# Patient Record
Sex: MALE | Race: WHITE | NOT HISPANIC OR LATINO | Employment: UNEMPLOYED | URBAN - METROPOLITAN AREA
[De-identification: names, ages, dates, MRNs, and addresses within clinical notes are randomized per-mention and may not be internally consistent; named-entity substitution may affect disease eponyms.]

---

## 2017-03-02 PROBLEM — H25.13 NUCLEAR SCLEROSIS OF BOTH EYES: Status: ACTIVE | Noted: 2017-03-02

## 2017-03-02 PROBLEM — E11.9 TYPE 2 DIABETES MELLITUS WITHOUT OPHTHALMIC MANIFESTATIONS: Status: ACTIVE | Noted: 2017-03-02

## 2017-03-02 PROBLEM — I10 BENIGN ESSENTIAL HTN: Status: ACTIVE | Noted: 2017-03-02

## 2017-06-05 PROBLEM — E11.65 UNCONTROLLED TYPE 2 DIABETES MELLITUS WITH HYPERGLYCEMIA, WITH LONG-TERM CURRENT USE OF INSULIN: Status: ACTIVE | Noted: 2017-06-05

## 2017-06-05 PROBLEM — T43.615A: Status: ACTIVE | Noted: 2017-06-05

## 2017-06-05 PROBLEM — E78.00 PURE HYPERCHOLESTEROLEMIA: Status: ACTIVE | Noted: 2017-06-05

## 2017-06-05 PROBLEM — R00.0 TACHYCARDIA: Status: ACTIVE | Noted: 2017-06-05

## 2017-06-05 PROBLEM — Z79.899 ON STATIN THERAPY: Status: ACTIVE | Noted: 2017-06-05

## 2017-06-05 PROBLEM — Z79.4 UNCONTROLLED TYPE 2 DIABETES MELLITUS WITH HYPERGLYCEMIA, WITH LONG-TERM CURRENT USE OF INSULIN: Status: ACTIVE | Noted: 2017-06-05

## 2017-09-21 PROBLEM — Z91.199 NONCOMPLIANCE WITH DIET AND MEDICATION REGIMEN: Status: ACTIVE | Noted: 2017-09-21

## 2017-09-21 PROBLEM — Z91.148 NONCOMPLIANCE WITH DIET AND MEDICATION REGIMEN: Status: ACTIVE | Noted: 2017-09-21

## 2017-09-21 PROBLEM — Z28.21 REFUSED INFLUENZA VACCINE: Status: ACTIVE | Noted: 2017-09-21

## 2018-02-22 ENCOUNTER — TELEPHONE (OUTPATIENT)
Dept: ADMINISTRATIVE | Facility: HOSPITAL | Age: 48
End: 2018-02-22

## 2018-05-29 PROBLEM — N40.1 BENIGN PROSTATIC HYPERPLASIA WITH URINARY FREQUENCY: Status: ACTIVE | Noted: 2018-05-29

## 2018-05-29 PROBLEM — R35.0 BENIGN PROSTATIC HYPERPLASIA WITH URINARY FREQUENCY: Status: ACTIVE | Noted: 2018-05-29

## 2018-10-11 ENCOUNTER — HOSPITAL ENCOUNTER (EMERGENCY)
Facility: HOSPITAL | Age: 48
Discharge: HOME | End: 2018-10-11
Attending: EMERGENCY MEDICINE
Payer: MEDICAID

## 2018-10-11 VITALS
WEIGHT: 195 LBS | TEMPERATURE: 97 F | RESPIRATION RATE: 16 BRPM | OXYGEN SATURATION: 98 % | DIASTOLIC BLOOD PRESSURE: 86 MMHG | SYSTOLIC BLOOD PRESSURE: 142 MMHG | BODY MASS INDEX: 29.65 KG/M2 | HEART RATE: 72 BPM

## 2018-10-11 DIAGNOSIS — B02.9 HERPES ZOSTER WITHOUT COMPLICATION: Primary | ICD-10-CM

## 2018-10-11 PROCEDURE — 99284 EMERGENCY DEPT VISIT MOD MDM: CPT | Mod: 25

## 2018-10-11 PROCEDURE — 63600175 PHARM REV CODE 636 W HCPCS: Performed by: NURSE PRACTITIONER

## 2018-10-11 PROCEDURE — 96372 THER/PROPH/DIAG INJ SC/IM: CPT

## 2018-10-11 RX ORDER — VALACYCLOVIR HYDROCHLORIDE 1 G/1
1000 TABLET, FILM COATED ORAL EVERY 8 HOURS
Qty: 21 TABLET | Refills: 0 | Status: SHIPPED | OUTPATIENT
Start: 2018-10-11 | End: 2020-09-22

## 2018-10-11 RX ORDER — GABAPENTIN 300 MG/1
300 CAPSULE ORAL EVERY 8 HOURS PRN
Qty: 63 CAPSULE | Refills: 0 | Status: SHIPPED | OUTPATIENT
Start: 2018-10-11 | End: 2024-03-21

## 2018-10-11 RX ORDER — KETOROLAC TROMETHAMINE 30 MG/ML
60 INJECTION, SOLUTION INTRAMUSCULAR; INTRAVENOUS
Status: COMPLETED | OUTPATIENT
Start: 2018-10-11 | End: 2018-10-11

## 2018-10-11 RX ADMIN — KETOROLAC TROMETHAMINE 60 MG: 30 INJECTION, SOLUTION INTRAMUSCULAR at 10:10

## 2018-10-11 NOTE — ED PROVIDER NOTES
"Encounter Date: 10/11/2018       History     Chief Complaint   Patient presents with    Rash     The history is provided by the patient.   Rash    This is a new problem. The current episode started several days ago (1 week). The problem has been gradually worsening. The problem is associated with nothing. The rash is present on the abdomen (right lower/flank). The pain is at a severity of 7/10. The pain has been constant since onset. Associated symptoms include blisters and pain. He has tried nothing for the symptoms.     Review of patient's allergies indicates:  No Known Allergies  Past Medical History:   Diagnosis Date    Diabetes mellitus     Hepatitis      Past Surgical History:   Procedure Laterality Date    NO PAST SURGERIES       Family History   Problem Relation Age of Onset    Diabetes Mother     Lung cancer Father      Social History     Tobacco Use    Smoking status: Current Every Day Smoker     Packs/day: 1.00     Types: Cigarettes    Smokeless tobacco: Former User   Substance Use Topics    Alcohol use: No    Drug use: Yes     Types: Methamphetamines     Comment: Patient stated "I'm use IV drug user with meth in the last 2 weeks."     Review of Systems   Constitutional: Negative for chills, fatigue and fever.   HENT: Negative for congestion, dental problem, ear pain, rhinorrhea, sore throat and trouble swallowing.    Eyes: Negative for pain, discharge, redness and visual disturbance.   Respiratory: Negative for cough, shortness of breath and wheezing.    Cardiovascular: Negative for chest pain, palpitations and leg swelling.   Gastrointestinal: Negative for abdominal pain, constipation, diarrhea, nausea and vomiting.   Genitourinary: Negative for difficulty urinating, dysuria, flank pain, frequency, hematuria and urgency.   Musculoskeletal: Negative for arthralgias, back pain, myalgias and neck pain.   Skin: Positive for rash. Negative for pallor and wound.   Neurological: Negative for " seizures, weakness and headaches.   Psychiatric/Behavioral: Negative.        Physical Exam     Initial Vitals [10/11/18 1012]   BP Pulse Resp Temp SpO2   (!) 140/91 76 16 97 °F (36.1 °C) 97 %      MAP       --         Physical Exam    Nursing note and vitals reviewed.  Constitutional: No distress.   HENT:   Head: Normocephalic and atraumatic.   Right Ear: External ear normal.   Left Ear: External ear normal.   Nose: Nose normal.   Mouth/Throat: Oropharynx is clear and moist.   Eyes: Conjunctivae, EOM and lids are normal. Pupils are equal, round, and reactive to light.   Neck: Neck supple.   Cardiovascular: Normal rate, regular rhythm, normal heart sounds and intact distal pulses.   Pulmonary/Chest: Breath sounds normal. No respiratory distress.   Abdominal: Soft. Bowel sounds are normal. There is no tenderness.   Musculoskeletal: Normal range of motion.   Neurological: He is alert and oriented to person, place, and time. He has normal strength.   Skin: Skin is warm and dry. Capillary refill takes less than 2 seconds. Rash noted.        Psychiatric: He has a normal mood and affect. His behavior is normal.         ED Course   Procedures                     Medications   ketorolac injection 60 mg (60 mg Intramuscular Given 10/11/18 1039)        Discussed plan of care with Dr. Jones, agrees with plan of care to treat with antiviral and gabapentin on discharge.               Clinical Impression:   The encounter diagnosis was Herpes zoster without complication.      Disposition:   Disposition: Discharged  Condition: Stable    The patient acknowledges that close follow up with medical provider is required. Instructed to follow up with PCP within 2 days. Patient was given specific return precautions. The patient agrees to comply with all instruction and directions given in the ER.                       Pamela Johns NP  10/11/18 2771

## 2018-10-11 NOTE — ED TRIAGE NOTES
Pt c/o small rash noted right side that appeared a couple days ago. Pt reported it began with inner pain about a week ago.

## 2018-10-11 NOTE — DISCHARGE INSTRUCTIONS
**Follow up with PCP in 24-48 hours. Return to ER with worsening of symptoms.     **Over the counter motrin as needed for pain and/or fever as directed on package insert. Drink plenty fluids. Get plenty rest. Wash hands frequently.     **Our goal in the emergency department is to always give you outstanding care and exceptional service. You may receive a survey by mail or e-mail in the next week regarding your experience in our ED. We would greatly appreciate your completing and returning the survey. Your feedback provides us with a way to recognize our staff who give very good care and it helps us learn how to improve when your experience was below our aspiration of excellence.

## 2018-10-31 PROBLEM — R80.9 TYPE 2 DIABETES MELLITUS WITH MICROALBUMINURIA, WITH LONG-TERM CURRENT USE OF INSULIN: Status: ACTIVE | Noted: 2017-03-02

## 2018-10-31 PROBLEM — E11.29 TYPE 2 DIABETES MELLITUS WITH MICROALBUMINURIA, WITH LONG-TERM CURRENT USE OF INSULIN: Status: ACTIVE | Noted: 2017-03-02

## 2018-10-31 PROBLEM — Z79.4 TYPE 2 DIABETES MELLITUS WITH MICROALBUMINURIA, WITH LONG-TERM CURRENT USE OF INSULIN: Status: ACTIVE | Noted: 2017-03-02

## 2019-01-02 ENCOUNTER — TELEPHONE (OUTPATIENT)
Dept: ADMINISTRATIVE | Facility: HOSPITAL | Age: 49
End: 2019-01-02

## 2019-05-06 ENCOUNTER — HOSPITAL ENCOUNTER (EMERGENCY)
Facility: HOSPITAL | Age: 49
Discharge: HOME | End: 2019-05-06
Attending: SURGERY
Payer: MEDICAID

## 2019-05-06 VITALS
HEART RATE: 76 BPM | SYSTOLIC BLOOD PRESSURE: 132 MMHG | BODY MASS INDEX: 30.41 KG/M2 | OXYGEN SATURATION: 98 % | WEIGHT: 200 LBS | TEMPERATURE: 98 F | RESPIRATION RATE: 16 BRPM | DIASTOLIC BLOOD PRESSURE: 75 MMHG

## 2019-05-06 DIAGNOSIS — L02.419 ABSCESS OF ANTECUBITAL FOSSA: Primary | ICD-10-CM

## 2019-05-06 PROCEDURE — 10060 I&D ABSCESS SIMPLE/SINGLE: CPT

## 2019-05-06 PROCEDURE — 87070 CULTURE OTHR SPECIMN AEROBIC: CPT

## 2019-05-06 PROCEDURE — 90471 IMMUNIZATION ADMIN: CPT | Performed by: SURGERY

## 2019-05-06 PROCEDURE — S0077 INJECTION, CLINDAMYCIN PHOSP: HCPCS | Performed by: SURGERY

## 2019-05-06 PROCEDURE — 63600175 PHARM REV CODE 636 W HCPCS: Performed by: SURGERY

## 2019-05-06 PROCEDURE — 99284 EMERGENCY DEPT VISIT MOD MDM: CPT | Mod: 25

## 2019-05-06 PROCEDURE — 90715 TDAP VACCINE 7 YRS/> IM: CPT | Performed by: SURGERY

## 2019-05-06 PROCEDURE — 96372 THER/PROPH/DIAG INJ SC/IM: CPT

## 2019-05-06 PROCEDURE — 25000003 PHARM REV CODE 250: Performed by: SURGERY

## 2019-05-06 RX ORDER — HYDROCODONE BITARTRATE AND ACETAMINOPHEN 5; 325 MG/1; MG/1
1 TABLET ORAL EVERY 4 HOURS PRN
Qty: 10 TABLET | Refills: 0 | Status: SHIPPED | OUTPATIENT
Start: 2019-05-06 | End: 2019-05-16

## 2019-05-06 RX ORDER — LIDOCAINE HYDROCHLORIDE 10 MG/ML
10 INJECTION, SOLUTION EPIDURAL; INFILTRATION; INTRACAUDAL; PERINEURAL
Status: COMPLETED | OUTPATIENT
Start: 2019-05-06 | End: 2019-05-06

## 2019-05-06 RX ORDER — MUPIROCIN 20 MG/G
OINTMENT TOPICAL 3 TIMES DAILY
Qty: 15 G | Refills: 0 | Status: SHIPPED | OUTPATIENT
Start: 2019-05-06 | End: 2019-05-16

## 2019-05-06 RX ORDER — MUPIROCIN 20 MG/G
1 OINTMENT TOPICAL
Status: COMPLETED | OUTPATIENT
Start: 2019-05-06 | End: 2019-05-06

## 2019-05-06 RX ORDER — CLINDAMYCIN HYDROCHLORIDE 300 MG/1
300 CAPSULE ORAL 4 TIMES DAILY
Qty: 28 CAPSULE | Refills: 0 | Status: SHIPPED | OUTPATIENT
Start: 2019-05-06 | End: 2019-05-13

## 2019-05-06 RX ORDER — CLINDAMYCIN PHOSPHATE 150 MG/ML
600 INJECTION, SOLUTION INTRAVENOUS
Status: COMPLETED | OUTPATIENT
Start: 2019-05-06 | End: 2019-05-06

## 2019-05-06 RX ADMIN — MUPIROCIN 22 G: 20 OINTMENT TOPICAL at 10:05

## 2019-05-06 RX ADMIN — LIDOCAINE HYDROCHLORIDE 100 MG: 10 INJECTION, SOLUTION EPIDURAL; INFILTRATION; INTRACAUDAL; PERINEURAL at 10:05

## 2019-05-06 RX ADMIN — CLOSTRIDIUM TETANI TOXOID ANTIGEN (FORMALDEHYDE INACTIVATED), CORYNEBACTERIUM DIPHTHERIAE TOXOID ANTIGEN (FORMALDEHYDE INACTIVATED), BORDETELLA PERTUSSIS TOXOID ANTIGEN (GLUTARALDEHYDE INACTIVATED), BORDETELLA PERTUSSIS FILAMENTOUS HEMAGGLUTININ ANTIGEN (FORMALDEHYDE INACTIVATED), BORDETELLA PERTUSSIS PERTACTIN ANTIGEN, AND BORDETELLA PERTUSSIS FIMBRIAE 2/3 ANTIGEN 0.5 ML: 5; 2; 2.5; 5; 3; 5 INJECTION, SUSPENSION INTRAMUSCULAR at 10:05

## 2019-05-06 RX ADMIN — CLINDAMYCIN PHOSPHATE 600 MG: 150 INJECTION, SOLUTION INTRAMUSCULAR; INTRAVENOUS at 10:05

## 2019-05-06 NOTE — ED PROVIDER NOTES
[facility]                                                 Chief Complaint  48 y.o. male with Cellulitis (rt elbow area)    History of Present Illness  Onofre Ceballos presents to the emergency room with abscess today  Patient presents with a right antecubital abscess from IV methamphetamine use  Patient states this abscess is been present since the weekend, worse this a.m.  Patient on exam has an obvious right antecubital abscess with fluctuance now  Patient has mild local reaction with no obvious cellulitis, no cellulitic spread  Patient has no MRSA history, has no history of abscess incision and drainage  After discussion with the patient, he agrees to incision and drainage this a.m.  I have also counseled the patient today to seek help for intravenous drug abuse    The history is provided by the patient   device was not used during this ER visit  Medical history: Diabetes, hepatitis, hypertension and cheek  No past surgical history  No known allergy    Review of Systems and Physical Exam      Review of Systems  -- Constitution - no fever, denies fatigue, no weakness, no chills  -- Eyes - no tearing or redness, no visual disturbance  -- Ear, Nose - no tinnitus or earache, no nasal congestion or discharge  -- Mouth,Throat - no sore throat, no toothache, normal voice, normal swallowing  -- Respiratory - denies cough and congestion, no shortness of breath, no KRUEGER  -- Cardiovascular - denies chest pain, no palpitations, denies claudication  -- Gastrointestinal - denies abdominal pain, nausea, vomiting, or diarrhea  -- Genitourinary - no dysuria, no hematuria, no flank pain, no bladder pain  -- Musculoskeletal - denies back pain, negative for trauma or injury  -- Neurological - no headache, denies weakness or seizure; no LOC  -- Skin - right-sided antecubital abscess    Vital Signs  His oral temperature is 98 °F (36.7 °C).   His blood pressure is 130/82 and his pulse is  88.   His respiration is 16 and oxygen saturation is 98%.     Physical Exam  -- Nursing note and vitals reviewed  -- Constitutional: Appears well-developed and well-nourished  -- Head: Atraumatic. Normocephalic. No obvious abnormality  -- Eyes: Pupils are equal and reactive to light. Normal conjunctiva and lids  -- Cardiac: Normal rate, regular rhythm and normal heart sounds  -- Pulmonary: Normal respiratory effort, breath sounds clear to auscultation  -- Abdominal: Soft, no tenderness. Normal bowel sounds. Normal liver edge  -- Musculoskeletal: Normal range of motion, no effusions. Joints stable   -- Neurological: No focal deficits. Showed good interaction with staff  -- Skin: 5 centimeter right antecubital abscess with local induration    Emergency Room Course          I & D - Incision and Drainage  -- Performed by: Jose Antonio Dowd M.D.  -- Date/Time: 10:36 AM 5/6/2019    -- Type: abscess  -- Location: Right antecubital  -- Anesthesia: local infiltration  -- Local anesthetic: lidocaine 1%   -- Anesthetic total: 10 ml  -- Scalpel size: 11  -- Incision type: single straight  -- Complexity: simple  -- Drainage: pus  -- Drainage amount: scant  -- Wound treatment: incision  -- Packing material: 1/4 in gauze  -- Wound culture taken    Medications Given  lidocaine (PF) 10 mg/ml (1%) injection 100 mg (100 mg Infiltration Given 5/6/19 1017)   clindamycin injection 600 mg (600 mg Intramuscular Given 5/6/19 1017)   mupirocin 2 % ointment 22 g (22 g Topical (Top) Given 5/6/19 1017)   Tdap vaccine injection 0.5 mL (0.5 mLs Intramuscular Given 5/6/19 1018)     Diagnosis  -- The encounter diagnosis was Abscess of antecubital fossa.    Disposition and Plan  -- Disposition: home  -- Condition: stable  -- Follow-up: Patient to follow up with MICHELLE Mayfield in 1-2 days.  -- I advised the patient that we have found no life threatening condition today  -- At this time, I believe the patient is clinically stable for discharge.   --  The patient acknowledges that close follow up with a MD is required   -- Patient agrees to comply with all instruction and direction given in the ER    This note is dictated on M*Modal word recognition program.  There are word recognition mistakes that are occasionally missed on review.          Jose Antonio Dowd MD  05/06/19 1038

## 2019-05-08 ENCOUNTER — HOSPITAL ENCOUNTER (EMERGENCY)
Facility: HOSPITAL | Age: 49
Discharge: HOME | End: 2019-05-08
Attending: SURGERY
Payer: MEDICAID

## 2019-05-08 VITALS
HEART RATE: 109 BPM | SYSTOLIC BLOOD PRESSURE: 155 MMHG | WEIGHT: 200 LBS | BODY MASS INDEX: 30.41 KG/M2 | RESPIRATION RATE: 16 BRPM | DIASTOLIC BLOOD PRESSURE: 87 MMHG | OXYGEN SATURATION: 95 % | TEMPERATURE: 98 F

## 2019-05-08 DIAGNOSIS — S41.101D ARM WOUND, RIGHT, SUBSEQUENT ENCOUNTER: Primary | ICD-10-CM

## 2019-05-08 LAB — BACTERIA SPEC AEROBE CULT: NORMAL

## 2019-05-08 PROCEDURE — 25000003 PHARM REV CODE 250: Performed by: SURGERY

## 2019-05-08 PROCEDURE — 99281 EMR DPT VST MAYX REQ PHY/QHP: CPT

## 2019-05-08 RX ADMIN — BACITRACIN, NEOMYCIN, POLYMYXIN B 1 EACH: 400; 3.5; 5 OINTMENT TOPICAL at 07:05

## 2019-05-08 NOTE — ED PROVIDER NOTES
"Encounter Date: 5/8/2019       History     Chief Complaint   Patient presents with    Wound Check     Patient is 48-year-old white male who had incision and drainage of right antecubital fossa abscess 2 days ago.  He returns for wound dressing change.        Review of patient's allergies indicates:  No Known Allergies  Past Medical History:   Diagnosis Date    Diabetes mellitus     Diabetes mellitus type I     Hepatitis     Hepatitis C     Hypertension     Shingles 10/2018     Past Surgical History:   Procedure Laterality Date    NO PAST SURGERIES       Family History   Problem Relation Age of Onset    Diabetes Mother     Lung cancer Father      Social History     Tobacco Use    Smoking status: Current Every Day Smoker     Packs/day: 1.00     Types: Cigarettes    Smokeless tobacco: Former User   Substance Use Topics    Alcohol use: No    Drug use: Yes     Types: Methamphetamines     Comment: Patient stated "I'm use IV drug user with meth in the last 2 weeks."     Review of Systems   Constitutional: Negative for fever.   HENT: Negative for sore throat.    Respiratory: Negative for shortness of breath.    Cardiovascular: Negative for chest pain.   Gastrointestinal: Negative for nausea.   Genitourinary: Negative for dysuria.   Musculoskeletal: Negative for back pain.   Skin: Positive for wound. Negative for rash.   Neurological: Negative for weakness.   Hematological: Does not bruise/bleed easily.       Physical Exam     Initial Vitals [05/08/19 0739]   BP Pulse Resp Temp SpO2   (!) 155/87 109 16 97.7 °F (36.5 °C) 95 %      MAP       --         Physical Exam    Nursing note and vitals reviewed.  Constitutional: He appears well-developed and well-nourished.   HENT:   Head: Normocephalic and atraumatic.   Eyes: EOM are normal. Pupils are equal, round, and reactive to light.   Neck: Normal range of motion. Neck supple.   Cardiovascular: Normal rate.   Pulmonary/Chest: Breath sounds normal.   Abdominal: Soft. "   Musculoskeletal: Normal range of motion.   Neurological: He is alert and oriented to person, place, and time.   Skin: Skin is warm and dry.   Open abscess cavity right antecubital fossa, clean, granulating, no purulent drainage   Psychiatric: He has a normal mood and affect. Thought content normal.         ED Course   Procedures  Labs Reviewed - No data to display       Imaging Results    None                               Clinical Impression:       ICD-10-CM ICD-9-CM   1. Arm wound, right, subsequent encounter S41.101D V58.89     884.0         Disposition:   Disposition: Discharged  Condition: Stable                        Juaquin William Jr., MD  05/08/19 0752

## 2019-10-10 ENCOUNTER — HOSPITAL ENCOUNTER (EMERGENCY)
Facility: HOSPITAL | Age: 49
Discharge: HOME | End: 2019-10-10
Attending: SURGERY
Payer: MEDICAID

## 2019-10-10 VITALS
BODY MASS INDEX: 30.17 KG/M2 | RESPIRATION RATE: 18 BRPM | TEMPERATURE: 97 F | DIASTOLIC BLOOD PRESSURE: 82 MMHG | HEART RATE: 73 BPM | SYSTOLIC BLOOD PRESSURE: 164 MMHG | OXYGEN SATURATION: 98 % | WEIGHT: 198.44 LBS

## 2019-10-10 DIAGNOSIS — R10.11 RIGHT UPPER QUADRANT ABDOMINAL PAIN: ICD-10-CM

## 2019-10-10 DIAGNOSIS — R10.13 EPIGASTRIC PAIN: ICD-10-CM

## 2019-10-10 DIAGNOSIS — R10.9 ABDOMINAL PAIN, UNSPECIFIED ABDOMINAL LOCATION: Primary | ICD-10-CM

## 2019-10-10 LAB
ALBUMIN SERPL BCP-MCNC: 3.4 G/DL (ref 3.5–5.2)
ALP SERPL-CCNC: 103 U/L (ref 55–135)
ALT SERPL W/O P-5'-P-CCNC: 50 U/L (ref 10–44)
AMPHET+METHAMPHET UR QL: NEGATIVE
ANION GAP SERPL CALC-SCNC: 10 MMOL/L (ref 8–16)
AST SERPL-CCNC: 26 U/L (ref 10–40)
BACTERIA #/AREA URNS HPF: NORMAL /HPF
BARBITURATES UR QL SCN>200 NG/ML: NEGATIVE
BASOPHILS # BLD AUTO: 0.06 K/UL (ref 0–0.2)
BASOPHILS NFR BLD: 0.4 % (ref 0–1.9)
BENZODIAZ UR QL SCN>200 NG/ML: NEGATIVE
BILIRUB SERPL-MCNC: 0.3 MG/DL (ref 0.1–1)
BILIRUB UR QL STRIP: NEGATIVE
BUN SERPL-MCNC: 17 MG/DL (ref 6–20)
BZE UR QL SCN: NEGATIVE
CALCIUM SERPL-MCNC: 9.5 MG/DL (ref 8.7–10.5)
CANNABINOIDS UR QL SCN: NEGATIVE
CHLORIDE SERPL-SCNC: 104 MMOL/L (ref 95–110)
CK MB SERPL-MCNC: 1.3 NG/ML (ref 0.1–6.5)
CK MB SERPL-RTO: 2.1 % (ref 0–5)
CK SERPL-CCNC: 63 U/L (ref 20–200)
CK SERPL-CCNC: 63 U/L (ref 20–200)
CLARITY UR: CLEAR
CO2 SERPL-SCNC: 21 MMOL/L (ref 23–29)
COLOR UR: YELLOW
CREAT SERPL-MCNC: 1 MG/DL (ref 0.5–1.4)
CREAT UR-MCNC: 181.2 MG/DL (ref 23–375)
DIFFERENTIAL METHOD: ABNORMAL
EOSINOPHIL # BLD AUTO: 0.5 K/UL (ref 0–0.5)
EOSINOPHIL NFR BLD: 3.6 % (ref 0–8)
ERYTHROCYTE [DISTWIDTH] IN BLOOD BY AUTOMATED COUNT: 12.3 % (ref 11.5–14.5)
EST. GFR  (AFRICAN AMERICAN): >60 ML/MIN/1.73 M^2
EST. GFR  (NON AFRICAN AMERICAN): >60 ML/MIN/1.73 M^2
GLUCOSE SERPL-MCNC: 319 MG/DL (ref 70–110)
GLUCOSE UR QL STRIP: ABNORMAL
HCT VFR BLD AUTO: 48.2 % (ref 40–54)
HGB BLD-MCNC: 16.5 G/DL (ref 14–18)
HGB UR QL STRIP: ABNORMAL
HYALINE CASTS #/AREA URNS LPF: 0 /LPF
IMM GRANULOCYTES # BLD AUTO: 0.04 K/UL (ref 0–0.04)
IMM GRANULOCYTES NFR BLD AUTO: 0.3 % (ref 0–0.5)
KETONES UR QL STRIP: ABNORMAL
LEUKOCYTE ESTERASE UR QL STRIP: NEGATIVE
LIPASE SERPL-CCNC: 36 U/L (ref 4–60)
LYMPHOCYTES # BLD AUTO: 3.7 K/UL (ref 1–4.8)
LYMPHOCYTES NFR BLD: 27.3 % (ref 18–48)
MCH RBC QN AUTO: 32.5 PG (ref 27–31)
MCHC RBC AUTO-ENTMCNC: 34.2 G/DL (ref 32–36)
MCV RBC AUTO: 95 FL (ref 82–98)
METHADONE UR QL SCN>300 NG/ML: NEGATIVE
MICROSCOPIC COMMENT: NORMAL
MONOCYTES # BLD AUTO: 0.8 K/UL (ref 0.3–1)
MONOCYTES NFR BLD: 6 % (ref 4–15)
NEUTROPHILS # BLD AUTO: 8.5 K/UL (ref 1.8–7.7)
NEUTROPHILS NFR BLD: 62.4 % (ref 38–73)
NITRITE UR QL STRIP: NEGATIVE
NRBC BLD-RTO: 0 /100 WBC
OPIATES UR QL SCN: NEGATIVE
PCP UR QL SCN>25 NG/ML: NEGATIVE
PH UR STRIP: 6 [PH] (ref 5–8)
PLATELET # BLD AUTO: 292 K/UL (ref 150–350)
PMV BLD AUTO: 10.6 FL (ref 9.2–12.9)
POTASSIUM SERPL-SCNC: 4.4 MMOL/L (ref 3.5–5.1)
PROT SERPL-MCNC: 6.6 G/DL (ref 6–8.4)
PROT UR QL STRIP: ABNORMAL
RBC # BLD AUTO: 5.07 M/UL (ref 4.6–6.2)
RBC #/AREA URNS HPF: 2 /HPF (ref 0–4)
SODIUM SERPL-SCNC: 135 MMOL/L (ref 136–145)
SP GR UR STRIP: >=1.03 (ref 1–1.03)
TOXICOLOGY INFORMATION: NORMAL
TROPONIN I SERPL DL<=0.01 NG/ML-MCNC: 0.01 NG/ML (ref 0–0.03)
URN SPEC COLLECT METH UR: ABNORMAL
UROBILINOGEN UR STRIP-ACNC: NEGATIVE EU/DL
WBC # BLD AUTO: 13.65 K/UL (ref 3.9–12.7)
WBC #/AREA URNS HPF: 0 /HPF (ref 0–5)
YEAST URNS QL MICRO: NORMAL

## 2019-10-10 PROCEDURE — 80053 COMPREHEN METABOLIC PANEL: CPT

## 2019-10-10 PROCEDURE — 96374 THER/PROPH/DIAG INJ IV PUSH: CPT | Mod: 59

## 2019-10-10 PROCEDURE — 63600175 PHARM REV CODE 636 W HCPCS: Performed by: NURSE PRACTITIONER

## 2019-10-10 PROCEDURE — 84484 ASSAY OF TROPONIN QUANT: CPT

## 2019-10-10 PROCEDURE — 83690 ASSAY OF LIPASE: CPT

## 2019-10-10 PROCEDURE — 93005 ELECTROCARDIOGRAM TRACING: CPT

## 2019-10-10 PROCEDURE — 96361 HYDRATE IV INFUSION ADD-ON: CPT

## 2019-10-10 PROCEDURE — 82553 CREATINE MB FRACTION: CPT

## 2019-10-10 PROCEDURE — 85025 COMPLETE CBC W/AUTO DIFF WBC: CPT

## 2019-10-10 PROCEDURE — 80307 DRUG TEST PRSMV CHEM ANLYZR: CPT

## 2019-10-10 PROCEDURE — 93010 ELECTROCARDIOGRAM REPORT: CPT | Mod: ,,, | Performed by: INTERNAL MEDICINE

## 2019-10-10 PROCEDURE — 93010 EKG 12-LEAD: ICD-10-PCS | Mod: ,,, | Performed by: INTERNAL MEDICINE

## 2019-10-10 PROCEDURE — 25500020 PHARM REV CODE 255: Performed by: SURGERY

## 2019-10-10 PROCEDURE — 82550 ASSAY OF CK (CPK): CPT

## 2019-10-10 PROCEDURE — 36415 COLL VENOUS BLD VENIPUNCTURE: CPT

## 2019-10-10 PROCEDURE — 99285 EMERGENCY DEPT VISIT HI MDM: CPT | Mod: 25

## 2019-10-10 PROCEDURE — 81000 URINALYSIS NONAUTO W/SCOPE: CPT | Mod: 59

## 2019-10-10 RX ORDER — KETOROLAC TROMETHAMINE 30 MG/ML
30 INJECTION, SOLUTION INTRAMUSCULAR; INTRAVENOUS
Status: COMPLETED | OUTPATIENT
Start: 2019-10-10 | End: 2019-10-10

## 2019-10-10 RX ORDER — SODIUM CHLORIDE 9 MG/ML
500 INJECTION, SOLUTION INTRAVENOUS
Status: COMPLETED | OUTPATIENT
Start: 2019-10-10 | End: 2019-10-10

## 2019-10-10 RX ADMIN — SODIUM CHLORIDE 500 ML: 0.9 INJECTION, SOLUTION INTRAVENOUS at 09:10

## 2019-10-10 RX ADMIN — SODIUM CHLORIDE 500 ML: 0.9 INJECTION, SOLUTION INTRAVENOUS at 08:10

## 2019-10-10 RX ADMIN — IOHEXOL 30 ML: 350 INJECTION, SOLUTION INTRAVENOUS at 08:10

## 2019-10-10 RX ADMIN — KETOROLAC TROMETHAMINE 30 MG: 30 INJECTION, SOLUTION INTRAMUSCULAR at 08:10

## 2019-10-10 RX ADMIN — IOHEXOL 100 ML: 350 INJECTION, SOLUTION INTRAVENOUS at 09:10

## 2019-10-11 ENCOUNTER — HOSPITAL ENCOUNTER (EMERGENCY)
Facility: HOSPITAL | Age: 49
Discharge: HOME | End: 2019-10-11
Attending: SURGERY
Payer: MEDICAID

## 2019-10-11 VITALS
WEIGHT: 198.44 LBS | SYSTOLIC BLOOD PRESSURE: 152 MMHG | TEMPERATURE: 96 F | HEART RATE: 82 BPM | RESPIRATION RATE: 18 BRPM | BODY MASS INDEX: 30.17 KG/M2 | DIASTOLIC BLOOD PRESSURE: 73 MMHG

## 2019-10-11 DIAGNOSIS — K59.00 CONSTIPATION: ICD-10-CM

## 2019-10-11 DIAGNOSIS — K59.00 CONSTIPATION, UNSPECIFIED CONSTIPATION TYPE: Primary | ICD-10-CM

## 2019-10-11 LAB
ALBUMIN SERPL BCP-MCNC: 3.3 G/DL (ref 3.5–5.2)
ALP SERPL-CCNC: 97 U/L (ref 55–135)
ALT SERPL W/O P-5'-P-CCNC: 46 U/L (ref 10–44)
AMPHET+METHAMPHET UR QL: NEGATIVE
ANION GAP SERPL CALC-SCNC: 10 MMOL/L (ref 8–16)
AST SERPL-CCNC: 25 U/L (ref 10–40)
BACTERIA #/AREA URNS HPF: NORMAL /HPF
BARBITURATES UR QL SCN>200 NG/ML: NEGATIVE
BASOPHILS # BLD AUTO: 0.05 K/UL (ref 0–0.2)
BASOPHILS NFR BLD: 0.4 % (ref 0–1.9)
BENZODIAZ UR QL SCN>200 NG/ML: NEGATIVE
BILIRUB SERPL-MCNC: 0.3 MG/DL (ref 0.1–1)
BILIRUB UR QL STRIP: NEGATIVE
BUN SERPL-MCNC: 11 MG/DL (ref 6–20)
BZE UR QL SCN: NEGATIVE
CALCIUM SERPL-MCNC: 9 MG/DL (ref 8.7–10.5)
CANNABINOIDS UR QL SCN: NEGATIVE
CHLORIDE SERPL-SCNC: 103 MMOL/L (ref 95–110)
CK MB SERPL-MCNC: 1.4 NG/ML (ref 0.1–6.5)
CK MB SERPL-RTO: 1.8 % (ref 0–5)
CK SERPL-CCNC: 79 U/L (ref 20–200)
CK SERPL-CCNC: 79 U/L (ref 20–200)
CLARITY UR: CLEAR
CO2 SERPL-SCNC: 23 MMOL/L (ref 23–29)
COLOR UR: YELLOW
CREAT SERPL-MCNC: 0.8 MG/DL (ref 0.5–1.4)
CREAT UR-MCNC: 89.7 MG/DL (ref 23–375)
DIFFERENTIAL METHOD: ABNORMAL
EOSINOPHIL # BLD AUTO: 0.3 K/UL (ref 0–0.5)
EOSINOPHIL NFR BLD: 2.7 % (ref 0–8)
ERYTHROCYTE [DISTWIDTH] IN BLOOD BY AUTOMATED COUNT: 12.2 % (ref 11.5–14.5)
EST. GFR  (AFRICAN AMERICAN): >60 ML/MIN/1.73 M^2
EST. GFR  (NON AFRICAN AMERICAN): >60 ML/MIN/1.73 M^2
GLUCOSE SERPL-MCNC: 250 MG/DL (ref 70–110)
GLUCOSE UR QL STRIP: ABNORMAL
HCT VFR BLD AUTO: 47.1 % (ref 40–54)
HGB BLD-MCNC: 16.5 G/DL (ref 14–18)
HGB UR QL STRIP: ABNORMAL
HYALINE CASTS #/AREA URNS LPF: 1 /LPF
IMM GRANULOCYTES # BLD AUTO: 0.05 K/UL (ref 0–0.04)
IMM GRANULOCYTES NFR BLD AUTO: 0.4 % (ref 0–0.5)
KETONES UR QL STRIP: NEGATIVE
LEUKOCYTE ESTERASE UR QL STRIP: NEGATIVE
LIPASE SERPL-CCNC: 62 U/L (ref 4–60)
LYMPHOCYTES # BLD AUTO: 3.4 K/UL (ref 1–4.8)
LYMPHOCYTES NFR BLD: 27 % (ref 18–48)
MCH RBC QN AUTO: 32.4 PG (ref 27–31)
MCHC RBC AUTO-ENTMCNC: 35 G/DL (ref 32–36)
MCV RBC AUTO: 92 FL (ref 82–98)
METHADONE UR QL SCN>300 NG/ML: NEGATIVE
MICROSCOPIC COMMENT: NORMAL
MONOCYTES # BLD AUTO: 0.7 K/UL (ref 0.3–1)
MONOCYTES NFR BLD: 5.8 % (ref 4–15)
NEUTROPHILS # BLD AUTO: 8 K/UL (ref 1.8–7.7)
NEUTROPHILS NFR BLD: 63.7 % (ref 38–73)
NITRITE UR QL STRIP: NEGATIVE
NRBC BLD-RTO: 0 /100 WBC
OPIATES UR QL SCN: NEGATIVE
PCP UR QL SCN>25 NG/ML: NEGATIVE
PH UR STRIP: 6 [PH] (ref 5–8)
PLATELET # BLD AUTO: 288 K/UL (ref 150–350)
PMV BLD AUTO: 10.4 FL (ref 9.2–12.9)
POTASSIUM SERPL-SCNC: 3.8 MMOL/L (ref 3.5–5.1)
PROT SERPL-MCNC: 6.4 G/DL (ref 6–8.4)
PROT UR QL STRIP: ABNORMAL
RBC # BLD AUTO: 5.1 M/UL (ref 4.6–6.2)
RBC #/AREA URNS HPF: 1 /HPF (ref 0–4)
SODIUM SERPL-SCNC: 136 MMOL/L (ref 136–145)
SP GR UR STRIP: 1.02 (ref 1–1.03)
SQUAMOUS #/AREA URNS HPF: 0 /HPF
TOXICOLOGY INFORMATION: NORMAL
TROPONIN I SERPL DL<=0.01 NG/ML-MCNC: 0.01 NG/ML (ref 0–0.03)
URN SPEC COLLECT METH UR: ABNORMAL
UROBILINOGEN UR STRIP-ACNC: NEGATIVE EU/DL
WBC # BLD AUTO: 12.61 K/UL (ref 3.9–12.7)
WBC #/AREA URNS HPF: 2 /HPF (ref 0–5)

## 2019-10-11 PROCEDURE — 93010 ELECTROCARDIOGRAM REPORT: CPT | Mod: ,,, | Performed by: INTERNAL MEDICINE

## 2019-10-11 PROCEDURE — 82553 CREATINE MB FRACTION: CPT

## 2019-10-11 PROCEDURE — 80307 DRUG TEST PRSMV CHEM ANLYZR: CPT

## 2019-10-11 PROCEDURE — 82550 ASSAY OF CK (CPK): CPT

## 2019-10-11 PROCEDURE — 81000 URINALYSIS NONAUTO W/SCOPE: CPT | Mod: 59

## 2019-10-11 PROCEDURE — 80053 COMPREHEN METABOLIC PANEL: CPT

## 2019-10-11 PROCEDURE — 93010 EKG 12-LEAD: ICD-10-PCS | Mod: ,,, | Performed by: INTERNAL MEDICINE

## 2019-10-11 PROCEDURE — 84484 ASSAY OF TROPONIN QUANT: CPT

## 2019-10-11 PROCEDURE — 85025 COMPLETE CBC W/AUTO DIFF WBC: CPT

## 2019-10-11 PROCEDURE — 25000003 PHARM REV CODE 250: Performed by: SURGERY

## 2019-10-11 PROCEDURE — 36415 COLL VENOUS BLD VENIPUNCTURE: CPT

## 2019-10-11 PROCEDURE — 99285 EMERGENCY DEPT VISIT HI MDM: CPT | Mod: 25

## 2019-10-11 PROCEDURE — 83690 ASSAY OF LIPASE: CPT

## 2019-10-11 PROCEDURE — 93005 ELECTROCARDIOGRAM TRACING: CPT

## 2019-10-11 RX ORDER — LACTULOSE 10 G/15ML
20 SOLUTION ORAL
Status: COMPLETED | OUTPATIENT
Start: 2019-10-11 | End: 2019-10-11

## 2019-10-11 RX ORDER — POLYETHYLENE GLYCOL 3350 17 G/17G
17 POWDER, FOR SOLUTION ORAL DAILY
Qty: 1 BOTTLE | Refills: 0 | Status: ON HOLD | OUTPATIENT
Start: 2019-10-11

## 2019-10-11 RX ORDER — DOCUSATE SODIUM 100 MG/1
100 CAPSULE, LIQUID FILLED ORAL 2 TIMES DAILY PRN
Qty: 30 CAPSULE | Refills: 0 | Status: ON HOLD | OUTPATIENT
Start: 2019-10-11

## 2019-10-11 RX ADMIN — LACTULOSE 20 G: 20 SOLUTION ORAL at 06:10

## 2019-10-11 NOTE — ED NOTES
Discharged to home/self care.    - Condition at discharge: Good  - Mode of Discharge: Ambulatory  - The patient left the ED accompanied by a friend.  - The discharge instructions were discussed with the patient.  - He states an understanding of the discharge instructions.  - Walked pt to the discharge station.

## 2019-10-11 NOTE — ED PROVIDER NOTES
[facility]                                                 Chief Complaint  48 y.o. male with Abdominal Pain    History of Present Illness  Onofre Ceballos presents to the emergency room with constipation  Patient has constipation today, patient was seen last night in the ER also  Patient was diagnosed last night with constipation, had a CT scan abdomen  Patient states that he has had continued constipation today, return to ER  Patient has a soft flat abdomen with no signs of peritonitis or rebound now  No fever, no weight loss, or diarrhea, no active emesis on ER evaluation  Patient states he would like additional medication for constipation tonight    The history is provided by the patient   device was not used during this ER visit  Medical history: Diabetes, hepatitis, hypertension and cheek  No past surgical history  No known allergy    I have reviewed all of this patient's past medical, surgical, family, and social   histories as well as active allergies and medications documented in the  electronic medical record    Review of Systems and Physical Exam      Review of Systems  -- Constitution - no fever, denies fatigue, no weakness, no chills  -- Eyes - no tearing or redness, no visual disturbance  -- Ear, Nose - no tinnitus or earache, no nasal congestion or discharge  -- Mouth,Throat - no sore throat, no toothache, normal voice, normal swallowing  -- Respiratory - denies cough and congestion, no shortness of breath, no KRUEGER  -- Cardiovascular - denies chest pain, no palpitations, denies claudication  -- Gastrointestinal - constipation, denies abdominal pain, nausea, vomiting  -- Genitourinary - no dysuria, denies flank pain, no hematuria, no STD risk  -- Musculoskeletal - denies back pain, negative for trauma or injury  -- Neurological - no headache, denies weakness or seizure; no LOC  -- Skin - denies pallor, rash, or changes in skin. no hives or welts noted  --  Psychiatric - Denies SI or HI, no psychosis or fractured thought noted     Vital Signs  His temperature is 95.8 °F (35.4 °C)  His blood pressure is 160/92 and his pulse is 78.   His respiration is 20.     Physical Exam  -- Nursing note and vitals reviewed  -- Constitutional: Appears well-developed and well-nourished  -- Head: Atraumatic. Normocephalic. No obvious abnormality  -- Eyes: Pupils are equal and reactive to light. Normal conjunctiva and lids  -- Cardiac: Normal rate, regular rhythm and normal heart sounds  -- Pulmonary: Normal respiratory effort, breath sounds clear to auscultation  -- Abdominal: Soft, no tenderness. Normal bowel sounds. Normal liver edge  -- Musculoskeletal: Normal range of motion, no effusions. Joints stable   -- Neurological: No focal deficits. Showed good interaction with staff  -- Vascular: Posterior tibial, dorsalis pedis and radial pulses 2+ bilaterally      Emergency Room Course      Lab Results     K 3.8      CO2 23   BUN 11   CREATININE 0.8    (H)   ALKPHOS 97   AST 25   ALT 46 (H)   BILITOT 0.3   ALBUMIN 3.3 (L)   PROT 6.4   WBC 12.61   HGB 16.5   HCT 47.1      CPK 79   CPK 79   CPKMB 1.4   TROPONINI 0.006     Urinalysis  -- Urinalysis performed during this ER visit showed no signs of infection      EKG   -- The EKG findings today were without concerning findings from baseline     Radiology  -- Flat and erect abdominal x-ray performed in the ER today was within normal limits     Medications Given  lactulose 20 gram/30 mL solution Soln 20 g (20 g Oral Given 10/11/19 1816)     Diagnosis  -- Constipation    Disposition and Plan  -- Disposition: home  -- Condition: stable  -- Follow-up: Patient to follow up with MICHELLE Mayfield in 1-2 days.  -- I advised the patient that we have found no life threatening condition today  -- At this time, I believe the patient is clinically stable for discharge.   -- The patient acknowledges that close follow up with a MD  is required   -- Patient agrees to comply with all instruction and direction given in the ER    This note is dictated on M*Modal word recognition program.  There are word recognition mistakes that are occasionally missed on review.         Jose Antonio Dowd MD  10/11/19 1932

## 2019-10-11 NOTE — ED PROVIDER NOTES
"Encounter Date: 10/10/2019       History     Chief Complaint   Patient presents with    Abdominal Pain     Onofre Ceballos is a 48 y.o. male with PMH of DM type 1, hepatitis-C, hypertension, and shingles who presents the ED with reports of right flank pain. Patient reports that pain began approximately 3 days ago.  Pain is described as intermittent, and aching, currently rated 5/10 on pain scale.  He denies abdominal pain. He denies nausea, vomiting, or diarrhea.  He denies dysuria, urgency, or frequency.  He denies hematuria or history of kidney stones.  He reports history of constipation, last BM approximately 3 days ago.  He denies blood or mucus in stool.  He denies chest pain or shortness of breath.  He denies history of abdominal surgeries.    The history is provided by the patient.     Review of patient's allergies indicates:  No Known Allergies  Past Medical History:   Diagnosis Date    Diabetes mellitus     Diabetes mellitus type I     Hepatitis     Hepatitis C     Hypertension     Shingles 10/2018     Past Surgical History:   Procedure Laterality Date    NO PAST SURGERIES       Family History   Problem Relation Age of Onset    Diabetes Mother     Lung cancer Father      Social History     Tobacco Use    Smoking status: Current Every Day Smoker     Packs/day: 1.00     Types: Cigarettes    Smokeless tobacco: Former User   Substance Use Topics    Alcohol use: No    Drug use: Yes     Types: Methamphetamines     Comment: Patient stated "I'm use IV drug user with meth in the last 2 weeks."     Review of Systems   Constitutional: Negative.  Negative for appetite change, chills and fever.   HENT: Negative.  Negative for congestion, ear discharge, ear pain, postnasal drip, rhinorrhea and sore throat.    Eyes: Negative.    Respiratory: Negative.  Negative for cough, chest tightness and shortness of breath.    Cardiovascular: Negative.  Negative for chest pain.   Gastrointestinal: Positive for " constipation. Negative for abdominal distention, abdominal pain and nausea.   Endocrine: Negative.    Genitourinary: Positive for flank pain (right). Negative for dysuria, hematuria and urgency.   Musculoskeletal: Negative for arthralgias and back pain.   Skin: Negative.  Negative for rash.   Allergic/Immunologic: Negative.    Neurological: Negative.  Negative for dizziness, weakness, numbness and headaches.   Hematological: Negative.  Does not bruise/bleed easily.   Psychiatric/Behavioral: Negative.        Physical Exam     Initial Vitals [10/10/19 1835]   BP Pulse Resp Temp SpO2   (!) 164/82 73 18 97.4 °F (36.3 °C) --      MAP       --         Physical Exam    Nursing note and vitals reviewed.  Constitutional: He appears well-developed and well-nourished.   HENT:   Head: Normocephalic and atraumatic.   Eyes: Conjunctivae and EOM are normal. Pupils are equal, round, and reactive to light.   Neck: Neck supple.   Cardiovascular: Normal rate, regular rhythm, normal heart sounds and intact distal pulses.   Pulmonary/Chest: Breath sounds normal.   Abdominal: Soft. Normal appearance and bowel sounds are normal. He exhibits no distension and no ascites. There is no hepatosplenomegaly, splenomegaly or hepatomegaly. There is no tenderness. There is no rigidity, no rebound, no guarding, no CVA tenderness, no tenderness at McBurney's point and negative Ramos's sign. No hernia.   Abdomen soft, nondistended and nontender. Negative Ramos's.  No rebound or guarding noted. Active bowel sounds x4 quadrants.   Musculoskeletal: Normal range of motion.   Neurological: He is alert and oriented to person, place, and time. He has normal strength.   Skin: Skin is warm and dry.   Psychiatric: He has a normal mood and affect. His behavior is normal. Judgment and thought content normal.         ED Course   Procedures  Labs Reviewed   CBC W/ AUTO DIFFERENTIAL - Abnormal; Notable for the following components:       Result Value    WBC 13.65  (*)     Mean Corpuscular Hemoglobin 32.5 (*)     Gran # (ANC) 8.5 (*)     All other components within normal limits   COMPREHENSIVE METABOLIC PANEL   LIPASE   URINALYSIS, REFLEX TO URINE CULTURE   DRUG SCREEN PANEL, URINE EMERGENCY   CK   CK-MB   TROPONIN I          Imaging Results    None            patient asymptomatic after treatment.  CT scan unremarkable.                    Clinical Impression:       ICD-10-CM ICD-9-CM   1. Abdominal pain, unspecified abdominal location R10.9 789.00   2. Epigastric pain R10.13 789.06   3. Right upper quadrant abdominal pain R10.11 789.01         Disposition:   Disposition: Discharged  Condition: Stable                        Juaquin William Jr., MD  10/10/19 4023

## 2019-10-11 NOTE — ED TRIAGE NOTES
Arrives per self transport from home. Presents with complaints of right sided abdominal pain. Seen in ED last night for the same complaint. CT scan found only constipation. Patient reports that he had a enema this morning but only had a small BM

## 2019-12-03 ENCOUNTER — PATIENT OUTREACH (OUTPATIENT)
Dept: ADMINISTRATIVE | Facility: HOSPITAL | Age: 49
End: 2019-12-03

## 2020-02-18 ENCOUNTER — PATIENT OUTREACH (OUTPATIENT)
Dept: ADMINISTRATIVE | Facility: HOSPITAL | Age: 50
End: 2020-02-18

## 2020-09-08 ENCOUNTER — PATIENT OUTREACH (OUTPATIENT)
Dept: ADMINISTRATIVE | Facility: HOSPITAL | Age: 50
End: 2020-09-08

## 2020-09-24 ENCOUNTER — TELEPHONE (OUTPATIENT)
Dept: ADMINISTRATIVE | Facility: HOSPITAL | Age: 50
End: 2020-09-24

## 2020-09-24 DIAGNOSIS — Z12.11 ENCOUNTER FOR SCREENING COLONOSCOPY: Primary | ICD-10-CM

## 2021-03-05 ENCOUNTER — LAB VISIT (OUTPATIENT)
Dept: LAB | Facility: HOSPITAL | Age: 51
End: 2021-03-05
Attending: NURSE PRACTITIONER
Payer: MEDICAID

## 2021-03-05 DIAGNOSIS — I10 ESSENTIAL (PRIMARY) HYPERTENSION: ICD-10-CM

## 2021-03-05 DIAGNOSIS — E11.9 TYPE 2 DIABETES MELLITUS WITHOUT COMPLICATION: ICD-10-CM

## 2021-03-05 DIAGNOSIS — Z79.4 LONG TERM CURRENT USE OF INSULIN: ICD-10-CM

## 2021-03-05 DIAGNOSIS — E78.5 HYPERLIPIDEMIA, UNSPECIFIED: Primary | ICD-10-CM

## 2021-03-05 DIAGNOSIS — N40.1 BENIGN PROSTATIC HYPERPLASIA WITH LOWER URINARY TRACT SYMPTOMS: ICD-10-CM

## 2021-03-05 LAB
ALBUMIN SERPL BCP-MCNC: 3.3 G/DL (ref 3.5–5.2)
ALBUMIN/CREAT UR: 1541.2 UG/MG (ref 0–30)
ALP SERPL-CCNC: 102 U/L (ref 55–135)
ALT SERPL W/O P-5'-P-CCNC: 61 U/L (ref 10–44)
ANION GAP SERPL CALC-SCNC: 10 MMOL/L (ref 8–16)
AST SERPL-CCNC: 25 U/L (ref 10–40)
BACTERIA #/AREA URNS HPF: NORMAL /HPF
BASOPHILS # BLD AUTO: 0.08 K/UL (ref 0–0.2)
BASOPHILS NFR BLD: 0.5 % (ref 0–1.9)
BILIRUB SERPL-MCNC: 0.4 MG/DL (ref 0.1–1)
BILIRUB UR QL STRIP: NEGATIVE
BUN SERPL-MCNC: 17 MG/DL (ref 6–20)
CALCIUM SERPL-MCNC: 8.7 MG/DL (ref 8.7–10.5)
CHLORIDE SERPL-SCNC: 102 MMOL/L (ref 95–110)
CHOLEST SERPL-MCNC: 147 MG/DL (ref 120–199)
CHOLEST/HDLC SERPL: 3 {RATIO} (ref 2–5)
CLARITY UR: CLEAR
CO2 SERPL-SCNC: 20 MMOL/L (ref 23–29)
COLOR UR: YELLOW
CREAT SERPL-MCNC: 0.9 MG/DL (ref 0.5–1.4)
CREAT UR-MCNC: 137.1 MG/DL (ref 23–375)
DIFFERENTIAL METHOD: ABNORMAL
EOSINOPHIL # BLD AUTO: 0.6 K/UL (ref 0–0.5)
EOSINOPHIL NFR BLD: 4.1 % (ref 0–8)
ERYTHROCYTE [DISTWIDTH] IN BLOOD BY AUTOMATED COUNT: 12.2 % (ref 11.5–14.5)
ERYTHROCYTE [DISTWIDTH] IN BLOOD BY AUTOMATED COUNT: 12.2 % (ref 11.5–14.5)
EST. GFR  (AFRICAN AMERICAN): >60 ML/MIN/1.73 M^2
EST. GFR  (NON AFRICAN AMERICAN): >60 ML/MIN/1.73 M^2
ESTIMATED AVG GLUCOSE: 246 MG/DL (ref 68–131)
GLUCOSE SERPL-MCNC: 309 MG/DL (ref 70–110)
GLUCOSE UR QL STRIP: ABNORMAL
HBA1C MFR BLD: 10.2 % (ref 4–5.6)
HCT VFR BLD AUTO: 47.3 % (ref 40–54)
HCT VFR BLD AUTO: 47.3 % (ref 40–54)
HDLC SERPL-MCNC: 49 MG/DL (ref 40–75)
HDLC SERPL: 33.3 % (ref 20–50)
HGB BLD-MCNC: 16.5 G/DL (ref 14–18)
HGB BLD-MCNC: 16.5 G/DL (ref 14–18)
HGB UR QL STRIP: ABNORMAL
HYALINE CASTS #/AREA URNS LPF: 0 /LPF
IMM GRANULOCYTES # BLD AUTO: 0.05 K/UL (ref 0–0.04)
IMM GRANULOCYTES NFR BLD AUTO: 0.3 % (ref 0–0.5)
KETONES UR QL STRIP: NEGATIVE
LDLC SERPL CALC-MCNC: 76.8 MG/DL (ref 63–159)
LEUKOCYTE ESTERASE UR QL STRIP: NEGATIVE
LYMPHOCYTES # BLD AUTO: 4.4 K/UL (ref 1–4.8)
LYMPHOCYTES NFR BLD: 28.2 % (ref 18–48)
MCH RBC QN AUTO: 33 PG (ref 27–31)
MCH RBC QN AUTO: 33 PG (ref 27–31)
MCHC RBC AUTO-ENTMCNC: 34.9 G/DL (ref 32–36)
MCHC RBC AUTO-ENTMCNC: 34.9 G/DL (ref 32–36)
MCV RBC AUTO: 95 FL (ref 82–98)
MCV RBC AUTO: 95 FL (ref 82–98)
MICROALBUMIN UR DL<=1MG/L-MCNC: 2113 UG/ML
MICROSCOPIC COMMENT: NORMAL
MONOCYTES # BLD AUTO: 1 K/UL (ref 0.3–1)
MONOCYTES NFR BLD: 6.4 % (ref 4–15)
NEUTROPHILS # BLD AUTO: 9.4 K/UL (ref 1.8–7.7)
NEUTROPHILS NFR BLD: 60.5 % (ref 38–73)
NITRITE UR QL STRIP: NEGATIVE
NONHDLC SERPL-MCNC: 98 MG/DL
NRBC BLD-RTO: 0 /100 WBC
PH UR STRIP: 6 [PH] (ref 5–8)
PLATELET # BLD AUTO: 326 K/UL (ref 150–350)
PLATELET # BLD AUTO: 326 K/UL (ref 150–350)
PMV BLD AUTO: 10.6 FL (ref 9.2–12.9)
PMV BLD AUTO: 10.6 FL (ref 9.2–12.9)
POTASSIUM SERPL-SCNC: 4.4 MMOL/L (ref 3.5–5.1)
PROT SERPL-MCNC: 6.5 G/DL (ref 6–8.4)
PROT UR QL STRIP: ABNORMAL
RBC # BLD AUTO: 5 M/UL (ref 4.6–6.2)
RBC # BLD AUTO: 5 M/UL (ref 4.6–6.2)
RBC #/AREA URNS HPF: 1 /HPF (ref 0–4)
SODIUM SERPL-SCNC: 132 MMOL/L (ref 136–145)
SP GR UR STRIP: >=1.03 (ref 1–1.03)
SQUAMOUS #/AREA URNS HPF: 2 /HPF
TRIGL SERPL-MCNC: 106 MG/DL (ref 30–150)
URN SPEC COLLECT METH UR: ABNORMAL
UROBILINOGEN UR STRIP-ACNC: NEGATIVE EU/DL
WBC # BLD AUTO: 15.6 K/UL (ref 3.9–12.7)
WBC # BLD AUTO: 15.6 K/UL (ref 3.9–12.7)
WBC #/AREA URNS HPF: 1 /HPF (ref 0–5)

## 2021-03-05 PROCEDURE — 83036 HEMOGLOBIN GLYCOSYLATED A1C: CPT | Performed by: NURSE PRACTITIONER

## 2021-03-05 PROCEDURE — 82043 UR ALBUMIN QUANTITATIVE: CPT | Performed by: NURSE PRACTITIONER

## 2021-03-05 PROCEDURE — 80053 COMPREHEN METABOLIC PANEL: CPT | Performed by: NURSE PRACTITIONER

## 2021-03-05 PROCEDURE — 80061 LIPID PANEL: CPT | Performed by: NURSE PRACTITIONER

## 2021-03-05 PROCEDURE — 85025 COMPLETE CBC W/AUTO DIFF WBC: CPT | Performed by: NURSE PRACTITIONER

## 2021-03-05 PROCEDURE — 36415 COLL VENOUS BLD VENIPUNCTURE: CPT | Performed by: NURSE PRACTITIONER

## 2021-03-05 PROCEDURE — 81000 URINALYSIS NONAUTO W/SCOPE: CPT | Performed by: NURSE PRACTITIONER

## 2021-03-05 PROCEDURE — 82570 ASSAY OF URINE CREATININE: CPT | Performed by: NURSE PRACTITIONER

## 2021-05-06 ENCOUNTER — PATIENT MESSAGE (OUTPATIENT)
Dept: RESEARCH | Facility: HOSPITAL | Age: 51
End: 2021-05-06

## 2021-06-28 ENCOUNTER — NURSE TRIAGE (OUTPATIENT)
Dept: ADMINISTRATIVE | Facility: CLINIC | Age: 51
End: 2021-06-28

## 2021-06-29 ENCOUNTER — LAB VISIT (OUTPATIENT)
Dept: LAB | Facility: HOSPITAL | Age: 51
End: 2021-06-29
Attending: NURSE PRACTITIONER
Payer: MEDICAID

## 2021-06-29 DIAGNOSIS — E78.5 HYPERLIPIDEMIA, UNSPECIFIED: ICD-10-CM

## 2021-06-29 DIAGNOSIS — I10 ESSENTIAL (PRIMARY) HYPERTENSION: ICD-10-CM

## 2021-06-29 DIAGNOSIS — E11.65 TYPE 2 DIABETES MELLITUS WITH HYPERGLYCEMIA: Primary | ICD-10-CM

## 2021-06-29 DIAGNOSIS — N40.1 BENIGN PROSTATIC HYPERPLASIA WITH LOWER URINARY TRACT SYMPTOMS: ICD-10-CM

## 2021-06-29 LAB
ALBUMIN SERPL BCP-MCNC: 3.2 G/DL (ref 3.5–5.2)
ALP SERPL-CCNC: 67 U/L (ref 55–135)
ALT SERPL W/O P-5'-P-CCNC: 55 U/L (ref 10–44)
ANION GAP SERPL CALC-SCNC: 9 MMOL/L (ref 8–16)
AST SERPL-CCNC: 31 U/L (ref 10–40)
BASOPHILS # BLD AUTO: 0.09 K/UL (ref 0–0.2)
BASOPHILS NFR BLD: 0.8 % (ref 0–1.9)
BILIRUB SERPL-MCNC: 0.4 MG/DL (ref 0.1–1)
BUN SERPL-MCNC: 12 MG/DL (ref 6–20)
CALCIUM SERPL-MCNC: 8.7 MG/DL (ref 8.7–10.5)
CHLORIDE SERPL-SCNC: 106 MMOL/L (ref 95–110)
CHOLEST SERPL-MCNC: 125 MG/DL (ref 120–199)
CHOLEST/HDLC SERPL: 3 {RATIO} (ref 2–5)
CO2 SERPL-SCNC: 21 MMOL/L (ref 23–29)
CREAT SERPL-MCNC: 0.9 MG/DL (ref 0.5–1.4)
DIFFERENTIAL METHOD: ABNORMAL
EOSINOPHIL # BLD AUTO: 0.7 K/UL (ref 0–0.5)
EOSINOPHIL NFR BLD: 6.5 % (ref 0–8)
ERYTHROCYTE [DISTWIDTH] IN BLOOD BY AUTOMATED COUNT: 13 % (ref 11.5–14.5)
EST. GFR  (AFRICAN AMERICAN): >60 ML/MIN/1.73 M^2
EST. GFR  (NON AFRICAN AMERICAN): >60 ML/MIN/1.73 M^2
ESTIMATED AVG GLUCOSE: 232 MG/DL (ref 68–131)
GLUCOSE SERPL-MCNC: 162 MG/DL (ref 70–110)
HBA1C MFR BLD: 9.7 % (ref 4–5.6)
HCT VFR BLD AUTO: 45.1 % (ref 40–54)
HDLC SERPL-MCNC: 42 MG/DL (ref 40–75)
HDLC SERPL: 33.6 % (ref 20–50)
HGB BLD-MCNC: 15.4 G/DL (ref 14–18)
IMM GRANULOCYTES # BLD AUTO: 0.05 K/UL (ref 0–0.04)
IMM GRANULOCYTES NFR BLD AUTO: 0.5 % (ref 0–0.5)
LDLC SERPL CALC-MCNC: 71.6 MG/DL (ref 63–159)
LYMPHOCYTES # BLD AUTO: 3.8 K/UL (ref 1–4.8)
LYMPHOCYTES NFR BLD: 34.8 % (ref 18–48)
MCH RBC QN AUTO: 33.4 PG (ref 27–31)
MCHC RBC AUTO-ENTMCNC: 34.1 G/DL (ref 32–36)
MCV RBC AUTO: 98 FL (ref 82–98)
MONOCYTES # BLD AUTO: 0.9 K/UL (ref 0.3–1)
MONOCYTES NFR BLD: 7.7 % (ref 4–15)
NEUTROPHILS # BLD AUTO: 5.5 K/UL (ref 1.8–7.7)
NEUTROPHILS NFR BLD: 49.7 % (ref 38–73)
NONHDLC SERPL-MCNC: 83 MG/DL
NRBC BLD-RTO: 0 /100 WBC
PLATELET # BLD AUTO: 290 K/UL (ref 150–450)
PMV BLD AUTO: 10.1 FL (ref 9.2–12.9)
POTASSIUM SERPL-SCNC: 4.6 MMOL/L (ref 3.5–5.1)
PROSTATE SPECIFIC ANTIGEN, TOTAL: 0.49 NG/ML (ref 0–4)
PROT SERPL-MCNC: 6.3 G/DL (ref 6–8.4)
PSA FREE MFR SERPL: 34.69 %
PSA FREE SERPL-MCNC: 0.17 NG/ML (ref 0.01–1.5)
RBC # BLD AUTO: 4.61 M/UL (ref 4.6–6.2)
SODIUM SERPL-SCNC: 136 MMOL/L (ref 136–145)
TRIGL SERPL-MCNC: 57 MG/DL (ref 30–150)
WBC # BLD AUTO: 11 K/UL (ref 3.9–12.7)

## 2021-06-29 PROCEDURE — 85025 COMPLETE CBC W/AUTO DIFF WBC: CPT | Performed by: NURSE PRACTITIONER

## 2021-06-29 PROCEDURE — 83036 HEMOGLOBIN GLYCOSYLATED A1C: CPT | Performed by: NURSE PRACTITIONER

## 2021-06-29 PROCEDURE — 84153 ASSAY OF PSA TOTAL: CPT | Performed by: NURSE PRACTITIONER

## 2021-06-29 PROCEDURE — 80053 COMPREHEN METABOLIC PANEL: CPT | Performed by: NURSE PRACTITIONER

## 2021-06-29 PROCEDURE — 36415 COLL VENOUS BLD VENIPUNCTURE: CPT | Performed by: NURSE PRACTITIONER

## 2021-06-29 PROCEDURE — 80061 LIPID PANEL: CPT | Performed by: NURSE PRACTITIONER

## 2021-06-29 PROCEDURE — 84154 ASSAY OF PSA FREE: CPT | Performed by: NURSE PRACTITIONER

## 2021-07-01 ENCOUNTER — PATIENT MESSAGE (OUTPATIENT)
Dept: ADMINISTRATIVE | Facility: OTHER | Age: 51
End: 2021-07-01

## 2022-01-24 ENCOUNTER — NURSE TRIAGE (OUTPATIENT)
Dept: ADMINISTRATIVE | Facility: CLINIC | Age: 52
End: 2022-01-24
Payer: MEDICAID

## 2022-01-24 NOTE — TELEPHONE ENCOUNTER
Reason for Disposition   Message left on unidentified voice mail. Phone number verified.    Protocols used: NO CONTACT OR DUPLICATE CONTACT CALL-A-[place]    Left message x 1 attempt.

## 2022-02-05 ENCOUNTER — HOSPITAL ENCOUNTER (EMERGENCY)
Facility: HOSPITAL | Age: 52
Discharge: HOME | End: 2022-02-05
Attending: STUDENT IN AN ORGANIZED HEALTH CARE EDUCATION/TRAINING PROGRAM
Payer: MEDICAID

## 2022-02-05 VITALS
HEART RATE: 65 BPM | WEIGHT: 183 LBS | SYSTOLIC BLOOD PRESSURE: 167 MMHG | OXYGEN SATURATION: 99 % | DIASTOLIC BLOOD PRESSURE: 95 MMHG | RESPIRATION RATE: 20 BRPM | BODY MASS INDEX: 27.82 KG/M2 | TEMPERATURE: 97 F

## 2022-02-05 DIAGNOSIS — L03.011 PARONYCHIA OF RIGHT INDEX FINGER: ICD-10-CM

## 2022-02-05 DIAGNOSIS — L03.011 CELLULITIS OF FINGER OF RIGHT HAND: Primary | ICD-10-CM

## 2022-02-05 LAB
ALBUMIN SERPL BCP-MCNC: 3.2 G/DL (ref 3.5–5.2)
ALP SERPL-CCNC: 108 U/L (ref 55–135)
ALT SERPL W/O P-5'-P-CCNC: 34 U/L (ref 10–44)
ANION GAP SERPL CALC-SCNC: 9 MMOL/L (ref 8–16)
AST SERPL-CCNC: 22 U/L (ref 10–40)
BASOPHILS # BLD AUTO: 0.04 K/UL (ref 0–0.2)
BASOPHILS NFR BLD: 0.4 % (ref 0–1.9)
BILIRUB SERPL-MCNC: 0.4 MG/DL (ref 0.1–1)
BUN SERPL-MCNC: 12 MG/DL (ref 6–20)
CALCIUM SERPL-MCNC: 9.2 MG/DL (ref 8.7–10.5)
CHLORIDE SERPL-SCNC: 100 MMOL/L (ref 95–110)
CO2 SERPL-SCNC: 21 MMOL/L (ref 23–29)
CREAT SERPL-MCNC: 0.9 MG/DL (ref 0.5–1.4)
DIFFERENTIAL METHOD: ABNORMAL
EOSINOPHIL # BLD AUTO: 0.4 K/UL (ref 0–0.5)
EOSINOPHIL NFR BLD: 3.6 % (ref 0–8)
ERYTHROCYTE [DISTWIDTH] IN BLOOD BY AUTOMATED COUNT: 11.9 % (ref 11.5–14.5)
EST. GFR  (AFRICAN AMERICAN): >60 ML/MIN/1.73 M^2
EST. GFR  (NON AFRICAN AMERICAN): >60 ML/MIN/1.73 M^2
GLUCOSE SERPL-MCNC: 429 MG/DL (ref 70–110)
HCT VFR BLD AUTO: 50.7 % (ref 40–54)
HGB BLD-MCNC: 17.5 G/DL (ref 14–18)
IMM GRANULOCYTES # BLD AUTO: 0.06 K/UL (ref 0–0.04)
IMM GRANULOCYTES NFR BLD AUTO: 0.6 % (ref 0–0.5)
LACTATE SERPL-SCNC: 1.1 MMOL/L (ref 0.5–2.2)
LYMPHOCYTES # BLD AUTO: 3.2 K/UL (ref 1–4.8)
LYMPHOCYTES NFR BLD: 32.2 % (ref 18–48)
MCH RBC QN AUTO: 33.1 PG (ref 27–31)
MCHC RBC AUTO-ENTMCNC: 34.5 G/DL (ref 32–36)
MCV RBC AUTO: 96 FL (ref 82–98)
MONOCYTES # BLD AUTO: 0.6 K/UL (ref 0.3–1)
MONOCYTES NFR BLD: 5.6 % (ref 4–15)
NEUTROPHILS # BLD AUTO: 5.6 K/UL (ref 1.8–7.7)
NEUTROPHILS NFR BLD: 57.6 % (ref 38–73)
NRBC BLD-RTO: 0 /100 WBC
PLATELET # BLD AUTO: 294 K/UL (ref 150–450)
PMV BLD AUTO: 10.8 FL (ref 9.2–12.9)
POTASSIUM SERPL-SCNC: 4.7 MMOL/L (ref 3.5–5.1)
PROT SERPL-MCNC: 6.8 G/DL (ref 6–8.4)
RBC # BLD AUTO: 5.29 M/UL (ref 4.6–6.2)
SODIUM SERPL-SCNC: 130 MMOL/L (ref 136–145)
WBC # BLD AUTO: 9.78 K/UL (ref 3.9–12.7)

## 2022-02-05 PROCEDURE — 99284 EMERGENCY DEPT VISIT MOD MDM: CPT | Mod: 25

## 2022-02-05 PROCEDURE — 25000003 PHARM REV CODE 250: Performed by: STUDENT IN AN ORGANIZED HEALTH CARE EDUCATION/TRAINING PROGRAM

## 2022-02-05 PROCEDURE — 36415 COLL VENOUS BLD VENIPUNCTURE: CPT | Performed by: STUDENT IN AN ORGANIZED HEALTH CARE EDUCATION/TRAINING PROGRAM

## 2022-02-05 PROCEDURE — 85025 COMPLETE CBC W/AUTO DIFF WBC: CPT | Performed by: STUDENT IN AN ORGANIZED HEALTH CARE EDUCATION/TRAINING PROGRAM

## 2022-02-05 PROCEDURE — 80053 COMPREHEN METABOLIC PANEL: CPT | Performed by: STUDENT IN AN ORGANIZED HEALTH CARE EDUCATION/TRAINING PROGRAM

## 2022-02-05 PROCEDURE — 83605 ASSAY OF LACTIC ACID: CPT | Performed by: STUDENT IN AN ORGANIZED HEALTH CARE EDUCATION/TRAINING PROGRAM

## 2022-02-05 RX ORDER — SULFAMETHOXAZOLE AND TRIMETHOPRIM 800; 160 MG/1; MG/1
1 TABLET ORAL
Status: COMPLETED | OUTPATIENT
Start: 2022-02-05 | End: 2022-02-05

## 2022-02-05 RX ORDER — SULFAMETHOXAZOLE AND TRIMETHOPRIM 800; 160 MG/1; MG/1
1 TABLET ORAL 2 TIMES DAILY
Qty: 14 TABLET | Refills: 0 | Status: SHIPPED | OUTPATIENT
Start: 2022-02-05 | End: 2022-02-05 | Stop reason: SDUPTHER

## 2022-02-05 RX ORDER — TRAMADOL HYDROCHLORIDE 50 MG/1
50 TABLET ORAL EVERY 6 HOURS PRN
Qty: 10 TABLET | Refills: 0 | OUTPATIENT
Start: 2022-02-05 | End: 2022-02-11

## 2022-02-05 RX ORDER — SULFAMETHOXAZOLE AND TRIMETHOPRIM 800; 160 MG/1; MG/1
1 TABLET ORAL 2 TIMES DAILY
Qty: 14 TABLET | Refills: 0 | Status: SHIPPED | OUTPATIENT
Start: 2022-02-05 | End: 2022-02-12

## 2022-02-05 RX ORDER — HYDROCODONE BITARTRATE AND ACETAMINOPHEN 5; 325 MG/1; MG/1
1 TABLET ORAL
Status: COMPLETED | OUTPATIENT
Start: 2022-02-05 | End: 2022-02-05

## 2022-02-05 RX ORDER — TRAMADOL HYDROCHLORIDE 50 MG/1
50 TABLET ORAL EVERY 6 HOURS PRN
Qty: 10 TABLET | Refills: 0 | Status: SHIPPED | OUTPATIENT
Start: 2022-02-05 | End: 2022-02-05 | Stop reason: SDUPTHER

## 2022-02-05 RX ADMIN — HYDROCODONE BITARTRATE AND ACETAMINOPHEN 1 TABLET: 5; 325 TABLET ORAL at 06:02

## 2022-02-05 RX ADMIN — SULFAMETHOXAZOLE AND TRIMETHOPRIM 1 TABLET: 800; 160 TABLET ORAL at 08:02

## 2022-02-06 NOTE — ED PROVIDER NOTES
"[facility]                                                  Chief Complaint     Chief Complaint   Patient presents with    Finger Pain     Pt reports pain to right index finger for months now. He reports he lost the nail as well.        History of Present Illness  51 y.o. male with PMHx Diabetes Mellitus, Hep C and HTN who presents with R index finger pain, redness and swelling x 1 year. He states that the symptoms have worsened over the last few weeks. His pain is located distally in the finger. His pain is stabbing, 10/10 in severity and intermittent, worsening when touches or moves the digit. He has noticed intermittent purulent drainage near the nail. Denies finger trauma. He denies fever, chills, nausea, vomiting, numbness, paresthesias or finger weakness. Of note, he states that his nail was "ripped off" of the finger 1 year ago but he is unsure how.     History obtained from:  Patient    Review of patient's allergies indicates:  No Known Allergies  Past Medical History:   Diagnosis Date    Diabetes mellitus     Diabetes mellitus type I     Hepatitis     Hepatitis C     Hypertension     Shingles 10/2018     Past Surgical History:   Procedure Laterality Date    NO PAST SURGERIES        Family History   Problem Relation Age of Onset    Diabetes Mother     Lung cancer Father      Social History     Tobacco Use    Smoking status: Current Every Day Smoker     Packs/day: 1.00     Types: Cigarettes    Smokeless tobacco: Former User   Substance Use Topics    Alcohol use: No    Drug use: Yes     Types: Methamphetamines     Comment: Patient stated "I'm use IV drug user with meth in the last 2 weeks."          Review of Systems and Physical Exam     Review of Systems      + Finger pain    All other symptoms negative as stated below    --Constitutional - Denies fever, appetite change, chills  --Eyes - Denies eye discharge, eye pain, eye redness or visual disturbance  --HENT- Denies congestion, " sore throat, drooling, ear discharge, rhinorrhea or trouble swallowing  --Respiratory - Denies cough, shortness or breath, wheezing  --Cardiovascular - Denies chest pain, leg swelling, palpitations  --Gastrointestinal - Denies abdominal pain, abdominal distension, constipation, diarrhea, nausea or vomiting  --Genitourinary - Denies difficulty urinating, dysuria, hematuria, urgency  --Musculoskeletal - Denies arthralgias, myalgias  --Neurological - Denies dizziness, headaches, lightheadedness, weakness  --Hematologic - Denies easy bruising or easy bleeding  --Skin - Denies rash, wound or pallor  --Psychiatric- Denies dysphoric mood, nervousness/anxiety    Vital Signs   weight is 83 kg (182 lb 15.7 oz). His tympanic temperature is 97 °F (36.1 °C). His blood pressure is 167/95 (abnormal) and his pulse is 65. His respiration is 20 and oxygen saturation is 99%.      Physical Exam   Nursing note and vitals reviewed  --Constitutional:  Well developed, well nourished  --HENT: Normocephalic, atraumatic. No rhinorrhea. Moist oral mucosa. No oropharyngeal edema, erythema or exudates.   --Eyes: PERRL. Extraocular movements intact. No periorbital swelling. Normal conjunctiva.  --Neck: Normal range of motion. Neck supple. No adenopathy  --Cardiac: Regular rhythm, normal S1, normal S2, no murmur, normal rate, intact distal pulses  --Pulmonary: Normal respiratory effort, breath sounds normal and equal bilaterally, no accessory muscle use, no respiratory distress  --Abdominal: Soft, normal bowel sounds, no tenderness, no guarding, no rebound  --Musculoskeletal: Distal erythema and dorsal tenderness of the R index finger. No fusiform swelling, tenderness over the the flexor sheath of the digit or flexed posture of digit. No fluctuance noted. Normal range of motion. No deformity.  --Neurological:  Alert and oriented x 4. Follows commands appropriately.    --Skin: Warm and dry. No rash, pallor, cyanosis or jaundice.  --Psych: Normal  mood    ED Course   Lab Results (reviewed by the physician)  Labs Reviewed   CBC W/ AUTO DIFFERENTIAL - Abnormal; Notable for the following components:       Result Value    MCH 33.1 (*)     Immature Granulocytes 0.6 (*)     Immature Grans (Abs) 0.06 (*)     All other components within normal limits   COMPREHENSIVE METABOLIC PANEL - Abnormal; Notable for the following components:    Sodium 130 (*)     CO2 21 (*)     Glucose 429 (*)     Albumin 3.2 (*)     All other components within normal limits   LACTIC ACID, PLASMA     Radiology (images visualized & reports reviewed by the physician)  X-Ray Finger 2 or More Views Right   Final Result      Soft tissue swelling and deformity involving the distal aspect of the 2nd distal phalanx.  Clinical correlation for infection and follow-up with contrast enhanced MRI, as clinically warranted.         Electronically signed by: Reilly Agee MD   Date:    02/05/2022   Time:    19:29          Medications Given  Medications   HYDROcodone-acetaminophen 5-325 mg per tablet 1 tablet (1 tablet Oral Given 2/5/22 1838)   sulfamethoxazole-trimethoprim 800-160mg per tablet 1 tablet (1 tablet Oral Given 2/5/22 2034)       Differential Diagnosis:  Cellulitis, paronychia, Lymphangitis, Abscess, Erysipelas     Clinical Tests:  Lab Tests: Ordered and reviewed  Radiological Study: Ordered and reviewed    ED Management    His tympanic temperature is 97 °F (36.1 °C). His blood pressure is 167/95 (abnormal) and his pulse is 65. His respiration is 20 and oxygen saturation is 99%.     Physical exam with distal erythema and dorsal tenderness of the R index finger. No fusiform swelling, tenderness over the the flexor sheath of the digit or flexed posture of digit, making flexor tenosynovitis unlikely.  Labs with mild hyponatremia and hyperglycemia but no evidence of DKA.  Imaging (x-ray right hand) revealed no evidence of osteomyelitis.  That he is not currently septic, there is no large extent or rapid  progression of his erythema/swelling and he has yet to fail outpatient p.o. antibiotic therapy, I do not feel that he needs to be admitted.  He was discharged with Bactrim and tramadol.  Strict return precautions given.  He was instructed to follow-up with his primary care physician within the next 1-2 days. Understanding of the plan was expressed and all questions were answered.      Diagnosis  The primary encounter diagnosis was Cellulitis of finger of right hand. A diagnosis of Paronychia of right index finger was also pertinent to this visit.    Disposition and Plan  Condition: Stable  Disposition:  Discharge      ED Prescriptions     Medication Sig Dispense Start Date End Date Auth. Provider    sulfamethoxazole-trimethoprim 800-160mg (BACTRIM DS) 800-160 mg Tab Take 1 tablet by mouth 2 (two) times daily. for 7 days 14 tablet 2/5/2022 2/12/2022 Kiet Jones MD    traMADoL (ULTRAM) 50 mg tablet Take 1 tablet (50 mg total) by mouth every 6 (six) hours as needed for Pain. 10 tablet 2/5/2022  Kiet Jones MD        Follow-up Information     Follow up With Specialties Details Why Contact Info    [facility] - Emergency Dept Emergency Medicine Go to  As needed, If symptoms worsen [address] [place] [id number] [phone]    Liza Putnam NP Internal Medicine Schedule an appointment as soon as possible for a visit in 3 days For follow-up of your ED visit [address] [place] [id number]  [phone]               Kiet Jones MD  02/06/22 0622

## 2022-02-06 NOTE — DISCHARGE INSTRUCTIONS
Please follow up with your primary care physician within 2 days. Ensure that you review all lab work results and imaging results. If you have any questions about your discharge paperwork please call the Emergency Department.     Return to the Emergency Department for any fevers, worsening cough or congestion, shortness of breath, chest pain, nausea, vomiting, diarrhea, if symptoms do not improve, or for any new or worsening symptoms, unless otherwise told.     Thank you for visiting [facility], Department of Emergency Medicine. Please see the entirety of the educational materials provided. Please note that a visit to the emergency department does not substitute ongoing care from a primary medical provider or specialist. Please ensure to follow up as recommended. However, please return to the emergency department immediately if symptoms do not improve as discussed, symptoms worsen, new symptoms develop, difficulty in following up or for any of your concerns or issues. Please note on discharge you are acknowledging understanding and agreement on medical evaluation, management recommendations and follow up recommendations.      Home Suture Removal Text: Patient was provided a home suture removal kit and will remove their sutures at home.  If they have any questions or difficulties they will call the office.

## 2022-02-06 NOTE — ED NOTES
Pt instructed to follow up with his primary provider regarding his elevated BS and his finger infection. Pt verbalized understanding.

## 2022-02-11 ENCOUNTER — TELEPHONE (OUTPATIENT)
Dept: EMERGENCY MEDICINE | Facility: HOSPITAL | Age: 52
End: 2022-02-11
Payer: MEDICAID

## 2022-02-11 ENCOUNTER — HOSPITAL ENCOUNTER (EMERGENCY)
Facility: HOSPITAL | Age: 52
Discharge: HOME | End: 2022-02-11
Attending: STUDENT IN AN ORGANIZED HEALTH CARE EDUCATION/TRAINING PROGRAM
Payer: MEDICAID

## 2022-02-11 VITALS
HEART RATE: 68 BPM | SYSTOLIC BLOOD PRESSURE: 137 MMHG | BODY MASS INDEX: 27.81 KG/M2 | RESPIRATION RATE: 20 BRPM | DIASTOLIC BLOOD PRESSURE: 73 MMHG | WEIGHT: 182.88 LBS | TEMPERATURE: 97 F | OXYGEN SATURATION: 98 %

## 2022-02-11 DIAGNOSIS — L03.011 CELLULITIS OF FINGER OF RIGHT HAND: Primary | ICD-10-CM

## 2022-02-11 PROCEDURE — 25000003 PHARM REV CODE 250: Performed by: STUDENT IN AN ORGANIZED HEALTH CARE EDUCATION/TRAINING PROGRAM

## 2022-02-11 PROCEDURE — 96372 THER/PROPH/DIAG INJ SC/IM: CPT

## 2022-02-11 PROCEDURE — 99284 EMERGENCY DEPT VISIT MOD MDM: CPT | Mod: 25

## 2022-02-11 RX ORDER — CLINDAMYCIN HYDROCHLORIDE 300 MG/1
300 CAPSULE ORAL 4 TIMES DAILY
Qty: 28 CAPSULE | Refills: 0 | Status: SHIPPED | OUTPATIENT
Start: 2022-02-11 | End: 2022-02-18

## 2022-02-11 RX ORDER — HYDROCODONE BITARTRATE AND ACETAMINOPHEN 5; 325 MG/1; MG/1
1 TABLET ORAL EVERY 4 HOURS PRN
Qty: 12 TABLET | Refills: 0 | Status: ON HOLD | OUTPATIENT
Start: 2022-02-11 | End: 2024-03-21

## 2022-02-11 RX ORDER — CLINDAMYCIN PHOSPHATE 150 MG/ML
600 INJECTION, SOLUTION INTRAVENOUS
Status: COMPLETED | OUTPATIENT
Start: 2022-02-11 | End: 2022-02-11

## 2022-02-11 RX ADMIN — CLINDAMYCIN PHOSPHATE 600 MG: 150 INJECTION, SOLUTION INTRAVENOUS at 09:02

## 2022-02-11 NOTE — DISCHARGE INSTRUCTIONS
Return to the ED if you have worsening finger swelling, pain, fevers over 100.4F, or the swelling/redness spreads.     PLEASE SEE YOUR PRIMARY CARE PROVIDER ON MONDAY.

## 2022-02-11 NOTE — TELEPHONE ENCOUNTER
Received a call from Onofre Ceballos. States his finger infection hasn't gotten better and he finished his TMP-SMX. Was advised after his recent ED visit to follow up with his PCP but he hasn't. Requesting more antibiotics.    I advised he needs to have someone look at his finger - either his PCP or ED visit. Patient voices understanding.      PALOMO PUGH DO  EMERGENCY MEDICINE  [facility]  02/11/2022 7:09 AM

## 2022-02-11 NOTE — ED TRIAGE NOTES
51 y.o. male presents to ER ED 01/ED 01B   Chief Complaint   Patient presents with    Hand Pain     Right index finger pain, pt states he was recently treated for an infection to his index finger and it is not getting better   . No acute distress noted.

## 2022-02-11 NOTE — ED PROVIDER NOTES
"Encounter Date: 2/11/2022    This document was partially completed using speech recognition software and may contain misspellings, grammatical errors, and/or unexpected word substitutions.       History     Chief Complaint   Patient presents with    Hand Pain     Right index finger pain, pt states he was recently treated for an infection to his index finger and it is not getting better     51-year-old male with a history of diabetes, hepatitis, hypertension presents to the emergency department requesting antibiotics for his right index finger infection.  He was recently seen and discharged with Bactrim.  He has not follow-up with his PCP as directed.  He states that he ran out but the swelling has not gotten any better. Denies any fevers.        Review of patient's allergies indicates:  No Known Allergies  Past Medical History:   Diagnosis Date    Diabetes mellitus     Diabetes mellitus type I     Hepatitis     Hepatitis C     Hypertension     Shingles 10/2018     Past Surgical History:   Procedure Laterality Date    NO PAST SURGERIES       Family History   Problem Relation Age of Onset    Diabetes Mother     Lung cancer Father      Social History     Tobacco Use    Smoking status: Current Every Day Smoker     Packs/day: 1.00     Types: Cigarettes    Smokeless tobacco: Former User   Substance Use Topics    Alcohol use: No    Drug use: Yes     Types: Methamphetamines     Comment: Patient stated "I'm use IV drug user with meth in the last 2 weeks."     Review of Systems   Constitutional: Negative for chills and fever.   HENT: Negative for congestion, rhinorrhea and sneezing.    Eyes: Negative for discharge and redness.   Respiratory: Negative for cough and shortness of breath.    Cardiovascular: Negative for chest pain and palpitations.   Gastrointestinal: Negative for abdominal pain, diarrhea and vomiting.   Genitourinary: Negative for difficulty urinating, flank pain and urgency.   Musculoskeletal: " Negative for back pain and neck pain.   Skin: Positive for color change. Negative for rash and wound.   Neurological: Negative for weakness, numbness and headaches.       Physical Exam     Initial Vitals [02/11/22 0842]   BP Pulse Resp Temp SpO2   137/73 68 20 96.9 °F (36.1 °C) 98 %      MAP       --         Physical Exam    Nursing note and vitals reviewed.  Constitutional: He appears well-developed. He is not diaphoretic. No distress.   HENT:   Head: Normocephalic and atraumatic.   Right Ear: External ear normal.   Left Ear: External ear normal.   Nose: Nose normal.   Eyes: Conjunctivae are normal. Right eye exhibits no discharge. Left eye exhibits no discharge. No scleral icterus.   Cardiovascular: Normal rate and regular rhythm.   Pulmonary/Chest: Breath sounds normal. No stridor. No respiratory distress. He has no wheezes. He has no rhonchi. He has no rales.   Abdominal: Abdomen is soft. He exhibits no distension. There is no abdominal tenderness. There is no guarding.   Musculoskeletal:         General: No edema.      Comments: Right index finger: swelling, erythema, warmth, indurated    Able to extend finger without pain    Mild-moderate diffuse pain of the finger; more on the extensor surface     Neurological: He is alert and oriented to person, place, and time. GCS score is 15. GCS eye subscore is 4. GCS verbal subscore is 5. GCS motor subscore is 6.   Skin: Skin is warm and dry. Capillary refill takes less than 2 seconds.   Psychiatric: He has a normal mood and affect.                     ED Course   Procedures  Labs Reviewed - No data to display       Imaging Results    None          Medications   clindamycin injection 600 mg (600 mg Intramuscular Given 2/11/22 0914)     Medical Decision Making:   Differential Diagnosis:   DDx: flexor tenosynovitis, pulpitis, cellulitis, abscess  ED Management:  Based on the patient's evaluation, the patient appears well for discharge home.  He recently completed his  course of Bactrim without any resolution of his finger infection.  Will give clindamycin in the emergency department and discharged home with clindamycin.      If he fails this antibiotic course, he may need to be admitted for failure of outpatient therapy.  Low suspicion at this time for flexor tenosynovitis with the patient able to extend his finger without pain, more swelling on the extensor surface of his finger.     Patient needs to follow up with his PCP or return to the ED if his finger worsens or does not improve in the next few days. He voices understanding. Return precautions given. Patient is in agreeement with the plan.                      Clinical Impression:   Final diagnoses:  [L03.011] Cellulitis of finger of right hand (Primary)          ED Disposition Condition    Discharge Stable        ED Prescriptions     Medication Sig Dispense Start Date End Date Auth. Provider    clindamycin (CLEOCIN) 300 MG capsule Take 1 capsule (300 mg total) by mouth 4 (four) times daily. for 7 days 28 capsule 2/11/2022 2/18/2022 Antonio Toledo DO    HYDROcodone-acetaminophen (NORCO) 5-325 mg per tablet Take 1 tablet by mouth every 4 (four) hours as needed for Pain. 12 tablet 2/11/2022  Antonio Toledo DO        Follow-up Information     Follow up With Specialties Details Why Contact Info    Your primary care provider  Schedule an appointment as soon as possible for a visit in 3 days      [facility] - Emergency Dept Emergency Medicine  If symptoms worsen [id number] [facility] [id number]  [phone]           Antonio Toledo DO  02/11/22 0941

## 2022-06-13 ENCOUNTER — HOSPITAL ENCOUNTER (EMERGENCY)
Facility: HOSPITAL | Age: 52
Discharge: HOME | End: 2022-06-13
Attending: SURGERY
Payer: MEDICAID

## 2022-06-13 VITALS
OXYGEN SATURATION: 97 % | RESPIRATION RATE: 18 BRPM | DIASTOLIC BLOOD PRESSURE: 65 MMHG | BODY MASS INDEX: 27.37 KG/M2 | SYSTOLIC BLOOD PRESSURE: 138 MMHG | WEIGHT: 180 LBS | TEMPERATURE: 97 F | HEART RATE: 100 BPM

## 2022-06-13 DIAGNOSIS — L03.011 CELLULITIS OF FINGER OF RIGHT HAND: Primary | ICD-10-CM

## 2022-06-13 LAB
ALBUMIN SERPL BCP-MCNC: 3.4 G/DL (ref 3.5–5.2)
ALP SERPL-CCNC: 113 U/L (ref 55–135)
ALT SERPL W/O P-5'-P-CCNC: 57 U/L (ref 10–44)
ANION GAP SERPL CALC-SCNC: 11 MMOL/L (ref 8–16)
AST SERPL-CCNC: 56 U/L (ref 10–40)
BASOPHILS # BLD AUTO: 0.1 K/UL (ref 0–0.2)
BASOPHILS NFR BLD: 0.7 % (ref 0–1.9)
BILIRUB SERPL-MCNC: 0.3 MG/DL (ref 0.1–1)
BUN SERPL-MCNC: 29 MG/DL (ref 6–20)
CALCIUM SERPL-MCNC: 9.1 MG/DL (ref 8.7–10.5)
CHLORIDE SERPL-SCNC: 101 MMOL/L (ref 95–110)
CO2 SERPL-SCNC: 22 MMOL/L (ref 23–29)
CREAT SERPL-MCNC: 1.2 MG/DL (ref 0.5–1.4)
DIFFERENTIAL METHOD: ABNORMAL
EOSINOPHIL # BLD AUTO: 0.6 K/UL (ref 0–0.5)
EOSINOPHIL NFR BLD: 3.7 % (ref 0–8)
ERYTHROCYTE [DISTWIDTH] IN BLOOD BY AUTOMATED COUNT: 12.4 % (ref 11.5–14.5)
ERYTHROCYTE [SEDIMENTATION RATE] IN BLOOD BY WESTERGREN METHOD: 30 MM/HR (ref 0–10)
EST. GFR  (AFRICAN AMERICAN): >60 ML/MIN/1.73 M^2
EST. GFR  (NON AFRICAN AMERICAN): >60 ML/MIN/1.73 M^2
GLUCOSE SERPL-MCNC: 89 MG/DL (ref 70–110)
HCT VFR BLD AUTO: 45 % (ref 40–54)
HGB BLD-MCNC: 15.6 G/DL (ref 14–18)
IMM GRANULOCYTES # BLD AUTO: 0.07 K/UL (ref 0–0.04)
IMM GRANULOCYTES NFR BLD AUTO: 0.5 % (ref 0–0.5)
LACTATE SERPL-SCNC: 0.9 MMOL/L (ref 0.5–2.2)
LYMPHOCYTES # BLD AUTO: 4.7 K/UL (ref 1–4.8)
LYMPHOCYTES NFR BLD: 31.6 % (ref 18–48)
MCH RBC QN AUTO: 32.8 PG (ref 27–31)
MCHC RBC AUTO-ENTMCNC: 34.7 G/DL (ref 32–36)
MCV RBC AUTO: 95 FL (ref 82–98)
MONOCYTES # BLD AUTO: 1.4 K/UL (ref 0.3–1)
MONOCYTES NFR BLD: 9.3 % (ref 4–15)
NEUTROPHILS # BLD AUTO: 8.1 K/UL (ref 1.8–7.7)
NEUTROPHILS NFR BLD: 54.2 % (ref 38–73)
NRBC BLD-RTO: 0 /100 WBC
PLATELET # BLD AUTO: 360 K/UL (ref 150–450)
PMV BLD AUTO: 10 FL (ref 9.2–12.9)
POTASSIUM SERPL-SCNC: 3.6 MMOL/L (ref 3.5–5.1)
PROT SERPL-MCNC: 7.3 G/DL (ref 6–8.4)
RBC # BLD AUTO: 4.75 M/UL (ref 4.6–6.2)
SODIUM SERPL-SCNC: 134 MMOL/L (ref 136–145)
WBC # BLD AUTO: 14.99 K/UL (ref 3.9–12.7)

## 2022-06-13 PROCEDURE — 63600175 PHARM REV CODE 636 W HCPCS: Performed by: SURGERY

## 2022-06-13 PROCEDURE — 90471 IMMUNIZATION ADMIN: CPT | Performed by: SURGERY

## 2022-06-13 PROCEDURE — 80053 COMPREHEN METABOLIC PANEL: CPT | Performed by: SURGERY

## 2022-06-13 PROCEDURE — 83605 ASSAY OF LACTIC ACID: CPT | Performed by: SURGERY

## 2022-06-13 PROCEDURE — 86140 C-REACTIVE PROTEIN: CPT | Performed by: SURGERY

## 2022-06-13 PROCEDURE — 90715 TDAP VACCINE 7 YRS/> IM: CPT | Performed by: SURGERY

## 2022-06-13 PROCEDURE — 99284 EMERGENCY DEPT VISIT MOD MDM: CPT | Mod: 25

## 2022-06-13 PROCEDURE — 85651 RBC SED RATE NONAUTOMATED: CPT | Performed by: SURGERY

## 2022-06-13 PROCEDURE — 85025 COMPLETE CBC W/AUTO DIFF WBC: CPT | Performed by: SURGERY

## 2022-06-13 PROCEDURE — 25000003 PHARM REV CODE 250: Performed by: SURGERY

## 2022-06-13 PROCEDURE — 96372 THER/PROPH/DIAG INJ SC/IM: CPT | Performed by: SURGERY

## 2022-06-13 PROCEDURE — 87040 BLOOD CULTURE FOR BACTERIA: CPT | Performed by: SURGERY

## 2022-06-13 RX ORDER — CLINDAMYCIN PHOSPHATE 150 MG/ML
600 INJECTION, SOLUTION INTRAVENOUS
Status: COMPLETED | OUTPATIENT
Start: 2022-06-13 | End: 2022-06-13

## 2022-06-13 RX ORDER — MUPIROCIN 20 MG/G
1 OINTMENT TOPICAL
Status: COMPLETED | OUTPATIENT
Start: 2022-06-13 | End: 2022-06-13

## 2022-06-13 RX ORDER — CLINDAMYCIN HYDROCHLORIDE 300 MG/1
300 CAPSULE ORAL 4 TIMES DAILY
Qty: 28 CAPSULE | Refills: 0 | Status: SHIPPED | OUTPATIENT
Start: 2022-06-13 | End: 2022-06-20

## 2022-06-13 RX ORDER — MUPIROCIN 20 MG/G
OINTMENT TOPICAL 3 TIMES DAILY
Qty: 15 G | Refills: 0 | Status: SHIPPED | OUTPATIENT
Start: 2022-06-13 | End: 2022-06-23

## 2022-06-13 RX ADMIN — MUPIROCIN 22 G: 20 OINTMENT TOPICAL at 09:06

## 2022-06-13 RX ADMIN — CLINDAMYCIN PHOSPHATE 600 MG: 150 INJECTION, SOLUTION INTRAVENOUS at 09:06

## 2022-06-13 RX ADMIN — TETANUS TOXOID, REDUCED DIPHTHERIA TOXOID AND ACELLULAR PERTUSSIS VACCINE, ADSORBED 0.5 ML: 5; 2.5; 8; 8; 2.5 SUSPENSION INTRAMUSCULAR at 09:06

## 2022-06-14 LAB — CRP SERPL-MCNC: 2.5 MG/L (ref 0–8.2)

## 2022-06-14 NOTE — ED NOTES
Doctor was in the room to explain discharge instruction, AVS was printed and handed to him he was instructed to wait 15 minutes after meds to leave

## 2022-06-14 NOTE — ED PROVIDER NOTES
"Encounter Date: 6/13/2022       History     Chief Complaint   Patient presents with    Hand Pain     Patient to ER with swelling and pain to his right point finger     51-year-old male presents with right index finger swelling of the dorsal tip  The patient has had chronic recurrent finger infections for several years  Pt has no tenosynovitis, the erythema is entirely dorsal/distal phalanx  Had had this issue for several years, typically treated with outpatient ABX  Good distal pulses, good capillary refill, no fever or fluctuance noted now        Review of patient's allergies indicates:  No Known Allergies  Past Medical History:   Diagnosis Date    Diabetes mellitus     Diabetes mellitus type I     Hepatitis     Hepatitis C     Hypertension     Shingles 10/2018     Past Surgical History:   Procedure Laterality Date    NO PAST SURGERIES       Family History   Problem Relation Age of Onset    Diabetes Mother     Lung cancer Father      Social History     Tobacco Use    Smoking status: Current Every Day Smoker     Packs/day: 1.00     Types: Cigarettes    Smokeless tobacco: Former User   Substance Use Topics    Alcohol use: No    Drug use: Yes     Types: Methamphetamines     Comment: Patient stated "I'm use IV drug user with meth in the last 2 weeks."     Review of Systems   Constitutional: Negative for activity change, appetite change, fatigue, fever and unexpected weight change.   HENT: Negative for congestion, ear pain, mouth sores, nosebleeds, rhinorrhea, sinus pressure, sneezing and sore throat.    Eyes: Negative for pain, discharge, redness and itching.   Respiratory: Negative for apnea, cough, chest tightness and shortness of breath.    Cardiovascular: Negative for chest pain, palpitations and leg swelling.   Gastrointestinal: Negative for abdominal distention, abdominal pain, anal bleeding, constipation, diarrhea, nausea and vomiting.   Endocrine: Negative.    Genitourinary: Negative for dysuria, " enuresis, flank pain and frequency.   Musculoskeletal: Negative for arthralgias, back pain, neck pain and neck stiffness.        Right index finger swelling at the dorsal tip   Skin: Negative for color change and wound.   Allergic/Immunologic: Negative.    Neurological: Negative for dizziness, tremors, syncope, facial asymmetry, speech difficulty, weakness, light-headedness, numbness and headaches.   Hematological: Negative for adenopathy. Does not bruise/bleed easily.   Psychiatric/Behavioral: Negative for agitation, behavioral problems, hallucinations, self-injury and suicidal ideas. The patient is not nervous/anxious.        Physical Exam     Initial Vitals [06/13/22 2048]   BP Pulse Resp Temp SpO2   138/65 100 18 96.6 °F (35.9 °C) 97 %      MAP       --         Physical Exam    Constitutional: Vital signs are normal. He appears well-developed and well-nourished. He is cooperative.   HENT:   Head: Normocephalic and atraumatic.   Right Ear: Hearing, tympanic membrane, external ear and ear canal normal.   Left Ear: Hearing, tympanic membrane, external ear and ear canal normal.   Nose: Nose normal.   Mouth/Throat: Uvula is midline, oropharynx is clear and moist and mucous membranes are normal.   Eyes: Conjunctivae, EOM and lids are normal. Pupils are equal, round, and reactive to light.   Neck: Neck supple. No JVD present.   Normal range of motion.   Full passive range of motion without pain.     Cardiovascular: Normal rate, regular rhythm, S1 normal, S2 normal, normal heart sounds, intact distal pulses and normal pulses.   Pulmonary/Chest: Effort normal and breath sounds normal.   Abdominal: Abdomen is soft and flat. Bowel sounds are normal.   Musculoskeletal:         General: Normal range of motion.      Cervical back: Full passive range of motion without pain, normal range of motion and neck supple.     Neurological: He is alert and oriented to person, place, and time. He has normal strength.   Skin: Skin is  intact. Capillary refill takes less than 2 seconds.   Right index finger swelling of the dorsal tip, patient has no fingernail         ED Course   Procedures  Labs Reviewed   COMPREHENSIVE METABOLIC PANEL - Abnormal; Notable for the following components:       Result Value    Sodium 134 (*)     CO2 22 (*)     BUN 29 (*)     Albumin 3.4 (*)     AST 56 (*)     ALT 57 (*)     All other components within normal limits   CBC W/ AUTO DIFFERENTIAL - Abnormal; Notable for the following components:    WBC 14.99 (*)     MCH 32.8 (*)     Gran # (ANC) 8.1 (*)     Immature Grans (Abs) 0.07 (*)     Mono # 1.4 (*)     Eos # 0.6 (*)     All other components within normal limits   CULTURE, BLOOD   CULTURE, BLOOD   LACTIC ACID, PLASMA   C-REACTIVE PROTEIN   SEDIMENTATION RATE          Imaging Results          X-Ray Hand 3 view Right (In process)                  Medications   Tdap (BOOSTRIX) vaccine injection 0.5 mL (has no administration in time range)   mupirocin 2 % ointment 22 g (has no administration in time range)   clindamycin injection 600 mg (has no administration in time range)     Medical Decision Making:   Initial Assessment:   Patient with right dorsal index finger erythema, chronic finger infection  Patient is a poorly-controlled diabetic with recurrent finger infections  No fever fluctuance or signs of abscess, treated earlier this year as well    Differential Diagnosis:   Cellulitis, osteomyelitis, chronic infection of the distal phalanx    Clinical Tests:   Lab Tests: Ordered and Reviewed  Radiological Study: Ordered and Reviewed    ED Management:  Patient with erythema to the right dorsal distal phalanx of the index finger  Patient has had recurrent infections this finger over several years now  Patient states typically clindamycin antibiotic is the treatment of choice  Patient would like a treatment of outpatient antibiotic with close follow up  Patient given specialist information for [place] [facility]  Patient will follow-up in next 48 hours return with any concerns on DC                        Clinical Impression:   Final diagnoses:  [L03.011] Cellulitis of finger of right hand (Primary)          ED Disposition Condition    Discharge Stable        ED Prescriptions     Medication Sig Dispense Start Date End Date Auth. Provider    mupirocin (BACTROBAN) 2 % ointment Apply topically 3 (three) times daily. for 10 days 15 g 6/13/2022 6/23/2022 Jose Antonio Dowd MD    clindamycin (CLEOCIN) 300 MG capsule Take 1 capsule (300 mg total) by mouth 4 (four) times daily. for 7 days 28 capsule 6/13/2022 6/20/2022 Jose Antonio Dowd MD        Follow-up Information     Follow up With Specialties Details Why Contact Info    Jr Ndiaye IV, MD Orthopedic Surgery Go in 2 days  [address]  [address]  [facility] [address]  [phone]             Jose Antonio Dowd MD  06/13/22 7315

## 2022-06-15 ENCOUNTER — PATIENT OUTREACH (OUTPATIENT)
Dept: EMERGENCY MEDICINE | Facility: HOSPITAL | Age: 52
End: 2022-06-15
Payer: MEDICAID

## 2022-06-19 LAB
BACTERIA BLD CULT: NORMAL
BACTERIA BLD CULT: NORMAL

## 2022-06-21 NOTE — PROGRESS NOTES
Attempted to contact patient on 3 separate occasions, patient is unable to reach. ED Navigator to close encounter at this time.    Edie Humphrey  ED Navigator- [place]

## 2023-08-07 PROBLEM — E11.65 UNCONTROLLED TYPE 2 DIABETES MELLITUS WITH HYPERGLYCEMIA, WITH LONG-TERM CURRENT USE OF INSULIN: Status: RESOLVED | Noted: 2017-06-05 | Resolved: 2023-08-07

## 2023-08-07 PROBLEM — Z79.4 UNCONTROLLED TYPE 2 DIABETES MELLITUS WITH HYPERGLYCEMIA, WITH LONG-TERM CURRENT USE OF INSULIN: Status: RESOLVED | Noted: 2017-06-05 | Resolved: 2023-08-07

## 2024-01-12 ENCOUNTER — NURSE TRIAGE (OUTPATIENT)
Dept: ADMINISTRATIVE | Facility: CLINIC | Age: 54
End: 2024-01-12
Payer: MEDICAID

## 2024-01-12 NOTE — TELEPHONE ENCOUNTER
Caller states that he has swollen feet, ankle and face. Caller denies difficulty breathing or ambulating at this time. Pt denies pain.  Pt advised per protocol and verbalized understanding.     Reason for Disposition   Swelling of face, arm or hands  (Exception: Slight puffiness of fingers occurring during hot weather.)    Additional Information   Negative: SEVERE difficulty breathing (e.g., struggling for each breath, speaks in single words)   Negative: Looks like a broken bone or dislocated joint (e.g., crooked or deformed)   Negative: Sounds like a life-threatening emergency to the triager   Negative: Difficulty breathing at rest   Negative: [1] Can't walk or can barely walk AND [2] new-onset   Negative: Entire foot is cool or blue in comparison to other side   Negative: [1] Difficulty breathing with exertion (e.g., walking) AND [2] new-onset or worsening   Negative: [1] Red area or streak AND [2] fever   Negative: [1] Swelling is painful to touch AND [2] fever   Negative: [1] Cast on leg or ankle AND [2] now increased pain   Negative: Patient sounds very sick or weak to the triager   Negative: SEVERE leg swelling (e.g., swelling extends above knee, entire leg is swollen, weeping fluid)   Negative: [1] Red area or streak [2] large (> 2 in. or 5 cm)   Negative: [1] Thigh or calf pain AND [2] only 1 side AND [3] present > 1 hour   Negative: [1] Thigh, calf, or ankle swelling AND [2] only 1 side   Negative: [1] Thigh, calf, or ankle swelling AND [2] bilateral AND [3] 1 side is more swollen   Negative: [1] MODERATE leg swelling (e.g., swelling extends up to knees) AND [2] new-onset or worsening    Protocols used: Leg Swelling and Edema-A-AH

## 2024-01-14 ENCOUNTER — HOSPITAL ENCOUNTER (EMERGENCY)
Facility: HOSPITAL | Age: 54
Discharge: HOME | End: 2024-01-14
Attending: SURGERY
Payer: MEDICAID

## 2024-01-14 VITALS
HEIGHT: 67 IN | SYSTOLIC BLOOD PRESSURE: 174 MMHG | TEMPERATURE: 98 F | DIASTOLIC BLOOD PRESSURE: 89 MMHG | BODY MASS INDEX: 30.61 KG/M2 | OXYGEN SATURATION: 98 % | HEART RATE: 107 BPM | WEIGHT: 195 LBS | RESPIRATION RATE: 20 BRPM

## 2024-01-14 DIAGNOSIS — L73.9 FOLLICULITIS: Primary | ICD-10-CM

## 2024-01-14 LAB
ALBUMIN SERPL BCP-MCNC: 1.9 G/DL (ref 3.5–5.2)
ALP SERPL-CCNC: 84 U/L (ref 55–135)
ALT SERPL W/O P-5'-P-CCNC: 53 U/L (ref 10–44)
AMORPH CRY URNS QL MICRO: ABNORMAL
AMPHET+METHAMPHET UR QL: ABNORMAL
ANION GAP SERPL CALC-SCNC: 11 MMOL/L (ref 8–16)
AST SERPL-CCNC: 46 U/L (ref 10–40)
BACTERIA #/AREA URNS HPF: ABNORMAL /HPF
BARBITURATES UR QL SCN>200 NG/ML: NEGATIVE
BASOPHILS # BLD AUTO: 0.11 K/UL (ref 0–0.2)
BASOPHILS NFR BLD: 0.6 % (ref 0–1.9)
BENZODIAZ UR QL SCN>200 NG/ML: NEGATIVE
BILIRUB SERPL-MCNC: 0.3 MG/DL (ref 0.1–1)
BILIRUB UR QL STRIP: NEGATIVE
BNP SERPL-MCNC: 28 PG/ML (ref 0–99)
BUN SERPL-MCNC: 27 MG/DL (ref 6–20)
BZE UR QL SCN: NEGATIVE
CALCIUM SERPL-MCNC: 8.3 MG/DL (ref 8.7–10.5)
CANNABINOIDS UR QL SCN: ABNORMAL
CHLORIDE SERPL-SCNC: 103 MMOL/L (ref 95–110)
CK SERPL-CCNC: 787 U/L (ref 20–200)
CLARITY UR: CLEAR
CO2 SERPL-SCNC: 19 MMOL/L (ref 23–29)
COLOR UR: YELLOW
CREAT SERPL-MCNC: 1.3 MG/DL (ref 0.5–1.4)
CREAT UR-MCNC: 66.1 MG/DL (ref 23–375)
DIFFERENTIAL METHOD BLD: ABNORMAL
EOSINOPHIL # BLD AUTO: 0.2 K/UL (ref 0–0.5)
EOSINOPHIL NFR BLD: 1.1 % (ref 0–8)
ERYTHROCYTE [DISTWIDTH] IN BLOOD BY AUTOMATED COUNT: 12.6 % (ref 11.5–14.5)
EST. GFR  (NO RACE VARIABLE): >60 ML/MIN/1.73 M^2
GLUCOSE SERPL-MCNC: 328 MG/DL (ref 70–110)
GLUCOSE UR QL STRIP: ABNORMAL
GRAN CASTS #/AREA URNS LPF: 13 /LPF
HCT VFR BLD AUTO: 43.5 % (ref 40–54)
HGB BLD-MCNC: 14.9 G/DL (ref 14–18)
HGB UR QL STRIP: ABNORMAL
HYALINE CASTS #/AREA URNS LPF: 0 /LPF
IMM GRANULOCYTES # BLD AUTO: 0.15 K/UL (ref 0–0.04)
IMM GRANULOCYTES NFR BLD AUTO: 0.8 % (ref 0–0.5)
KETONES UR QL STRIP: ABNORMAL
LACTATE SERPL-SCNC: 1.4 MMOL/L (ref 0.5–2.2)
LEUKOCYTE ESTERASE UR QL STRIP: NEGATIVE
LYMPHOCYTES # BLD AUTO: 3.4 K/UL (ref 1–4.8)
LYMPHOCYTES NFR BLD: 18.4 % (ref 18–48)
MCH RBC QN AUTO: 32.7 PG (ref 27–31)
MCHC RBC AUTO-ENTMCNC: 34.3 G/DL (ref 32–36)
MCV RBC AUTO: 96 FL (ref 82–98)
METHADONE UR QL SCN>300 NG/ML: NEGATIVE
MICROSCOPIC COMMENT: ABNORMAL
MONOCYTES # BLD AUTO: 1.1 K/UL (ref 0.3–1)
MONOCYTES NFR BLD: 6 % (ref 4–15)
NEUTROPHILS # BLD AUTO: 13.5 K/UL (ref 1.8–7.7)
NEUTROPHILS NFR BLD: 73.1 % (ref 38–73)
NITRITE UR QL STRIP: NEGATIVE
NRBC BLD-RTO: 0 /100 WBC
OPIATES UR QL SCN: NEGATIVE
OTHER ELEMENTS URNS MICRO: ABNORMAL
PCP UR QL SCN>25 NG/ML: NEGATIVE
PH UR STRIP: 6 [PH] (ref 5–8)
PLATELET # BLD AUTO: 420 K/UL (ref 150–450)
PMV BLD AUTO: 11.1 FL (ref 9.2–12.9)
POTASSIUM SERPL-SCNC: 4.7 MMOL/L (ref 3.5–5.1)
PROT SERPL-MCNC: 5.2 G/DL (ref 6–8.4)
PROT UR QL STRIP: ABNORMAL
RBC # BLD AUTO: 4.55 M/UL (ref 4.6–6.2)
RBC #/AREA URNS HPF: 3 /HPF (ref 0–4)
SODIUM SERPL-SCNC: 133 MMOL/L (ref 136–145)
SP GR UR STRIP: 1.02 (ref 1–1.03)
TOXICOLOGY INFORMATION: ABNORMAL
TROPONIN I SERPL DL<=0.01 NG/ML-MCNC: 0.03 NG/ML (ref 0–0.03)
URN SPEC COLLECT METH UR: ABNORMAL
UROBILINOGEN UR STRIP-ACNC: NEGATIVE EU/DL
WBC # BLD AUTO: 18.44 K/UL (ref 3.9–12.7)
WBC #/AREA URNS HPF: 2 /HPF (ref 0–5)
YEAST URNS QL MICRO: ABNORMAL

## 2024-01-14 PROCEDURE — 84484 ASSAY OF TROPONIN QUANT: CPT | Performed by: SURGERY

## 2024-01-14 PROCEDURE — 36415 COLL VENOUS BLD VENIPUNCTURE: CPT | Mod: XB | Performed by: SURGERY

## 2024-01-14 PROCEDURE — 99900035 HC TECH TIME PER 15 MIN (STAT)

## 2024-01-14 PROCEDURE — 80053 COMPREHEN METABOLIC PANEL: CPT | Performed by: SURGERY

## 2024-01-14 PROCEDURE — 82550 ASSAY OF CK (CPK): CPT | Performed by: SURGERY

## 2024-01-14 PROCEDURE — 83605 ASSAY OF LACTIC ACID: CPT | Performed by: SURGERY

## 2024-01-14 PROCEDURE — 87040 BLOOD CULTURE FOR BACTERIA: CPT | Performed by: SURGERY

## 2024-01-14 PROCEDURE — 99285 EMERGENCY DEPT VISIT HI MDM: CPT | Mod: 25

## 2024-01-14 PROCEDURE — 93010 ELECTROCARDIOGRAM REPORT: CPT | Mod: ,,, | Performed by: INTERNAL MEDICINE

## 2024-01-14 PROCEDURE — 93005 ELECTROCARDIOGRAM TRACING: CPT

## 2024-01-14 PROCEDURE — 81000 URINALYSIS NONAUTO W/SCOPE: CPT | Mod: 59 | Performed by: SURGERY

## 2024-01-14 PROCEDURE — 80307 DRUG TEST PRSMV CHEM ANLYZR: CPT | Performed by: SURGERY

## 2024-01-14 PROCEDURE — 85025 COMPLETE CBC W/AUTO DIFF WBC: CPT | Performed by: SURGERY

## 2024-01-14 PROCEDURE — 83880 ASSAY OF NATRIURETIC PEPTIDE: CPT | Performed by: SURGERY

## 2024-01-14 RX ORDER — CLINDAMYCIN HYDROCHLORIDE 300 MG/1
300 CAPSULE ORAL 4 TIMES DAILY
Qty: 28 CAPSULE | Refills: 0 | Status: SHIPPED | OUTPATIENT
Start: 2024-01-14 | End: 2024-01-21

## 2024-01-14 RX ORDER — MUPIROCIN 20 MG/G
OINTMENT TOPICAL 3 TIMES DAILY
Qty: 15 G | Refills: 0 | Status: SHIPPED | OUTPATIENT
Start: 2024-01-14 | End: 2024-01-24

## 2024-01-15 NOTE — ED PROVIDER NOTES
"Encounter Date: 1/14/2024       History     Chief Complaint   Patient presents with    Leg Swelling     Patient to ER CC of bilateral leg swelling for the past few days      Onofre Ceballos is a 53 y.o. male that presents with perceived leg swelling today  The patient has no leg swelling on my examination, no calf tenderness noted tonight  Patient with no obvious signs of congestive heart failure, no obvious signs of DVT now  Patient unfortunately has sores all over his body with likely methamphetamine use  Patient is alert, afebrile, appropriate, thought in his legs felt swollen earlier this evening    The history is provided by the patient.     Review of patient's allergies indicates:  No Known Allergies  Past Medical History:   Diagnosis Date    Diabetes mellitus     Diabetes mellitus type I     Hepatitis     Hepatitis C     Hypertension     Shingles 10/2018     Past Surgical History:   Procedure Laterality Date    NO PAST SURGERIES       Family History   Problem Relation Age of Onset    Diabetes Mother     Lung cancer Father      Social History     Tobacco Use    Smoking status: Every Day     Current packs/day: 1.00     Types: Cigarettes    Smokeless tobacco: Former   Substance Use Topics    Alcohol use: No    Drug use: Yes     Types: Methamphetamines     Comment: Patient stated "I'm use IV drug user with meth in the last 2 weeks."     Review of Systems   Constitutional:  Negative for activity change, appetite change, fatigue, fever and unexpected weight change.   HENT:  Negative for congestion, ear pain, mouth sores, nosebleeds, rhinorrhea, sinus pressure, sneezing and sore throat.    Eyes:  Negative for pain, discharge, redness and itching.   Respiratory:  Negative for apnea, cough, chest tightness and shortness of breath.    Cardiovascular:  Negative for chest pain, palpitations and leg swelling.   Gastrointestinal:  Negative for abdominal distention, abdominal pain, anal bleeding, constipation, " [facility]  Initial Discharge Assessment       Primary Care Provider: Cristina Ren MD    Admission Diagnosis: COURTNEY (acute kidney injury) [N17.9]    Admission Date: 6/15/2023  Expected Discharge Date: 6/16/2023  Discharge assessment completed with patient at bedside. Information verified as correct on facesheet. Patient denies HH/HD/coumadin. Patient reports appropriate DME at home and denies any new DME needs at this time. Patient anticipates discharging to home. Denies any discharge needs. Family to provide transport on discharge.     Transition of Care Barriers: None    Payor: HU[employer] MANAGED MEDICARE / Plan: [employer] KXEN HMO PPO SPECIAL NEEDS / Product Type: Medicare Advantage /     Extended Emergency Contact Information  Primary Emergency Contact: FigueroaNish  Home Phone: [phone]  Mobile Phone: [phone]  Relation: Friend   needed? No  Secondary Emergency Contact: Gita Johnson  Address: [address]  Home Phone: [phone]  Mobile Phone: [phone]  Relation: Sister  Preferred language: English   needed? No    Discharge Plan A: Home with family  Discharge Plan B: Home      [facility] - [place], [place] - [address]  [address] [facility] [address]  Phone: [phone] Fax: [phone]    [facility] [address]  [facility] [address]  Phone: [phone] Fax: [phone]    [id number] - [facility] [address] [facility]  [address] [facility] [address]  Phone: [phone] Fax: [phone]      Initial Assessment (most recent)       Adult Discharge Assessment - 06/16/23 1231          Discharge Assessment    Assessment Type Discharge Planning Assessment     Confirmed/corrected address, phone number and insurance Yes     Confirmed Demographics Correct on Facesheet     Source of  Information patient     Does patient/caregiver understand observation status Yes     Communicated ALEX with patient/caregiver Yes     Reason For Admission AMS hypoglycemia     People in Home parent(s);significant other     Facility Arrived From: home     Do you expect to return to your current living situation? Yes     Do you have help at home or someone to help you manage your care at home? Yes     Who are your caregiver(s) and their phone number(s)? S/O and mother     Prior to hospitilization cognitive status: Alert/Oriented     Current cognitive status: Alert/Oriented     Equipment Currently Used at Home rollator;shower chair;cane, straight;bedside commode;walker, rolling     Readmission within 30 days? No     Patient currently being followed by outpatient case management? Yes     If yes, name of outpatient case management following: [facility] outpatient case management     Do you currently have service(s) that help you manage your care at home? No     Do you take prescription medications? Yes     Do you have prescription coverage? Yes     Coverage [employer]/medicaid     Do you have any problems affording any of your prescribed medications? No     Is the patient taking medications as prescribed? yes     Who is going to help you get home at discharge? S/O     How do you get to doctors appointments? family or friend will provide     Are you on dialysis? No     Do you take coumadin? No     Discharge Plan A Home with family     Discharge Plan B Home     DME Needed Upon Discharge  none     Discharge Plan discussed with: Patient     Transition of Care Barriers None                                 diarrhea, nausea and vomiting.   Endocrine: Negative.    Genitourinary:  Negative for dysuria, enuresis, flank pain and frequency.   Musculoskeletal:  Negative for arthralgias, back pain, neck pain and neck stiffness.        (+) bilateral leg swelling   Skin:  Negative for color change and wound.   Allergic/Immunologic: Negative.    Neurological:  Negative for dizziness, tremors, syncope, facial asymmetry, speech difficulty, weakness, light-headedness, numbness and headaches.   Hematological:  Negative for adenopathy. Does not bruise/bleed easily.   Psychiatric/Behavioral:  Negative for agitation, behavioral problems, hallucinations, self-injury and suicidal ideas. The patient is not nervous/anxious.        Physical Exam     Initial Vitals [01/14/24 1826]   BP Pulse Resp Temp SpO2   (!) 144/81 (!) 112 19 98.1 °F (36.7 °C) 97 %      MAP       --         Physical Exam    Nursing note and vitals reviewed.  Constitutional: Vital signs are normal. He appears well-developed and well-nourished. He is cooperative.   HENT:   Head: Normocephalic and atraumatic.   Mouth/Throat: Uvula is midline and mucous membranes are normal.   Eyes: Conjunctivae, EOM and lids are normal. Pupils are equal, round, and reactive to light.   Neck: Neck supple.   Normal range of motion.   Full passive range of motion without pain.     Cardiovascular:  Normal rate, regular rhythm, S1 normal, S2 normal, normal heart sounds, intact distal pulses and normal pulses.           Pulmonary/Chest: Effort normal and breath sounds normal.   Abdominal: Abdomen is soft and flat. Bowel sounds are normal.   Musculoskeletal:         General: Normal range of motion.      Cervical back: Full passive range of motion without pain, normal range of motion and neck supple.     Neurological: He is alert and oriented to person, place, and time. He has normal strength.   Skin: Skin is intact. Capillary refill takes less than 2 seconds.   (+) folliculitis on the face trunk  extremities         ED Course   Procedures  Labs Reviewed   COMPREHENSIVE METABOLIC PANEL - Abnormal; Notable for the following components:       Result Value    Sodium 133 (*)     CO2 19 (*)     Glucose 328 (*)     BUN 27 (*)     Calcium 8.3 (*)     Total Protein 5.2 (*)     Albumin 1.9 (*)     AST 46 (*)     ALT 53 (*)     All other components within normal limits   CBC W/ AUTO DIFFERENTIAL - Abnormal; Notable for the following components:    WBC 18.44 (*)     RBC 4.55 (*)     MCH 32.7 (*)     Immature Granulocytes 0.8 (*)     Gran # (ANC) 13.5 (*)     Immature Grans (Abs) 0.15 (*)     Mono # 1.1 (*)     Gran % 73.1 (*)     All other components within normal limits   CK - Abnormal; Notable for the following components:     (*)     All other components within normal limits   URINALYSIS, REFLEX TO URINE CULTURE - Abnormal; Notable for the following components:    Protein, UA 3+ (*)     Glucose, UA 3+ (*)     Ketones, UA 1+ (*)     Occult Blood UA 2+ (*)     All other components within normal limits    Narrative:     Specimen Source->Urine   DRUG SCREEN PANEL, URINE EMERGENCY - Abnormal; Notable for the following components:    Amphetamine Screen, Ur Presumptive Positive (*)     THC Presumptive Positive (*)     All other components within normal limits    Narrative:     Specimen Source->Urine   URINALYSIS MICROSCOPIC - Abnormal; Notable for the following components:    Bacteria Few (*)     Granular Casts, UA 13 (*)     Other (U/A) Occasional (*)     All other components within normal limits    Narrative:     Specimen Source->Urine   CULTURE, BLOOD   CULTURE, BLOOD   TROPONIN I   B-TYPE NATRIURETIC PEPTIDE   LACTIC ACID, PLASMA          Imaging Results              US Lower Extremity Veins Bilateral (Final result)  Result time 01/14/24 20:37:20      Final result by Quinton Da Silva MD (01/14/24 20:37:20)                   Impression:      No evidence of deep venous thrombosis in either lower  extremity.      Electronically signed by: Quinton Da Silva MD  Date:    01/14/2024  Time:    20:37               Narrative:    EXAMINATION:  US LOWER EXTREMITY VEINS BILATERAL    CLINICAL HISTORY:  Leg swelling;    TECHNIQUE:  Duplex and color flow Doppler and dynamic compression was performed of the bilateral lower extremity veins was performed.    COMPARISON:  None    FINDINGS:  Right thigh veins: The common femoral, femoral, popliteal, upper greater saphenous, and deep femoral veins are patent and free of thrombus. The veins are normally compressible and have normal phasic flow and augmentation response.    Right calf veins: The visualized calf veins are patent.    Left thigh veins: The common femoral, femoral, popliteal, upper greater saphenous, and deep femoral veins are patent and free of thrombus. The veins are normally compressible and have normal phasic flow and augmentation response.    Left calf veins: The visualized calf veins are patent.    Miscellaneous: None                                       X-Ray Chest 1 View (Final result)  Result time 01/14/24 19:24:37      Final result by Raj Oneill MD (01/14/24 19:24:37)                   Impression:      No acute abnormality.      Electronically signed by: Raj Oneill  Date:    01/14/2024  Time:    19:24               Narrative:    EXAMINATION:  XR CHEST 1 VIEW    CLINICAL HISTORY:  Leg swelling;    TECHNIQUE:  Single frontal view of the chest was performed.    COMPARISON:  None    FINDINGS:  The lungs are clear, with normal appearance of pulmonary vasculature and no pleural effusion or pneumothorax.    The cardiac silhouette is normal in size. The hilar and mediastinal contours are unremarkable.    Bones are intact.                                       Medications - No data to display    Medical Decision Making  53-year-old male presents with folliculitis on ER evaluation today  Patient presented with a complaint of leg swelling with no leg  swelling  Based on my exam the patient appears to be on methamphetamine  Differential includes peripheral edema, CHF, DVT, dependent edema    Problems Addressed:  Folliculitis: complicated acute illness or injury    Amount and/or Complexity of Data Reviewed  Labs: ordered. Decision-making details documented in ED Course.  Radiology: ordered and independent interpretation performed.  ECG/medicine tests: ordered and independent interpretation performed.    ED Management & Risks of Complication, Morbidity, & Mortality:  18,000 white count with likely methamphetamine abuse issues  Normal lactic acid, blood cultures drawn as precaution  Chest x-ray within normal limits, EKG was nonspecific tonight  Blood sugar 328 with no obvious signs of diabetic ketoacidosis  Patient's urine drug screen showed methamphetamine & marijuana  Normal BNP, DVT ultrasound showed no blood clots in the ER tonight  Will place the patient on antibiotic & Bactroban for folliculitis on DC  Patient's skin condition likely due to methamphetamine use daily  No leg swelling here tonight but encouraged follow-up with Cardiology  Carefully counseled return to the ER with any concerning symptoms     Clinical Impression:  Final diagnoses:  [L73.9] Folliculitis (Primary)          ED Disposition Condition    Discharge Stable          ED Prescriptions       Medication Sig Dispense Start Date End Date Auth. Provider    mupirocin (BACTROBAN) 2 % ointment Apply topically 3 (three) times daily. for 10 days 15 g 1/14/2024 1/24/2024 Jose Antonio Dowd MD    clindamycin (CLEOCIN) 300 MG capsule Take 1 capsule (300 mg total) by mouth 4 (four) times daily. for 7 days 28 capsule 1/14/2024 1/21/2024 Jose Antonio Dowd MD          Follow-up Information       Follow up With Specialties Details Why Contact Info    Carter Paula MD Family Medicine Go in 2 days  [address] [id number]  [phone]               Jose Antonio Dowd MD  01/14/24 2055

## 2024-01-20 LAB
BACTERIA BLD CULT: NORMAL
BACTERIA BLD CULT: NORMAL

## 2024-03-11 ENCOUNTER — HOSPITAL ENCOUNTER (EMERGENCY)
Facility: HOSPITAL | Age: 54
Discharge: HOME | End: 2024-03-11
Attending: SURGERY
Payer: MEDICAID

## 2024-03-11 VITALS
SYSTOLIC BLOOD PRESSURE: 184 MMHG | DIASTOLIC BLOOD PRESSURE: 101 MMHG | HEART RATE: 82 BPM | HEIGHT: 67 IN | BODY MASS INDEX: 29.7 KG/M2 | OXYGEN SATURATION: 100 % | TEMPERATURE: 99 F | RESPIRATION RATE: 17 BRPM | WEIGHT: 189.25 LBS

## 2024-03-11 DIAGNOSIS — J40 BRONCHITIS: Primary | ICD-10-CM

## 2024-03-11 LAB
GROUP A STREP, MOLECULAR: NEGATIVE
INFLUENZA A, MOLECULAR: NEGATIVE
INFLUENZA B, MOLECULAR: NEGATIVE
SARS-COV-2 RDRP RESP QL NAA+PROBE: NEGATIVE
SPECIMEN SOURCE: NORMAL

## 2024-03-11 PROCEDURE — 87502 INFLUENZA DNA AMP PROBE: CPT | Performed by: SURGERY

## 2024-03-11 PROCEDURE — 63600175 PHARM REV CODE 636 W HCPCS: Performed by: SURGERY

## 2024-03-11 PROCEDURE — 87651 STREP A DNA AMP PROBE: CPT | Performed by: SURGERY

## 2024-03-11 PROCEDURE — 99284 EMERGENCY DEPT VISIT MOD MDM: CPT | Mod: 25

## 2024-03-11 PROCEDURE — 96372 THER/PROPH/DIAG INJ SC/IM: CPT | Performed by: SURGERY

## 2024-03-11 PROCEDURE — U0002 COVID-19 LAB TEST NON-CDC: HCPCS | Performed by: SURGERY

## 2024-03-11 RX ORDER — DOXYCYCLINE 100 MG/1
100 CAPSULE ORAL 2 TIMES DAILY
Qty: 20 CAPSULE | Refills: 0 | Status: ON HOLD | OUTPATIENT
Start: 2024-03-11 | End: 2024-03-25 | Stop reason: HOSPADM

## 2024-03-11 RX ORDER — BENZONATATE 100 MG/1
200 CAPSULE ORAL 3 TIMES DAILY PRN
Qty: 20 CAPSULE | Refills: 0 | Status: ON HOLD | OUTPATIENT
Start: 2024-03-11 | End: 2024-03-25 | Stop reason: HOSPADM

## 2024-03-11 RX ORDER — CEFTRIAXONE 1 G/1
1 INJECTION, POWDER, FOR SOLUTION INTRAMUSCULAR; INTRAVENOUS
Status: COMPLETED | OUTPATIENT
Start: 2024-03-11 | End: 2024-03-11

## 2024-03-11 RX ADMIN — CEFTRIAXONE SODIUM 1 G: 1 INJECTION, POWDER, FOR SOLUTION INTRAMUSCULAR; INTRAVENOUS at 10:03

## 2024-03-12 NOTE — ED PROVIDER NOTES
"Encounter Date: 3/11/2024       History     Chief Complaint   Patient presents with    General Illness     History of Present Illness  Onofre Ceballos is a 53 y.o. male that presents with cough & congestion today  Cough & congestion over last several weeks with no wheezing or sputum noted today  Patient was tried over-the-counter medication, denies any actual shortness of breath  Patient on exam has mild rhonchi with no active wheezing, dry hacking cough tonight    The history is provided by the patient.     Review of patient's allergies indicates:  No Known Allergies  Past Medical History:   Diagnosis Date    Diabetes mellitus     Diabetes mellitus type I     Hepatitis     Hepatitis C     Hypertension     Shingles 10/2018     Past Surgical History:   Procedure Laterality Date    NO PAST SURGERIES       Family History   Problem Relation Age of Onset    Diabetes Mother     Lung cancer Father      Social History     Tobacco Use    Smoking status: Every Day     Current packs/day: 1.00     Types: Cigarettes    Smokeless tobacco: Former   Substance Use Topics    Alcohol use: No    Drug use: Yes     Types: Methamphetamines     Comment: Patient stated "I'm use IV drug user with meth in the last 2 weeks."     Review of Systems   Constitutional:  Negative for diaphoresis, fatigue and fever.   HENT:  Negative for ear pain, hearing loss and tinnitus.    Eyes:  Negative for pain and redness.   Respiratory:  Positive for cough. Negative for shortness of breath and wheezing.    Cardiovascular:  Negative for chest pain.   Gastrointestinal:  Negative for abdominal pain, constipation, diarrhea, nausea and rectal pain.   Musculoskeletal:  Negative for back pain, neck pain and neck stiffness.   Neurological:  Negative for dizziness, seizures, numbness and headaches.   Psychiatric/Behavioral:  Negative for confusion, decreased concentration and dysphoric mood.    All other systems reviewed and are negative.      Physical Exam "     Initial Vitals [03/11/24 1949]   BP Pulse Resp Temp SpO2   (!) 185/114 83 16 98.6 °F (37 °C) 100 %      MAP       --         Physical Exam    Nursing note and vitals reviewed.  Constitutional: Vital signs are normal. He appears well-developed and well-nourished. He is cooperative.   HENT:   Head: Normocephalic and atraumatic.   Mouth/Throat: Uvula is midline and mucous membranes are normal.   Eyes: Conjunctivae, EOM and lids are normal. Pupils are equal, round, and reactive to light.   Neck: Neck supple.   Normal range of motion.   Full passive range of motion without pain.     Cardiovascular:  Normal rate, regular rhythm, S1 normal, S2 normal, normal heart sounds, intact distal pulses and normal pulses.           Pulmonary/Chest: Effort normal.   (+) rhonchi at the bilateral bases with no active wheezing   Abdominal: Abdomen is soft and flat. Bowel sounds are normal.   Musculoskeletal:         General: Normal range of motion.      Cervical back: Full passive range of motion without pain, normal range of motion and neck supple.     Neurological: He is alert and oriented to person, place, and time. He has normal strength.   Skin: Skin is warm, dry and intact. Capillary refill takes less than 2 seconds.         ED Course   Procedures  Labs Reviewed   INFLUENZA A & B BY MOLECULAR   GROUP A STREP, MOLECULAR   SARS-COV-2 RNA AMPLIFICATION, QUAL          Imaging Results              X-Ray Chest 1 View (In process)                      Medications   cefTRIAXone injection 1 g (has no administration in time range)     Medical Decision Making  53-year-old male presents with cough & congestion in the emergency room today  Differential: flu, strep, COVID, bronchitis, pneumonia, URI, virus, otitis media    Problems Addressed:  Bronchitis: complicated acute illness or injury    Amount and/or Complexity of Data Reviewed  Labs: ordered. Decision-making details documented in ED Course.  Radiology: ordered and independent  interpretation performed.    ED Management & Risks of Complication, Morbidity, & Mortality:  No infiltrate on chest x-ray with (-) swabs in the emergency room tonight  IM Rocephin in the ER with a prescription of doxycycline & Tessalon  Carefully counseled to follow-up within next 48 hours after ER discharge  Follow-up with PCP &/or specialist until complete resolution of symptoms  Pt/Family counseled to return to the ER with any concerning symptoms     Need for Hospitalization or Surgery with Social Determinants of Health:  This patient does not need to be hospitalized on ER evaluation today  The patient's diagnosis is not limited by social determinants of health  Does not require surgery or procedure (major or minor), no risk factors    Clinical Impression:  Final diagnoses:  [J40] Bronchitis (Primary)          ED Disposition Condition    Discharge Stable          ED Prescriptions       Medication Sig Dispense Start Date End Date Auth. Provider    doxycycline (VIBRAMYCIN) 100 MG Cap Take 1 capsule (100 mg total) by mouth 2 (two) times daily. for 10 days 20 capsule 3/11/2024 3/21/2024 Jose Antonio Dowd MD    benzonatate (TESSALON) 100 MG capsule Take 2 capsules (200 mg total) by mouth 3 (three) times daily as needed for Cough. 20 capsule 3/11/2024 3/21/2024 Jose Antonio Dowd MD          Follow-up Information       Follow up With Specialties Details Why Contact Info    Carter Paula MD Family Medicine Go in 2 days  [address] [id number]  [phone]               Jose Antonio Dowd MD  03/11/24 1153

## 2024-03-12 NOTE — ED TRIAGE NOTES
"Pt identified x2 pt identifiers. 54 YO male presents today via private vehicle from home with c/o general illness. Patient states he has been having a cough, body aches, and nausea for "last couple of weeks." Denies sick contact. Has tried flu medicine over the counter with no improvement.     Pt is AO x4, speaking in full clear sentences, respirations even and unlabored. Skin warm, dry, intact, appropriate for ethnicity.    Patient updated on plan of care. Patient verbalized understanding to inform RN of any changes in symptoms.    "

## 2024-03-21 ENCOUNTER — HOSPITAL ENCOUNTER (INPATIENT)
Facility: HOSPITAL | Age: 54
LOS: 5 days | Discharge: HOME | DRG: 682 | End: 2024-03-26
Attending: STUDENT IN AN ORGANIZED HEALTH CARE EDUCATION/TRAINING PROGRAM | Admitting: STUDENT IN AN ORGANIZED HEALTH CARE EDUCATION/TRAINING PROGRAM
Payer: MEDICAID

## 2024-03-21 DIAGNOSIS — K20.90 GASTROESOPHAGITIS: ICD-10-CM

## 2024-03-21 DIAGNOSIS — K29.70 GASTROESOPHAGITIS: ICD-10-CM

## 2024-03-21 DIAGNOSIS — R80.9 PROTEINURIA, UNSPECIFIED TYPE: ICD-10-CM

## 2024-03-21 DIAGNOSIS — N17.9 AKI (ACUTE KIDNEY INJURY): ICD-10-CM

## 2024-03-21 DIAGNOSIS — E87.6 HYPOKALEMIA: ICD-10-CM

## 2024-03-21 DIAGNOSIS — R60.1 ANASARCA: ICD-10-CM

## 2024-03-21 DIAGNOSIS — Z72.0 TOBACCO ABUSE: ICD-10-CM

## 2024-03-21 DIAGNOSIS — E11.00 HYPEROSMOLAR HYPERGLYCEMIC STATE (HHS): Primary | ICD-10-CM

## 2024-03-21 DIAGNOSIS — B18.2 HEP C W/O COMA, CHRONIC: ICD-10-CM

## 2024-03-21 DIAGNOSIS — R73.9 HYPERGLYCEMIA: ICD-10-CM

## 2024-03-21 DIAGNOSIS — Z79.899 ON STATIN THERAPY: ICD-10-CM

## 2024-03-21 DIAGNOSIS — E87.21 ACUTE LACTIC ACIDOSIS: ICD-10-CM

## 2024-03-21 DIAGNOSIS — R77.0 LOW SERUM ALBUMIN: ICD-10-CM

## 2024-03-21 DIAGNOSIS — R41.82 ALTERED MENTAL STATUS, UNSPECIFIED ALTERED MENTAL STATUS TYPE: ICD-10-CM

## 2024-03-21 DIAGNOSIS — J18.9 PNEUMONIA OF LEFT LOWER LOBE DUE TO INFECTIOUS ORGANISM: ICD-10-CM

## 2024-03-21 DIAGNOSIS — E83.51 HYPOCALCEMIA: ICD-10-CM

## 2024-03-21 PROBLEM — E43 SEVERE PROTEIN-CALORIE MALNUTRITION: Status: ACTIVE | Noted: 2024-03-21

## 2024-03-21 LAB
ALBUMIN SERPL BCP-MCNC: 1.2 G/DL (ref 3.5–5.2)
ALP SERPL-CCNC: 180 U/L (ref 55–135)
ALT SERPL W/O P-5'-P-CCNC: 25 U/L (ref 10–44)
AMORPH CRY URNS QL MICRO: ABNORMAL
ANION GAP SERPL CALC-SCNC: 11 MMOL/L (ref 8–16)
ANION GAP SERPL CALC-SCNC: 13 MMOL/L (ref 8–16)
ANION GAP SERPL CALC-SCNC: 8 MMOL/L (ref 8–16)
ANION GAP SERPL CALC-SCNC: 8 MMOL/L (ref 8–16)
APTT PPP: <21 SEC (ref 21–32)
AST SERPL-CCNC: 20 U/L (ref 10–40)
B-OH-BUTYR BLD STRIP-SCNC: 0.9 MMOL/L (ref 0–0.5)
BACTERIA #/AREA URNS HPF: ABNORMAL /HPF
BASOPHILS # BLD AUTO: 0.07 K/UL (ref 0–0.2)
BASOPHILS NFR BLD: 0.4 % (ref 0–1.9)
BILIRUB SERPL-MCNC: 0.2 MG/DL (ref 0.1–1)
BILIRUB UR QL STRIP: NEGATIVE
BUN SERPL-MCNC: 13 MG/DL (ref 6–20)
BUN SERPL-MCNC: 13 MG/DL (ref 6–20)
BUN SERPL-MCNC: 14 MG/DL (ref 6–20)
BUN SERPL-MCNC: 15 MG/DL (ref 6–20)
CALCIUM SERPL-MCNC: 7.7 MG/DL (ref 8.7–10.5)
CALCIUM SERPL-MCNC: 8.1 MG/DL (ref 8.7–10.5)
CALCIUM SERPL-MCNC: 8.1 MG/DL (ref 8.7–10.5)
CALCIUM SERPL-MCNC: 8.3 MG/DL (ref 8.7–10.5)
CHLORIDE SERPL-SCNC: 101 MMOL/L (ref 95–110)
CHLORIDE SERPL-SCNC: 103 MMOL/L (ref 95–110)
CHLORIDE SERPL-SCNC: 104 MMOL/L (ref 95–110)
CHLORIDE SERPL-SCNC: 93 MMOL/L (ref 95–110)
CLARITY UR: CLEAR
CO2 SERPL-SCNC: 28 MMOL/L (ref 23–29)
CO2 SERPL-SCNC: 29 MMOL/L (ref 23–29)
CO2 SERPL-SCNC: 32 MMOL/L (ref 23–29)
CO2 SERPL-SCNC: 33 MMOL/L (ref 23–29)
COLOR UR: ABNORMAL
CREAT SERPL-MCNC: 1.2 MG/DL (ref 0.5–1.4)
CREAT SERPL-MCNC: 1.2 MG/DL (ref 0.5–1.4)
CREAT SERPL-MCNC: 1.5 MG/DL (ref 0.5–1.4)
CREAT SERPL-MCNC: 2 MG/DL (ref 0.5–1.4)
DIFFERENTIAL METHOD BLD: ABNORMAL
EOSINOPHIL # BLD AUTO: 0.1 K/UL (ref 0–0.5)
EOSINOPHIL NFR BLD: 0.3 % (ref 0–8)
ERYTHROCYTE [DISTWIDTH] IN BLOOD BY AUTOMATED COUNT: 12.6 % (ref 11.5–14.5)
EST. GFR  (NO RACE VARIABLE): 39 ML/MIN/1.73 M^2
EST. GFR  (NO RACE VARIABLE): 55 ML/MIN/1.73 M^2
EST. GFR  (NO RACE VARIABLE): >60 ML/MIN/1.73 M^2
EST. GFR  (NO RACE VARIABLE): >60 ML/MIN/1.73 M^2
GLUCOSE SERPL-MCNC: 562 MG/DL (ref 70–110)
GLUCOSE SERPL-MCNC: 67 MG/DL (ref 70–110)
GLUCOSE SERPL-MCNC: 82 MG/DL (ref 70–110)
GLUCOSE SERPL-MCNC: 914 MG/DL (ref 70–110)
GLUCOSE UR QL STRIP: ABNORMAL
HCT VFR BLD AUTO: 42.2 % (ref 40–54)
HGB BLD-MCNC: 14 G/DL (ref 14–18)
HGB UR QL STRIP: ABNORMAL
HYALINE CASTS #/AREA URNS LPF: 15 /LPF
IMM GRANULOCYTES # BLD AUTO: 0.22 K/UL (ref 0–0.04)
IMM GRANULOCYTES NFR BLD AUTO: 1.2 % (ref 0–0.5)
INFLUENZA A, MOLECULAR: NEGATIVE
INFLUENZA B, MOLECULAR: NEGATIVE
INR PPP: 0.9 (ref 0.8–1.2)
KETONES UR QL STRIP: NEGATIVE
LACTATE SERPL-SCNC: 3.4 MMOL/L (ref 0.5–2.2)
LACTATE SERPL-SCNC: 6 MMOL/L (ref 0.5–2.2)
LEUKOCYTE ESTERASE UR QL STRIP: NEGATIVE
LYMPHOCYTES # BLD AUTO: 2 K/UL (ref 1–4.8)
LYMPHOCYTES NFR BLD: 11.3 % (ref 18–48)
MAGNESIUM SERPL-MCNC: 2.2 MG/DL (ref 1.6–2.6)
MCH RBC QN AUTO: 32.7 PG (ref 27–31)
MCHC RBC AUTO-ENTMCNC: 33.2 G/DL (ref 32–36)
MCV RBC AUTO: 99 FL (ref 82–98)
MICROSCOPIC COMMENT: ABNORMAL
MONOCYTES # BLD AUTO: 0.8 K/UL (ref 0.3–1)
MONOCYTES NFR BLD: 4.4 % (ref 4–15)
NEUTROPHILS # BLD AUTO: 14.9 K/UL (ref 1.8–7.7)
NEUTROPHILS NFR BLD: 82.4 % (ref 38–73)
NITRITE UR QL STRIP: NEGATIVE
NRBC BLD-RTO: 0 /100 WBC
OSMOLALITY SERPL: 327 MOSM/KG (ref 280–300)
OSMOLALITY UR: 487 MOSM/KG (ref 50–1200)
PH UR STRIP: 6 [PH] (ref 5–8)
PLATELET # BLD AUTO: 442 K/UL (ref 150–450)
PMV BLD AUTO: 11.1 FL (ref 9.2–12.9)
POC PH VENOUS: 7.47
POCT GLUCOSE: 140 MG/DL (ref 70–110)
POCT GLUCOSE: 214 MG/DL (ref 70–110)
POCT GLUCOSE: 303 MG/DL (ref 70–110)
POCT GLUCOSE: >500 MG/DL (ref 70–110)
POCT GLUCOSE: >500 MG/DL (ref 70–110)
POTASSIUM SERPL-SCNC: 2.5 MMOL/L (ref 3.5–5.1)
POTASSIUM SERPL-SCNC: 2.6 MMOL/L (ref 3.5–5.1)
POTASSIUM SERPL-SCNC: 3.3 MMOL/L (ref 3.5–5.1)
POTASSIUM SERPL-SCNC: 3.4 MMOL/L (ref 3.5–5.1)
PROT SERPL-MCNC: 5.5 G/DL (ref 6–8.4)
PROT UR QL STRIP: ABNORMAL
PROTHROMBIN TIME: 10 SEC (ref 9–12.5)
RBC # BLD AUTO: 4.28 M/UL (ref 4.6–6.2)
RBC #/AREA URNS HPF: 1 /HPF (ref 0–4)
SARS-COV-2 RDRP RESP QL NAA+PROBE: NEGATIVE
SODIUM SERPL-SCNC: 135 MMOL/L (ref 136–145)
SODIUM SERPL-SCNC: 140 MMOL/L (ref 136–145)
SODIUM SERPL-SCNC: 143 MMOL/L (ref 136–145)
SODIUM SERPL-SCNC: 145 MMOL/L (ref 136–145)
SP GR UR STRIP: 1.01 (ref 1–1.03)
SPECIMEN SOURCE: NORMAL
SQUAMOUS #/AREA URNS HPF: 3 /HPF
TROPONIN I SERPL DL<=0.01 NG/ML-MCNC: 0.03 NG/ML (ref 0–0.03)
TROPONIN I SERPL DL<=0.01 NG/ML-MCNC: 0.04 NG/ML (ref 0–0.03)
TSH SERPL DL<=0.005 MIU/L-ACNC: 0.98 UIU/ML (ref 0.4–4)
URN SPEC COLLECT METH UR: ABNORMAL
UROBILINOGEN UR STRIP-ACNC: NEGATIVE EU/DL
WBC # BLD AUTO: 18.04 K/UL (ref 3.9–12.7)
WBC #/AREA URNS HPF: 3 /HPF (ref 0–5)
YEAST URNS QL MICRO: ABNORMAL

## 2024-03-21 PROCEDURE — 27000221 HC OXYGEN, UP TO 24 HOURS

## 2024-03-21 PROCEDURE — 93010 ELECTROCARDIOGRAM REPORT: CPT | Mod: ,,, | Performed by: INTERNAL MEDICINE

## 2024-03-21 PROCEDURE — 99900031 HC PATIENT EDUCATION (STAT)

## 2024-03-21 PROCEDURE — 85730 THROMBOPLASTIN TIME PARTIAL: CPT | Performed by: STUDENT IN AN ORGANIZED HEALTH CARE EDUCATION/TRAINING PROGRAM

## 2024-03-21 PROCEDURE — 99900035 HC TECH TIME PER 15 MIN (STAT)

## 2024-03-21 PROCEDURE — 80053 COMPREHEN METABOLIC PANEL: CPT | Performed by: STUDENT IN AN ORGANIZED HEALTH CARE EDUCATION/TRAINING PROGRAM

## 2024-03-21 PROCEDURE — 25000003 PHARM REV CODE 250: Performed by: STUDENT IN AN ORGANIZED HEALTH CARE EDUCATION/TRAINING PROGRAM

## 2024-03-21 PROCEDURE — 83036 HEMOGLOBIN GLYCOSYLATED A1C: CPT | Performed by: STUDENT IN AN ORGANIZED HEALTH CARE EDUCATION/TRAINING PROGRAM

## 2024-03-21 PROCEDURE — 80048 BASIC METABOLIC PNL TOTAL CA: CPT | Mod: 91,XB | Performed by: STUDENT IN AN ORGANIZED HEALTH CARE EDUCATION/TRAINING PROGRAM

## 2024-03-21 PROCEDURE — 63600175 PHARM REV CODE 636 W HCPCS: Performed by: STUDENT IN AN ORGANIZED HEALTH CARE EDUCATION/TRAINING PROGRAM

## 2024-03-21 PROCEDURE — 84443 ASSAY THYROID STIM HORMONE: CPT | Performed by: STUDENT IN AN ORGANIZED HEALTH CARE EDUCATION/TRAINING PROGRAM

## 2024-03-21 PROCEDURE — 94761 N-INVAS EAR/PLS OXIMETRY MLT: CPT

## 2024-03-21 PROCEDURE — 83605 ASSAY OF LACTIC ACID: CPT | Performed by: STUDENT IN AN ORGANIZED HEALTH CARE EDUCATION/TRAINING PROGRAM

## 2024-03-21 PROCEDURE — 82962 GLUCOSE BLOOD TEST: CPT

## 2024-03-21 PROCEDURE — 83930 ASSAY OF BLOOD OSMOLALITY: CPT | Performed by: STUDENT IN AN ORGANIZED HEALTH CARE EDUCATION/TRAINING PROGRAM

## 2024-03-21 PROCEDURE — 93005 ELECTROCARDIOGRAM TRACING: CPT

## 2024-03-21 PROCEDURE — 82800 BLOOD PH: CPT | Performed by: STUDENT IN AN ORGANIZED HEALTH CARE EDUCATION/TRAINING PROGRAM

## 2024-03-21 PROCEDURE — 99291 CRITICAL CARE FIRST HOUR: CPT

## 2024-03-21 PROCEDURE — 96361 HYDRATE IV INFUSION ADD-ON: CPT

## 2024-03-21 PROCEDURE — 83935 ASSAY OF URINE OSMOLALITY: CPT | Performed by: STUDENT IN AN ORGANIZED HEALTH CARE EDUCATION/TRAINING PROGRAM

## 2024-03-21 PROCEDURE — 20000000 HC ICU ROOM

## 2024-03-21 PROCEDURE — U0002 COVID-19 LAB TEST NON-CDC: HCPCS | Performed by: STUDENT IN AN ORGANIZED HEALTH CARE EDUCATION/TRAINING PROGRAM

## 2024-03-21 PROCEDURE — 84484 ASSAY OF TROPONIN QUANT: CPT | Performed by: STUDENT IN AN ORGANIZED HEALTH CARE EDUCATION/TRAINING PROGRAM

## 2024-03-21 PROCEDURE — 96375 TX/PRO/DX INJ NEW DRUG ADDON: CPT

## 2024-03-21 PROCEDURE — 87502 INFLUENZA DNA AMP PROBE: CPT | Performed by: STUDENT IN AN ORGANIZED HEALTH CARE EDUCATION/TRAINING PROGRAM

## 2024-03-21 PROCEDURE — 83735 ASSAY OF MAGNESIUM: CPT | Performed by: STUDENT IN AN ORGANIZED HEALTH CARE EDUCATION/TRAINING PROGRAM

## 2024-03-21 PROCEDURE — 96367 TX/PROPH/DG ADDL SEQ IV INF: CPT

## 2024-03-21 PROCEDURE — 82330 ASSAY OF CALCIUM: CPT | Performed by: STUDENT IN AN ORGANIZED HEALTH CARE EDUCATION/TRAINING PROGRAM

## 2024-03-21 PROCEDURE — 96365 THER/PROPH/DIAG IV INF INIT: CPT

## 2024-03-21 PROCEDURE — 87040 BLOOD CULTURE FOR BACTERIA: CPT | Mod: 59 | Performed by: STUDENT IN AN ORGANIZED HEALTH CARE EDUCATION/TRAINING PROGRAM

## 2024-03-21 PROCEDURE — 85025 COMPLETE CBC W/AUTO DIFF WBC: CPT | Performed by: STUDENT IN AN ORGANIZED HEALTH CARE EDUCATION/TRAINING PROGRAM

## 2024-03-21 PROCEDURE — 81000 URINALYSIS NONAUTO W/SCOPE: CPT | Performed by: STUDENT IN AN ORGANIZED HEALTH CARE EDUCATION/TRAINING PROGRAM

## 2024-03-21 PROCEDURE — 85610 PROTHROMBIN TIME: CPT | Performed by: STUDENT IN AN ORGANIZED HEALTH CARE EDUCATION/TRAINING PROGRAM

## 2024-03-21 PROCEDURE — 36415 COLL VENOUS BLD VENIPUNCTURE: CPT | Mod: XB | Performed by: STUDENT IN AN ORGANIZED HEALTH CARE EDUCATION/TRAINING PROGRAM

## 2024-03-21 PROCEDURE — 99291 CRITICAL CARE FIRST HOUR: CPT | Mod: ,,, | Performed by: STUDENT IN AN ORGANIZED HEALTH CARE EDUCATION/TRAINING PROGRAM

## 2024-03-21 PROCEDURE — 82010 KETONE BODYS QUAN: CPT | Performed by: STUDENT IN AN ORGANIZED HEALTH CARE EDUCATION/TRAINING PROGRAM

## 2024-03-21 RX ORDER — POTASSIUM CHLORIDE 7.45 MG/ML
10 INJECTION INTRAVENOUS
Status: DISPENSED | OUTPATIENT
Start: 2024-03-21 | End: 2024-03-21

## 2024-03-21 RX ORDER — INSULIN ASPART 100 [IU]/ML
0-10 INJECTION, SOLUTION INTRAVENOUS; SUBCUTANEOUS
Status: DISCONTINUED | OUTPATIENT
Start: 2024-03-21 | End: 2024-03-26 | Stop reason: HOSPADM

## 2024-03-21 RX ORDER — TAMSULOSIN HYDROCHLORIDE 0.4 MG/1
0.4 CAPSULE ORAL DAILY
COMMUNITY

## 2024-03-21 RX ORDER — SODIUM CHLORIDE 0.9 % (FLUSH) 0.9 %
10 SYRINGE (ML) INJECTION
Status: DISCONTINUED | OUTPATIENT
Start: 2024-03-21 | End: 2024-03-26 | Stop reason: HOSPADM

## 2024-03-21 RX ORDER — ATORVASTATIN CALCIUM 80 MG/1
80 TABLET, FILM COATED ORAL NIGHTLY
Status: DISCONTINUED | OUTPATIENT
Start: 2024-03-21 | End: 2024-03-26 | Stop reason: HOSPADM

## 2024-03-21 RX ORDER — POTASSIUM CHLORIDE 7.45 MG/ML
40 INJECTION INTRAVENOUS ONCE
Status: COMPLETED | OUTPATIENT
Start: 2024-03-21 | End: 2024-03-22

## 2024-03-21 RX ORDER — ATORVASTATIN CALCIUM 80 MG/1
80 TABLET, FILM COATED ORAL NIGHTLY
COMMUNITY

## 2024-03-21 RX ORDER — ERGOCALCIFEROL 1.25 MG/1
50000 CAPSULE ORAL
COMMUNITY

## 2024-03-21 RX ORDER — POLYETHYLENE GLYCOL 3350 17 G/17G
17 POWDER, FOR SOLUTION ORAL 2 TIMES DAILY PRN
Status: DISCONTINUED | OUTPATIENT
Start: 2024-03-21 | End: 2024-03-26 | Stop reason: HOSPADM

## 2024-03-21 RX ORDER — ACETAMINOPHEN 325 MG/1
650 TABLET ORAL EVERY 8 HOURS PRN
Status: DISCONTINUED | OUTPATIENT
Start: 2024-03-21 | End: 2024-03-26 | Stop reason: HOSPADM

## 2024-03-21 RX ORDER — INSULIN GLARGINE 100 [IU]/ML
30 INJECTION, SOLUTION SUBCUTANEOUS NIGHTLY
Status: ON HOLD | COMMUNITY
End: 2024-03-25

## 2024-03-21 RX ORDER — CALCIUM GLUCONATE 20 MG/ML
1 INJECTION, SOLUTION INTRAVENOUS
Status: COMPLETED | OUTPATIENT
Start: 2024-03-21 | End: 2024-03-21

## 2024-03-21 RX ORDER — ATORVASTATIN CALCIUM 20 MG/1
80 TABLET, FILM COATED ORAL NIGHTLY
Status: DISCONTINUED | OUTPATIENT
Start: 2024-03-21 | End: 2024-03-21

## 2024-03-21 RX ORDER — TALC
6 POWDER (GRAM) TOPICAL NIGHTLY PRN
Status: DISCONTINUED | OUTPATIENT
Start: 2024-03-21 | End: 2024-03-26 | Stop reason: HOSPADM

## 2024-03-21 RX ORDER — INSULIN ASPART 100 [IU]/ML
15 INJECTION, SOLUTION INTRAVENOUS; SUBCUTANEOUS
Status: DISCONTINUED | OUTPATIENT
Start: 2024-03-22 | End: 2024-03-24

## 2024-03-21 RX ORDER — ONDANSETRON HYDROCHLORIDE 2 MG/ML
4 INJECTION, SOLUTION INTRAVENOUS EVERY 8 HOURS PRN
Status: DISCONTINUED | OUTPATIENT
Start: 2024-03-21 | End: 2024-03-26 | Stop reason: HOSPADM

## 2024-03-21 RX ORDER — ASPIRIN 81 MG/1
81 TABLET ORAL DAILY
Status: DISCONTINUED | OUTPATIENT
Start: 2024-03-22 | End: 2024-03-26 | Stop reason: HOSPADM

## 2024-03-21 RX ORDER — GLUCAGON 1 MG
1 KIT INJECTION
Status: DISCONTINUED | OUTPATIENT
Start: 2024-03-21 | End: 2024-03-26 | Stop reason: HOSPADM

## 2024-03-21 RX ORDER — PROPRANOLOL HYDROCHLORIDE 20 MG/1
20 TABLET ORAL 2 TIMES DAILY
Status: DISCONTINUED | OUTPATIENT
Start: 2024-03-21 | End: 2024-03-26 | Stop reason: HOSPADM

## 2024-03-21 RX ORDER — LISINOPRIL 5 MG/1
5 TABLET ORAL DAILY
Status: ON HOLD | COMMUNITY
End: 2024-03-25 | Stop reason: HOSPADM

## 2024-03-21 RX ORDER — INSULIN ASPART 100 [IU]/ML
15 INJECTION, SOLUTION INTRAVENOUS; SUBCUTANEOUS
Status: ON HOLD | COMMUNITY
End: 2024-03-25

## 2024-03-21 RX ORDER — LISINOPRIL 2.5 MG/1
5 TABLET ORAL DAILY
Status: DISCONTINUED | OUTPATIENT
Start: 2024-03-22 | End: 2024-03-24

## 2024-03-21 RX ORDER — IBUPROFEN 200 MG
16 TABLET ORAL
Status: DISCONTINUED | OUTPATIENT
Start: 2024-03-21 | End: 2024-03-26 | Stop reason: HOSPADM

## 2024-03-21 RX ORDER — IBUPROFEN 200 MG
1 TABLET ORAL DAILY
Status: DISCONTINUED | OUTPATIENT
Start: 2024-03-22 | End: 2024-03-26 | Stop reason: HOSPADM

## 2024-03-21 RX ORDER — BENZONATATE 100 MG/1
100 CAPSULE ORAL 3 TIMES DAILY PRN
Status: DISCONTINUED | OUTPATIENT
Start: 2024-03-21 | End: 2024-03-26 | Stop reason: HOSPADM

## 2024-03-21 RX ORDER — SODIUM CHLORIDE, SODIUM LACTATE, POTASSIUM CHLORIDE, CALCIUM CHLORIDE 600; 310; 30; 20 MG/100ML; MG/100ML; MG/100ML; MG/100ML
500 INJECTION, SOLUTION INTRAVENOUS CONTINUOUS
Status: ACTIVE | OUTPATIENT
Start: 2024-03-21 | End: 2024-03-21

## 2024-03-21 RX ORDER — IBUPROFEN 200 MG
24 TABLET ORAL
Status: DISCONTINUED | OUTPATIENT
Start: 2024-03-21 | End: 2024-03-26 | Stop reason: HOSPADM

## 2024-03-21 RX ORDER — EMPAGLIFLOZIN 10 MG/1
10 TABLET, FILM COATED ORAL DAILY
COMMUNITY

## 2024-03-21 RX ORDER — PROPRANOLOL HYDROCHLORIDE 20 MG/1
20 TABLET ORAL 2 TIMES DAILY
Status: ON HOLD | COMMUNITY
End: 2024-04-03 | Stop reason: HOSPADM

## 2024-03-21 RX ADMIN — PROPRANOLOL HYDROCHLORIDE 20 MG: 20 TABLET ORAL at 09:03

## 2024-03-21 RX ADMIN — CEFTRIAXONE SODIUM 1 G: 1 INJECTION, POWDER, FOR SOLUTION INTRAMUSCULAR; INTRAVENOUS at 01:03

## 2024-03-21 RX ADMIN — SODIUM CHLORIDE 2000 ML: 9 INJECTION, SOLUTION INTRAVENOUS at 10:03

## 2024-03-21 RX ADMIN — SODIUM CHLORIDE, POTASSIUM CHLORIDE, SODIUM LACTATE AND CALCIUM CHLORIDE 2000 ML: 600; 310; 30; 20 INJECTION, SOLUTION INTRAVENOUS at 12:03

## 2024-03-21 RX ADMIN — POTASSIUM CHLORIDE 40 MEQ: 10 INJECTION, SOLUTION INTRAVENOUS at 10:03

## 2024-03-21 RX ADMIN — SODIUM CHLORIDE, POTASSIUM CHLORIDE, SODIUM LACTATE AND CALCIUM CHLORIDE 500 ML: 600; 310; 30; 20 INJECTION, SOLUTION INTRAVENOUS at 08:03

## 2024-03-21 RX ADMIN — POTASSIUM CHLORIDE 10 MEQ: 7.46 INJECTION, SOLUTION INTRAVENOUS at 12:03

## 2024-03-21 RX ADMIN — SODIUM CHLORIDE, POTASSIUM CHLORIDE, SODIUM LACTATE AND CALCIUM CHLORIDE 2000 ML: 600; 310; 30; 20 INJECTION, SOLUTION INTRAVENOUS at 03:03

## 2024-03-21 RX ADMIN — Medication 6 MG: at 11:03

## 2024-03-21 RX ADMIN — CALCIUM GLUCONATE 1 G: 20 INJECTION, SOLUTION INTRAVENOUS at 03:03

## 2024-03-21 RX ADMIN — INSULIN HUMAN 0.1 UNITS/KG/HR: 1 INJECTION, SOLUTION INTRAVENOUS at 03:03

## 2024-03-21 RX ADMIN — ATORVASTATIN CALCIUM 80 MG: 20 TABLET, FILM COATED ORAL at 09:03

## 2024-03-21 RX ADMIN — AZITHROMYCIN MONOHYDRATE 500 MG: 500 INJECTION, POWDER, LYOPHILIZED, FOR SOLUTION INTRAVENOUS at 02:03

## 2024-03-21 RX ADMIN — INSULIN HUMAN 20 UNITS: 100 INJECTION, SOLUTION PARENTERAL at 11:03

## 2024-03-21 RX ADMIN — POTASSIUM BICARBONATE 40 MEQ: 391 TABLET, EFFERVESCENT ORAL at 09:03

## 2024-03-21 NOTE — RESPIRATORY THERAPY
Pt requested water. RN stated pt okay to drink water.  Pt drank approximately 4oz. After a minute, pt began vomiting. RT and RN sat pt up with a emesis bag. Water moved away from pt and pt repositioned. O2 sat is approximately 94%  Will continue to monitor.

## 2024-03-21 NOTE — PHARMACY MED REC
"  [facility]           Pharmacy  Admission Medication History     The home medication history was taken by Tami Delgado.      Medication history obtained from Medications listed below were obtained from: [employer] software- [employer]    Based on information gathered for medication list, you may go to "Admission" then "Reconcile Home Medications" tabs to review and/or act upon those items.     The home medication list has been updated by the Pharmacy department.   Please read ALL comments highlighted in yellow.   Please address this information as you see fit.    Feel free to contact us if you have any questions or require assistance.      No current facility-administered medications on file prior to encounter.     Current Outpatient Medications on File Prior to Encounter   Medication Sig Dispense Refill    aspirin (ECOTRIN) 81 MG EC tablet Take 81 mg by mouth once daily.      atorvastatin (LIPITOR) 80 MG tablet Take 80 mg by mouth every evening.      doxycycline (VIBRAMYCIN) 100 MG Cap Take 1 capsule (100 mg total) by mouth 2 (two) times daily. for 10 days 20 capsule 0    ergocalciferol (ERGOCALCIFEROL) 50,000 unit Cap Take 50,000 Units by mouth every 7 days.      insulin (BASAGLAR KWIKPEN U-100 INSULIN) glargine 100 units/mL SubQ pen Inject 30 Units into the skin every evening. Within 15 minutes of eating      insulin aspart U-100 (NOVOLOG) 100 unit/mL (3 mL) InPn pen Inject 15 Units into the skin 3 (three) times daily with meals.      JARDIANCE 10 mg tablet Take 10 mg by mouth once daily.      lisinopriL (PRINIVIL,ZESTRIL) 5 MG tablet Take 5 mg by mouth once daily.      tamsulosin (FLOMAX) 0.4 mg Cap Take 0.4 mg by mouth once daily.      alcohol swabs (ALCOHOL WIPES) PadM Use topical 4 x daily for insulin 200 each 11    benzonatate (TESSALON) 100 MG capsule Take 2 capsules (200 mg total) by mouth 3 (three) times daily as needed for Cough. 20 capsule 0    blood sugar diagnostic Strp 1 each by " "Misc.(Non-Drug; Combo Route) route 3 (three) times daily before meals. 200 each 11    blood-glucose meter (TRUE METRIX GLUCOSE METER) Misc 1 each by Misc.(Non-Drug; Combo Route) route once daily. 1 each 0    docusate sodium (COLACE) 100 MG capsule Take 1 capsule (100 mg total) by mouth 2 (two) times daily as needed for Constipation. 30 capsule 0    insulin syringe-needle U-100 (INSULIN SYRINGE) 1/2 mL 28 gauge x 1/2" Syrg To use with meal time insulin tid 200 each 9    lancets 32 gauge Misc Inject 1 lancet into the skin 3 (three) times daily before meals. 200 each 11    pen needle, diabetic (NOVOTWIST) 32 gauge x 1/5" Ndle To use nightly with levemir 100 each 3    polyethylene glycol (GLYCOLAX) 17 gram/dose powder Take 17 g by mouth once daily. 1 Bottle 0    propranoloL (INDERAL) 20 MG tablet Take 20 mg by mouth 2 (two) times daily.      SURE COMFORT INSULIN SYRINGE 0.3 mL 31 gauge x 5/16" Syrg Inject 1 each into the skin 4 (four) times daily before meals and nightly. 200 each 11       Please address this information as you see fit.  Feel free to contact us if you have any questions or require assistance.    Tami Delgado  [phone]                .          "

## 2024-03-21 NOTE — ED PROVIDER NOTES
"Encounter Date: 3/21/2024       History     Chief Complaint   Patient presents with    Hyperglycemia     To ED via [facility] from PCP clinic for scheduled checkup, sent to ED due to "HI" glucose reading at office. Did not take prescribed insulin. Denies complaints. POCT glucose >500     53 year old male with a PMHx of IDDM, HTN, HepC presents to the ED via EMS with hyperglycemia. Went to his PCP today for a routine checkup. Didn't take any of his insulin today. Glucose checked was "HI" and he was sent to the ED. Reports increased thrist. Otherwise no other symptoms - no nausea, vomiting, abdominal pain, polyuria, numbness, weakness, chest pain. He has no idea what his insulin regimen is and last took an unknown amount last night. Patient drinks sodas on a regular basis.    Patient is alert, oriented to name, birthday, month and year, situation, and hospital. He has some mild confusion that requires repeating answers/questions.        Review of patient's allergies indicates:  No Known Allergies  Past Medical History:   Diagnosis Date    Diabetes mellitus     Diabetes mellitus type I     Hepatitis     Hepatitis C     Hypertension     Shingles 10/2018     Past Surgical History:   Procedure Laterality Date    NO PAST SURGERIES       Family History   Problem Relation Age of Onset    Diabetes Mother     Lung cancer Father      Social History     Tobacco Use    Smoking status: Every Day     Current packs/day: 1.00     Types: Cigarettes    Smokeless tobacco: Former   Substance Use Topics    Alcohol use: No    Drug use: Yes     Types: Methamphetamines     Comment: Patient stated "I'm use IV drug user with meth in the last 2 weeks."     Review of Systems   Constitutional:  Negative for chills and fever.   HENT:  Negative for congestion, rhinorrhea and sneezing.    Eyes:  Negative for discharge and redness.   Respiratory:  Positive for cough. Negative for shortness of breath.    Cardiovascular:  Negative for chest pain and " palpitations.   Gastrointestinal:  Negative for abdominal pain, diarrhea and vomiting.   Endocrine: Positive for polydipsia.   Genitourinary:  Negative for difficulty urinating, flank pain and urgency.   Musculoskeletal:  Negative for back pain and neck pain.   Skin:  Negative for rash and wound.   Neurological:  Negative for weakness, numbness and headaches.       Physical Exam     Initial Vitals   BP Pulse Resp Temp SpO2   03/21/24 0925 03/21/24 0925 03/21/24 0925 03/21/24 0927 03/21/24 0925   (!) 180/74 86 20 98 °F (36.7 °C) 97 %      MAP       --                Physical Exam    Nursing note and vitals reviewed.  Constitutional: He appears well-developed. He is not diaphoretic. No distress.   HENT:   Head: Normocephalic and atraumatic.   Right Ear: External ear normal.   Left Ear: External ear normal.   Nose: Nose normal.   Eyes: Conjunctivae are normal. Right eye exhibits no discharge. Left eye exhibits no discharge. No scleral icterus.   Cardiovascular:  Normal rate and regular rhythm.           Pulmonary/Chest: Breath sounds normal. No stridor. No respiratory distress. He has no wheezes. He has no rhonchi. He has no rales.   Abdominal: Abdomen is soft. He exhibits no distension. There is no abdominal tenderness. There is no guarding.   Musculoskeletal:         General: No edema.     Neurological: He is alert and oriented to person, place, and time. He has normal strength. No cranial nerve deficit or sensory deficit. GCS score is 15. GCS eye subscore is 4. GCS verbal subscore is 5. GCS motor subscore is 6.   Mild confusion - needs questions/answers repeated, forgetful; pulled out IV lines   Skin: Skin is warm and dry. Capillary refill takes less than 2 seconds.   Psychiatric: He has a normal mood and affect.         ED Course   Critical Care    Date/Time: 3/21/2024 2:48 PM    Performed by: Antonio Toledo DO  Authorized by: Antonio Toledo DO  Direct patient critical care time: 30 minutes  Additional history  critical care time: 4 minutes  Ordering / reviewing critical care time: 3 minutes  Documentation critical care time: 3 minutes  Consulting other physicians critical care time: 4 minutes  Total critical care time (exclusive of procedural time) : 44 minutes  Critical care time was exclusive of separately billable procedures and treating other patients and teaching time.  Critical care was necessary to treat or prevent imminent or life-threatening deterioration of the following conditions: dehydration and CNS failure or compromise.  Critical care was time spent personally by me on the following activities: development of treatment plan with patient or surrogate, discussions with consultants, evaluation of patient's response to treatment, examination of patient, obtaining history from patient or surrogate, ordering and performing treatments and interventions, ordering and review of laboratory studies, ordering and review of radiographic studies, pulse oximetry, re-evaluation of patient's condition and review of old charts.        Labs Reviewed   CBC W/ AUTO DIFFERENTIAL - Abnormal; Notable for the following components:       Result Value    WBC 18.04 (*)     RBC 4.28 (*)     MCV 99 (*)     MCH 32.7 (*)     Immature Granulocytes 1.2 (*)     Gran # (ANC) 14.9 (*)     Immature Grans (Abs) 0.22 (*)     Gran % 82.4 (*)     Lymph % 11.3 (*)     All other components within normal limits   COMPREHENSIVE METABOLIC PANEL - Abnormal; Notable for the following components:    Sodium 135 (*)     Potassium 3.4 (*)     Chloride 93 (*)     Glucose 914 (*)     Creatinine 2.0 (*)     Calcium 8.3 (*)     Total Protein 5.5 (*)     Albumin 1.2 (*)     Alkaline Phosphatase 180 (*)     eGFR 39 (*)     All other components within normal limits    Narrative:       GLU critical result(s) called and verbal readback obtained from   Prince COOK by APC 03/21/2024 10:42   BETA - HYDROXYBUTYRATE, SERUM - Abnormal; Notable for the following  components:    Beta-Hydroxybutyrate 0.9 (*)     All other components within normal limits   URINALYSIS, REFLEX TO URINE CULTURE - Abnormal; Notable for the following components:    Protein, UA 3+ (*)     Glucose, UA 4+ (*)     Occult Blood UA 1+ (*)     All other components within normal limits    Narrative:     Specimen Source->Urine   TROPONIN I - Abnormal; Notable for the following components:    Troponin I 0.042 (*)     All other components within normal limits   URINALYSIS MICROSCOPIC - Abnormal; Notable for the following components:    Bacteria Few (*)     Hyaline Casts, UA 15 (*)     All other components within normal limits    Narrative:     Specimen Source->Urine   LACTIC ACID, PLASMA - Abnormal; Notable for the following components:    Lactate (Lactic Acid) 6.0 (*)     All other components within normal limits    Narrative:       LA critical result(s) called and verbal readback obtained from    Specimen is slightly hemolyzed.  Care provider has been notified and   rejects redraw request.    Shayla Deng RN by APC 03/21/2024 13:12   BASIC METABOLIC PANEL - Abnormal; Notable for the following components:    Potassium 3.3 (*)     Glucose 562 (*)     Creatinine 1.5 (*)     Calcium 7.7 (*)     eGFR 55 (*)     All other components within normal limits    Narrative:      GLU  critical result(s) called and verbal readback obtained from   Thompson JONES by APC 03/21/2024 14:31   TROPONIN I - Abnormal; Notable for the following components:    Troponin I 0.031 (*)     All other components within normal limits   POCT GLUCOSE - Abnormal; Notable for the following components:    POCT Glucose >500 (*)     All other components within normal limits   POCT GLUCOSE - Abnormal; Notable for the following components:    POCT Glucose >500 (*)     All other components within normal limits   INFLUENZA A & B BY MOLECULAR   CULTURE, BLOOD   CULTURE, BLOOD   MAGNESIUM   SARS-COV-2 RNA AMPLIFICATION, QUAL   OSMOLALITY, URINE  RANDOM   OSMOLALITY, SERUM   OSMOLALITY, URINE RANDOM     EKG Readings: (Independently Interpreted)   NSR at 79 bpm. Normal axis. Prolonged Qtc 513ms. T wave flattening. No STEMI.       Imaging Results              CT Chest Without Contrast (Edited Result - FINAL)  Result time 03/21/24 12:33:15      Addendum (preliminary) 1 of 1 by Jill Ellis MD (03/21/24 12:33:15)      There is some thickening of the walls of mid and distal esophagus as well as the proximal stomach.  This could reflect a gastroesophagitis; however, consider correlation with direct visualization to exclude underlying lesion.      Electronically signed by: Jill Ellis MD  Date:    03/21/2024  Time:    12:33                 Final result by Jill Ellis MD (03/21/24 11:56:33)                   Impression:      Scattered micronodular densities with a tree-in-bud distribution seen throughout both lungs with associated regions of ground-glass opacity seen in the lingula and left lower lobe.  In the appropriate clinical setting, this could represent an acute inflammatory/infectious process with a differential to include atypical infectious processes, indolent infectious processes as well as microaspiration and hypersensitivity pneumonitis.  Correlation with patient symptoms is recommended.    Whole-body anasarca.    Mediastinal and axillary adenopathy, the etiology of which is uncertain.    Suspected hepatic cysts, unchanged from 2019 and large left adrenal gland nodule, likely related to an adenoma given internal density, also unchanged from 2019.      Electronically signed by: Jill Ellis MD  Date:    03/21/2024  Time:    11:56               Narrative:    EXAMINATION:  CT CHEST WITHOUT CONTRAST    CLINICAL HISTORY:  Aspiration;    TECHNIQUE:  Low dose axial images, sagittal and coronal reformations were obtained from the thoracic inlet to the lung bases. Contrast was not administered.    COMPARISON:  03/21/2024    FINDINGS:  The  thyroid appears normal.  The main central airways are patent.  There is thickening of the walls of the entirety of the esophagus, most pronounced in the mid and distal esophagus.  There is hiatal hernia.  There does appear to be some thickening of the walls of the proximal stomach as well as the body of the stomach.  The heart is normal in size.  Calcified coronary artery disease.  No pericardial effusion.  Small mediastinal lymph nodes are seen.  AP window lymph node measures 1 cm.  Right precarinal lymph node measures 1.2 cm.  Prominent bilateral axillary lymph nodes.  Anasarca.    Small to moderate bilateral pleural effusions.  There is scattered clustered micronodular densities with a tree-in-bud distribution seen throughout both lungs, most pronounced in the lingula and left lower lobe where there are also some ground-glass opacities noted.  No large airspace consolidation.    Limited evaluation of the upper abdominal structures show 2 indeterminate hypodensities in the liver which are unchanged from 2019 and likely related to cysts.  Large left adrenal gland nodule measuring 2.3 cm has internal density suggestive for an adenoma, stable.    Age-appropriate degenerative changes affect the skeleton.                                       CT Head Without Contrast (Final result)  Result time 03/21/24 11:50:34      Final result by Jill Ellis MD (03/21/24 11:50:34)                   Impression:      No acute abnormality.      Electronically signed by: Jill Ellis MD  Date:    03/21/2024  Time:    11:50               Narrative:    EXAMINATION:  CT HEAD WITHOUT CONTRAST    CLINICAL HISTORY:  Mental status change, unknown cause;    TECHNIQUE:  Low dose axial CT images obtained throughout the head without intravenous contrast. Sagittal and coronal reconstructions were performed.    COMPARISON:  None.    FINDINGS:  Intracranial compartment:    Ventricles and sulci are normal in size for age without evidence of  hydrocephalus. No extra-axial blood or fluid collections.    The brain parenchyma appears normal. No parenchymal mass, hemorrhage, edema or major vascular distribution infarct.    Skull/extracranial contents (limited evaluation): No fracture. Mastoid air cells are clear.Paranasal sinuses are essentially clear.                                       X-Ray Chest AP Portable (Final result)  Result time 03/21/24 09:46:10      Final result by Jill Ellis MD (03/21/24 09:46:10)                   Impression:      As above.      Electronically signed by: Jill Ellis MD  Date:    03/21/2024  Time:    09:46               Narrative:    EXAMINATION:  XR CHEST AP PORTABLE    CLINICAL HISTORY:  Hyperglycemia, unspecified    TECHNIQUE:  Single frontal view of the chest was performed.    COMPARISON:  03/11/2024    FINDINGS:  Chronic lung changes.  No consolidation or pleural effusions.  Heart size is normal.  Skeletal structures are intact.                                       Medications   potassium chloride 10 mEq in 100 mL IVPB (0 mEq Intravenous Stopped 3/21/24 1335)   cefTRIAXone (Rocephin) 1 g in dextrose 5 % in water (D5W) 100 mL IVPB (MB+) (0 g Intravenous Stopped 3/21/24 1410)   azithromycin (ZITHROMAX) 500 mg in dextrose 5 % (D5W) 250 mL IVPB (Vial-Mate) (500 mg Intravenous New Bag 3/21/24 1409)   lactated ringers bolus 2,000 mL (has no administration in time range)   potassium chloride 10 mEq in 100 mL IVPB (has no administration in time range)   calcium gluconate 1 g in NS IVPB (premixed) (has no administration in time range)   insulin regular in 0.9 % NaCl 100 unit/100 mL (1 unit/mL) infusion (has no administration in time range)   sodium chloride 0.9% bolus 2,000 mL 2,000 mL (0 mLs Intravenous Stopped 3/21/24 1243)   lactated ringers bolus 2,000 mL (0 mLs Intravenous Stopped 3/21/24 1345)   insulin regular injection 20 Units 0.2 mL (20 Units Intravenous Given 3/21/24 1107)     Medical Decision Making  Ddx:  DKA, HHS, hyperglycemia, dehydration, ACS, UTI, PNA, infection, YAMIL    53 year old male with mild confusion, hyperglycemia. Concern for HHS (glucose is 914, no anion gap, slightly elevated beta-hydroxybutyrate at 0.9), YAMIL (Cr 2.0), K 3.4, Ca 8.3, Trop 0.042, lactic acid 6, WBC 18k, vbg pH 7.47.    CT head neg. CT chest with gastroesophagitis - can be referred to GI as outpatient. CT chest also mentioned possible infectious/inflammatory process of left lung. Bcx pending.    Serum Osm in process. Given extremely high glucose and slight confusion - I'm concerned patient has HHS.     Given 2L of NS, 2L of LR, 20 units of insulin, ceftriaxone, azithromycin, IV K x 2.     Trop 0.042->0.031, glucose 914->562, Cr 2.0->1.5, K 3.4->3.3. Ordering another 2L LR bolus, insulin drip, IV K (insulin expected to drop K).    Repeat lactic pending although I believe a large reason it's elevated is due to hypovolemia from the patient's glucose. Leukocytosis can also be caused by the patient's hyperglycemia. Still, repeat lactic is ordered.    I spoke with Dr. Paula to admit to the ICU. He is happy to assist and accepts admission.    Amount and/or Complexity of Data Reviewed  Labs: ordered.  Radiology: ordered.    Risk  OTC drugs.  Prescription drug management.  Decision regarding hospitalization.                                      Clinical Impression:  Final diagnoses:  [R73.9] Hyperglycemia  [N17.9] YAMIL (acute kidney injury)  [E87.6] Hypokalemia  [E83.51] Hypocalcemia  [E11.00] Hyperosmolar hyperglycemic state (HHS) (Primary)  [R41.82] Altered mental status, unspecified altered mental status type  [E87.21] Acute lactic acidosis  [J18.9] Pneumonia of left lower lobe due to infectious organism          ED Disposition Condition    Admit Stable                Toledo, AlvarezDO  03/21/24 1748

## 2024-03-22 LAB
ALBUMIN SERPL BCP-MCNC: 1.1 G/DL (ref 3.5–5.2)
ALBUMIN SERPL BCP-MCNC: 1.1 G/DL (ref 3.5–5.2)
ALP SERPL-CCNC: 129 U/L (ref 55–135)
ALP SERPL-CCNC: 129 U/L (ref 55–135)
ALT SERPL W/O P-5'-P-CCNC: 25 U/L (ref 10–44)
ALT SERPL W/O P-5'-P-CCNC: 25 U/L (ref 10–44)
ANION GAP SERPL CALC-SCNC: 9 MMOL/L (ref 8–16)
ANION GAP SERPL CALC-SCNC: 9 MMOL/L (ref 8–16)
AST SERPL-CCNC: 41 U/L (ref 10–40)
AST SERPL-CCNC: 41 U/L (ref 10–40)
BASOPHILS # BLD AUTO: 0.07 K/UL (ref 0–0.2)
BASOPHILS NFR BLD: 0.3 % (ref 0–1.9)
BILIRUB SERPL-MCNC: 0.3 MG/DL (ref 0.1–1)
BILIRUB SERPL-MCNC: 0.3 MG/DL (ref 0.1–1)
BUN SERPL-MCNC: 17 MG/DL (ref 6–20)
BUN SERPL-MCNC: 17 MG/DL (ref 6–20)
CA-I BLDV-SCNC: 1.05 MMOL/L (ref 1.06–1.42)
CALCIUM SERPL-MCNC: 7.8 MG/DL (ref 8.7–10.5)
CALCIUM SERPL-MCNC: 7.8 MG/DL (ref 8.7–10.5)
CHLORIDE SERPL-SCNC: 99 MMOL/L (ref 95–110)
CHLORIDE SERPL-SCNC: 99 MMOL/L (ref 95–110)
CHOLEST SERPL-MCNC: 203 MG/DL (ref 120–199)
CHOLEST/HDLC SERPL: 4.1 {RATIO} (ref 2–5)
CO2 SERPL-SCNC: 31 MMOL/L (ref 23–29)
CO2 SERPL-SCNC: 31 MMOL/L (ref 23–29)
CREAT SERPL-MCNC: 1.4 MG/DL (ref 0.5–1.4)
CREAT SERPL-MCNC: 1.4 MG/DL (ref 0.5–1.4)
DIFFERENTIAL METHOD BLD: ABNORMAL
EOSINOPHIL # BLD AUTO: 0.3 K/UL (ref 0–0.5)
EOSINOPHIL NFR BLD: 1.6 % (ref 0–8)
ERYTHROCYTE [DISTWIDTH] IN BLOOD BY AUTOMATED COUNT: 12.4 % (ref 11.5–14.5)
EST. GFR  (NO RACE VARIABLE): 60 ML/MIN/1.73 M^2
EST. GFR  (NO RACE VARIABLE): 60 ML/MIN/1.73 M^2
ESTIMATED AVG GLUCOSE: ABNORMAL MG/DL (ref 68–131)
ESTIMATED AVG GLUCOSE: ABNORMAL MG/DL (ref 68–131)
GLUCOSE SERPL-MCNC: 218 MG/DL (ref 70–110)
GLUCOSE SERPL-MCNC: 218 MG/DL (ref 70–110)
HBA1C MFR BLD: >14 % (ref 4–5.6)
HBA1C MFR BLD: >14 % (ref 4–5.6)
HCT VFR BLD AUTO: 38.5 % (ref 40–54)
HDLC SERPL-MCNC: 50 MG/DL (ref 40–75)
HDLC SERPL: 24.6 % (ref 20–50)
HGB BLD-MCNC: 13.2 G/DL (ref 14–18)
IMM GRANULOCYTES # BLD AUTO: 0.25 K/UL (ref 0–0.04)
IMM GRANULOCYTES NFR BLD AUTO: 1.2 % (ref 0–0.5)
LACTATE SERPL-SCNC: 1.6 MMOL/L (ref 0.5–2.2)
LDLC SERPL CALC-MCNC: 130.6 MG/DL (ref 63–159)
LYMPHOCYTES # BLD AUTO: 4.3 K/UL (ref 1–4.8)
LYMPHOCYTES NFR BLD: 20.7 % (ref 18–48)
MCH RBC QN AUTO: 33.2 PG (ref 27–31)
MCHC RBC AUTO-ENTMCNC: 34.3 G/DL (ref 32–36)
MCV RBC AUTO: 97 FL (ref 82–98)
MONOCYTES # BLD AUTO: 0.9 K/UL (ref 0.3–1)
MONOCYTES NFR BLD: 4.1 % (ref 4–15)
NEUTROPHILS # BLD AUTO: 14.9 K/UL (ref 1.8–7.7)
NEUTROPHILS NFR BLD: 72.1 % (ref 38–73)
NONHDLC SERPL-MCNC: 153 MG/DL
NRBC BLD-RTO: 0 /100 WBC
OHS QRS DURATION: 104 MS
OHS QTC CALCULATION: 513 MS
PLATELET # BLD AUTO: 448 K/UL (ref 150–450)
PMV BLD AUTO: 10.8 FL (ref 9.2–12.9)
POCT GLUCOSE: 111 MG/DL (ref 70–110)
POCT GLUCOSE: 184 MG/DL (ref 70–110)
POCT GLUCOSE: 233 MG/DL (ref 70–110)
POCT GLUCOSE: 328 MG/DL (ref 70–110)
POCT GLUCOSE: 366 MG/DL (ref 70–110)
POTASSIUM SERPL-SCNC: 3.6 MMOL/L (ref 3.5–5.1)
POTASSIUM SERPL-SCNC: 3.6 MMOL/L (ref 3.5–5.1)
PROT SERPL-MCNC: 4.7 G/DL (ref 6–8.4)
PROT SERPL-MCNC: 4.7 G/DL (ref 6–8.4)
RBC # BLD AUTO: 3.98 M/UL (ref 4.6–6.2)
SODIUM SERPL-SCNC: 139 MMOL/L (ref 136–145)
SODIUM SERPL-SCNC: 139 MMOL/L (ref 136–145)
TRIGL SERPL-MCNC: 112 MG/DL (ref 30–150)
WBC # BLD AUTO: 20.67 K/UL (ref 3.9–12.7)

## 2024-03-22 PROCEDURE — 11000001 HC ACUTE MED/SURG PRIVATE ROOM

## 2024-03-22 PROCEDURE — 25000003 PHARM REV CODE 250: Performed by: STUDENT IN AN ORGANIZED HEALTH CARE EDUCATION/TRAINING PROGRAM

## 2024-03-22 PROCEDURE — 83036 HEMOGLOBIN GLYCOSYLATED A1C: CPT | Performed by: STUDENT IN AN ORGANIZED HEALTH CARE EDUCATION/TRAINING PROGRAM

## 2024-03-22 PROCEDURE — 99900035 HC TECH TIME PER 15 MIN (STAT)

## 2024-03-22 PROCEDURE — 80061 LIPID PANEL: CPT | Performed by: STUDENT IN AN ORGANIZED HEALTH CARE EDUCATION/TRAINING PROGRAM

## 2024-03-22 PROCEDURE — 83605 ASSAY OF LACTIC ACID: CPT | Performed by: STUDENT IN AN ORGANIZED HEALTH CARE EDUCATION/TRAINING PROGRAM

## 2024-03-22 PROCEDURE — 36415 COLL VENOUS BLD VENIPUNCTURE: CPT | Performed by: STUDENT IN AN ORGANIZED HEALTH CARE EDUCATION/TRAINING PROGRAM

## 2024-03-22 PROCEDURE — 25000242 PHARM REV CODE 250 ALT 637 W/ HCPCS: Performed by: PHYSICIAN ASSISTANT

## 2024-03-22 PROCEDURE — 63600175 PHARM REV CODE 636 W HCPCS: Performed by: FAMILY MEDICINE

## 2024-03-22 PROCEDURE — 25000003 PHARM REV CODE 250: Performed by: PHYSICIAN ASSISTANT

## 2024-03-22 PROCEDURE — 94640 AIRWAY INHALATION TREATMENT: CPT

## 2024-03-22 PROCEDURE — 63600175 PHARM REV CODE 636 W HCPCS: Performed by: STUDENT IN AN ORGANIZED HEALTH CARE EDUCATION/TRAINING PROGRAM

## 2024-03-22 PROCEDURE — 99291 CRITICAL CARE FIRST HOUR: CPT | Mod: ,,, | Performed by: PHYSICIAN ASSISTANT

## 2024-03-22 PROCEDURE — 85025 COMPLETE CBC W/AUTO DIFF WBC: CPT | Performed by: STUDENT IN AN ORGANIZED HEALTH CARE EDUCATION/TRAINING PROGRAM

## 2024-03-22 PROCEDURE — 99900031 HC PATIENT EDUCATION (STAT)

## 2024-03-22 PROCEDURE — 94761 N-INVAS EAR/PLS OXIMETRY MLT: CPT

## 2024-03-22 PROCEDURE — 80053 COMPREHEN METABOLIC PANEL: CPT | Performed by: STUDENT IN AN ORGANIZED HEALTH CARE EDUCATION/TRAINING PROGRAM

## 2024-03-22 PROCEDURE — 94799 UNLISTED PULMONARY SVC/PX: CPT | Mod: XB

## 2024-03-22 RX ORDER — GUAIFENESIN 600 MG/1
600 TABLET, EXTENDED RELEASE ORAL 2 TIMES DAILY
Status: DISCONTINUED | OUTPATIENT
Start: 2024-03-22 | End: 2024-03-26 | Stop reason: HOSPADM

## 2024-03-22 RX ORDER — IPRATROPIUM BROMIDE AND ALBUTEROL SULFATE 2.5; .5 MG/3ML; MG/3ML
3 SOLUTION RESPIRATORY (INHALATION) EVERY 4 HOURS
Status: DISCONTINUED | OUTPATIENT
Start: 2024-03-22 | End: 2024-03-24

## 2024-03-22 RX ORDER — MUPIROCIN 20 MG/G
OINTMENT TOPICAL 2 TIMES DAILY
Status: DISCONTINUED | OUTPATIENT
Start: 2024-03-22 | End: 2024-03-26 | Stop reason: HOSPADM

## 2024-03-22 RX ORDER — INSULIN ASPART 100 [IU]/ML
20 INJECTION, SOLUTION INTRAVENOUS; SUBCUTANEOUS ONCE
Status: COMPLETED | OUTPATIENT
Start: 2024-03-22 | End: 2024-03-22

## 2024-03-22 RX ADMIN — IPRATROPIUM BROMIDE AND ALBUTEROL SULFATE 3 ML: 2.5; .5 SOLUTION RESPIRATORY (INHALATION) at 03:03

## 2024-03-22 RX ADMIN — ATORVASTATIN CALCIUM 80 MG: 20 TABLET, FILM COATED ORAL at 08:03

## 2024-03-22 RX ADMIN — GUAIFENESIN 600 MG: 600 TABLET, EXTENDED RELEASE ORAL at 08:03

## 2024-03-22 RX ADMIN — IPRATROPIUM BROMIDE AND ALBUTEROL SULFATE 3 ML: 2.5; .5 SOLUTION RESPIRATORY (INHALATION) at 11:03

## 2024-03-22 RX ADMIN — PROPRANOLOL HYDROCHLORIDE 20 MG: 20 TABLET ORAL at 08:03

## 2024-03-22 RX ADMIN — INSULIN DETEMIR 25 UNITS: 100 INJECTION, SOLUTION SUBCUTANEOUS at 08:03

## 2024-03-22 RX ADMIN — INSULIN ASPART 15 UNITS: 100 INJECTION, SOLUTION INTRAVENOUS; SUBCUTANEOUS at 08:03

## 2024-03-22 RX ADMIN — LISINOPRIL 5 MG: 2.5 TABLET ORAL at 08:03

## 2024-03-22 RX ADMIN — INSULIN ASPART 8 UNITS: 100 INJECTION, SOLUTION INTRAVENOUS; SUBCUTANEOUS at 05:03

## 2024-03-22 RX ADMIN — ASPIRIN 81 MG: 81 TABLET, COATED ORAL at 08:03

## 2024-03-22 RX ADMIN — INSULIN ASPART 20 UNITS: 100 INJECTION, SOLUTION INTRAVENOUS; SUBCUTANEOUS at 03:03

## 2024-03-22 RX ADMIN — INSULIN ASPART 15 UNITS: 100 INJECTION, SOLUTION INTRAVENOUS; SUBCUTANEOUS at 05:03

## 2024-03-22 RX ADMIN — AZITHROMYCIN MONOHYDRATE 500 MG: 500 INJECTION, POWDER, LYOPHILIZED, FOR SOLUTION INTRAVENOUS at 01:03

## 2024-03-22 RX ADMIN — CEFTRIAXONE SODIUM 1 G: 1 INJECTION, POWDER, FOR SOLUTION INTRAMUSCULAR; INTRAVENOUS at 02:03

## 2024-03-22 RX ADMIN — INSULIN ASPART 1 UNITS: 100 INJECTION, SOLUTION INTRAVENOUS; SUBCUTANEOUS at 08:03

## 2024-03-22 RX ADMIN — MUPIROCIN: 20 OINTMENT TOPICAL at 08:03

## 2024-03-22 RX ADMIN — IPRATROPIUM BROMIDE AND ALBUTEROL SULFATE 3 ML: 2.5; .5 SOLUTION RESPIRATORY (INHALATION) at 08:03

## 2024-03-22 RX ADMIN — INSULIN ASPART 2 UNITS: 100 INJECTION, SOLUTION INTRAVENOUS; SUBCUTANEOUS at 12:03

## 2024-03-22 RX ADMIN — INSULIN ASPART 15 UNITS: 100 INJECTION, SOLUTION INTRAVENOUS; SUBCUTANEOUS at 12:03

## 2024-03-22 RX ADMIN — POTASSIUM BICARBONATE 40 MEQ: 391 TABLET, EFFERVESCENT ORAL at 12:03

## 2024-03-22 RX ADMIN — IPRATROPIUM BROMIDE AND ALBUTEROL SULFATE 3 ML: 2.5; .5 SOLUTION RESPIRATORY (INHALATION) at 07:03

## 2024-03-22 NOTE — ASSESSMENT & PLAN NOTE
Seen on CT chest  Cont rocephin/azithro  Duo nebulizer/mucinex   Persistent leukocytosis.  May need to change abx if doesn't improve within the next 24 hours  Blood cultures pending

## 2024-03-22 NOTE — ASSESSMENT & PLAN NOTE
Patient with acute kidney injury/acute renal failure likely due to pre-renal azotemia due to IVVD YAMIL is currently improving. Baseline creatinine  1.2-1.3  - Labs reviewed- Renal function/electrolytes with Estimated Creatinine Clearance: 58.9 mL/min (A) (based on SCr of 1.5 mg/dL (H)). according to latest data. Monitor urine output and serial BMP and adjust therapy as needed. Avoid nephrotoxins and renally dose meds for GFR listed above.

## 2024-03-22 NOTE — PLAN OF CARE
[facility] - Intensive Care  Initial Discharge Assessment       Primary Care Provider: No, Primary Doctor    Admission Diagnosis: Hypocalcemia [E83.51]  Gastroesophagitis [K29.70, K20.90]  Hypokalemia [E87.6]  Hyperglycemia [R73.9]  YAMIL (acute kidney injury) [N17.9]  Altered mental status, unspecified altered mental status type [R41.82]  Pneumonia of left lower lobe due to infectious organism [J18.9]  Hyperosmolar hyperglycemic state (HHS) [E11.00]  Acute lactic acidosis [E87.21]    Admission Date: 3/21/2024  Expected Discharge Date:     Transition of Care Barriers: (P) None    Payor: MEDICAID / Plan: [place] / Product Type: Managed Medicaid /     Extended Emergency Contact Information  Primary Emergency Contact: Slava Jung   [place]  Mobile Phone: [phone]  Relation: Brother    Discharge Plan A: (P) Home  Discharge Plan B: (P) Home with family      [facility] [address]  [address]  [facility] [address]  Phone: [phone] Fax: [phone]    [id number] - [place] - [address]  [address]  [facility] [address]  Phone: [phone] Fax: [phone]      Initial Assessment (most recent)       Adult Discharge Assessment - 03/22/24 1303          Discharge Assessment    Assessment Type Discharge Planning Assessment (P)      Confirmed/corrected address, phone number and insurance Yes (P)      Confirmed Demographics Correct on Facesheet (P)      Source of Information patient (P)      Reason For Admission Hyperosmolar Hyperglycemic state (P)      People in Home alone (P)      Do you have help at home or someone to help you manage your care at home? Yes (P)      Who are your caregiver(s) and their phone number(s)? brother Pedersen, [phone] (P)      Prior to hospitilization cognitive status: Alert/Oriented (P)      Current cognitive status: Alert/Oriented (P)      Walking or Climbing Stairs  Difficulty no (P)      Dressing/Bathing Difficulty no (P)      Equipment Currently Used at Home glucometer (P)      Readmission within 30 days? No (P)      Patient currently being followed by outpatient case management? No (P)      Do you currently have service(s) that help you manage your care at home? No (P)      Do you take prescription medications? Yes (P)      Do you have prescription coverage? Yes (P)      Coverage Medicaid (P)      Do you have any problems affording any of your prescribed medications? No (P)      Is the patient taking medications as prescribed? no (P)      If no, which medications is patient not taking? Insulin (P)      Who is going to help you get home at discharge? Family (P)      How do you get to doctors appointments? car, drives self (P)      Are you on dialysis? No (P)      Do you take coumadin? No (P)      Discharge Plan A Home (P)      Discharge Plan B Home with family (P)      DME Needed Upon Discharge  none (P)      Discharge Plan discussed with: Patient (P)      Transition of Care Barriers None (P)         Physical Activity    On average, how many days per week do you engage in moderate to strenuous exercise (like a brisk walk)? 0 days (P)      On average, how many minutes do you engage in exercise at this level? 0 min (P)         Financial Resource Strain    How hard is it for you to pay for the very basics like food, housing, medical care, and heating? Patient declined (P)         Housing Stability    In the last 12 months, was there a time when you were not able to pay the mortgage or rent on time? Patient declined (P)      In the last 12 months, was there a time when you did not have a steady place to sleep or slept in a shelter (including now)? Patient declined (P)         Transportation Needs    In the past 12 months, has lack of transportation kept you from medical appointments or from getting medications? Patient declined (P)      In the past 12 months, has lack of  transportation kept you from meetings, work, or from getting things needed for daily living? Patient declined (P)         Food Insecurity    Within the past 12 months, you worried that your food would run out before you got the money to buy more. Patient declined (P)      Within the past 12 months, the food you bought just didn't last and you didn't have money to get more. Patient declined (P)         Stress    Do you feel stress - tense, restless, nervous, or anxious, or unable to sleep at night because your mind is troubled all the time - these days? Patient declined (P)         Social Connections    In a typical week, how many times do you talk on the phone with family, friends, or neighbors? Patient declined (P)      How often do you get together with friends or relatives? Patient declined (P)                       Discharge assessment completed at bedside with patient. No post acute needs determined at this time. SW to remain available if needs arise.

## 2024-03-22 NOTE — SUBJECTIVE & OBJECTIVE
"Past Medical History:   Diagnosis Date    Diabetes mellitus     Diabetes mellitus type I     Hepatitis     Hepatitis C     Hypertension     Shingles 10/2018       Past Surgical History:   Procedure Laterality Date    NO PAST SURGERIES         Review of patient's allergies indicates:  No Known Allergies    No current facility-administered medications on file prior to encounter.     Current Outpatient Medications on File Prior to Encounter   Medication Sig    aspirin (ECOTRIN) 81 MG EC tablet Take 81 mg by mouth once daily.    atorvastatin (LIPITOR) 80 MG tablet Take 80 mg by mouth every evening.    [] doxycycline (VIBRAMYCIN) 100 MG Cap Take 1 capsule (100 mg total) by mouth 2 (two) times daily. for 10 days    ergocalciferol (ERGOCALCIFEROL) 50,000 unit Cap Take 50,000 Units by mouth every 7 days.    insulin (BASAGLAR KWIKPEN U-100 INSULIN) glargine 100 units/mL SubQ pen Inject 30 Units into the skin every evening. Within 15 minutes of eating    insulin aspart U-100 (NOVOLOG) 100 unit/mL (3 mL) InPn pen Inject 15 Units into the skin 3 (three) times daily with meals.    JARDIANCE 10 mg tablet Take 10 mg by mouth once daily.    lisinopriL (PRINIVIL,ZESTRIL) 5 MG tablet Take 5 mg by mouth once daily.    tamsulosin (FLOMAX) 0.4 mg Cap Take 0.4 mg by mouth once daily.    alcohol swabs (ALCOHOL WIPES) PadM Use topical 4 x daily for insulin    [] benzonatate (TESSALON) 100 MG capsule Take 2 capsules (200 mg total) by mouth 3 (three) times daily as needed for Cough.    blood sugar diagnostic Strp 1 each by Misc.(Non-Drug; Combo Route) route 3 (three) times daily before meals.    blood-glucose meter (TRUE METRIX GLUCOSE METER) Misc 1 each by Misc.(Non-Drug; Combo Route) route once daily.    docusate sodium (COLACE) 100 MG capsule Take 1 capsule (100 mg total) by mouth 2 (two) times daily as needed for Constipation.    insulin syringe-needle U-100 (INSULIN SYRINGE) 1/2 mL 28 gauge x 1/2" Syrg To use with meal " "time insulin tid    lancets 32 gauge Misc Inject 1 lancet into the skin 3 (three) times daily before meals.    metFORMIN (GLUCOPHAGE) 1000 MG tablet Take 1 tablet (1,000 mg total) by mouth 2 (two) times daily with meals. (Patient not taking: Reported on 3/21/2024)    pen needle, diabetic (NOVOTWIST) 32 gauge x 1/5" Ndle To use nightly with levemir    polyethylene glycol (GLYCOLAX) 17 gram/dose powder Take 17 g by mouth once daily.    propranoloL (INDERAL) 20 MG tablet Take 20 mg by mouth 2 (two) times daily.    SURE COMFORT INSULIN SYRINGE 0.3 mL 31 gauge x 5/16" Syrg Inject 1 each into the skin 4 (four) times daily before meals and nightly.     Family History       Problem Relation (Age of Onset)    Diabetes Mother    Lung cancer Father          Tobacco Use    Smoking status: Every Day     Current packs/day: 1.00     Types: Cigarettes    Smokeless tobacco: Former   Substance and Sexual Activity    Alcohol use: No    Drug use: Yes     Types: Methamphetamines     Comment: Patient stated "I'm use IV drug user with meth in the last 2 weeks."    Sexual activity: Not on file     Review of Systems   Constitutional:  Negative for chills and fever.   HENT:  Negative for congestion and sore throat.    Eyes:  Negative for visual disturbance.   Respiratory:  Positive for cough. Negative for shortness of breath.    Cardiovascular:  Positive for leg swelling.   Gastrointestinal:  Negative for abdominal pain, diarrhea, nausea and vomiting.   Genitourinary:  Negative for dysuria and hematuria.   Skin:  Negative for rash and wound.   Neurological:  Negative for dizziness, syncope and light-headedness.   Psychiatric/Behavioral:  Negative for confusion.      Objective:     Vital Signs (Most Recent):  Temp: 98.3 °F (36.8 °C) (03/22/24 0540)  Pulse: 86 (03/22/24 0730)  Resp: 16 (03/22/24 0725)  BP: (!) 132/91 (03/22/24 0730)  SpO2: 97 % (03/22/24 0730) Vital Signs (24h Range):  Temp:  [97.9 °F (36.6 °C)-98.3 °F (36.8 °C)] 98.3 °F (36.8 " °C)  Pulse:  [58-95] 86  Resp:  [6-24] 16  SpO2:  [89 %-100 %] 97 %  BP: (131-186)/() 132/91     Weight: 85.9 kg (189 lb 6 oz)  Body mass index is 29.66 kg/m².     Physical Exam  Vitals reviewed.   Constitutional:       General: He is not in acute distress.  HENT:      Head: Normocephalic and atraumatic.      Right Ear: External ear normal.      Left Ear: External ear normal.      Mouth/Throat:      Mouth: Mucous membranes are moist.   Eyes:      Extraocular Movements: Extraocular movements intact.      Conjunctiva/sclera: Conjunctivae normal.      Pupils: Pupils are equal, round, and reactive to light.   Cardiovascular:      Rate and Rhythm: Normal rate and regular rhythm.      Heart sounds: Normal heart sounds. No murmur heard.  Pulmonary:      Effort: Pulmonary effort is normal. No respiratory distress.      Breath sounds: Normal breath sounds.   Abdominal:      General: Bowel sounds are normal. There is no distension.      Palpations: Abdomen is soft.      Tenderness: There is no abdominal tenderness.   Musculoskeletal:      Cervical back: Neck supple.      Right lower leg: Edema present.      Left lower leg: Edema present.      Comments: anasarca   Neurological:      General: No focal deficit present.      Mental Status: He is alert and oriented to person, place, and time.   Psychiatric:         Mood and Affect: Mood normal.         Behavior: Behavior normal.              CRANIAL NERVES     CN III, IV, VI   Pupils are equal, round, and reactive to light.       Significant Labs: All pertinent labs within the past 24 hours have been reviewed.  A1C:   Recent Labs   Lab 03/21/24  1911   HGBA1C >14.0*     Blood Culture:   Recent Labs   Lab 03/21/24  1215 03/21/24  1230   LABBLOO No Growth to date No Growth to date     BMP:   Recent Labs   Lab 03/21/24  0953 03/21/24  1345 03/22/24  0426   *   < > 218*  218*   *   < > 139  139   K 3.4*   < > 3.6  3.6   CL 93*   < > 99  99   CO2 29   < > 31*   "31*   BUN 15   < > 17  17   CREATININE 2.0*   < > 1.4  1.4   CALCIUM 8.3*   < > 7.8*  7.8*   MG 2.2  --   --     < > = values in this interval not displayed.       CBC:   Recent Labs   Lab 03/21/24  0953 03/22/24  0426   WBC 18.04* 20.67*   HGB 14.0 13.2*   HCT 42.2 38.5*    448       CMP:   Recent Labs   Lab 03/21/24  0953 03/21/24  1345 03/21/24 1911 03/21/24 2000 03/22/24  0426   *   < > 145 143 139  139   K 3.4*   < > 2.6* 2.5* 3.6  3.6   CL 93*   < > 104 103 99  99   CO2 29   < > 33* 32* 31*  31*   *   < > 82 67* 218*  218*   BUN 15   < > 13 13 17  17   CREATININE 2.0*   < > 1.2 1.2 1.4  1.4   CALCIUM 8.3*   < > 8.1* 8.1* 7.8*  7.8*   PROT 5.5*  --   --   --  4.7*  4.7*   ALBUMIN 1.2*  --   --   --  1.1*  1.1*   BILITOT 0.2  --   --   --  0.3  0.3   ALKPHOS 180*  --   --   --  129  129   AST 20  --   --   --  41*  41*   ALT 25  --   --   --  25  25   ANIONGAP 13   < > 8 8 9  9    < > = values in this interval not displayed.       Cardiac Markers: No results for input(s): "CKMB", "MYOGLOBIN", "BNP", "TROPISTAT" in the last 48 hours.  Coagulation:   Recent Labs   Lab 03/21/24 2000   INR 0.9   APTT <21.0     Lactic Acid:   Recent Labs   Lab 03/21/24  1237 03/21/24 1911 03/22/24 0426   LACTATE 6.0* 3.4* 1.6       Magnesium:   Recent Labs   Lab 03/21/24  0953   MG 2.2       POCT Glucose:   Recent Labs   Lab 03/21/24  1659 03/21/24  1814 03/21/24  1824   POCTGLUCOSE 303* 214* 140*       Troponin:   Recent Labs   Lab 03/21/24  0953 03/21/24  1302   TROPONINI 0.042* 0.031*       TSH:   Recent Labs   Lab 03/21/24  2000   TSH 0.982     Urine Studies:   Recent Labs   Lab 03/21/24  1003   COLORU Straw   APPEARANCEUA Clear   PHUR 6.0   SPECGRAV 1.010   PROTEINUA 3+*   GLUCUA 4+*   KETONESU Negative   BILIRUBINUA Negative   OCCULTUA 1+*   NITRITE Negative   UROBILINOGEN Negative   LEUKOCYTESUR Negative   RBCUA 1   WBCUA 3   BACTERIA Few*   SQUAMEPITHEL 3   HYALINECASTS 15* "         Significant Imaging: I have reviewed all pertinent imaging results/findings within the past 24 hours.    CT chest:     Scattered micronodular densities with a tree-in-bud distribution seen throughout both lungs with associated regions of ground-glass opacity seen in the lingula and left lower lobe.  In the appropriate clinical setting, this could represent an acute inflammatory/infectious process with a differential to include atypical infectious processes, indolent infectious processes as well as microaspiration and hypersensitivity pneumonitis.  Correlation with patient symptoms is recommended.     Whole-body anasarca.     Mediastinal and axillary adenopathy, the etiology of which is uncertain.     Suspected hepatic cysts, unchanged from 2019 and large left adrenal gland nodule, likely related to an adenoma given internal density, also unchanged from 2019.    Ct head: No acute abnormality.     CXR: Chronic lung changes. No consolidation or pleural effusions. Heart size is normal. Skeletal structures are intact.

## 2024-03-22 NOTE — ASSESSMENT & PLAN NOTE
Chronic, controlled. Latest blood pressure and vitals reviewed-     Temp:  [97.9 °F (36.6 °C)-98.3 °F (36.8 °C)]   Pulse:  [58-95]   Resp:  [6-24]   BP: (131-186)/()   SpO2:  [89 %-100 %] .   Home meds for hypertension were reviewed and noted below.   Hypertension Medications               lisinopriL (PRINIVIL,ZESTRIL) 5 MG tablet Take 5 mg by mouth once daily.    propranoloL (INDERAL) 20 MG tablet Take 20 mg by mouth 2 (two) times daily.            While in the hospital, will manage blood pressure as follows; Continue home antihypertensive regimen    Will utilize p.r.n. blood pressure medication only if patient's blood pressure greater than 180/110 and he develops symptoms such as worsening chest pain or shortness of breath.

## 2024-03-22 NOTE — PLAN OF CARE
Problem: Adult Inpatient Plan of Care  Goal: Plan of Care Review  Outcome: Ongoing, Progressing  Goal: Patient-Specific Goal (Individualized)  Outcome: Ongoing, Progressing  Goal: Absence of Hospital-Acquired Illness or Injury  Outcome: Ongoing, Progressing  Goal: Optimal Comfort and Wellbeing  Outcome: Ongoing, Progressing  Goal: Readiness for Transition of Care  Outcome: Ongoing, Progressing     Problem: Diabetes Comorbidity  Goal: Blood Glucose Level Within Targeted Range  Outcome: Ongoing, Progressing     Problem: Fluid and Electrolyte Imbalance (Acute Kidney Injury/Impairment)  Goal: Fluid and Electrolyte Balance  Outcome: Ongoing, Progressing     Problem: Oral Intake Inadequate (Acute Kidney Injury/Impairment)  Goal: Optimal Nutrition Intake  Outcome: Ongoing, Progressing     Problem: Renal Function Impairment (Acute Kidney Injury/Impairment)  Goal: Effective Renal Function  Outcome: Ongoing, Progressing     Problem: Fluid Imbalance (Pneumonia)  Goal: Fluid Balance  Outcome: Ongoing, Progressing     Problem: Infection (Pneumonia)  Goal: Resolution of Infection Signs and Symptoms  Outcome: Ongoing, Progressing     Problem: Respiratory Compromise (Pneumonia)  Goal: Effective Oxygenation and Ventilation  Outcome: Ongoing, Progressing

## 2024-03-22 NOTE — ASSESSMENT & PLAN NOTE
Patient has hypokalemia which is Acute and currently uncontrolled. Most recent potassium levels reviewed-   Lab Results   Component Value Date    K 3.6 03/22/2024    K 3.6 03/22/2024   . Will continue potassium replacement per protocol and recheck repeat levels after replacement completed.     Resolved

## 2024-03-22 NOTE — SUBJECTIVE & OBJECTIVE
"Past Medical History:   Diagnosis Date    Diabetes mellitus     Diabetes mellitus type I     Hepatitis     Hepatitis C     Hypertension     Shingles 10/2018       Past Surgical History:   Procedure Laterality Date    NO PAST SURGERIES         Review of patient's allergies indicates:  No Known Allergies    No current facility-administered medications on file prior to encounter.     Current Outpatient Medications on File Prior to Encounter   Medication Sig    aspirin (ECOTRIN) 81 MG EC tablet Take 81 mg by mouth once daily.    atorvastatin (LIPITOR) 80 MG tablet Take 80 mg by mouth every evening.    doxycycline (VIBRAMYCIN) 100 MG Cap Take 1 capsule (100 mg total) by mouth 2 (two) times daily. for 10 days    ergocalciferol (ERGOCALCIFEROL) 50,000 unit Cap Take 50,000 Units by mouth every 7 days.    insulin (BASAGLAR KWIKPEN U-100 INSULIN) glargine 100 units/mL SubQ pen Inject 30 Units into the skin every evening. Within 15 minutes of eating    insulin aspart U-100 (NOVOLOG) 100 unit/mL (3 mL) InPn pen Inject 15 Units into the skin 3 (three) times daily with meals.    JARDIANCE 10 mg tablet Take 10 mg by mouth once daily.    lisinopriL (PRINIVIL,ZESTRIL) 5 MG tablet Take 5 mg by mouth once daily.    tamsulosin (FLOMAX) 0.4 mg Cap Take 0.4 mg by mouth once daily.    alcohol swabs (ALCOHOL WIPES) PadM Use topical 4 x daily for insulin    benzonatate (TESSALON) 100 MG capsule Take 2 capsules (200 mg total) by mouth 3 (three) times daily as needed for Cough.    blood sugar diagnostic Strp 1 each by Misc.(Non-Drug; Combo Route) route 3 (three) times daily before meals.    blood-glucose meter (TRUE METRIX GLUCOSE METER) Misc 1 each by Misc.(Non-Drug; Combo Route) route once daily.    docusate sodium (COLACE) 100 MG capsule Take 1 capsule (100 mg total) by mouth 2 (two) times daily as needed for Constipation.    insulin syringe-needle U-100 (INSULIN SYRINGE) 1/2 mL 28 gauge x 1/2" Syrg To use with meal time insulin tid    " "lancets 32 gauge Misc Inject 1 lancet into the skin 3 (three) times daily before meals.    metFORMIN (GLUCOPHAGE) 1000 MG tablet Take 1 tablet (1,000 mg total) by mouth 2 (two) times daily with meals. (Patient not taking: Reported on 3/21/2024)    pen needle, diabetic (NOVOTWIST) 32 gauge x 1/5" Ndle To use nightly with levemir    polyethylene glycol (GLYCOLAX) 17 gram/dose powder Take 17 g by mouth once daily.    propranoloL (INDERAL) 20 MG tablet Take 20 mg by mouth 2 (two) times daily.    SURE COMFORT INSULIN SYRINGE 0.3 mL 31 gauge x 5/16" Syrg Inject 1 each into the skin 4 (four) times daily before meals and nightly.    [DISCONTINUED] atorvastatin (LIPITOR) 80 MG tablet Take 1 tablet (80 mg total) by mouth every evening.    [DISCONTINUED] BASAGLAR KWIKPEN U-100 INSULIN glargine 100 units/mL (3mL) SubQ pen Inject 50 Units into the skin every evening.    [DISCONTINUED] gabapentin (NEURONTIN) 300 MG capsule Take 1 capsule (300 mg total) by mouth every 8 (eight) hours as needed (pain). (Patient not taking: Reported on 9/22/2020)    [DISCONTINUED] HYDROcodone-acetaminophen (NORCO) 5-325 mg per tablet Take 1 tablet by mouth every 4 (four) hours as needed for Pain. (Patient not taking: Reported on 3/21/2024)    [DISCONTINUED] insulin aspart U-100 (NOVOLOG FLEXPEN U-100 INSULIN) 100 unit/mL (3 mL) InPn pen Inject 20 Units into the skin 3 (three) times daily with meals.    [DISCONTINUED] lisinopriL (PRINIVIL,ZESTRIL) 2.5 MG tablet Take 1 tablet (2.5 mg total) by mouth once daily.    [DISCONTINUED] tamsulosin (FLOMAX) 0.4 mg Cap Take 1 capsule (0.4 mg total) by mouth once daily.     Family History       Problem Relation (Age of Onset)    Diabetes Mother    Lung cancer Father          Tobacco Use    Smoking status: Every Day     Current packs/day: 1.00     Types: Cigarettes    Smokeless tobacco: Former   Substance and Sexual Activity    Alcohol use: No    Drug use: Yes     Types: Methamphetamines     Comment: Patient " "stated "I'm use IV drug user with meth in the last 2 weeks."    Sexual activity: Not on file     Review of Systems   Constitutional:  Negative for chills and fever.   HENT:  Negative for congestion and sore throat.    Eyes:  Negative for visual disturbance.   Respiratory:  Positive for cough. Negative for shortness of breath.    Cardiovascular:  Positive for leg swelling.   Gastrointestinal:  Negative for abdominal pain, diarrhea, nausea and vomiting.   Genitourinary:  Negative for dysuria and hematuria.   Skin:  Negative for rash and wound.   Neurological:  Negative for dizziness, syncope and light-headedness.   Psychiatric/Behavioral:  Negative for confusion.      Objective:     Vital Signs (Most Recent):  Temp: 98 °F (36.7 °C) (03/21/24 1703)  Pulse: 91 (03/21/24 1803)  Resp: 15 (03/21/24 1803)  BP: 135/85 (03/21/24 1803)  SpO2: (!) 93 % (03/21/24 1803) Vital Signs (24h Range):  Temp:  [98 °F (36.7 °C)] 98 °F (36.7 °C)  Pulse:  [83-92] 91  Resp:  [15-20] 15  SpO2:  [92 %-100 %] 93 %  BP: (135-183)/() 135/85     Weight: 85.9 kg (189 lb 6 oz)  Body mass index is 29.66 kg/m².     Physical Exam  Vitals reviewed.   Constitutional:       General: He is not in acute distress.  HENT:      Head: Normocephalic and atraumatic.      Right Ear: External ear normal.      Left Ear: External ear normal.      Mouth/Throat:      Mouth: Mucous membranes are moist.   Eyes:      Extraocular Movements: Extraocular movements intact.      Conjunctiva/sclera: Conjunctivae normal.      Pupils: Pupils are equal, round, and reactive to light.   Cardiovascular:      Rate and Rhythm: Normal rate and regular rhythm.      Heart sounds: Normal heart sounds. No murmur heard.  Pulmonary:      Effort: Pulmonary effort is normal. No respiratory distress.      Breath sounds: Normal breath sounds.   Abdominal:      General: Bowel sounds are normal. There is no distension.      Palpations: Abdomen is soft.      Tenderness: There is no abdominal " "tenderness.   Musculoskeletal:      Cervical back: Neck supple.      Right lower leg: Edema present.      Left lower leg: Edema present.      Comments: anasarca   Neurological:      General: No focal deficit present.      Mental Status: He is alert and oriented to person, place, and time.   Psychiatric:         Mood and Affect: Mood normal.         Behavior: Behavior normal.              CRANIAL NERVES     CN III, IV, VI   Pupils are equal, round, and reactive to light.       Significant Labs: All pertinent labs within the past 24 hours have been reviewed.  A1C: No results for input(s): "HGBA1C" in the last 4320 hours.  Blood Culture: No results for input(s): "LABBLOO" in the last 48 hours.  BMP:   Recent Labs   Lab 03/21/24  0953 03/21/24  1345   * 562*   * 140   K 3.4* 3.3*   CL 93* 101   CO2 29 28   BUN 15 14   CREATININE 2.0* 1.5*   CALCIUM 8.3* 7.7*   MG 2.2  --      CBC:   Recent Labs   Lab 03/21/24  0953   WBC 18.04*   HGB 14.0   HCT 42.2        CMP:   Recent Labs   Lab 03/21/24  0953 03/21/24  1345   * 140   K 3.4* 3.3*   CL 93* 101   CO2 29 28   * 562*   BUN 15 14   CREATININE 2.0* 1.5*   CALCIUM 8.3* 7.7*   PROT 5.5*  --    ALBUMIN 1.2*  --    BILITOT 0.2  --    ALKPHOS 180*  --    AST 20  --    ALT 25  --    ANIONGAP 13 11     Cardiac Markers: No results for input(s): "CKMB", "MYOGLOBIN", "BNP", "TROPISTAT" in the last 48 hours.  Coagulation: No results for input(s): "PT", "INR", "APTT" in the last 48 hours.  Lactic Acid:   Recent Labs   Lab 03/21/24  1237   LACTATE 6.0*     Magnesium:   Recent Labs   Lab 03/21/24  0953   MG 2.2     POCT Glucose:   Recent Labs   Lab 03/21/24  1659 03/21/24  1814 03/21/24  1824   POCTGLUCOSE 303* 214* 140*     Troponin:   Recent Labs   Lab 03/21/24  0953 03/21/24  1302   TROPONINI 0.042* 0.031*     TSH: No results for input(s): "TSH" in the last 4320 hours.  Urine Studies:   Recent Labs   Lab 03/21/24  1003   COLORU Straw   APPEARANCEUA " Clear   PHUR 6.0   SPECGRAV 1.010   PROTEINUA 3+*   GLUCUA 4+*   KETONESU Negative   BILIRUBINUA Negative   OCCULTUA 1+*   NITRITE Negative   UROBILINOGEN Negative   LEUKOCYTESUR Negative   RBCUA 1   WBCUA 3   BACTERIA Few*   SQUAMEPITHEL 3   HYALINECASTS 15*       Significant Imaging: I have reviewed all pertinent imaging results/findings within the past 24 hours.    CT chest:     Scattered micronodular densities with a tree-in-bud distribution seen throughout both lungs with associated regions of ground-glass opacity seen in the lingula and left lower lobe.  In the appropriate clinical setting, this could represent an acute inflammatory/infectious process with a differential to include atypical infectious processes, indolent infectious processes as well as microaspiration and hypersensitivity pneumonitis.  Correlation with patient symptoms is recommended.     Whole-body anasarca.     Mediastinal and axillary adenopathy, the etiology of which is uncertain.     Suspected hepatic cysts, unchanged from 2019 and large left adrenal gland nodule, likely related to an adenoma given internal density, also unchanged from 2019.    Ct head: No acute abnormality.     CXR: Chronic lung changes. No consolidation or pleural effusions. Heart size is normal. Skeletal structures are intact.

## 2024-03-22 NOTE — HPI
53yM with DM2, HTN, HLD, polysubstance abuse presented to ER with elevated glucose after being sent from his clinic. Pt states he was in his usual state of health aside from generalized swelling for the last couple months. He also reports he was being treated for bronchitis with antibiotics and cough medication. He has no SOB or chest pain. No fever/chills. He reports he has not been compliant with DM meds including insulin. He reports last meth use was about 2 weeks ago.

## 2024-03-22 NOTE — ASSESSMENT & PLAN NOTE
Chronic, controlled. Latest blood pressure and vitals reviewed-     Temp:  [98 °F (36.7 °C)]   Pulse:  [83-95]   Resp:  [15-20]   BP: (135-183)/()   SpO2:  [92 %-100 %] .   Home meds for hypertension were reviewed and noted below.   Hypertension Medications               lisinopriL (PRINIVIL,ZESTRIL) 5 MG tablet Take 5 mg by mouth once daily.    propranoloL (INDERAL) 20 MG tablet Take 20 mg by mouth 2 (two) times daily.            While in the hospital, will manage blood pressure as follows; Continue home antihypertensive regimen    Will utilize p.r.n. blood pressure medication only if patient's blood pressure greater than 180/110 and he develops symptoms such as worsening chest pain or shortness of breath.

## 2024-03-22 NOTE — ASSESSMENT & PLAN NOTE
Glucose improved  Mental status improved  Stop insulin gtt; start subQ  Start diabetic diet  Stop IVF as pt with anasarca likely 2/2 low albumin

## 2024-03-22 NOTE — ASSESSMENT & PLAN NOTE
Nutrition consulted. Most recent weight and BMI monitored-     Measurements:  Wt Readings from Last 1 Encounters:   03/22/24 85.9 kg (189 lb 6 oz)   Body mass index is 29.66 kg/m².

## 2024-03-22 NOTE — PROGRESS NOTES
[facility] Medicine  Progress Note    Patient Name: Onofre Ceballos  MRN: [id number]  Patient Class: IP- Inpatient   Admission Date: 3/21/2024  Length of Stay: 1 days  Attending Physician: Carter Paula MD  Primary Care Provider: Cinthya, Primary Doctor        Subjective:     Principal Problem:Hyperosmolar hyperglycemic state (HHS)        HPI:  53yM with DM2, HTN, HLD, polysubstance abuse presented to ER with elevated glucose after being sent from his clinic. Pt states he was in his usual state of health aside from generalized swelling for the last couple months. He also reports he was being treated for bronchitis with antibiotics and cough medication. He has no SOB or chest pain. No fever/chills. He reports he has not been compliant with DM meds including insulin. He reports last meth use was about 2 weeks ago.    Overview/Hospital Course:  Pt HD stable on room air.  Glucose levels improved and off insulin gtt.  PNA on CT Chest.  Continues to have leukocytosis on IV abx.  Will consider switching abx if doesn't improve in the next 24 hours.  Duo nebulizer and abx ordered.      Past Medical History:   Diagnosis Date    Diabetes mellitus     Diabetes mellitus type I     Hepatitis     Hepatitis C     Hypertension     Shingles 10/2018       Past Surgical History:   Procedure Laterality Date    NO PAST SURGERIES         Review of patient's allergies indicates:  No Known Allergies    No current facility-administered medications on file prior to encounter.     Current Outpatient Medications on File Prior to Encounter   Medication Sig    aspirin (ECOTRIN) 81 MG EC tablet Take 81 mg by mouth once daily.    atorvastatin (LIPITOR) 80 MG tablet Take 80 mg by mouth every evening.    [] doxycycline (VIBRAMYCIN) 100 MG Cap Take 1 capsule (100 mg total) by mouth 2 (two) times daily. for 10 days    ergocalciferol (ERGOCALCIFEROL) 50,000 unit Cap Take 50,000 Units by mouth every 7 days.    insulin  "(BASAGLCHLOE RAMIREZPEN U-100 INSULIN) glargine 100 units/mL SubQ pen Inject 30 Units into the skin every evening. Within 15 minutes of eating    insulin aspart U-100 (NOVOLOG) 100 unit/mL (3 mL) InPn pen Inject 15 Units into the skin 3 (three) times daily with meals.    JARDIANCE 10 mg tablet Take 10 mg by mouth once daily.    lisinopriL (PRINIVIL,ZESTRIL) 5 MG tablet Take 5 mg by mouth once daily.    tamsulosin (FLOMAX) 0.4 mg Cap Take 0.4 mg by mouth once daily.    alcohol swabs (ALCOHOL WIPES) PadM Use topical 4 x daily for insulin    [] benzonatate (TESSALON) 100 MG capsule Take 2 capsules (200 mg total) by mouth 3 (three) times daily as needed for Cough.    blood sugar diagnostic Strp 1 each by Misc.(Non-Drug; Combo Route) route 3 (three) times daily before meals.    blood-glucose meter (TRUE METRIX GLUCOSE METER) Misc 1 each by Misc.(Non-Drug; Combo Route) route once daily.    docusate sodium (COLACE) 100 MG capsule Take 1 capsule (100 mg total) by mouth 2 (two) times daily as needed for Constipation.    insulin syringe-needle U-100 (INSULIN SYRINGE) 1/2 mL 28 gauge x 1/2" Syrg To use with meal time insulin tid    lancets 32 gauge Misc Inject 1 lancet into the skin 3 (three) times daily before meals.    metFORMIN (GLUCOPHAGE) 1000 MG tablet Take 1 tablet (1,000 mg total) by mouth 2 (two) times daily with meals. (Patient not taking: Reported on 3/21/2024)    pen needle, diabetic (NOVOTWIST) 32 gauge x 1/5" Ndle To use nightly with levemir    polyethylene glycol (GLYCOLAX) 17 gram/dose powder Take 17 g by mouth once daily.    propranoloL (INDERAL) 20 MG tablet Take 20 mg by mouth 2 (two) times daily.    SURE COMFORT INSULIN SYRINGE 0.3 mL 31 gauge x 5/16" Syrg Inject 1 each into the skin 4 (four) times daily before meals and nightly.     Family History       Problem Relation (Age of Onset)    Diabetes Mother    Lung cancer Father          Tobacco Use    Smoking status: Every Day     Current packs/day: 1.00     " "Types: Cigarettes    Smokeless tobacco: Former   Substance and Sexual Activity    Alcohol use: No    Drug use: Yes     Types: Methamphetamines     Comment: Patient stated "I'm use IV drug user with meth in the last 2 weeks."    Sexual activity: Not on file     Review of Systems   Constitutional:  Negative for chills and fever.   HENT:  Negative for congestion and sore throat.    Eyes:  Negative for visual disturbance.   Respiratory:  Positive for cough. Negative for shortness of breath.    Cardiovascular:  Positive for leg swelling.   Gastrointestinal:  Negative for abdominal pain, diarrhea, nausea and vomiting.   Genitourinary:  Negative for dysuria and hematuria.   Skin:  Negative for rash and wound.   Neurological:  Negative for dizziness, syncope and light-headedness.   Psychiatric/Behavioral:  Negative for confusion.      Objective:     Vital Signs (Most Recent):  Temp: 98.3 °F (36.8 °C) (03/22/24 0540)  Pulse: 86 (03/22/24 0730)  Resp: 16 (03/22/24 0725)  BP: (!) 132/91 (03/22/24 0730)  SpO2: 97 % (03/22/24 0730) Vital Signs (24h Range):  Temp:  [97.9 °F (36.6 °C)-98.3 °F (36.8 °C)] 98.3 °F (36.8 °C)  Pulse:  [58-95] 86  Resp:  [6-24] 16  SpO2:  [89 %-100 %] 97 %  BP: (131-186)/() 132/91     Weight: 85.9 kg (189 lb 6 oz)  Body mass index is 29.66 kg/m².     Physical Exam  Vitals reviewed.   Constitutional:       General: He is not in acute distress.  HENT:      Head: Normocephalic and atraumatic.      Right Ear: External ear normal.      Left Ear: External ear normal.      Mouth/Throat:      Mouth: Mucous membranes are moist.   Eyes:      Extraocular Movements: Extraocular movements intact.      Conjunctiva/sclera: Conjunctivae normal.      Pupils: Pupils are equal, round, and reactive to light.   Cardiovascular:      Rate and Rhythm: Normal rate and regular rhythm.      Heart sounds: Normal heart sounds. No murmur heard.  Pulmonary:      Effort: Pulmonary effort is normal. No respiratory distress.      " Breath sounds: Normal breath sounds.   Abdominal:      General: Bowel sounds are normal. There is no distension.      Palpations: Abdomen is soft.      Tenderness: There is no abdominal tenderness.   Musculoskeletal:      Cervical back: Neck supple.      Right lower leg: Edema present.      Left lower leg: Edema present.      Comments: anasarca   Neurological:      General: No focal deficit present.      Mental Status: He is alert and oriented to person, place, and time.   Psychiatric:         Mood and Affect: Mood normal.         Behavior: Behavior normal.              CRANIAL NERVES     CN III, IV, VI   Pupils are equal, round, and reactive to light.       Significant Labs: All pertinent labs within the past 24 hours have been reviewed.  A1C:   Recent Labs   Lab 03/21/24 1911   HGBA1C >14.0*     Blood Culture:   Recent Labs   Lab 03/21/24  1215 03/21/24  1230   LABBLOO No Growth to date No Growth to date     BMP:   Recent Labs   Lab 03/21/24  0953 03/21/24  1345 03/22/24  0426   *   < > 218*  218*   *   < > 139  139   K 3.4*   < > 3.6  3.6   CL 93*   < > 99  99   CO2 29   < > 31*  31*   BUN 15   < > 17  17   CREATININE 2.0*   < > 1.4  1.4   CALCIUM 8.3*   < > 7.8*  7.8*   MG 2.2  --   --     < > = values in this interval not displayed.       CBC:   Recent Labs   Lab 03/21/24  0953 03/22/24  0426   WBC 18.04* 20.67*   HGB 14.0 13.2*   HCT 42.2 38.5*    448       CMP:   Recent Labs   Lab 03/21/24  0953 03/21/24  1345 03/21/24  1911 03/21/24 2000 03/22/24  0426   *   < > 145 143 139  139   K 3.4*   < > 2.6* 2.5* 3.6  3.6   CL 93*   < > 104 103 99  99   CO2 29   < > 33* 32* 31*  31*   *   < > 82 67* 218*  218*   BUN 15   < > 13 13 17  17   CREATININE 2.0*   < > 1.2 1.2 1.4  1.4   CALCIUM 8.3*   < > 8.1* 8.1* 7.8*  7.8*   PROT 5.5*  --   --   --  4.7*  4.7*   ALBUMIN 1.2*  --   --   --  1.1*  1.1*   BILITOT 0.2  --   --   --  0.3  0.3   ALKPHOS 180*  --   --   --   "129  129   AST 20  --   --   --  41*  41*   ALT 25  --   --   --  25  25   ANIONGAP 13   < > 8 8 9  9    < > = values in this interval not displayed.       Cardiac Markers: No results for input(s): "CKMB", "MYOGLOBIN", "BNP", "TROPISTAT" in the last 48 hours.  Coagulation:   Recent Labs   Lab 03/21/24 2000   INR 0.9   APTT <21.0     Lactic Acid:   Recent Labs   Lab 03/21/24  1237 03/21/24  1911 03/22/24  0426   LACTATE 6.0* 3.4* 1.6       Magnesium:   Recent Labs   Lab 03/21/24  0953   MG 2.2       POCT Glucose:   Recent Labs   Lab 03/21/24  1659 03/21/24  1814 03/21/24  1824   POCTGLUCOSE 303* 214* 140*       Troponin:   Recent Labs   Lab 03/21/24  0953 03/21/24  1302   TROPONINI 0.042* 0.031*       TSH:   Recent Labs   Lab 03/21/24 2000   TSH 0.982     Urine Studies:   Recent Labs   Lab 03/21/24  1003   COLORU Straw   APPEARANCEUA Clear   PHUR 6.0   SPECGRAV 1.010   PROTEINUA 3+*   GLUCUA 4+*   KETONESU Negative   BILIRUBINUA Negative   OCCULTUA 1+*   NITRITE Negative   UROBILINOGEN Negative   LEUKOCYTESUR Negative   RBCUA 1   WBCUA 3   BACTERIA Few*   SQUAMEPITHEL 3   HYALINECASTS 15*         Significant Imaging: I have reviewed all pertinent imaging results/findings within the past 24 hours.    CT chest:     Scattered micronodular densities with a tree-in-bud distribution seen throughout both lungs with associated regions of ground-glass opacity seen in the lingula and left lower lobe.  In the appropriate clinical setting, this could represent an acute inflammatory/infectious process with a differential to include atypical infectious processes, indolent infectious processes as well as microaspiration and hypersensitivity pneumonitis.  Correlation with patient symptoms is recommended.     Whole-body anasarca.     Mediastinal and axillary adenopathy, the etiology of which is uncertain.     Suspected hepatic cysts, unchanged from 2019 and large left adrenal gland nodule, likely related to an adenoma given " internal density, also unchanged from 2019.    Ct head: No acute abnormality.     CXR: Chronic lung changes. No consolidation or pleural effusions. Heart size is normal. Skeletal structures are intact.     Assessment/Plan:      * Hyperosmolar hyperglycemic state (HHS)  Glucose improved  Mental status improved  Stop insulin gtt; start subQ  Start diabetic diet  Stop IVF as pt with anasarca likely 2/2 low albumin    Glucose in the 200's.  Hg A1C >14 noncompliant.  Diabetic education referral at discharge       Low serum albumin  Likely cause of anasarca      Severe protein-calorie malnutrition  Nutrition consulted. Most recent weight and BMI monitored-     Measurements:  Wt Readings from Last 1 Encounters:   03/22/24 85.9 kg (189 lb 6 oz)   Body mass index is 29.66 kg/m².             Pneumonia of left lower lobe due to infectious organism  Seen on CT chest  Cont rocephin/azithro  Duo nebulizer/mucinex   Persistent leukocytosis.  May need to change abx if doesn't improve within the next 24 hours  Blood cultures pending         YAMIL (acute kidney injury)  Patient with acute kidney injury/acute renal failure likely due to pre-renal azotemia due to IVVD YAMIL is currently improving. Baseline creatinine  1.2-1.3  - Labs reviewed- Renal function/electrolytes with Estimated Creatinine Clearance: 63.1 mL/min (based on SCr of 1.4 mg/dL). according to latest data. Monitor urine output and serial BMP and adjust therapy as needed. Avoid nephrotoxins and renally dose meds for GFR listed above.    Close to baseline     Hypokalemia  Patient has hypokalemia which is Acute and currently uncontrolled. Most recent potassium levels reviewed-   Lab Results   Component Value Date    K 3.6 03/22/2024    K 3.6 03/22/2024   . Will continue potassium replacement per protocol and recheck repeat levels after replacement completed.     Resolved    Hypocalcemia  Patient has hypocalcemia due to Critical Illness which is currently controlled, and has  been confirmed with ionized and/or corrected calcium. Will replace calcium and monitor electrolytes closely. The latest calcium labs have been reviewed and are listed below.  Recent Labs   Lab 03/22/24  0426   CALCIUM 7.8*  7.8*         Recent Labs   Lab 03/21/24 2000   CAION 1.05*           Pure hypercholesterolemia  Cont home statin      Type 2 diabetes mellitus with microalbuminuria, with long-term current use of insulin  A1C >14   ISS  Detemir 25u BID  Aspart 15u with meals      Benign essential HTN  Chronic, controlled. Latest blood pressure and vitals reviewed-     Temp:  [97.9 °F (36.6 °C)-98.3 °F (36.8 °C)]   Pulse:  [58-95]   Resp:  [6-24]   BP: (131-186)/()   SpO2:  [89 %-100 %] .   Home meds for hypertension were reviewed and noted below.   Hypertension Medications               lisinopriL (PRINIVIL,ZESTRIL) 5 MG tablet Take 5 mg by mouth once daily.    propranoloL (INDERAL) 20 MG tablet Take 20 mg by mouth 2 (two) times daily.            While in the hospital, will manage blood pressure as follows; Continue home antihypertensive regimen    Will utilize p.r.n. blood pressure medication only if patient's blood pressure greater than 180/110 and he develops symptoms such as worsening chest pain or shortness of breath.    Hep C w/o coma, chronic  Patient has chronic liver disease due to hepatitis c. They have a history of ascites/volume overload. Their liver disease is compensated. We will obtain  CBC, CMP, and INR. Their most current Na-MELD is listed below.  MELD 3.0: 12 at 3/22/2024  4:26 AM  MELD-Na: 10 at 3/22/2024  4:26 AM  Calculated from:  Serum Creatinine: 1.4 mg/dL at 3/22/2024  4:26 AM  Serum Sodium: 139 mmol/L (Using max of 137 mmol/L) at 3/22/2024  4:26 AM  Total Bilirubin: 0.3 mg/dL (Using min of 1 mg/dL) at 3/22/2024  4:26 AM  Serum Albumin: 1.1 g/dL (Using min of 1.5 g/dL) at 3/22/2024  4:26 AM  INR(ratio): 0.9 (Using min of 1) at 3/21/2024  8:00 PM  Age at listing (hypothetical): 53  years  Sex: Male at 3/22/2024  4:26 AM        Tobacco abuse  Cessation counseling  Nicotine patch      Noncompliance with medication regimen  Stressed compliance        VTE Risk Mitigation (From admission, onward)           Ordered     IP VTE LOW RISK PATIENT  Once         03/21/24 1433                    Discharge Planning   LYNDA:      Code Status: Full Code   Is the patient medically ready for discharge?:     Reason for patient still in hospital (select all that apply): Patient trending condition               Critical care time spent on the evaluation and treatment of severe organ dysfunction, review of pertinent labs and imaging studies, discussions with consulting providers and discussions with patient/family: 35 minutes.      Liza Craft PA-C  Department of Hospital Medicine   [facility] - Intensive Care

## 2024-03-22 NOTE — ASSESSMENT & PLAN NOTE
Glucose improved  Mental status improved  Stop insulin gtt; start subQ  Start diabetic diet  Stop IVF as pt with anasarca likely 2/2 low albumin    Glucose in the 200's.  Hg A1C >14 noncompliant.  Diabetic education referral at discharge

## 2024-03-22 NOTE — HOSPITAL COURSE
Pt HD stable on room air.  Glucose levels improved and off insulin gtt.  PNA on CT Chest.  Continues to have leukocytosis on IV abx.  Will consider switching abx if doesn't improve in the next 24 hours.  Duo nebulizer and abx ordered.      3/23  Patient with significant anasarca. He is complaining of 'hushed' sounding voice and dyspnea with exertion as well as a dry cough. He has had no fever. He also denies sputum production. He has h/o hep c - dx'd over 10 years ago. Swelling significantly worse over last 48 hours. Also notes 'black' stool this morning.    3/24  Patient concerned about his upper and lower extremity swelling. He has had improved breathing and cough/shortness of breath. No fevers since admission. Blood sugars very labile. No bloody/black stools noted. Significant output yesterday though still remains positive overall.    3/25 PT HD stable on room air.  Waiting for echo and possibly discharge this afternoon or tomorrow pending echo results.      3/26 Echo with EF 60%.  Discharge today with close follow up nephrology and PCP.  Defer to PCP for GI/ID f/u for hepatitis.

## 2024-03-22 NOTE — ASSESSMENT & PLAN NOTE
Patient has hypocalcemia due to Critical Illness which is currently controlled, and has been confirmed with ionized and/or corrected calcium. Will replace calcium and monitor electrolytes closely. The latest calcium labs have been reviewed and are listed below.  Recent Labs   Lab 03/22/24  0426   CALCIUM 7.8*  7.8*         Recent Labs   Lab 03/21/24 2000   CAION 1.05*

## 2024-03-22 NOTE — ASSESSMENT & PLAN NOTE
Patient has chronic liver disease due to hepatitis c. They have a history of ascites/volume overload. Their liver disease is compensated. We will obtain  CBC, CMP, and INR. Their most current Na-MELD is listed below.  MELD 3.0: 12 at 3/22/2024  4:26 AM  MELD-Na: 10 at 3/22/2024  4:26 AM  Calculated from:  Serum Creatinine: 1.4 mg/dL at 3/22/2024  4:26 AM  Serum Sodium: 139 mmol/L (Using max of 137 mmol/L) at 3/22/2024  4:26 AM  Total Bilirubin: 0.3 mg/dL (Using min of 1 mg/dL) at 3/22/2024  4:26 AM  Serum Albumin: 1.1 g/dL (Using min of 1.5 g/dL) at 3/22/2024  4:26 AM  INR(ratio): 0.9 (Using min of 1) at 3/21/2024  8:00 PM  Age at listing (hypothetical): 53 years  Sex: Male at 3/22/2024  4:26 AM       Zygomaticofacial Flap Text: Given the location of the defect, shape of the defect and the proximity to free margins a zygomaticofacial flap was deemed most appropriate for repair.  Using a sterile surgical marker, the appropriate flap was drawn incorporating the defect and placing the expected incisions within the relaxed skin tension lines where possible. The area thus outlined was incised deep to adipose tissue with a #15 scalpel blade with preservation of a vascular pedicle.  The skin margins were undermined to an appropriate distance in all directions utilizing iris scissors.  The flap was then placed into the defect and anchored with interrupted buried subcutaneous sutures.

## 2024-03-22 NOTE — EICU
Intervention Initiated From:  [facility] / EICU    Bessie intervened regarding:  Rounding (Video assessment)    Nurse Notified:  Yes    Doctor Notified:  No    Comments: Rounding done. Patient alert. On room air. B/P 134/87, HR 76, resp 11, sat 94. Side rails up x4

## 2024-03-22 NOTE — ASSESSMENT & PLAN NOTE
Patient has hypokalemia which is Acute and currently uncontrolled. Most recent potassium levels reviewed-   Lab Results   Component Value Date    K 3.3 (L) 03/21/2024   . Will continue potassium replacement per protocol and recheck repeat levels after replacement completed.

## 2024-03-22 NOTE — ASSESSMENT & PLAN NOTE
Patient with acute kidney injury/acute renal failure likely due to pre-renal azotemia due to IVVD YAMIL is currently improving. Baseline creatinine  1.2-1.3  - Labs reviewed- Renal function/electrolytes with Estimated Creatinine Clearance: 63.1 mL/min (based on SCr of 1.4 mg/dL). according to latest data. Monitor urine output and serial BMP and adjust therapy as needed. Avoid nephrotoxins and renally dose meds for GFR listed above.    Close to baseline

## 2024-03-22 NOTE — PLAN OF CARE
The patient remained hemodynamically stable throughout the night.  Blood sugars were within normal limits. Patient has no complaints at this time except wanting to go home.

## 2024-03-22 NOTE — ASSESSMENT & PLAN NOTE
"Patient has hypocalcemia due to Critical Illness which is currently controlled, and has been confirmed with ionized and/or corrected calcium. Will replace calcium and monitor electrolytes closely. The latest calcium labs have been reviewed and are listed below.  Recent Labs   Lab 03/21/24  1345   CALCIUM 7.7*       No results for input(s): "CAION" in the last 24 hours.      "

## 2024-03-22 NOTE — ASSESSMENT & PLAN NOTE
Nutrition consulted. Most recent weight and BMI monitored-     Measurements:  Wt Readings from Last 1 Encounters:   03/21/24 85.9 kg (189 lb 6 oz)   Body mass index is 29.66 kg/m².

## 2024-03-22 NOTE — ASSESSMENT & PLAN NOTE
Patient has chronic liver disease due to hepatitis c. They have a history of ascites/volume overload. Their liver disease is compensated. We will obtain  CBC, CMP, and INR. Their most current Na-MELD is listed below.  Computed MELD 3.0 unavailable. Necessary lab results were not found in the last year.  Computed MELD-Na unavailable. Necessary lab results were not found in the last year.

## 2024-03-23 LAB
AFP SERPL-MCNC: 6.2 NG/ML (ref 0–8.4)
ANION GAP SERPL CALC-SCNC: 8 MMOL/L (ref 8–16)
BACTERIA #/AREA URNS HPF: ABNORMAL /HPF
BASOPHILS # BLD AUTO: 0.11 K/UL (ref 0–0.2)
BASOPHILS NFR BLD: 0.5 % (ref 0–1.9)
BILIRUB UR QL STRIP: NEGATIVE
BUN SERPL-MCNC: 32 MG/DL (ref 6–20)
CALCIUM SERPL-MCNC: 7.1 MG/DL (ref 8.7–10.5)
CHLORIDE SERPL-SCNC: 101 MMOL/L (ref 95–110)
CLARITY UR: CLEAR
CO2 SERPL-SCNC: 28 MMOL/L (ref 23–29)
COLOR UR: YELLOW
CREAT SERPL-MCNC: 1.5 MG/DL (ref 0.5–1.4)
DIFFERENTIAL METHOD BLD: ABNORMAL
EOSINOPHIL # BLD AUTO: 0.8 K/UL (ref 0–0.5)
EOSINOPHIL NFR BLD: 3.9 % (ref 0–8)
ERYTHROCYTE [DISTWIDTH] IN BLOOD BY AUTOMATED COUNT: 12.5 % (ref 11.5–14.5)
EST. GFR  (NO RACE VARIABLE): 55 ML/MIN/1.73 M^2
GLUCOSE SERPL-MCNC: 94 MG/DL (ref 70–110)
GLUCOSE UR QL STRIP: ABNORMAL
GRAN CASTS #/AREA URNS LPF: 1 /LPF
HCT VFR BLD AUTO: 35.6 % (ref 40–54)
HGB BLD-MCNC: 12.1 G/DL (ref 14–18)
HGB UR QL STRIP: ABNORMAL
HYALINE CASTS #/AREA URNS LPF: 0 /LPF
IMM GRANULOCYTES # BLD AUTO: 0.44 K/UL (ref 0–0.04)
IMM GRANULOCYTES NFR BLD AUTO: 2.1 % (ref 0–0.5)
INR PPP: 0.9 (ref 0.8–1.2)
KETONES UR QL STRIP: NEGATIVE
LEUKOCYTE ESTERASE UR QL STRIP: NEGATIVE
LYMPHOCYTES # BLD AUTO: 5.2 K/UL (ref 1–4.8)
LYMPHOCYTES NFR BLD: 25.4 % (ref 18–48)
MAGNESIUM SERPL-MCNC: 1.9 MG/DL (ref 1.6–2.6)
MCH RBC QN AUTO: 33.1 PG (ref 27–31)
MCHC RBC AUTO-ENTMCNC: 34 G/DL (ref 32–36)
MCV RBC AUTO: 97 FL (ref 82–98)
MICROSCOPIC COMMENT: ABNORMAL
MONOCYTES # BLD AUTO: 1.2 K/UL (ref 0.3–1)
MONOCYTES NFR BLD: 5.7 % (ref 4–15)
NEUTROPHILS # BLD AUTO: 12.8 K/UL (ref 1.8–7.7)
NEUTROPHILS NFR BLD: 62.4 % (ref 38–73)
NITRITE UR QL STRIP: NEGATIVE
NRBC BLD-RTO: 0 /100 WBC
PH UR STRIP: 6 [PH] (ref 5–8)
PLATELET # BLD AUTO: 408 K/UL (ref 150–450)
PMV BLD AUTO: 11.6 FL (ref 9.2–12.9)
POCT GLUCOSE: 110 MG/DL (ref 70–110)
POCT GLUCOSE: 115 MG/DL (ref 70–110)
POCT GLUCOSE: 94 MG/DL (ref 70–110)
POTASSIUM SERPL-SCNC: 2.8 MMOL/L (ref 3.5–5.1)
PREALB SERPL-MCNC: 14 MG/DL (ref 20–43)
PROCALCITONIN SERPL IA-MCNC: 0.12 NG/ML
PROT UR QL STRIP: ABNORMAL
PROTHROMBIN TIME: 9.6 SEC (ref 9–12.5)
RBC # BLD AUTO: 3.66 M/UL (ref 4.6–6.2)
RBC #/AREA URNS HPF: 3 /HPF (ref 0–4)
SODIUM SERPL-SCNC: 137 MMOL/L (ref 136–145)
SP GR UR STRIP: 1.01 (ref 1–1.03)
URN SPEC COLLECT METH UR: ABNORMAL
UROBILINOGEN UR STRIP-ACNC: NEGATIVE EU/DL
WBC # BLD AUTO: 20.53 K/UL (ref 3.9–12.7)
WBC #/AREA URNS HPF: 1 /HPF (ref 0–5)

## 2024-03-23 PROCEDURE — 87522 HEPATITIS C REVRS TRNSCRPJ: CPT | Performed by: FAMILY MEDICINE

## 2024-03-23 PROCEDURE — 99900035 HC TECH TIME PER 15 MIN (STAT)

## 2024-03-23 PROCEDURE — 36415 COLL VENOUS BLD VENIPUNCTURE: CPT | Mod: XB | Performed by: FAMILY MEDICINE

## 2024-03-23 PROCEDURE — 81000 URINALYSIS NONAUTO W/SCOPE: CPT | Performed by: FAMILY MEDICINE

## 2024-03-23 PROCEDURE — 83735 ASSAY OF MAGNESIUM: CPT | Performed by: PHYSICIAN ASSISTANT

## 2024-03-23 PROCEDURE — 85025 COMPLETE CBC W/AUTO DIFF WBC: CPT | Performed by: PHYSICIAN ASSISTANT

## 2024-03-23 PROCEDURE — 11000001 HC ACUTE MED/SURG PRIVATE ROOM

## 2024-03-23 PROCEDURE — 25000003 PHARM REV CODE 250: Performed by: PHYSICIAN ASSISTANT

## 2024-03-23 PROCEDURE — 94640 AIRWAY INHALATION TREATMENT: CPT

## 2024-03-23 PROCEDURE — 25000003 PHARM REV CODE 250: Performed by: FAMILY MEDICINE

## 2024-03-23 PROCEDURE — 25000242 PHARM REV CODE 250 ALT 637 W/ HCPCS: Performed by: PHYSICIAN ASSISTANT

## 2024-03-23 PROCEDURE — 63600175 PHARM REV CODE 636 W HCPCS: Performed by: STUDENT IN AN ORGANIZED HEALTH CARE EDUCATION/TRAINING PROGRAM

## 2024-03-23 PROCEDURE — 63600175 PHARM REV CODE 636 W HCPCS: Performed by: FAMILY MEDICINE

## 2024-03-23 PROCEDURE — 25000003 PHARM REV CODE 250: Performed by: STUDENT IN AN ORGANIZED HEALTH CARE EDUCATION/TRAINING PROGRAM

## 2024-03-23 PROCEDURE — 85610 PROTHROMBIN TIME: CPT | Performed by: FAMILY MEDICINE

## 2024-03-23 PROCEDURE — 87902 NFCT AGT GNTYP ALYS HEP C: CPT | Performed by: FAMILY MEDICINE

## 2024-03-23 PROCEDURE — 84134 ASSAY OF PREALBUMIN: CPT | Performed by: FAMILY MEDICINE

## 2024-03-23 PROCEDURE — 80048 BASIC METABOLIC PNL TOTAL CA: CPT | Performed by: PHYSICIAN ASSISTANT

## 2024-03-23 PROCEDURE — 36415 COLL VENOUS BLD VENIPUNCTURE: CPT | Performed by: PHYSICIAN ASSISTANT

## 2024-03-23 PROCEDURE — 99233 SBSQ HOSP IP/OBS HIGH 50: CPT | Mod: ,,, | Performed by: FAMILY MEDICINE

## 2024-03-23 PROCEDURE — 82105 ALPHA-FETOPROTEIN SERUM: CPT | Performed by: FAMILY MEDICINE

## 2024-03-23 PROCEDURE — 94761 N-INVAS EAR/PLS OXIMETRY MLT: CPT

## 2024-03-23 PROCEDURE — 84145 PROCALCITONIN (PCT): CPT | Performed by: FAMILY MEDICINE

## 2024-03-23 RX ORDER — FUROSEMIDE 10 MG/ML
20 INJECTION INTRAMUSCULAR; INTRAVENOUS EVERY 12 HOURS
Status: DISCONTINUED | OUTPATIENT
Start: 2024-03-23 | End: 2024-03-26 | Stop reason: HOSPADM

## 2024-03-23 RX ORDER — PANTOPRAZOLE SODIUM 40 MG/1
40 TABLET, DELAYED RELEASE ORAL DAILY
Status: DISCONTINUED | OUTPATIENT
Start: 2024-03-23 | End: 2024-03-26 | Stop reason: HOSPADM

## 2024-03-23 RX ORDER — SPIRONOLACTONE 25 MG/1
25 TABLET ORAL DAILY
Status: DISCONTINUED | OUTPATIENT
Start: 2024-03-23 | End: 2024-03-26 | Stop reason: HOSPADM

## 2024-03-23 RX ORDER — POTASSIUM CHLORIDE 20 MEQ/1
40 TABLET, EXTENDED RELEASE ORAL ONCE
Status: COMPLETED | OUTPATIENT
Start: 2024-03-23 | End: 2024-03-23

## 2024-03-23 RX ADMIN — AZITHROMYCIN MONOHYDRATE 500 MG: 500 INJECTION, POWDER, LYOPHILIZED, FOR SOLUTION INTRAVENOUS at 02:03

## 2024-03-23 RX ADMIN — CEFTRIAXONE SODIUM 1 G: 1 INJECTION, POWDER, FOR SOLUTION INTRAMUSCULAR; INTRAVENOUS at 01:03

## 2024-03-23 RX ADMIN — INSULIN DETEMIR 15 UNITS: 100 INJECTION, SOLUTION SUBCUTANEOUS at 08:03

## 2024-03-23 RX ADMIN — PANTOPRAZOLE SODIUM 40 MG: 40 TABLET, DELAYED RELEASE ORAL at 10:03

## 2024-03-23 RX ADMIN — IPRATROPIUM BROMIDE AND ALBUTEROL SULFATE 3 ML: 2.5; .5 SOLUTION RESPIRATORY (INHALATION) at 11:03

## 2024-03-23 RX ADMIN — INSULIN ASPART 10 UNITS: 100 INJECTION, SOLUTION INTRAVENOUS; SUBCUTANEOUS at 12:03

## 2024-03-23 RX ADMIN — LISINOPRIL 5 MG: 2.5 TABLET ORAL at 08:03

## 2024-03-23 RX ADMIN — IPRATROPIUM BROMIDE AND ALBUTEROL SULFATE 3 ML: 2.5; .5 SOLUTION RESPIRATORY (INHALATION) at 03:03

## 2024-03-23 RX ADMIN — IPRATROPIUM BROMIDE AND ALBUTEROL SULFATE 3 ML: 2.5; .5 SOLUTION RESPIRATORY (INHALATION) at 07:03

## 2024-03-23 RX ADMIN — IPRATROPIUM BROMIDE AND ALBUTEROL SULFATE 3 ML: 2.5; .5 SOLUTION RESPIRATORY (INHALATION) at 04:03

## 2024-03-23 RX ADMIN — MUPIROCIN: 20 OINTMENT TOPICAL at 08:03

## 2024-03-23 RX ADMIN — GUAIFENESIN 600 MG: 600 TABLET, EXTENDED RELEASE ORAL at 08:03

## 2024-03-23 RX ADMIN — POTASSIUM CHLORIDE 40 MEQ: 1500 TABLET, EXTENDED RELEASE ORAL at 07:03

## 2024-03-23 RX ADMIN — SPIRONOLACTONE 25 MG: 25 TABLET ORAL at 10:03

## 2024-03-23 RX ADMIN — PROPRANOLOL HYDROCHLORIDE 20 MG: 20 TABLET ORAL at 08:03

## 2024-03-23 RX ADMIN — ASPIRIN 81 MG: 81 TABLET, COATED ORAL at 08:03

## 2024-03-23 RX ADMIN — IPRATROPIUM BROMIDE AND ALBUTEROL SULFATE 3 ML: 2.5; .5 SOLUTION RESPIRATORY (INHALATION) at 12:03

## 2024-03-23 RX ADMIN — INSULIN ASPART 15 UNITS: 100 INJECTION, SOLUTION INTRAVENOUS; SUBCUTANEOUS at 04:03

## 2024-03-23 RX ADMIN — ATORVASTATIN CALCIUM 80 MG: 20 TABLET, FILM COATED ORAL at 08:03

## 2024-03-23 RX ADMIN — INSULIN DETEMIR 10 UNITS: 100 INJECTION, SOLUTION SUBCUTANEOUS at 09:03

## 2024-03-23 RX ADMIN — FUROSEMIDE 20 MG: 10 INJECTION, SOLUTION INTRAMUSCULAR; INTRAVENOUS at 10:03

## 2024-03-23 RX ADMIN — INSULIN ASPART 2 UNITS: 100 INJECTION, SOLUTION INTRAVENOUS; SUBCUTANEOUS at 04:03

## 2024-03-23 NOTE — ASSESSMENT & PLAN NOTE
A1C >14   ISS  Detemir 25u BID  Aspart 15u with meals    Decrease levemir this am - blood sugar 94.

## 2024-03-23 NOTE — NURSING
Discussed AM glucose with Dr. Estevez. Orders to hold 15 units aspart this morning. Give 15 units of levemir instead of previously ordered 25 units, continue 25 units levemir nightly.

## 2024-03-23 NOTE — ASSESSMENT & PLAN NOTE
"Patient has hypocalcemia due to Critical Illness which is currently controlled, and has been confirmed with ionized and/or corrected calcium. Will replace calcium and monitor electrolytes closely. The latest calcium labs have been reviewed and are listed below.  Recent Labs   Lab 03/23/24  0424   CALCIUM 7.1*         No results for input(s): "CAION" in the last 24 hours.        "

## 2024-03-23 NOTE — ASSESSMENT & PLAN NOTE
Patient with acute kidney injury/acute renal failure likely due to pre-renal azotemia due to IVVD YAMIL is currently improving. Baseline creatinine  1.2-1.3  - Labs reviewed- Renal function/electrolytes with Estimated Creatinine Clearance: 58.9 mL/min (A) (based on SCr of 1.5 mg/dL (H)). according to latest data. Monitor urine output and serial BMP and adjust therapy as needed. Avoid nephrotoxins and renally dose meds for GFR listed above.    Close to baseline     Patient with intravascular fluid depletion but significant anasarca. Will attempt diuresis and monitor bmp.

## 2024-03-23 NOTE — ASSESSMENT & PLAN NOTE
Likely cause of anasarca    ?renal - recheck urine. +++ proteinuria. Patient with known and admitted meth use.  ?hepatic - meld 10. Work up in progress.  ?poor nutrition - add jd.     May consider albumin infusion to help with diuresis. Though BP will allow for diuretic currently.

## 2024-03-23 NOTE — ASSESSMENT & PLAN NOTE
Seen on CT chest  Cont rocephin/azithro  Duo nebulizer/mucinex   Persistent leukocytosis.  Switch to zosyn.  Blood cultures pending.    Afebrile.

## 2024-03-23 NOTE — PLAN OF CARE
Recommendations  1. Continue diabetic diet.   2. RD to add low sodium diet modifications to diet order.   3. RD to reduce frequency of Tyler from TID to BID.   4. Continue frequent weight assessments.   5. RD to follow and perform NFPE, and assess nutrition education needs at in-person follow up.    Goals: Pt will continue to meet at least 75% ENN by RD follow up.  Nutrition Goal Status: new

## 2024-03-23 NOTE — PLAN OF CARE
Problem: Diabetes Comorbidity  Goal: Blood Glucose Level Within Targeted Range  Outcome: Ongoing, Progressing     Problem: Infection (Pneumonia)  Goal: Resolution of Infection Signs and Symptoms  Outcome: Ongoing, Progressing     Problem: Respiratory Compromise (Pneumonia)  Goal: Effective Oxygenation and Ventilation  Outcome: Ongoing, Progressing

## 2024-03-23 NOTE — CONSULTS
[place] - Med Surg (3rd Fl)  Adult Nutrition  Consult Note    SUMMARY     Recommendations  1. Continue diabetic diet.   2. RD to add low sodium diet modifications to diet order.   3. RD to reduce frequency of Tyler from TID to BID.   4. Continue frequent weight assessments.   5. RD to follow and perform NFPE, and assess nutrition education needs at in-person follow up.    Goals: Pt will continue to meet at least 75% ENN by RD follow up.  Nutrition Goal Status: new  Communication of RD Recs: other (comment) (POC)    Assessment and Plan    Nutrition Problem  Increased nutritional needs    Related to (etiology):   Conditions associated with diagnoses  Predicted poor nutritional QOL    Signs and Symptoms (as evidenced by):   H/o polysubstance abuse  Admitted recent use of methamphetamines  Hepatitis C  Anasarca  A1C > 14    Interventions/Recommendations (treatment strategy):  Recommendations  1. Continue diabetic diet.   2. RD to add low sodium diet modifications to diet order.   3. RD to reduce frequency of Tyler from TID to BID.   4. Continue frequent weight assessments.   5. RD to follow and perform NFPE, and assess nutrition education needs at in-person follow up.    Interventions  1. Carbohydrate modified diet  2. Increased energy diet  3. Increased protein diet  4. Commercial beverage  5. Collaboration with other providers    Goals: Pt will continue to meet at least 75% ENN by RD follow up.  Nutrition Goal Status: new    Nutrition Diagnosis Status:   New         Malnutrition Assessment   No NFPE - RD remote          Fluid Accumulation (Malnutrition): mild                         Reason for Assessment    Reason For Assessment: consult (PCM and DM)  Diagnosis: diabetes diagnosis/complications (HHS)  Relevant Medical History: T1DM, Hep C, Shingles, HLD, polysubstance abuse  Interdisciplinary Rounds: did not attend  General Information Comments:   RD providing remote coverage for 54 yo M. Recently transferred from  "ICU to medsurg unit. Admitted for [facility]. H&P reviewed. Severe malnutrition documented. AAKASH medellin hx due to remote assessment. No significant weight loss hx per chart review.   Currently receiving diabetic diet with double portions and Tyler TID. Tyler recommended by  to be used BID for wound healing. May be helpful with immunocompromised state. 100% meal intakes documented. Pt exceeding protein needs with double portions. Low sodium diet modificatons to be added to assist in diuresis and HTN.   RD to attach consistent CHO diet edu handouts to discharge documents. LBM: 3/21. Will continue to monitor and perform NFPE at follow up.    Nutrition Discharge Planning: Pt to dc on consistent carbohydrate diet    Nutrition Risk Screen    Nutrition Risk Screen: no indicators present    Nutrition/Diet History    Food Allergies: NKFA      Nutrition Related Social Determinants of Health: SDOH: Unable to assess at this time.     Anthropometrics    Temp: 97.8 °F (36.6 °C)  Height: 5' 7" (170.2 cm)  Height (inches): 67 in  Weight Method: Bed Scale  Weight: 85.9 kg (189 lb 6 oz)  Weight (lb): 189.38 lb  Ideal Body Weight (IBW), Male: 148 lb  % Ideal Body Weight, Male (lb): 127.96 %  BMI (Calculated): 29.7  BMI Grade: 25 - 29.9 - overweight  Usual Body Weight (UBW), k.45 kg  Weight Change Amount: 5 lb 9.8 oz (-2.8% weight change in 2 months)  % Usual Body Weight: 97.32       Lab/Procedures/Meds    Pertinent Labs Reviewed: reviewed  Lab Results   Component Value Date    HGBA1C >14.0 (H) 2024     BMP  Lab Results   Component Value Date     2024    K 2.8 (L) 2024     2024    CO2 28 2024    BUN 32 (H) 2024    CREATININE 1.5 (H) 2024    CALCIUM 7.1 (L) 2024    ANIONGAP 8 2024    EGFRNORACEVR 55 (A) 2024     Pertinent Medications Reviewed: reviewed  Scheduled:   albuterol-ipratropium  3 mL Nebulization Q4H    aspirin  81 mg Oral Daily    atorvastatin "  80 mg Oral QHS    azithromycin  500 mg Intravenous Q24H    cefTRIAXone (Rocephin) IV (PEDS and ADULTS)  1 g Intravenous Q24H    furosemide (LASIX) injection  20 mg Intravenous Q12H    guaiFENesin  600 mg Oral BID    insulin aspart U-100  15 Units Subcutaneous TID WM    insulin detemir U-100  25 Units Subcutaneous BID    lisinopriL  5 mg Oral Daily    mupirocin   Nasal BID    nicotine  1 patch Transdermal Daily    pantoprazole  40 mg Oral Daily    propranoloL  20 mg Oral BID    spironolactone  25 mg Oral Daily     PRN: acetaminophen, acetaminophen, benzonatate, dextrose 10%, dextrose 10%, glucagon (human recombinant), glucose, glucose, insulin aspart U-100, melatonin, ondansetron, polyethylene glycol, sodium chloride 0.9%    Physical Findings/Assessment         Estimated/Assessed Needs    Weight Used For Calorie Calculations: 67.3 kg (148 lb 4.9 oz) (IBW)  Energy Calorie Requirements (kcal): 2018-2354  Energy Need Method: Kcal/kg (30-35 kcal/kg for substance abuse)  Protein Requirements:  g (1.25-1.5 g/kg for substance abuse)  Weight Used For Protein Calculations: 85.9 kg (189 lb 6 oz)  Fluid Requirements (mL): 1682  Estimated Fluid Requirement Method: other (see comments) (25 mLl/kg IBW)  RDA Method (mL): 2018  CHO Requirement: 246-301 g (45-55% of total kcals)      Nutrition Prescription Ordered    Current Diet Order: diabetic diet; double portions  Oral Nutrition Supplement: Tyler TID    Evaluation of Received Nutrient/Fluid Intake    % Kcal Needs: 100-125%  % Protein Needs: > 150%  I/O: +6.2 L since admit  Energy Calories Required: meeting needs  Protein Required: exceeds needs  Fluid Required: meeting needs  Tolerance: tolerating  % Intake of Estimated Energy Needs: Other: 100-125%  % Meal Intake: 75 - 100 %    Nutrition Risk    Level of Risk/Frequency of Follow-up: moderate - high (2 x per week)       Monitor and Evaluation    Food and Nutrient Intake: energy intake, food and beverage intake  Food and  Nutrient Adminstration: diet order  Knowledge/Beliefs/Attitudes: food and nutrition knowledge/skill, beliefs and attitudes  Physical Activity and Function: nutrition-related ADLs and IADLs, factors affecting access to physical activity  Anthropometric Measurements: height/length, weight, weight change, body mass index  Biochemical Data, Medical Tests and Procedures: electrolyte and renal panel, gastrointestinal profile, glucose/endocrine profile, inflammatory profile, lipid profile       Nutrition Follow-Up    RD Follow-up?: Yes        Virgne Pastor RDN, SARAN

## 2024-03-23 NOTE — SUBJECTIVE & OBJECTIVE
Interval History: increased edema. Dyspnea. Afebrile. Black stools.    Review of Systems   Constitutional:  Negative for chills and fever.   HENT:  Positive for voice change. Negative for congestion, ear pain, postnasal drip, rhinorrhea, sore throat and trouble swallowing.    Eyes:  Negative for redness and itching.   Respiratory:  Positive for cough. Negative for shortness of breath and wheezing.    Cardiovascular:  Positive for leg swelling. Negative for chest pain and palpitations.   Gastrointestinal:  Negative for abdominal pain, diarrhea, nausea and vomiting.        Black stool   Genitourinary:  Positive for decreased urine volume and scrotal swelling. Negative for dysuria and frequency.   Skin:  Negative for rash.   Neurological:  Negative for weakness and headaches.     Objective:     Vital Signs (Most Recent):  Temp: 97.8 °F (36.6 °C) (03/23/24 0801)  Pulse: 78 (03/23/24 0801)  Resp: 20 (03/23/24 0801)  BP: (!) 155/75 (03/23/24 0801)  SpO2: 95 % (03/23/24 0801) Vital Signs (24h Range):  Temp:  [97.8 °F (36.6 °C)-98.7 °F (37.1 °C)] 97.8 °F (36.6 °C)  Pulse:  [64-93] 78  Resp:  [15-24] 20  SpO2:  [94 %-98 %] 95 %  BP: (108-155)/() 155/75     Weight: 85.9 kg (189 lb 6 oz)  Body mass index is 29.66 kg/m².    Intake/Output Summary (Last 24 hours) at 3/23/2024 0826  Last data filed at 3/23/2024 0353  Gross per 24 hour   Intake 786 ml   Output --   Net 786 ml         Physical Exam  Vitals and nursing note reviewed.   Constitutional:       General: He is not in acute distress.     Appearance: He is well-developed. He is obese.   HENT:      Head: Normocephalic and atraumatic.   Eyes:      Conjunctiva/sclera: Conjunctivae normal.      Pupils: Pupils are equal, round, and reactive to light.   Neck:      Thyroid: No thyromegaly.   Cardiovascular:      Rate and Rhythm: Normal rate and regular rhythm.      Heart sounds: Normal heart sounds.   Pulmonary:      Effort: Pulmonary effort is normal. No respiratory  "distress.      Breath sounds: Normal breath sounds. No wheezing.   Abdominal:      General: Bowel sounds are normal.      Palpations: Abdomen is soft.      Tenderness: There is no abdominal tenderness.      Comments: Edema through abdominal wall   Musculoskeletal:         General: Normal range of motion.      Cervical back: Normal range of motion and neck supple.   Lymphadenopathy:      Cervical: No cervical adenopathy.   Skin:     General: Skin is warm and dry.      Findings: No rash.   Neurological:      Mental Status: He is alert and oriented to person, place, and time.   Psychiatric:         Behavior: Behavior normal.             Significant Labs: All pertinent labs within the past 24 hours have been reviewed.  A1C:   Recent Labs   Lab 03/21/24  1911 03/22/24  0426   HGBA1C >14.0* >14.0*     CBC:   Recent Labs   Lab 03/21/24  0953 03/22/24  0426 03/23/24  0424   WBC 18.04* 20.67* 20.53*   HGB 14.0 13.2* 12.1*   HCT 42.2 38.5* 35.6*    448 408     CMP:   Recent Labs   Lab 03/21/24  0953 03/21/24  1345 03/21/24  2000 03/22/24  0426 03/23/24  0424   *   < > 143 139  139 137   K 3.4*   < > 2.5* 3.6  3.6 2.8*   CL 93*   < > 103 99  99 101   CO2 29   < > 32* 31*  31* 28   *   < > 67* 218*  218* 94   BUN 15   < > 13 17  17 32*   CREATININE 2.0*   < > 1.2 1.4  1.4 1.5*   CALCIUM 8.3*   < > 8.1* 7.8*  7.8* 7.1*   PROT 5.5*  --   --  4.7*  4.7*  --    ALBUMIN 1.2*  --   --  1.1*  1.1*  --    BILITOT 0.2  --   --  0.3  0.3  --    ALKPHOS 180*  --   --  129  129  --    AST 20  --   --  41*  41*  --    ALT 25  --   --  25  25  --    ANIONGAP 13   < > 8 9  9 8    < > = values in this interval not displayed.     Cardiac Markers: No results for input(s): "CKMB", "MYOGLOBIN", "BNP", "TROPISTAT" in the last 48 hours.  Coagulation:   Recent Labs   Lab 03/21/24 2000   INR 0.9   APTT <21.0     POCT Glucose:   Recent Labs   Lab 03/22/24  1123 03/22/24  1706 03/22/24  1938   POCTGLUCOSE 366* 328* " "184*     Prealbumin: No results for input(s): "PREALBUMIN" in the last 48 hours.  Troponin:   Recent Labs   Lab 03/21/24  0953 03/21/24  1302   TROPONINI 0.042* 0.031*       Significant Imaging: I have reviewed all pertinent imaging results/findings within the past 24 hours.  "

## 2024-03-23 NOTE — PROGRESS NOTES
[facility] ([facility])  [facility] Medicine  Progress Note    Patient Name: Onofre Ceballos  MRN: [id number]  Patient Class: IP- Inpatient   Admission Date: 3/21/2024  Length of Stay: 2 days  Attending Physician: Heather Estevez MD  Primary Care Provider: Cinthya, Primary Doctor        Subjective:     Principal Problem:Hyperosmolar hyperglycemic state (HHS)        HPI:  53yM with DM2, HTN, HLD, polysubstance abuse presented to ER with elevated glucose after being sent from his clinic. Pt states he was in his usual state of health aside from generalized swelling for the last couple months. He also reports he was being treated for bronchitis with antibiotics and cough medication. He has no SOB or chest pain. No fever/chills. He reports he has not been compliant with DM meds including insulin. He reports last meth use was about 2 weeks ago.    Overview/Hospital Course:  Pt HD stable on room air.  Glucose levels improved and off insulin gtt.  PNA on CT Chest.  Continues to have leukocytosis on IV abx.  Will consider switching abx if doesn't improve in the next 24 hours.  Duo nebulizer and abx ordered.      3/23  Patient with significant anasarca. He is complaining of 'hushed' sounding voice and dyspnea with exertion as well as a dry cough. He has had no fever. He also denies sputum production. He has h/o hep c - dx'd over 10 years ago. Swelling significantly worse over last 48 hours. Also notes 'black' stool this morning.    Interval History: increased edema. Dyspnea. Afebrile. Black stools.    Review of Systems   Constitutional:  Negative for chills and fever.   HENT:  Positive for voice change. Negative for congestion, ear pain, postnasal drip, rhinorrhea, sore throat and trouble swallowing.    Eyes:  Negative for redness and itching.   Respiratory:  Positive for cough. Negative for shortness of breath and wheezing.    Cardiovascular:  Positive for leg swelling. Negative for chest pain and palpitations.    Gastrointestinal:  Negative for abdominal pain, diarrhea, nausea and vomiting.        Black stool   Genitourinary:  Positive for decreased urine volume and scrotal swelling. Negative for dysuria and frequency.   Skin:  Negative for rash.   Neurological:  Negative for weakness and headaches.     Objective:     Vital Signs (Most Recent):  Temp: 97.8 °F (36.6 °C) (03/23/24 0801)  Pulse: 78 (03/23/24 0801)  Resp: 20 (03/23/24 0801)  BP: (!) 155/75 (03/23/24 0801)  SpO2: 95 % (03/23/24 0801) Vital Signs (24h Range):  Temp:  [97.8 °F (36.6 °C)-98.7 °F (37.1 °C)] 97.8 °F (36.6 °C)  Pulse:  [64-93] 78  Resp:  [15-24] 20  SpO2:  [94 %-98 %] 95 %  BP: (108-155)/() 155/75     Weight: 85.9 kg (189 lb 6 oz)  Body mass index is 29.66 kg/m².    Intake/Output Summary (Last 24 hours) at 3/23/2024 0826  Last data filed at 3/23/2024 0353  Gross per 24 hour   Intake 786 ml   Output --   Net 786 ml         Physical Exam  Vitals and nursing note reviewed.   Constitutional:       General: He is not in acute distress.     Appearance: He is well-developed. He is obese.   HENT:      Head: Normocephalic and atraumatic.   Eyes:      Conjunctiva/sclera: Conjunctivae normal.      Pupils: Pupils are equal, round, and reactive to light.   Neck:      Thyroid: No thyromegaly.   Cardiovascular:      Rate and Rhythm: Normal rate and regular rhythm.      Heart sounds: Normal heart sounds.   Pulmonary:      Effort: Pulmonary effort is normal. No respiratory distress.      Breath sounds: Normal breath sounds. No wheezing.   Abdominal:      General: Bowel sounds are normal.      Palpations: Abdomen is soft.      Tenderness: There is no abdominal tenderness.      Comments: Edema through abdominal wall   Musculoskeletal:         General: Normal range of motion.      Cervical back: Normal range of motion and neck supple.   Lymphadenopathy:      Cervical: No cervical adenopathy.   Skin:     General: Skin is warm and dry.      Findings: No rash.  "  Neurological:      Mental Status: He is alert and oriented to person, place, and time.   Psychiatric:         Behavior: Behavior normal.             Significant Labs: All pertinent labs within the past 24 hours have been reviewed.  A1C:   Recent Labs   Lab 03/21/24  1911 03/22/24 0426   HGBA1C >14.0* >14.0*     CBC:   Recent Labs   Lab 03/21/24  0953 03/22/24 0426 03/23/24 0424   WBC 18.04* 20.67* 20.53*   HGB 14.0 13.2* 12.1*   HCT 42.2 38.5* 35.6*    448 408     CMP:   Recent Labs   Lab 03/21/24  0953 03/21/24  1345 03/21/24 2000 03/22/24 0426 03/23/24 0424   *   < > 143 139  139 137   K 3.4*   < > 2.5* 3.6  3.6 2.8*   CL 93*   < > 103 99  99 101   CO2 29   < > 32* 31*  31* 28   *   < > 67* 218*  218* 94   BUN 15   < > 13 17  17 32*   CREATININE 2.0*   < > 1.2 1.4  1.4 1.5*   CALCIUM 8.3*   < > 8.1* 7.8*  7.8* 7.1*   PROT 5.5*  --   --  4.7*  4.7*  --    ALBUMIN 1.2*  --   --  1.1*  1.1*  --    BILITOT 0.2  --   --  0.3  0.3  --    ALKPHOS 180*  --   --  129  129  --    AST 20  --   --  41*  41*  --    ALT 25  --   --  25  25  --    ANIONGAP 13   < > 8 9  9 8    < > = values in this interval not displayed.     Cardiac Markers: No results for input(s): "CKMB", "MYOGLOBIN", "BNP", "TROPISTAT" in the last 48 hours.  Coagulation:   Recent Labs   Lab 03/21/24 2000   INR 0.9   APTT <21.0     POCT Glucose:   Recent Labs   Lab 03/22/24  1123 03/22/24  1706 03/22/24  1938   POCTGLUCOSE 366* 328* 184*     Prealbumin: No results for input(s): "PREALBUMIN" in the last 48 hours.  Troponin:   Recent Labs   Lab 03/21/24  0953 03/21/24  1302   TROPONINI 0.042* 0.031*       Significant Imaging: I have reviewed all pertinent imaging results/findings within the past 24 hours.    Assessment/Plan:      * Hyperosmolar hyperglycemic state (HHS)  Glucose improved  Mental status improved  Stop insulin gtt; start subQ  Start diabetic diet  Stop IVF as pt with anasarca likely 2/2 low " albumin    Glucose in the 200's.  Hg A1C >14 noncompliant.  Diabetic education referral at discharge     Resolved.      Low serum albumin  Likely cause of anasarca    ?renal - recheck urine. +++ proteinuria. Patient with known and admitted meth use.  ?hepatic - meld 10. Work up in progress.  ?poor nutrition - add jd.     May consider albumin infusion to help with diuresis. Though BP will allow for diuretic currently.    Severe protein-calorie malnutrition  Nutrition consulted. Most recent weight and BMI monitored-     Measurements:  Wt Readings from Last 1 Encounters:   03/22/24 85.9 kg (189 lb 6 oz)   Body mass index is 29.66 kg/m².             Pneumonia of left lower lobe due to infectious organism  Seen on CT chest  Cont rocephin/azithro  Duo nebulizer/mucinex   Persistent leukocytosis.  Switch to zosyn.  Blood cultures pending.    Afebrile.        YAMIL (acute kidney injury)  Patient with acute kidney injury/acute renal failure likely due to pre-renal azotemia due to IVVD YAMIL is currently improving. Baseline creatinine  1.2-1.3  - Labs reviewed- Renal function/electrolytes with Estimated Creatinine Clearance: 58.9 mL/min (A) (based on SCr of 1.5 mg/dL (H)). according to latest data. Monitor urine output and serial BMP and adjust therapy as needed. Avoid nephrotoxins and renally dose meds for GFR listed above.    Close to baseline     Patient with intravascular fluid depletion but significant anasarca. Will attempt diuresis and monitor bmp.    Hypokalemia  Patient has hypokalemia which is Acute and currently uncontrolled. Most recent potassium levels reviewed-   Lab Results   Component Value Date    K 2.8 (L) 03/23/2024   . Will continue potassium replacement per protocol and recheck repeat levels after replacement completed.     Resolved    Replace with 40 po this morning. Give add'l this afternoon. Add in spironolactone.    Hypocalcemia  Patient has hypocalcemia due to Critical Illness which is currently  "controlled, and has been confirmed with ionized and/or corrected calcium. Will replace calcium and monitor electrolytes closely. The latest calcium labs have been reviewed and are listed below.  Recent Labs   Lab 03/23/24  0424   CALCIUM 7.1*         No results for input(s): "CAION" in the last 24 hours.          Pure hypercholesterolemia  Cont home statin      Type 2 diabetes mellitus with microalbuminuria, with long-term current use of insulin  A1C >14   ISS  Detemir 25u BID  Aspart 15u with meals    Decrease levemir this am - blood sugar 94.    Benign essential HTN  Chronic, controlled. Latest blood pressure and vitals reviewed-     Temp:  [97.8 °F (36.6 °C)-98.7 °F (37.1 °C)]   Pulse:  [64-91]   Resp:  [15-21]   BP: (108-155)/(56-81)   SpO2:  [94 %-98 %] .   Home meds for hypertension were reviewed and noted below.   Hypertension Medications               lisinopriL (PRINIVIL,ZESTRIL) 5 MG tablet Take 5 mg by mouth once daily.    propranoloL (INDERAL) 20 MG tablet Take 20 mg by mouth 2 (two) times daily.            While in the hospital, will manage blood pressure as follows; Continue home antihypertensive regimen    Will utilize p.r.n. blood pressure medication only if patient's blood pressure greater than 180/110 and he develops symptoms such as worsening chest pain or shortness of breath.    Propranolol.  Give lasix and spironolactone this am.    Hep C w/o coma, chronic  Patient has chronic liver disease due to hepatitis c. They have a history of ascites/volume overload. Their liver disease is compensated. We will obtain  CBC, CMP, and INR. Their most current Na-MELD is listed below.  MELD 3.0: 13 at 3/23/2024  4:24 AM  MELD-Na: 10 at 3/23/2024  4:24 AM  Calculated from:  Serum Creatinine: 1.5 mg/dL at 3/23/2024  4:24 AM  Serum Sodium: 137 mmol/L at 3/23/2024  4:24 AM  Total Bilirubin: 0.3 mg/dL (Using min of 1 mg/dL) at 3/22/2024  4:26 AM  Serum Albumin: 1.1 g/dL (Using min of 1.5 g/dL) at 3/22/2024  4:26 " AM  INR(ratio): 0.9 (Using min of 1) at 3/21/2024  8:00 PM  Age at listing (hypothetical): 53 years  Sex: Male at 3/23/2024  4:24 AM    Will check PCR. Patient asking about treatment.  Plt count okay. Albumin significantly decreased. Anasarca noted. Will add in spironolactone and lasix for now. Check AFP and get u/s this morning.    Tobacco abuse  Cessation counseling  Nicotine patch      Noncompliance with medication regimen  Stressed compliance  He hasn't been taking any of his meds.      VTE Risk Mitigation (From admission, onward)           Ordered     IP VTE LOW RISK PATIENT  Once         03/21/24 1433                    Discharge Planning   LYNDA:      Code Status: Full Code   Is the patient medically ready for discharge?:     Reason for patient still in hospital (select all that apply): Patient trending condition and Laboratory test  Discharge Plan A: Home                  Heather Estevez MD  Department of Hospital Medicine   [place] [facility] ([facility])

## 2024-03-23 NOTE — ASSESSMENT & PLAN NOTE
Patient has hypokalemia which is Acute and currently uncontrolled. Most recent potassium levels reviewed-   Lab Results   Component Value Date    K 2.8 (L) 03/23/2024   . Will continue potassium replacement per protocol and recheck repeat levels after replacement completed.     Resolved    Replace with 40 po this morning. Give add'l this afternoon. Add in spironolactone.

## 2024-03-23 NOTE — ASSESSMENT & PLAN NOTE
Glucose improved  Mental status improved  Stop insulin gtt; start subQ  Start diabetic diet  Stop IVF as pt with anasarca likely 2/2 low albumin    Glucose in the 200's.  Hg A1C >14 noncompliant.  Diabetic education referral at discharge     Resolved.

## 2024-03-23 NOTE — ASSESSMENT & PLAN NOTE
Patient has chronic liver disease due to hepatitis c. They have a history of ascites/volume overload. Their liver disease is compensated. We will obtain  CBC, CMP, and INR. Their most current Na-MELD is listed below.  MELD 3.0: 13 at 3/23/2024  4:24 AM  MELD-Na: 10 at 3/23/2024  4:24 AM  Calculated from:  Serum Creatinine: 1.5 mg/dL at 3/23/2024  4:24 AM  Serum Sodium: 137 mmol/L at 3/23/2024  4:24 AM  Total Bilirubin: 0.3 mg/dL (Using min of 1 mg/dL) at 3/22/2024  4:26 AM  Serum Albumin: 1.1 g/dL (Using min of 1.5 g/dL) at 3/22/2024  4:26 AM  INR(ratio): 0.9 (Using min of 1) at 3/21/2024  8:00 PM  Age at listing (hypothetical): 53 years  Sex: Male at 3/23/2024  4:24 AM    Will check PCR. Patient asking about treatment.  Plt count okay. Albumin significantly decreased. Anasarca noted. Will add in spironolactone and lasix for now. Check AFP and get u/s this morning.

## 2024-03-23 NOTE — ASSESSMENT & PLAN NOTE
Chronic, controlled. Latest blood pressure and vitals reviewed-     Temp:  [97.8 °F (36.6 °C)-98.7 °F (37.1 °C)]   Pulse:  [64-91]   Resp:  [15-21]   BP: (108-155)/(56-81)   SpO2:  [94 %-98 %] .   Home meds for hypertension were reviewed and noted below.   Hypertension Medications               lisinopriL (PRINIVIL,ZESTRIL) 5 MG tablet Take 5 mg by mouth once daily.    propranoloL (INDERAL) 20 MG tablet Take 20 mg by mouth 2 (two) times daily.            While in the hospital, will manage blood pressure as follows; Continue home antihypertensive regimen    Will utilize p.r.n. blood pressure medication only if patient's blood pressure greater than 180/110 and he develops symptoms such as worsening chest pain or shortness of breath.    Propranolol.  Give lasix and spironolactone this am.

## 2024-03-24 LAB
ANION GAP SERPL CALC-SCNC: 8 MMOL/L (ref 8–16)
BASOPHILS # BLD AUTO: 0.08 K/UL (ref 0–0.2)
BASOPHILS NFR BLD: 0.4 % (ref 0–1.9)
BUN SERPL-MCNC: 35 MG/DL (ref 6–20)
CALCIUM SERPL-MCNC: 7.3 MG/DL (ref 8.7–10.5)
CHLORIDE SERPL-SCNC: 103 MMOL/L (ref 95–110)
CO2 SERPL-SCNC: 25 MMOL/L (ref 23–29)
CREAT SERPL-MCNC: 1.7 MG/DL (ref 0.5–1.4)
DIFFERENTIAL METHOD BLD: ABNORMAL
EOSINOPHIL # BLD AUTO: 0.7 K/UL (ref 0–0.5)
EOSINOPHIL NFR BLD: 3.5 % (ref 0–8)
ERYTHROCYTE [DISTWIDTH] IN BLOOD BY AUTOMATED COUNT: 12.6 % (ref 11.5–14.5)
EST. GFR  (NO RACE VARIABLE): 47 ML/MIN/1.73 M^2
GLUCOSE SERPL-MCNC: 345 MG/DL (ref 70–110)
HCT VFR BLD AUTO: 36.6 % (ref 40–54)
HGB BLD-MCNC: 12.4 G/DL (ref 14–18)
IMM GRANULOCYTES # BLD AUTO: 0.35 K/UL (ref 0–0.04)
IMM GRANULOCYTES NFR BLD AUTO: 1.8 % (ref 0–0.5)
LYMPHOCYTES # BLD AUTO: 3.5 K/UL (ref 1–4.8)
LYMPHOCYTES NFR BLD: 18.1 % (ref 18–48)
MCH RBC QN AUTO: 32.6 PG (ref 27–31)
MCHC RBC AUTO-ENTMCNC: 33.9 G/DL (ref 32–36)
MCV RBC AUTO: 96 FL (ref 82–98)
MONOCYTES # BLD AUTO: 1 K/UL (ref 0.3–1)
MONOCYTES NFR BLD: 5.2 % (ref 4–15)
NEUTROPHILS # BLD AUTO: 13.5 K/UL (ref 1.8–7.7)
NEUTROPHILS NFR BLD: 71 % (ref 38–73)
NRBC BLD-RTO: 0 /100 WBC
PLATELET # BLD AUTO: 364 K/UL (ref 150–450)
PMV BLD AUTO: 11.2 FL (ref 9.2–12.9)
POCT GLUCOSE: 180 MG/DL (ref 70–110)
POCT GLUCOSE: 250 MG/DL (ref 70–110)
POCT GLUCOSE: 386 MG/DL (ref 70–110)
POCT GLUCOSE: 464 MG/DL (ref 70–110)
POCT GLUCOSE: 496 MG/DL (ref 70–110)
POCT GLUCOSE: 78 MG/DL (ref 70–110)
POCT GLUCOSE: >500 MG/DL (ref 70–110)
POTASSIUM SERPL-SCNC: 3.8 MMOL/L (ref 3.5–5.1)
RBC # BLD AUTO: 3.8 M/UL (ref 4.6–6.2)
SODIUM SERPL-SCNC: 136 MMOL/L (ref 136–145)
WBC # BLD AUTO: 19.08 K/UL (ref 3.9–12.7)

## 2024-03-24 PROCEDURE — 63600175 PHARM REV CODE 636 W HCPCS: Performed by: STUDENT IN AN ORGANIZED HEALTH CARE EDUCATION/TRAINING PROGRAM

## 2024-03-24 PROCEDURE — 25000003 PHARM REV CODE 250: Performed by: FAMILY MEDICINE

## 2024-03-24 PROCEDURE — 80048 BASIC METABOLIC PNL TOTAL CA: CPT | Performed by: FAMILY MEDICINE

## 2024-03-24 PROCEDURE — 85025 COMPLETE CBC W/AUTO DIFF WBC: CPT | Performed by: FAMILY MEDICINE

## 2024-03-24 PROCEDURE — 99233 SBSQ HOSP IP/OBS HIGH 50: CPT | Mod: ,,, | Performed by: FAMILY MEDICINE

## 2024-03-24 PROCEDURE — 94640 AIRWAY INHALATION TREATMENT: CPT

## 2024-03-24 PROCEDURE — 25000003 PHARM REV CODE 250: Performed by: STUDENT IN AN ORGANIZED HEALTH CARE EDUCATION/TRAINING PROGRAM

## 2024-03-24 PROCEDURE — 25000242 PHARM REV CODE 250 ALT 637 W/ HCPCS: Performed by: PHYSICIAN ASSISTANT

## 2024-03-24 PROCEDURE — 36415 COLL VENOUS BLD VENIPUNCTURE: CPT | Performed by: FAMILY MEDICINE

## 2024-03-24 PROCEDURE — 25000242 PHARM REV CODE 250 ALT 637 W/ HCPCS: Performed by: FAMILY MEDICINE

## 2024-03-24 PROCEDURE — 99900035 HC TECH TIME PER 15 MIN (STAT)

## 2024-03-24 PROCEDURE — 63600175 PHARM REV CODE 636 W HCPCS: Performed by: FAMILY MEDICINE

## 2024-03-24 PROCEDURE — 94761 N-INVAS EAR/PLS OXIMETRY MLT: CPT

## 2024-03-24 PROCEDURE — 25000003 PHARM REV CODE 250: Performed by: PHYSICIAN ASSISTANT

## 2024-03-24 PROCEDURE — 11000001 HC ACUTE MED/SURG PRIVATE ROOM

## 2024-03-24 RX ORDER — INSULIN ASPART 100 [IU]/ML
20 INJECTION, SOLUTION INTRAVENOUS; SUBCUTANEOUS ONCE
Status: COMPLETED | OUTPATIENT
Start: 2024-03-24 | End: 2024-03-24

## 2024-03-24 RX ORDER — INSULIN ASPART 100 [IU]/ML
20 INJECTION, SOLUTION INTRAVENOUS; SUBCUTANEOUS
Status: DISCONTINUED | OUTPATIENT
Start: 2024-03-24 | End: 2024-03-26 | Stop reason: HOSPADM

## 2024-03-24 RX ORDER — IPRATROPIUM BROMIDE AND ALBUTEROL SULFATE 2.5; .5 MG/3ML; MG/3ML
3 SOLUTION RESPIRATORY (INHALATION)
Status: DISCONTINUED | OUTPATIENT
Start: 2024-03-24 | End: 2024-03-26 | Stop reason: HOSPADM

## 2024-03-24 RX ADMIN — BENZONATATE 100 MG: 100 CAPSULE ORAL at 08:03

## 2024-03-24 RX ADMIN — CEFTRIAXONE SODIUM 1 G: 1 INJECTION, POWDER, FOR SOLUTION INTRAMUSCULAR; INTRAVENOUS at 01:03

## 2024-03-24 RX ADMIN — GUAIFENESIN 600 MG: 600 TABLET, EXTENDED RELEASE ORAL at 10:03

## 2024-03-24 RX ADMIN — IPRATROPIUM BROMIDE AND ALBUTEROL SULFATE 3 ML: 2.5; .5 SOLUTION RESPIRATORY (INHALATION) at 07:03

## 2024-03-24 RX ADMIN — FUROSEMIDE 20 MG: 10 INJECTION, SOLUTION INTRAMUSCULAR; INTRAVENOUS at 08:03

## 2024-03-24 RX ADMIN — HUMAN INSULIN 25 UNITS: 100 INJECTION, SUSPENSION SUBCUTANEOUS at 07:03

## 2024-03-24 RX ADMIN — IPRATROPIUM BROMIDE AND ALBUTEROL SULFATE 3 ML: 2.5; .5 SOLUTION RESPIRATORY (INHALATION) at 11:03

## 2024-03-24 RX ADMIN — INSULIN ASPART 20 UNITS: 100 INJECTION, SOLUTION INTRAVENOUS; SUBCUTANEOUS at 07:03

## 2024-03-24 RX ADMIN — FUROSEMIDE 20 MG: 10 INJECTION, SOLUTION INTRAMUSCULAR; INTRAVENOUS at 10:03

## 2024-03-24 RX ADMIN — ASPIRIN 81 MG: 81 TABLET, COATED ORAL at 10:03

## 2024-03-24 RX ADMIN — INSULIN ASPART 20 UNITS: 100 INJECTION, SOLUTION INTRAVENOUS; SUBCUTANEOUS at 05:03

## 2024-03-24 RX ADMIN — INSULIN ASPART 10 UNITS: 100 INJECTION, SOLUTION INTRAVENOUS; SUBCUTANEOUS at 11:03

## 2024-03-24 RX ADMIN — Medication 6 MG: at 08:03

## 2024-03-24 RX ADMIN — INSULIN ASPART 15 UNITS: 100 INJECTION, SOLUTION INTRAVENOUS; SUBCUTANEOUS at 11:03

## 2024-03-24 RX ADMIN — MUPIROCIN: 20 OINTMENT TOPICAL at 10:03

## 2024-03-24 RX ADMIN — MUPIROCIN: 20 OINTMENT TOPICAL at 09:03

## 2024-03-24 RX ADMIN — ATORVASTATIN CALCIUM 80 MG: 20 TABLET, FILM COATED ORAL at 08:03

## 2024-03-24 RX ADMIN — PROPRANOLOL HYDROCHLORIDE 20 MG: 20 TABLET ORAL at 10:03

## 2024-03-24 RX ADMIN — INSULIN DETEMIR 30 UNITS: 100 INJECTION, SOLUTION SUBCUTANEOUS at 08:03

## 2024-03-24 RX ADMIN — GUAIFENESIN 600 MG: 600 TABLET, EXTENDED RELEASE ORAL at 08:03

## 2024-03-24 RX ADMIN — IPRATROPIUM BROMIDE AND ALBUTEROL SULFATE 3 ML: 2.5; .5 SOLUTION RESPIRATORY (INHALATION) at 04:03

## 2024-03-24 RX ADMIN — INSULIN ASPART 4 UNITS: 100 INJECTION, SOLUTION INTRAVENOUS; SUBCUTANEOUS at 05:03

## 2024-03-24 RX ADMIN — PROPRANOLOL HYDROCHLORIDE 20 MG: 20 TABLET ORAL at 08:03

## 2024-03-24 RX ADMIN — SPIRONOLACTONE 25 MG: 25 TABLET ORAL at 10:03

## 2024-03-24 NOTE — ASSESSMENT & PLAN NOTE
Seen on CT chest  Cont rocephin/azithro  Duo nebulizer/mucinex   Persistent leukocytosis  Blood cultures pending.    Afebrile.    3/24  No change to WBC ct. Repeat cxr. Procal normal. Afebrile.

## 2024-03-24 NOTE — NURSING
Dr. Estevez notified of patient's blood sugar. NPH 25units one time ordered. Morning levemir will be held as well as the morning 15 of aspart. 20 of aspart ordered one time.

## 2024-03-24 NOTE — ASSESSMENT & PLAN NOTE
Patient has chronic liver disease due to hepatitis c. They have a history of ascites/volume overload. Their liver disease is compensated. We will obtain  CBC, CMP, and INR. Their most current Na-MELD is listed below.  MELD 3.0: 14 at 3/24/2024  4:48 AM  MELD-Na: 13 at 3/24/2024  4:48 AM  Calculated from:  Serum Creatinine: 1.7 mg/dL at 3/24/2024  4:48 AM  Serum Sodium: 136 mmol/L at 3/24/2024  4:48 AM  Total Bilirubin: 0.3 mg/dL (Using min of 1 mg/dL) at 3/22/2024  4:26 AM  Serum Albumin: 1.1 g/dL (Using min of 1.5 g/dL) at 3/22/2024  4:26 AM  INR(ratio): 0.9 (Using min of 1) at 3/23/2024 11:55 AM  Age at listing (hypothetical): 53 years  Sex: Male at 3/24/2024  4:48 AM    Will check PCR. Patient asking about treatment.  Plt count okay. Albumin significantly decreased. Anasarca noted. Will add in spironolactone and lasix for now. Check AFP and get u/s this morning.    3/24  No ascites noted, liver without masses/cirrhotic appearance. Not likely the cause of his fluid overload.

## 2024-03-24 NOTE — ASSESSMENT & PLAN NOTE
Patient with acute kidney injury/acute renal failure likely due to pre-renal azotemia due to IVVD YAMIL is currently improving. Baseline creatinine  1.2-1.3  - Labs reviewed- Renal function/electrolytes with Estimated Creatinine Clearance: 52 mL/min (A) (based on SCr of 1.7 mg/dL (H)). according to latest data. Monitor urine output and serial BMP and adjust therapy as needed. Avoid nephrotoxins and renally dose meds for GFR listed above.    Close to baseline     Patient with intravascular fluid depletion but significant anasarca. Will attempt diuresis and monitor bmp.    3/24  Urine with proteinuria. Significant anasarca secondary to hypoalbuminemia likely because of this. Would like renal consult. May be a candidate for albumin infusion if pressure doesn't allow for loop dosing. Minimize iv overload and monitor accurate I/os.

## 2024-03-24 NOTE — SUBJECTIVE & OBJECTIVE
Interval History: increased edema. Afebrile    Review of Systems   Constitutional:  Negative for chills and fever.   HENT:  Positive for voice change. Negative for congestion, ear pain, postnasal drip, rhinorrhea, sore throat and trouble swallowing.    Eyes:  Negative for redness and itching.   Respiratory:  Positive for cough. Negative for shortness of breath and wheezing.    Cardiovascular:  Positive for leg swelling. Negative for chest pain and palpitations.   Gastrointestinal:  Negative for abdominal pain, diarrhea, nausea and vomiting.        Black stool   Genitourinary:  Positive for scrotal swelling. Negative for decreased urine volume, dysuria and frequency.   Skin:  Negative for rash.   Neurological:  Negative for weakness and headaches.     Objective:     Vital Signs (Most Recent):  Temp: 98.2 °F (36.8 °C) (03/24/24 1108)  Pulse: 78 (03/24/24 1200)  Resp: 18 (03/24/24 1124)  BP: 128/66 (03/24/24 1108)  SpO2: 95 % (03/24/24 1124) Vital Signs (24h Range):  Temp:  [98.2 °F (36.8 °C)-99.4 °F (37.4 °C)] 98.2 °F (36.8 °C)  Pulse:  [] 78  Resp:  [16-24] 18  SpO2:  [91 %-99 %] 95 %  BP: (107-161)/(59-92) 128/66     Weight: 85.9 kg (189 lb 6 oz)  Body mass index is 29.66 kg/m².    Intake/Output Summary (Last 24 hours) at 3/24/2024 1233  Last data filed at 3/24/2024 1159  Gross per 24 hour   Intake 1857.7 ml   Output 2325 ml   Net -467.3 ml           Physical Exam  Vitals and nursing note reviewed.   Constitutional:       General: He is not in acute distress.     Appearance: He is well-developed. He is obese.   HENT:      Head: Normocephalic and atraumatic.   Eyes:      Conjunctiva/sclera: Conjunctivae normal.      Pupils: Pupils are equal, round, and reactive to light.   Neck:      Thyroid: No thyromegaly.   Cardiovascular:      Rate and Rhythm: Normal rate and regular rhythm.      Heart sounds: Normal heart sounds.   Pulmonary:      Effort: Pulmonary effort is normal. No respiratory distress.      Breath  "sounds: Normal breath sounds. No wheezing.   Abdominal:      General: Bowel sounds are normal.      Palpations: Abdomen is soft.      Tenderness: There is no abdominal tenderness.      Comments: Edema through abdominal wall   Musculoskeletal:         General: Swelling (equal bilateral upper extrem.) present. Normal range of motion.      Cervical back: Normal range of motion and neck supple.      Right lower leg: Edema present.      Left lower leg: Edema present.   Lymphadenopathy:      Cervical: No cervical adenopathy.   Skin:     General: Skin is warm and dry.      Findings: No rash.   Neurological:      Mental Status: He is alert and oriented to person, place, and time.   Psychiatric:         Behavior: Behavior normal.             Significant Labs: All pertinent labs within the past 24 hours have been reviewed.  A1C:   Recent Labs   Lab 03/21/24  1911 03/22/24  0426   HGBA1C >14.0* >14.0*       CBC:   Recent Labs   Lab 03/23/24  0424 03/24/24  0448   WBC 20.53* 19.08*   HGB 12.1* 12.4*   HCT 35.6* 36.6*    364       CMP:   Recent Labs   Lab 03/23/24  0424 03/24/24  0448    136   K 2.8* 3.8    103   CO2 28 25   GLU 94 345*   BUN 32* 35*   CREATININE 1.5* 1.7*   CALCIUM 7.1* 7.3*   ANIONGAP 8 8       Cardiac Markers: No results for input(s): "CKMB", "MYOGLOBIN", "BNP", "TROPISTAT" in the last 48 hours.  Coagulation:   Recent Labs   Lab 03/23/24  1155   INR 0.9       POCT Glucose:   Recent Labs   Lab 03/24/24  0700 03/24/24  0704 03/24/24  1106   POCTGLUCOSE >500* 464* 386*       Prealbumin:   Recent Labs   Lab 03/23/24  0857   PREALBUMIN 14*     Troponin:   No results for input(s): "TROPONINI", "TROPONINIHS" in the last 48 hours.      Significant Imaging: I have reviewed all pertinent imaging results/findings within the past 24 hours.    Liver: 15.5 cm, normal in size. Homogeneous parenchymal echotexture. No focal lesions.     Gallbladder: No calculi, wall thickening, or pericholecystic fluid.  " Negative sonographic Ramos's sign.     Biliary system: 3.2 mm common bile duct.  No intrahepatic ductal dilatation.     Inferior vena cava: Normal in appearance.     Right kidney: 10 cm. No hydronephrosis.     Left kidney: 12.7 cm. No hydronephrosis.     Spleen: 8.6 cm.  Normal in size with homogeneous echotexture.     Miscellaneous: No ascites.

## 2024-03-24 NOTE — PLAN OF CARE
Problem: Adult Inpatient Plan of Care  Goal: Plan of Care Review  Outcome: Ongoing, Progressing     Problem: Diabetes Comorbidity  Goal: Blood Glucose Level Within Targeted Range  Outcome: Ongoing, Progressing     Problem: Renal Function Impairment (Acute Kidney Injury/Impairment)  Goal: Effective Renal Function  Outcome: Ongoing, Progressing     Problem: Infection (Pneumonia)  Goal: Resolution of Infection Signs and Symptoms  Outcome: Ongoing, Progressing     IV rocephin continued  Chest x-ray completed  Dietician consulted  Nicotine patch refused  Pt safe and free from falls  Pt updated on plan of care

## 2024-03-24 NOTE — ASSESSMENT & PLAN NOTE
Nutrition consulted. Most recent weight and BMI monitored-     Measurements:  Wt Readings from Last 1 Encounters:   03/22/24 85.9 kg (189 lb 6 oz)   Body mass index is 29.66 kg/m².      1. Continue diabetic diet. 2. RD to add low sodium diet modifications to diet order. 3. RD to reduce frequency of Tyler from TID to BID. 4. Continue frequent weight assessments. 5. RD to follow and perform NFPE, and assess nutrition education needs at in-person follow up.

## 2024-03-24 NOTE — ASSESSMENT & PLAN NOTE
Chronic, controlled. Latest blood pressure and vitals reviewed-     Temp:  [98.2 °F (36.8 °C)-99.4 °F (37.4 °C)]   Pulse:  []   Resp:  [16-24]   BP: (107-161)/(59-92)   SpO2:  [91 %-99 %] .   Home meds for hypertension were reviewed and noted below.   Hypertension Medications               lisinopriL (PRINIVIL,ZESTRIL) 5 MG tablet Take 5 mg by mouth once daily.    propranoloL (INDERAL) 20 MG tablet Take 20 mg by mouth 2 (two) times daily.            While in the hospital, will manage blood pressure as follows; Continue home antihypertensive regimen    Will utilize p.r.n. blood pressure medication only if patient's blood pressure greater than 180/110 and he develops symptoms such as worsening chest pain or shortness of breath.    Propranolol.  Give lasix and spironolactone this am.    3/24  Tolerated am dosing of diuretic but in the evening his bp was elevated. He really doesn't seem INTRAVASCULARLY fluid overloaded, but has significant peripheral edema. Hold on lisinopril, cont on lasix, propranolol and spironolactone in attempt to preserve renal function - though noting increasing creatine and cont on jd.

## 2024-03-24 NOTE — ASSESSMENT & PLAN NOTE
A1C >14   ISS  Detemir 25u BID  Aspart 15u with meals    Decrease levemir this am - blood sugar 94.    3/24  NPH this morning. Cont to adjust SSI. Tonight will give 35 of levemir.  Discussed importance of improved glycemic control as it relates to renal health

## 2024-03-24 NOTE — PROGRESS NOTES
[facility] ([facility])  [facility]  Progress Note    Patient Name: Onofre Ceballos  MRN: [id number]  Patient Class: IP- Inpatient   Admission Date: 3/21/2024  Length of Stay: 3 days  Attending Physician: Heather Estevez MD  Primary Care Provider: Cinthya, Primary Doctor        Subjective:     Principal Problem:Hyperosmolar hyperglycemic state (HHS)        HPI:  53yM with DM2, HTN, HLD, polysubstance abuse presented to ER with elevated glucose after being sent from his clinic. Pt states he was in his usual state of health aside from generalized swelling for the last couple months. He also reports he was being treated for bronchitis with antibiotics and cough medication. He has no SOB or chest pain. No fever/chills. He reports he has not been compliant with DM meds including insulin. He reports last meth use was about 2 weeks ago.    Overview/Hospital Course:  Pt HD stable on room air.  Glucose levels improved and off insulin gtt.  PNA on CT Chest.  Continues to have leukocytosis on IV abx.  Will consider switching abx if doesn't improve in the next 24 hours.  Duo nebulizer and abx ordered.      3/23  Patient with significant anasarca. He is complaining of 'hushed' sounding voice and dyspnea with exertion as well as a dry cough. He has had no fever. He also denies sputum production. He has h/o hep c - dx'd over 10 years ago. Swelling significantly worse over last 48 hours. Also notes 'black' stool this morning.    3/24  Patient concerned about his upper and lower extremity swelling. He has had improved breathing and cough/shortness of breath. No fevers since admission. Blood sugars very labile. No bloody/black stools noted. Significant output yesterday though still remains positive overall.    Interval History: increased edema. Afebrile    Review of Systems   Constitutional:  Negative for chills and fever.   HENT:  Positive for voice change. Negative for congestion, ear pain, postnasal drip, rhinorrhea,  sore throat and trouble swallowing.    Eyes:  Negative for redness and itching.   Respiratory:  Positive for cough. Negative for shortness of breath and wheezing.    Cardiovascular:  Positive for leg swelling. Negative for chest pain and palpitations.   Gastrointestinal:  Negative for abdominal pain, diarrhea, nausea and vomiting.        Black stool   Genitourinary:  Positive for scrotal swelling. Negative for decreased urine volume, dysuria and frequency.   Skin:  Negative for rash.   Neurological:  Negative for weakness and headaches.     Objective:     Vital Signs (Most Recent):  Temp: 98.2 °F (36.8 °C) (03/24/24 1108)  Pulse: 78 (03/24/24 1200)  Resp: 18 (03/24/24 1124)  BP: 128/66 (03/24/24 1108)  SpO2: 95 % (03/24/24 1124) Vital Signs (24h Range):  Temp:  [98.2 °F (36.8 °C)-99.4 °F (37.4 °C)] 98.2 °F (36.8 °C)  Pulse:  [] 78  Resp:  [16-24] 18  SpO2:  [91 %-99 %] 95 %  BP: (107-161)/(59-92) 128/66     Weight: 85.9 kg (189 lb 6 oz)  Body mass index is 29.66 kg/m².    Intake/Output Summary (Last 24 hours) at 3/24/2024 1233  Last data filed at 3/24/2024 1159  Gross per 24 hour   Intake 1857.7 ml   Output 2325 ml   Net -467.3 ml           Physical Exam  Vitals and nursing note reviewed.   Constitutional:       General: He is not in acute distress.     Appearance: He is well-developed. He is obese.   HENT:      Head: Normocephalic and atraumatic.   Eyes:      Conjunctiva/sclera: Conjunctivae normal.      Pupils: Pupils are equal, round, and reactive to light.   Neck:      Thyroid: No thyromegaly.   Cardiovascular:      Rate and Rhythm: Normal rate and regular rhythm.      Heart sounds: Normal heart sounds.   Pulmonary:      Effort: Pulmonary effort is normal. No respiratory distress.      Breath sounds: Normal breath sounds. No wheezing.   Abdominal:      General: Bowel sounds are normal.      Palpations: Abdomen is soft.      Tenderness: There is no abdominal tenderness.      Comments: Edema through  "abdominal wall   Musculoskeletal:         General: Swelling (equal bilateral upper extrem.) present. Normal range of motion.      Cervical back: Normal range of motion and neck supple.      Right lower leg: Edema present.      Left lower leg: Edema present.   Lymphadenopathy:      Cervical: No cervical adenopathy.   Skin:     General: Skin is warm and dry.      Findings: No rash.   Neurological:      Mental Status: He is alert and oriented to person, place, and time.   Psychiatric:         Behavior: Behavior normal.             Significant Labs: All pertinent labs within the past 24 hours have been reviewed.  A1C:   Recent Labs   Lab 03/21/24  1911 03/22/24  0426   HGBA1C >14.0* >14.0*       CBC:   Recent Labs   Lab 03/23/24  0424 03/24/24  0448   WBC 20.53* 19.08*   HGB 12.1* 12.4*   HCT 35.6* 36.6*    364       CMP:   Recent Labs   Lab 03/23/24  0424 03/24/24  0448    136   K 2.8* 3.8    103   CO2 28 25   GLU 94 345*   BUN 32* 35*   CREATININE 1.5* 1.7*   CALCIUM 7.1* 7.3*   ANIONGAP 8 8       Cardiac Markers: No results for input(s): "CKMB", "MYOGLOBIN", "BNP", "TROPISTAT" in the last 48 hours.  Coagulation:   Recent Labs   Lab 03/23/24  1155   INR 0.9       POCT Glucose:   Recent Labs   Lab 03/24/24  0700 03/24/24  0704 03/24/24  1106   POCTGLUCOSE >500* 464* 386*       Prealbumin:   Recent Labs   Lab 03/23/24  0857   PREALBUMIN 14*     Troponin:   No results for input(s): "TROPONINI", "TROPONINIHS" in the last 48 hours.      Significant Imaging: I have reviewed all pertinent imaging results/findings within the past 24 hours.    Liver: 15.5 cm, normal in size. Homogeneous parenchymal echotexture. No focal lesions.     Gallbladder: No calculi, wall thickening, or pericholecystic fluid.  Negative sonographic Ramos's sign.     Biliary system: 3.2 mm common bile duct.  No intrahepatic ductal dilatation.     Inferior vena cava: Normal in appearance.     Right kidney: 10 cm. No hydronephrosis.   "   Left kidney: 12.7 cm. No hydronephrosis.     Spleen: 8.6 cm.  Normal in size with homogeneous echotexture.     Miscellaneous: No ascites.    Assessment/Plan:      * Hyperosmolar hyperglycemic state (HHS)  Glucose improved  Mental status improved  Stop insulin gtt; start subQ  Start diabetic diet  Stop IVF as pt with anasarca likely 2/2 low albumin    Glucose in the 200's.  Hg A1C >14 noncompliant.  Diabetic education referral at discharge     Resolved.      Low serum albumin  Likely cause of anasarca    ?renal - recheck urine. +++ proteinuria. Patient with known and admitted meth use.  ?hepatic - meld 10. Work up in progress.  ?poor nutrition - add tyler.     May consider albumin infusion to help with diuresis. Though BP will allow for diuretic currently.    Severe protein-calorie malnutrition  Nutrition consulted. Most recent weight and BMI monitored-     Measurements:  Wt Readings from Last 1 Encounters:   03/22/24 85.9 kg (189 lb 6 oz)   Body mass index is 29.66 kg/m².      1. Continue diabetic diet. 2. RD to add low sodium diet modifications to diet order. 3. RD to reduce frequency of Tyler from TID to BID. 4. Continue frequent weight assessments. 5. RD to follow and perform NFPE, and assess nutrition education needs at in-person follow up.        Pneumonia of left lower lobe due to infectious organism  Seen on CT chest  Cont rocephin/azithro  Duo nebulizer/mucinex   Persistent leukocytosis  Blood cultures pending.    Afebrile.    3/24  No change to WBC ct. Repeat cxr. Procal normal. Afebrile.       YAMIL (acute kidney injury)  Patient with acute kidney injury/acute renal failure likely due to pre-renal azotemia due to IVVD YAMIL is currently improving. Baseline creatinine  1.2-1.3  - Labs reviewed- Renal function/electrolytes with Estimated Creatinine Clearance: 52 mL/min (A) (based on SCr of 1.7 mg/dL (H)). according to latest data. Monitor urine output and serial BMP and adjust therapy as needed. Avoid  "nephrotoxins and renally dose meds for GFR listed above.    Close to baseline     Patient with intravascular fluid depletion but significant anasarca. Will attempt diuresis and monitor bmp.    3/24  Urine with proteinuria. Significant anasarca secondary to hypoalbuminemia likely because of this. Would like renal consult. May be a candidate for albumin infusion if pressure doesn't allow for loop dosing. Minimize iv overload and monitor accurate I/os.    Hypokalemia  Patient has hypokalemia which is Acute and currently uncontrolled. Most recent potassium levels reviewed-   Lab Results   Component Value Date    K 3.8 03/24/2024   . Will continue potassium replacement per protocol and recheck repeat levels after replacement completed.     Resolved    Replace with 40 po this morning. Give add'l this afternoon. Add in spironolactone.    3/24  Cnt on spironolactone. Check bmp in am.    Hypocalcemia  Patient has hypocalcemia due to Critical Illness which is currently controlled, and has been confirmed with ionized and/or corrected calcium. Will replace calcium and monitor electrolytes closely. The latest calcium labs have been reviewed and are listed below.  Recent Labs   Lab 03/23/24  0424   CALCIUM 7.1*         No results for input(s): "CAION" in the last 24 hours.          Pure hypercholesterolemia  Cont home statin      Type 2 diabetes mellitus with microalbuminuria, with long-term current use of insulin  A1C >14   ISS  Detemir 25u BID  Aspart 15u with meals    Decrease levemir this am - blood sugar 94.    3/24  NPH this morning. Cont to adjust SSI. Tonight will give 35 of levemir.  Discussed importance of improved glycemic control as it relates to renal health    Benign essential HTN  Chronic, controlled. Latest blood pressure and vitals reviewed-     Temp:  [98.2 °F (36.8 °C)-99.4 °F (37.4 °C)]   Pulse:  []   Resp:  [16-24]   BP: (107-161)/(59-92)   SpO2:  [91 %-99 %] .   Home meds for hypertension were reviewed " and noted below.   Hypertension Medications               lisinopriL (PRINIVIL,ZESTRIL) 5 MG tablet Take 5 mg by mouth once daily.    propranoloL (INDERAL) 20 MG tablet Take 20 mg by mouth 2 (two) times daily.            While in the hospital, will manage blood pressure as follows; Continue home antihypertensive regimen    Will utilize p.r.n. blood pressure medication only if patient's blood pressure greater than 180/110 and he develops symptoms such as worsening chest pain or shortness of breath.    Propranolol.  Give lasix and spironolactone this am.    3/24  Tolerated am dosing of diuretic but in the evening his bp was elevated. He really doesn't seem INTRAVASCULARLY fluid overloaded, but has significant peripheral edema. Hold on lisinopril, cont on lasix, propranolol and spironolactone in attempt to preserve renal function - though noting increasing creatine and cont on jd.    Hep C w/o coma, chronic  Patient has chronic liver disease due to hepatitis c. They have a history of ascites/volume overload. Their liver disease is compensated. We will obtain  CBC, CMP, and INR. Their most current Na-MELD is listed below.  MELD 3.0: 14 at 3/24/2024  4:48 AM  MELD-Na: 13 at 3/24/2024  4:48 AM  Calculated from:  Serum Creatinine: 1.7 mg/dL at 3/24/2024  4:48 AM  Serum Sodium: 136 mmol/L at 3/24/2024  4:48 AM  Total Bilirubin: 0.3 mg/dL (Using min of 1 mg/dL) at 3/22/2024  4:26 AM  Serum Albumin: 1.1 g/dL (Using min of 1.5 g/dL) at 3/22/2024  4:26 AM  INR(ratio): 0.9 (Using min of 1) at 3/23/2024 11:55 AM  Age at listing (hypothetical): 53 years  Sex: Male at 3/24/2024  4:48 AM    Will check PCR. Patient asking about treatment.  Plt count okay. Albumin significantly decreased. Anasarca noted. Will add in spironolactone and lasix for now. Check AFP and get u/s this morning.    3/24  No ascites noted, liver without masses/cirrhotic appearance. Not likely the cause of his fluid overload.    Tobacco abuse  Cessation  counseling  Nicotine patch      Noncompliance with medication regimen  Stressed compliance  He hasn't been taking any of his meds.      VTE Risk Mitigation (From admission, onward)           Ordered     IP VTE LOW RISK PATIENT  Once         03/21/24 1433                    Discharge Planning   LYNDA:      Code Status: Full Code   Is the patient medically ready for discharge?:     Reason for patient still in hospital (select all that apply): Imaging  Discharge Plan A: Home          Would like echo tomorrow and neprhology e consult. May be able to d/c tomorrow afternoon.        Heather Estevez MD  Department of Hospital Medicine   [place] [facility] (3rd Fl)

## 2024-03-24 NOTE — ASSESSMENT & PLAN NOTE
Patient has hypokalemia which is Acute and currently uncontrolled. Most recent potassium levels reviewed-   Lab Results   Component Value Date    K 3.8 03/24/2024   . Will continue potassium replacement per protocol and recheck repeat levels after replacement completed.     Resolved    Replace with 40 po this morning. Give add'l this afternoon. Add in spironolactone.    3/24  Cnt on spironolactone. Check bmp in am.

## 2024-03-25 LAB
ANION GAP SERPL CALC-SCNC: 7 MMOL/L (ref 8–16)
BASOPHILS # BLD AUTO: 0.11 K/UL (ref 0–0.2)
BASOPHILS NFR BLD: 0.7 % (ref 0–1.9)
BUN SERPL-MCNC: 37 MG/DL (ref 6–20)
CALCIUM SERPL-MCNC: 7.6 MG/DL (ref 8.7–10.5)
CHLORIDE SERPL-SCNC: 104 MMOL/L (ref 95–110)
CO2 SERPL-SCNC: 28 MMOL/L (ref 23–29)
CREAT SERPL-MCNC: 1.7 MG/DL (ref 0.5–1.4)
DIFFERENTIAL METHOD BLD: ABNORMAL
EOSINOPHIL # BLD AUTO: 0.7 K/UL (ref 0–0.5)
EOSINOPHIL NFR BLD: 4.3 % (ref 0–8)
ERYTHROCYTE [DISTWIDTH] IN BLOOD BY AUTOMATED COUNT: 13 % (ref 11.5–14.5)
EST. GFR  (NO RACE VARIABLE): 47 ML/MIN/1.73 M^2
GLUCOSE SERPL-MCNC: 150 MG/DL (ref 70–110)
HCT VFR BLD AUTO: 36.7 % (ref 40–54)
HCV RNA SERPL NAA+PROBE-LOG IU: 5.54 LOGIU/ML
HCV RNA SERPL QL NAA+PROBE: DETECTED
HCV RNA SPEC NAA+PROBE-ACNC: ABNORMAL IU/ML
HGB BLD-MCNC: 12.3 G/DL (ref 14–18)
IMM GRANULOCYTES # BLD AUTO: 0.43 K/UL (ref 0–0.04)
IMM GRANULOCYTES NFR BLD AUTO: 2.8 % (ref 0–0.5)
LYMPHOCYTES # BLD AUTO: 3.4 K/UL (ref 1–4.8)
LYMPHOCYTES NFR BLD: 21.9 % (ref 18–48)
MCH RBC QN AUTO: 32.6 PG (ref 27–31)
MCHC RBC AUTO-ENTMCNC: 33.5 G/DL (ref 32–36)
MCV RBC AUTO: 97 FL (ref 82–98)
MONOCYTES # BLD AUTO: 0.9 K/UL (ref 0.3–1)
MONOCYTES NFR BLD: 5.8 % (ref 4–15)
NEUTROPHILS # BLD AUTO: 10.1 K/UL (ref 1.8–7.7)
NEUTROPHILS NFR BLD: 64.5 % (ref 38–73)
NRBC BLD-RTO: 0 /100 WBC
PLATELET # BLD AUTO: 377 K/UL (ref 150–450)
PMV BLD AUTO: 11 FL (ref 9.2–12.9)
POCT GLUCOSE: 110 MG/DL (ref 70–110)
POCT GLUCOSE: 114 MG/DL (ref 70–110)
POCT GLUCOSE: 177 MG/DL (ref 70–110)
POCT GLUCOSE: 218 MG/DL (ref 70–110)
POCT GLUCOSE: 254 MG/DL (ref 70–110)
POCT GLUCOSE: 317 MG/DL (ref 70–110)
POTASSIUM SERPL-SCNC: 3.7 MMOL/L (ref 3.5–5.1)
RBC # BLD AUTO: 3.77 M/UL (ref 4.6–6.2)
SODIUM SERPL-SCNC: 139 MMOL/L (ref 136–145)
WBC # BLD AUTO: 15.63 K/UL (ref 3.9–12.7)

## 2024-03-25 PROCEDURE — 25000003 PHARM REV CODE 250: Performed by: PHYSICIAN ASSISTANT

## 2024-03-25 PROCEDURE — 80048 BASIC METABOLIC PNL TOTAL CA: CPT | Performed by: FAMILY MEDICINE

## 2024-03-25 PROCEDURE — 99233 SBSQ HOSP IP/OBS HIGH 50: CPT | Mod: ,,, | Performed by: PHYSICIAN ASSISTANT

## 2024-03-25 PROCEDURE — 63600175 PHARM REV CODE 636 W HCPCS: Performed by: FAMILY MEDICINE

## 2024-03-25 PROCEDURE — 99900035 HC TECH TIME PER 15 MIN (STAT)

## 2024-03-25 PROCEDURE — 25000003 PHARM REV CODE 250: Performed by: FAMILY MEDICINE

## 2024-03-25 PROCEDURE — 36415 COLL VENOUS BLD VENIPUNCTURE: CPT | Performed by: FAMILY MEDICINE

## 2024-03-25 PROCEDURE — 25000003 PHARM REV CODE 250: Performed by: STUDENT IN AN ORGANIZED HEALTH CARE EDUCATION/TRAINING PROGRAM

## 2024-03-25 PROCEDURE — 94761 N-INVAS EAR/PLS OXIMETRY MLT: CPT

## 2024-03-25 PROCEDURE — 94640 AIRWAY INHALATION TREATMENT: CPT

## 2024-03-25 PROCEDURE — 25000242 PHARM REV CODE 250 ALT 637 W/ HCPCS: Performed by: FAMILY MEDICINE

## 2024-03-25 PROCEDURE — 94799 UNLISTED PULMONARY SVC/PX: CPT | Mod: XB

## 2024-03-25 PROCEDURE — 63600175 PHARM REV CODE 636 W HCPCS: Performed by: STUDENT IN AN ORGANIZED HEALTH CARE EDUCATION/TRAINING PROGRAM

## 2024-03-25 PROCEDURE — 11000001 HC ACUTE MED/SURG PRIVATE ROOM

## 2024-03-25 PROCEDURE — 85025 COMPLETE CBC W/AUTO DIFF WBC: CPT | Performed by: FAMILY MEDICINE

## 2024-03-25 PROCEDURE — 99900031 HC PATIENT EDUCATION (STAT)

## 2024-03-25 RX ORDER — INSULIN GLARGINE 100 [IU]/ML
35 INJECTION, SOLUTION SUBCUTANEOUS NIGHTLY
Refills: 0
Start: 2024-03-25

## 2024-03-25 RX ORDER — FUROSEMIDE 20 MG/1
40 TABLET ORAL DAILY
Qty: 30 TABLET | Refills: 0 | Status: ON HOLD | OUTPATIENT
Start: 2024-03-25 | End: 2024-04-03

## 2024-03-25 RX ORDER — AMOXICILLIN AND CLAVULANATE POTASSIUM 875; 125 MG/1; MG/1
1 TABLET, FILM COATED ORAL EVERY 12 HOURS
Qty: 14 TABLET | Refills: 0 | Status: ON HOLD | OUTPATIENT
Start: 2024-03-25 | End: 2024-04-03 | Stop reason: HOSPADM

## 2024-03-25 RX ORDER — PANTOPRAZOLE SODIUM 40 MG/1
40 TABLET, DELAYED RELEASE ORAL DAILY
Qty: 30 TABLET | Refills: 0 | Status: SHIPPED | OUTPATIENT
Start: 2024-03-26 | End: 2025-03-26

## 2024-03-25 RX ORDER — SPIRONOLACTONE 25 MG/1
25 TABLET ORAL DAILY
Qty: 30 TABLET | Refills: 0 | Status: ON HOLD | OUTPATIENT
Start: 2024-03-25 | End: 2024-04-03 | Stop reason: HOSPADM

## 2024-03-25 RX ORDER — INSULIN ASPART 100 [IU]/ML
20 INJECTION, SOLUTION INTRAVENOUS; SUBCUTANEOUS
Refills: 0
Start: 2024-03-25

## 2024-03-25 RX ORDER — LACTOBACILLUS RHAMNOSUS GG 10B CELL
1 CAPSULE ORAL DAILY
Qty: 30 CAPSULE | Refills: 0 | Status: SHIPPED | OUTPATIENT
Start: 2024-03-25 | End: 2024-04-24

## 2024-03-25 RX ADMIN — IPRATROPIUM BROMIDE AND ALBUTEROL SULFATE 3 ML: 2.5; .5 SOLUTION RESPIRATORY (INHALATION) at 01:03

## 2024-03-25 RX ADMIN — PANTOPRAZOLE SODIUM 40 MG: 40 TABLET, DELAYED RELEASE ORAL at 08:03

## 2024-03-25 RX ADMIN — INSULIN ASPART 4 UNITS: 100 INJECTION, SOLUTION INTRAVENOUS; SUBCUTANEOUS at 05:03

## 2024-03-25 RX ADMIN — INSULIN ASPART 20 UNITS: 100 INJECTION, SOLUTION INTRAVENOUS; SUBCUTANEOUS at 08:03

## 2024-03-25 RX ADMIN — PROPRANOLOL HYDROCHLORIDE 20 MG: 20 TABLET ORAL at 09:03

## 2024-03-25 RX ADMIN — INSULIN ASPART 20 UNITS: 100 INJECTION, SOLUTION INTRAVENOUS; SUBCUTANEOUS at 12:03

## 2024-03-25 RX ADMIN — CEFTRIAXONE SODIUM 1 G: 1 INJECTION, POWDER, FOR SOLUTION INTRAMUSCULAR; INTRAVENOUS at 12:03

## 2024-03-25 RX ADMIN — ASPIRIN 81 MG: 81 TABLET, COATED ORAL at 08:03

## 2024-03-25 RX ADMIN — MUPIROCIN: 20 OINTMENT TOPICAL at 09:03

## 2024-03-25 RX ADMIN — PROPRANOLOL HYDROCHLORIDE 20 MG: 20 TABLET ORAL at 11:03

## 2024-03-25 RX ADMIN — MUPIROCIN: 20 OINTMENT TOPICAL at 11:03

## 2024-03-25 RX ADMIN — INSULIN ASPART 8 UNITS: 100 INJECTION, SOLUTION INTRAVENOUS; SUBCUTANEOUS at 12:03

## 2024-03-25 RX ADMIN — ATORVASTATIN CALCIUM 80 MG: 20 TABLET, FILM COATED ORAL at 09:03

## 2024-03-25 RX ADMIN — IPRATROPIUM BROMIDE AND ALBUTEROL SULFATE 3 ML: 2.5; .5 SOLUTION RESPIRATORY (INHALATION) at 07:03

## 2024-03-25 RX ADMIN — GUAIFENESIN 600 MG: 600 TABLET, EXTENDED RELEASE ORAL at 08:03

## 2024-03-25 RX ADMIN — INSULIN ASPART 20 UNITS: 100 INJECTION, SOLUTION INTRAVENOUS; SUBCUTANEOUS at 05:03

## 2024-03-25 RX ADMIN — GUAIFENESIN 600 MG: 600 TABLET, EXTENDED RELEASE ORAL at 09:03

## 2024-03-25 RX ADMIN — FUROSEMIDE 20 MG: 10 INJECTION, SOLUTION INTRAMUSCULAR; INTRAVENOUS at 11:03

## 2024-03-25 RX ADMIN — FUROSEMIDE 20 MG: 10 INJECTION, SOLUTION INTRAMUSCULAR; INTRAVENOUS at 09:03

## 2024-03-25 RX ADMIN — SPIRONOLACTONE 25 MG: 25 TABLET ORAL at 11:03

## 2024-03-25 RX ADMIN — INSULIN ASPART 2 UNITS: 100 INJECTION, SOLUTION INTRAVENOUS; SUBCUTANEOUS at 08:03

## 2024-03-25 RX ADMIN — INSULIN ASPART 3 UNITS: 100 INJECTION, SOLUTION INTRAVENOUS; SUBCUTANEOUS at 10:03

## 2024-03-25 NOTE — PLAN OF CARE
Patient provided with Case Management contact information and encouraged to call with concerns or questions.  Will continue to follow for duration of stay.

## 2024-03-25 NOTE — ASSESSMENT & PLAN NOTE
Seen on CT chest  Cont rocephin/azithro  Duo nebulizer/mucinex   Persistent leukocytosis  Blood cultures pending.    Afebrile.    3/24  No change to WBC ct. Repeat cxr. Procal normal. Afebrile.     Augmentin at discharge for 7 more days

## 2024-03-25 NOTE — PROGRESS NOTES
[facility] ([facility])  [facility] Medicine  Progress Note    Patient Name: Onofre Ceballos  MRN: [id number]  Patient Class: IP- Inpatient   Admission Date: 3/21/2024  Length of Stay: 4 days  Attending Physician: Heather Estevez MD  Primary Care Provider: No primary care provider on file.        Subjective:     Principal Problem:Hyperosmolar hyperglycemic state (HHS)        HPI:  53yM with DM2, HTN, HLD, polysubstance abuse presented to ER with elevated glucose after being sent from his clinic. Pt states he was in his usual state of health aside from generalized swelling for the last couple months. He also reports he was being treated for bronchitis with antibiotics and cough medication. He has no SOB or chest pain. No fever/chills. He reports he has not been compliant with DM meds including insulin. He reports last meth use was about 2 weeks ago.    Overview/Hospital Course:  Pt HD stable on room air.  Glucose levels improved and off insulin gtt.  PNA on CT Chest.  Continues to have leukocytosis on IV abx.  Will consider switching abx if doesn't improve in the next 24 hours.  Duo nebulizer and abx ordered.      3/23  Patient with significant anasarca. He is complaining of 'hushed' sounding voice and dyspnea with exertion as well as a dry cough. He has had no fever. He also denies sputum production. He has h/o hep c - dx'd over 10 years ago. Swelling significantly worse over last 48 hours. Also notes 'black' stool this morning.    3/24  Patient concerned about his upper and lower extremity swelling. He has had improved breathing and cough/shortness of breath. No fevers since admission. Blood sugars very labile. No bloody/black stools noted. Significant output yesterday though still remains positive overall.    3/25 PT HD stable on room air.  Waiting for echo and possibly discharge this afternoon or tomorrow pending echo results.      Interval History: increased edema. Afebrile    Review of Systems    Constitutional:  Negative for chills and fever.   HENT:  Positive for voice change. Negative for congestion, ear pain, postnasal drip, rhinorrhea, sore throat and trouble swallowing.    Eyes:  Negative for redness and itching.   Respiratory:  Positive for cough. Negative for shortness of breath and wheezing.    Cardiovascular:  Positive for leg swelling. Negative for chest pain and palpitations.   Gastrointestinal:  Negative for abdominal pain, diarrhea, nausea and vomiting.        Black stool   Genitourinary:  Positive for scrotal swelling. Negative for decreased urine volume, dysuria and frequency.   Skin:  Negative for rash.   Neurological:  Negative for weakness and headaches.     Objective:     Vital Signs (Most Recent):  Temp: 97.8 °F (36.6 °C) (03/25/24 1100)  Pulse: 84 (03/25/24 1303)  Resp: 16 (03/25/24 1303)  BP: (!) 147/82 (03/25/24 1100)  SpO2: 97 % (03/25/24 1303) Vital Signs (24h Range):  Temp:  [97.7 °F (36.5 °C)-98.6 °F (37 °C)] 97.8 °F (36.6 °C)  Pulse:  [] 84  Resp:  [16-20] 16  SpO2:  [93 %-100 %] 97 %  BP: (123-147)/(62-82) 147/82     Weight: 85.9 kg (189 lb 6 oz)  Body mass index is 29.66 kg/m².    Intake/Output Summary (Last 24 hours) at 3/25/2024 1346  Last data filed at 3/25/2024 0633  Gross per 24 hour   Intake 225 ml   Output 1575 ml   Net -1350 ml           Physical Exam  Vitals and nursing note reviewed.   Constitutional:       General: He is not in acute distress.     Appearance: He is well-developed. He is obese.   HENT:      Head: Normocephalic and atraumatic.   Eyes:      Conjunctiva/sclera: Conjunctivae normal.      Pupils: Pupils are equal, round, and reactive to light.   Neck:      Thyroid: No thyromegaly.   Cardiovascular:      Rate and Rhythm: Normal rate and regular rhythm.      Heart sounds: Normal heart sounds.   Pulmonary:      Effort: Pulmonary effort is normal. No respiratory distress.      Breath sounds: Normal breath sounds. No wheezing.   Abdominal:      General:  "Bowel sounds are normal.      Palpations: Abdomen is soft.      Tenderness: There is no abdominal tenderness.      Comments: Edema through abdominal wall   Musculoskeletal:         General: Swelling (equal bilateral upper extrem.) present. Normal range of motion.      Cervical back: Normal range of motion and neck supple.      Right lower leg: Edema present.      Left lower leg: Edema present.   Lymphadenopathy:      Cervical: No cervical adenopathy.   Skin:     General: Skin is warm and dry.      Findings: No rash.   Neurological:      Mental Status: He is alert and oriented to person, place, and time.   Psychiatric:         Behavior: Behavior normal.             Significant Labs: All pertinent labs within the past 24 hours have been reviewed.  A1C:   Recent Labs   Lab 03/21/24  1911 03/22/24  0426   HGBA1C >14.0* >14.0*       CBC:   Recent Labs   Lab 03/24/24 0448 03/25/24  0622   WBC 19.08* 15.63*   HGB 12.4* 12.3*   HCT 36.6* 36.7*    377       CMP:   Recent Labs   Lab 03/24/24 0448 03/25/24  0622    139   K 3.8 3.7    104   CO2 25 28   * 150*   BUN 35* 37*   CREATININE 1.7* 1.7*   CALCIUM 7.3* 7.6*   ANIONGAP 8 7*       Cardiac Markers: No results for input(s): "CKMB", "MYOGLOBIN", "BNP", "TROPISTAT" in the last 48 hours.  Coagulation:   No results for input(s): "PT", "INR", "APTT" in the last 48 hours.    POCT Glucose:   Recent Labs   Lab 03/25/24  0049 03/25/24  0722 03/25/24  1109   POCTGLUCOSE 110 177* 317*       Prealbumin: No results for input(s): "PREALBUMIN" in the last 48 hours.    Troponin:   No results for input(s): "TROPONINI", "TROPONINIHS" in the last 48 hours.      Significant Imaging: I have reviewed all pertinent imaging results/findings within the past 24 hours.    Liver: 15.5 cm, normal in size. Homogeneous parenchymal echotexture. No focal lesions.     Gallbladder: No calculi, wall thickening, or pericholecystic fluid.  Negative sonographic Ramos's sign.   "   Biliary system: 3.2 mm common bile duct.  No intrahepatic ductal dilatation.     Inferior vena cava: Normal in appearance.     Right kidney: 10 cm. No hydronephrosis.     Left kidney: 12.7 cm. No hydronephrosis.     Spleen: 8.6 cm.  Normal in size with homogeneous echotexture.     Miscellaneous: No ascites.    Assessment/Plan:      * Hyperosmolar hyperglycemic state (HHS)  Glucose improved  Mental status improved  Stop insulin gtt; start subQ  Start diabetic diet  Stop IVF as pt with anasarca likely 2/2 low albumin    Glucose in the 200's.  Hg A1C >14 noncompliant.  Diabetic education referral at discharge     Resolved.Increased home insulin to 35 units long acting and 20 units short acting       Low serum albumin  Likely cause of anasarca    ?renal - recheck urine. +++ proteinuria. Patient with known and admitted meth use.  ?hepatic - meld 10. Work up in progress.  ?poor nutrition - add tyler.     May consider albumin infusion to help with diuresis. Though BP will allow for diuretic currently.    Severe protein-calorie malnutrition  Nutrition consulted. Most recent weight and BMI monitored-     Measurements:  Wt Readings from Last 1 Encounters:   03/22/24 85.9 kg (189 lb 6 oz)   Body mass index is 29.66 kg/m².      1. Continue diabetic diet. 2. RD to add low sodium diet modifications to diet order. 3. RD to reduce frequency of Tyler from TID to BID. 4. Continue frequent weight assessments. 5. RD to follow and perform NFPE, and assess nutrition education needs at in-person follow up.        Pneumonia of left lower lobe due to infectious organism  Seen on CT chest  Cont rocephin/azithro  Duo nebulizer/mucinex   Persistent leukocytosis  Blood cultures pending.    Afebrile.    3/24  No change to WBC ct. Repeat cxr. Procal normal. Afebrile.     Augmentin at discharge for 7 more days       YAMIL (acute kidney injury)  Patient with acute kidney injury/acute renal failure likely due to pre-renal azotemia due to IVVD YAMIL is  "currently improving. Baseline creatinine  1.2-1.3  - Labs reviewed- Renal function/electrolytes with Estimated Creatinine Clearance: 52 mL/min (A) (based on SCr of 1.7 mg/dL (H)). according to latest data. Monitor urine output and serial BMP and adjust therapy as needed. Avoid nephrotoxins and renally dose meds for GFR listed above.    Close to baseline     Patient with intravascular fluid depletion but significant anasarca. Will attempt diuresis and monitor bmp.    3/24  Urine with proteinuria. Significant anasarca secondary to hypoalbuminemia likely because of this. Would like renal consult. May be a candidate for albumin infusion if pressure doesn't allow for loop dosing. Minimize iv overload and monitor accurate I/os.    3/25 PT without SOB and YAMIL stable.  Discharge with referral to nephrology     Hypokalemia  Patient has hypokalemia which is Acute and currently uncontrolled. Most recent potassium levels reviewed-   Lab Results   Component Value Date    K 3.7 03/25/2024   . Will continue potassium replacement per protocol and recheck repeat levels after replacement completed.     Resolved    Replace with 40 po this morning. Give add'l this afternoon. Add in spironolactone.    3/24  Cnt on spironolactone. Check bmp in am.    Resolved  Labs this week     Hypocalcemia  Patient has hypocalcemia due to Critical Illness which is currently controlled, and has been confirmed with ionized and/or corrected calcium. Will replace calcium and monitor electrolytes closely. The latest calcium labs have been reviewed and are listed below.  Recent Labs   Lab 03/25/24  0622   CALCIUM 7.6*         No results for input(s): "CAION" in the last 24 hours.          Pure hypercholesterolemia  Cont home statin      Type 2 diabetes mellitus with microalbuminuria, with long-term current use of insulin  A1C >14   ISS  Detemir 25u BID  Aspart 15u with meals    Decrease levemir this am - blood sugar 94.    3/24  NPH this morning. Cont to " adjust SSI. Tonight will give 35 of levemir.  Discussed importance of improved glycemic control as it relates to renal health    Long acting 35 units and 20 units short acting TID with meals.  Discussed with pt     Benign essential HTN  Chronic, controlled. Latest blood pressure and vitals reviewed-     Temp:  [97.7 °F (36.5 °C)-98.6 °F (37 °C)]   Pulse:  []   Resp:  [16-20]   BP: (123-147)/(62-82)   SpO2:  [93 %-100 %] .   Home meds for hypertension were reviewed and noted below.   Hypertension Medications               lisinopriL (PRINIVIL,ZESTRIL) 5 MG tablet Take 5 mg by mouth once daily.    propranoloL (INDERAL) 20 MG tablet Take 20 mg by mouth 2 (two) times daily.            While in the hospital, will manage blood pressure as follows; Continue home antihypertensive regimen    Will utilize p.r.n. blood pressure medication only if patient's blood pressure greater than 180/110 and he develops symptoms such as worsening chest pain or shortness of breath.    Propranolol.  Give lasix and spironolactone this am.    3/24  Tolerated am dosing of diuretic but in the evening his bp was elevated. He really doesn't seem INTRAVASCULARLY fluid overloaded, but has significant peripheral edema. Hold on lisinopril, cont on lasix, propranolol and spironolactone in attempt to preserve renal function - though noting increasing creatine and cont on jd.    D/c lisinopril at discharge due to YAMIL.  Lasix and spironolactone.      Hep C w/o coma, chronic  Patient has chronic liver disease due to hepatitis c. They have a history of ascites/volume overload. Their liver disease is compensated. We will obtain  CBC, CMP, and INR. Their most current Na-MELD is listed below.  MELD 3.0: 13 at 3/23/2024  4:24 AM  MELD-Na: 10 at 3/23/2024  4:24 AM  Calculated from:  Serum Creatinine: 1.5 mg/dL at 3/23/2024  4:24 AM  Serum Sodium: 137 mmol/L at 3/23/2024  4:24 AM  Total Bilirubin: 0.3 mg/dL (Using min of 1 mg/dL) at 3/22/2024  4:26  AM  Serum Albumin: 1.1 g/dL (Using min of 1.5 g/dL) at 3/22/2024  4:26 AM  INR(ratio): 0.9 (Using min of 1) at 3/21/2024  8:00 PM  Age at listing (hypothetical): 53 years  Sex: Male at 3/23/2024  4:24 AM    Will check PCR. Patient asking about treatment.  Plt count okay. Albumin significantly decreased. Anasarca noted. Will add in spironolactone and lasix for now. Check AFP and get u/s this morning.    3/24  No ascites noted, liver without masses/cirrhotic appearance. Not likely the cause of his fluid overload.    Tobacco abuse  Cessation counseling  Nicotine patch      Noncompliance with medication regimen  Stressed compliance  He hasn't been taking any of his meds.      VTE Risk Mitigation (From admission, onward)           Ordered     IP VTE LOW RISK PATIENT  Once         03/21/24 1433                    Discharge Planning   LYNDA:      Code Status: Full Code   Is the patient medically ready for discharge?:     Reason for patient still in hospital (select all that apply): Patient trending condition  Discharge Plan A: Home                  Liza Craft PA-C  Department of Hospital Medicine   [facility] - Med Surg (3rd Fl)

## 2024-03-25 NOTE — ASSESSMENT & PLAN NOTE
"Patient has hypocalcemia due to Critical Illness which is currently controlled, and has been confirmed with ionized and/or corrected calcium. Will replace calcium and monitor electrolytes closely. The latest calcium labs have been reviewed and are listed below.  Recent Labs   Lab 03/25/24  0622   CALCIUM 7.6*         No results for input(s): "CAION" in the last 24 hours.        "

## 2024-03-25 NOTE — ASSESSMENT & PLAN NOTE
Patient with acute kidney injury/acute renal failure likely due to pre-renal azotemia due to IVVD YAMIL is currently improving. Baseline creatinine  1.2-1.3  - Labs reviewed- Renal function/electrolytes with Estimated Creatinine Clearance: 52 mL/min (A) (based on SCr of 1.7 mg/dL (H)). according to latest data. Monitor urine output and serial BMP and adjust therapy as needed. Avoid nephrotoxins and renally dose meds for GFR listed above.    Close to baseline     Patient with intravascular fluid depletion but significant anasarca. Will attempt diuresis and monitor bmp.    3/24  Urine with proteinuria. Significant anasarca secondary to hypoalbuminemia likely because of this. Would like renal consult. May be a candidate for albumin infusion if pressure doesn't allow for loop dosing. Minimize iv overload and monitor accurate I/os.    3/25 PT without SOB and YAMIL stable.  Discharge with referral to nephrology

## 2024-03-25 NOTE — ASSESSMENT & PLAN NOTE
A1C >14   ISS  Detemir 25u BID  Aspart 15u with meals    Decrease levemir this am - blood sugar 94.    3/24  NPH this morning. Cont to adjust SSI. Tonight will give 35 of levemir.  Discussed importance of improved glycemic control as it relates to renal health    Long acting 35 units and 20 units short acting TID with meals.  Discussed with pt

## 2024-03-25 NOTE — ASSESSMENT & PLAN NOTE
Glucose improved  Mental status improved  Stop insulin gtt; start subQ  Start diabetic diet  Stop IVF as pt with anasarca likely 2/2 low albumin    Glucose in the 200's.  Hg A1C >14 noncompliant.  Diabetic education referral at discharge     Resolved.Increased home insulin to 35 units long acting and 20 units short acting

## 2024-03-25 NOTE — ASSESSMENT & PLAN NOTE
Chronic, controlled. Latest blood pressure and vitals reviewed-     Temp:  [97.7 °F (36.5 °C)-98.6 °F (37 °C)]   Pulse:  []   Resp:  [16-20]   BP: (123-147)/(62-82)   SpO2:  [93 %-100 %] .   Home meds for hypertension were reviewed and noted below.   Hypertension Medications               lisinopriL (PRINIVIL,ZESTRIL) 5 MG tablet Take 5 mg by mouth once daily.    propranoloL (INDERAL) 20 MG tablet Take 20 mg by mouth 2 (two) times daily.            While in the hospital, will manage blood pressure as follows; Continue home antihypertensive regimen    Will utilize p.r.n. blood pressure medication only if patient's blood pressure greater than 180/110 and he develops symptoms such as worsening chest pain or shortness of breath.    Propranolol.  Give lasix and spironolactone this am.    3/24  Tolerated am dosing of diuretic but in the evening his bp was elevated. He really doesn't seem INTRAVASCULARLY fluid overloaded, but has significant peripheral edema. Hold on lisinopril, cont on lasix, propranolol and spironolactone in attempt to preserve renal function - though noting increasing creatine and cont on jd.    D/c lisinopril at discharge due to YAMIL.  Lasix and spironolactone.

## 2024-03-25 NOTE — ASSESSMENT & PLAN NOTE
Patient has chronic liver disease due to hepatitis c. They have a history of ascites/volume overload. Their liver disease is compensated. We will obtain  CBC, CMP, and INR. Their most current Na-MELD is listed below.  MELD 3.0: 13 at 3/23/2024  4:24 AM  MELD-Na: 10 at 3/23/2024  4:24 AM  Calculated from:  Serum Creatinine: 1.5 mg/dL at 3/23/2024  4:24 AM  Serum Sodium: 137 mmol/L at 3/23/2024  4:24 AM  Total Bilirubin: 0.3 mg/dL (Using min of 1 mg/dL) at 3/22/2024  4:26 AM  Serum Albumin: 1.1 g/dL (Using min of 1.5 g/dL) at 3/22/2024  4:26 AM  INR(ratio): 0.9 (Using min of 1) at 3/21/2024  8:00 PM  Age at listing (hypothetical): 53 years  Sex: Male at 3/23/2024  4:24 AM    Will check PCR. Patient asking about treatment.  Plt count okay. Albumin significantly decreased. Anasarca noted. Will add in spironolactone and lasix for now. Check AFP and get u/s this morning.    3/24  No ascites noted, liver without masses/cirrhotic appearance. Not likely the cause of his fluid overload.

## 2024-03-25 NOTE — SUBJECTIVE & OBJECTIVE
Interval History: increased edema. Afebrile    Review of Systems   Constitutional:  Negative for chills and fever.   HENT:  Positive for voice change. Negative for congestion, ear pain, postnasal drip, rhinorrhea, sore throat and trouble swallowing.    Eyes:  Negative for redness and itching.   Respiratory:  Positive for cough. Negative for shortness of breath and wheezing.    Cardiovascular:  Positive for leg swelling. Negative for chest pain and palpitations.   Gastrointestinal:  Negative for abdominal pain, diarrhea, nausea and vomiting.        Black stool   Genitourinary:  Positive for scrotal swelling. Negative for decreased urine volume, dysuria and frequency.   Skin:  Negative for rash.   Neurological:  Negative for weakness and headaches.     Objective:     Vital Signs (Most Recent):  Temp: 97.8 °F (36.6 °C) (03/25/24 1100)  Pulse: 84 (03/25/24 1303)  Resp: 16 (03/25/24 1303)  BP: (!) 147/82 (03/25/24 1100)  SpO2: 97 % (03/25/24 1303) Vital Signs (24h Range):  Temp:  [97.7 °F (36.5 °C)-98.6 °F (37 °C)] 97.8 °F (36.6 °C)  Pulse:  [] 84  Resp:  [16-20] 16  SpO2:  [93 %-100 %] 97 %  BP: (123-147)/(62-82) 147/82     Weight: 85.9 kg (189 lb 6 oz)  Body mass index is 29.66 kg/m².    Intake/Output Summary (Last 24 hours) at 3/25/2024 1346  Last data filed at 3/25/2024 0633  Gross per 24 hour   Intake 225 ml   Output 1575 ml   Net -1350 ml           Physical Exam  Vitals and nursing note reviewed.   Constitutional:       General: He is not in acute distress.     Appearance: He is well-developed. He is obese.   HENT:      Head: Normocephalic and atraumatic.   Eyes:      Conjunctiva/sclera: Conjunctivae normal.      Pupils: Pupils are equal, round, and reactive to light.   Neck:      Thyroid: No thyromegaly.   Cardiovascular:      Rate and Rhythm: Normal rate and regular rhythm.      Heart sounds: Normal heart sounds.   Pulmonary:      Effort: Pulmonary effort is normal. No respiratory distress.      Breath  "sounds: Normal breath sounds. No wheezing.   Abdominal:      General: Bowel sounds are normal.      Palpations: Abdomen is soft.      Tenderness: There is no abdominal tenderness.      Comments: Edema through abdominal wall   Musculoskeletal:         General: Swelling (equal bilateral upper extrem.) present. Normal range of motion.      Cervical back: Normal range of motion and neck supple.      Right lower leg: Edema present.      Left lower leg: Edema present.   Lymphadenopathy:      Cervical: No cervical adenopathy.   Skin:     General: Skin is warm and dry.      Findings: No rash.   Neurological:      Mental Status: He is alert and oriented to person, place, and time.   Psychiatric:         Behavior: Behavior normal.             Significant Labs: All pertinent labs within the past 24 hours have been reviewed.  A1C:   Recent Labs   Lab 03/21/24  1911 03/22/24  0426   HGBA1C >14.0* >14.0*       CBC:   Recent Labs   Lab 03/24/24 0448 03/25/24  0622   WBC 19.08* 15.63*   HGB 12.4* 12.3*   HCT 36.6* 36.7*    377       CMP:   Recent Labs   Lab 03/24/24  0448 03/25/24  0622    139   K 3.8 3.7    104   CO2 25 28   * 150*   BUN 35* 37*   CREATININE 1.7* 1.7*   CALCIUM 7.3* 7.6*   ANIONGAP 8 7*       Cardiac Markers: No results for input(s): "CKMB", "MYOGLOBIN", "BNP", "TROPISTAT" in the last 48 hours.  Coagulation:   No results for input(s): "PT", "INR", "APTT" in the last 48 hours.    POCT Glucose:   Recent Labs   Lab 03/25/24  0049 03/25/24  0722 03/25/24  1109   POCTGLUCOSE 110 177* 317*       Prealbumin: No results for input(s): "PREALBUMIN" in the last 48 hours.    Troponin:   No results for input(s): "TROPONINI", "TROPONINIHS" in the last 48 hours.      Significant Imaging: I have reviewed all pertinent imaging results/findings within the past 24 hours.    Liver: 15.5 cm, normal in size. Homogeneous parenchymal echotexture. No focal lesions.     Gallbladder: No calculi, wall thickening, " or pericholecystic fluid.  Negative sonographic Ramos's sign.     Biliary system: 3.2 mm common bile duct.  No intrahepatic ductal dilatation.     Inferior vena cava: Normal in appearance.     Right kidney: 10 cm. No hydronephrosis.     Left kidney: 12.7 cm. No hydronephrosis.     Spleen: 8.6 cm.  Normal in size with homogeneous echotexture.     Miscellaneous: No ascites.

## 2024-03-25 NOTE — ASSESSMENT & PLAN NOTE
Patient has hypokalemia which is Acute and currently uncontrolled. Most recent potassium levels reviewed-   Lab Results   Component Value Date    K 3.7 03/25/2024   . Will continue potassium replacement per protocol and recheck repeat levels after replacement completed.     Resolved    Replace with 40 po this morning. Give add'l this afternoon. Add in spironolactone.    3/24  Cnt on spironolactone. Check bmp in am.    Resolved  Labs this week

## 2024-03-26 ENCOUNTER — NURSE TRIAGE (OUTPATIENT)
Dept: ADMINISTRATIVE | Facility: CLINIC | Age: 54
End: 2024-03-26
Payer: MEDICAID

## 2024-03-26 VITALS
DIASTOLIC BLOOD PRESSURE: 69 MMHG | TEMPERATURE: 99 F | WEIGHT: 189.38 LBS | BODY MASS INDEX: 29.72 KG/M2 | HEIGHT: 67 IN | SYSTOLIC BLOOD PRESSURE: 105 MMHG | HEART RATE: 90 BPM | OXYGEN SATURATION: 97 % | RESPIRATION RATE: 20 BRPM

## 2024-03-26 LAB
AV INDEX (PROSTH): 0.62
AV MEAN GRADIENT: 3 MMHG
AV PEAK GRADIENT: 7 MMHG
AV VALVE AREA BY VELOCITY RATIO: 2 CM²
AV VALVE AREA: 2.02 CM²
AV VELOCITY RATIO: 0.62
BACTERIA BLD CULT: NORMAL
BACTERIA BLD CULT: NORMAL
BSA FOR ECHO PROCEDURE: 2.01 M2
CV ECHO LV RWT: 0.38 CM
DOP CALC AO PEAK VEL: 1.28 M/S
DOP CALC AO VTI: 25.5 CM
DOP CALC LVOT AREA: 3.2 CM2
DOP CALC LVOT DIAMETER: 2.03 CM
DOP CALC LVOT PEAK VEL: 0.79 M/S
DOP CALC LVOT STROKE VOLUME: 51.44 CM3
DOP CALCLVOT PEAK VEL VTI: 15.9 CM
E WAVE DECELERATION TIME: 190.44 MSEC
E/A RATIO: 0.96
E/E' RATIO: 10.5 M/S
ECHO LV POSTERIOR WALL: 0.96 CM (ref 0.6–1.1)
FRACTIONAL SHORTENING: 47 % (ref 28–44)
INTERVENTRICULAR SEPTUM: 1.15 CM (ref 0.6–1.1)
IVC DIAMETER: 1.53 CM
LA MAJOR: 4.67 CM
LEFT ATRIUM SIZE: 2.96 CM
LEFT ATRIUM VOLUME INDEX MOD: 27.1 ML/M2
LEFT ATRIUM VOLUME MOD: 53.72 CM3
LEFT INTERNAL DIMENSION IN SYSTOLE: 2.64 CM (ref 2.1–4)
LEFT VENTRICLE DIASTOLIC VOLUME INDEX: 59.9 ML/M2
LEFT VENTRICLE DIASTOLIC VOLUME: 118.61 ML
LEFT VENTRICLE MASS INDEX: 99 G/M2
LEFT VENTRICLE SYSTOLIC VOLUME INDEX: 12.9 ML/M2
LEFT VENTRICLE SYSTOLIC VOLUME: 25.52 ML
LEFT VENTRICULAR INTERNAL DIMENSION IN DIASTOLE: 5.01 CM (ref 3.5–6)
LEFT VENTRICULAR MASS: 196.28 G
LV LATERAL E/E' RATIO: 9.55 M/S
LV SEPTAL E/E' RATIO: 11.67 M/S
LVOT MG: 1.29 MMHG
LVOT MV: 0.55 CM/S
MV PEAK A VEL: 1.09 M/S
MV PEAK E VEL: 1.05 M/S
MV STENOSIS PRESSURE HALF TIME: 55.23 MS
MV VALVE AREA P 1/2 METHOD: 3.98 CM2
PISA TR MAX VEL: 1.68 M/S
POCT GLUCOSE: 108 MG/DL (ref 70–110)
POCT GLUCOSE: 173 MG/DL (ref 70–110)
PV MV: 0.64 M/S
PV PEAK GRADIENT: 4 MMHG
PV PEAK VELOCITY: 0.95 M/S
RA MAJOR: 4.11 CM
RA PRESSURE ESTIMATED: 3 MMHG
RIGHT VENTRICULAR END-DIASTOLIC DIMENSION: 2.12 CM
RV TB RVSP: 5 MMHG
RV TISSUE DOPPLER FREE WALL SYSTOLIC VELOCITY 1 (APICAL 4 CHAMBER VIEW): 12.56 CM/S
TDI LATERAL: 0.11 M/S
TDI SEPTAL: 0.09 M/S
TDI: 0.1 M/S
TR MAX PG: 11 MMHG
TRICUSPID ANNULAR PLANE SYSTOLIC EXCURSION: 2.74 CM
TV REST PULMONARY ARTERY PRESSURE: 14 MMHG
Z-SCORE OF LEFT VENTRICULAR DIMENSION IN END DIASTOLE: -1.32
Z-SCORE OF LEFT VENTRICULAR DIMENSION IN END SYSTOLE: -2.26

## 2024-03-26 PROCEDURE — 25000003 PHARM REV CODE 250: Performed by: PHYSICIAN ASSISTANT

## 2024-03-26 PROCEDURE — 63600175 PHARM REV CODE 636 W HCPCS: Performed by: FAMILY MEDICINE

## 2024-03-26 PROCEDURE — 94761 N-INVAS EAR/PLS OXIMETRY MLT: CPT

## 2024-03-26 PROCEDURE — 25000242 PHARM REV CODE 250 ALT 637 W/ HCPCS: Performed by: FAMILY MEDICINE

## 2024-03-26 PROCEDURE — 25000003 PHARM REV CODE 250: Performed by: STUDENT IN AN ORGANIZED HEALTH CARE EDUCATION/TRAINING PROGRAM

## 2024-03-26 PROCEDURE — 25000003 PHARM REV CODE 250: Performed by: FAMILY MEDICINE

## 2024-03-26 PROCEDURE — 94640 AIRWAY INHALATION TREATMENT: CPT

## 2024-03-26 PROCEDURE — 99239 HOSP IP/OBS DSCHRG MGMT >30: CPT | Mod: ,,, | Performed by: PHYSICIAN ASSISTANT

## 2024-03-26 PROCEDURE — 94799 UNLISTED PULMONARY SVC/PX: CPT | Mod: XB

## 2024-03-26 RX ADMIN — IPRATROPIUM BROMIDE AND ALBUTEROL SULFATE 3 ML: 2.5; .5 SOLUTION RESPIRATORY (INHALATION) at 07:03

## 2024-03-26 RX ADMIN — INSULIN ASPART 1 UNITS: 100 INJECTION, SOLUTION INTRAVENOUS; SUBCUTANEOUS at 08:03

## 2024-03-26 RX ADMIN — GUAIFENESIN 600 MG: 600 TABLET, EXTENDED RELEASE ORAL at 08:03

## 2024-03-26 RX ADMIN — PROPRANOLOL HYDROCHLORIDE 20 MG: 20 TABLET ORAL at 08:03

## 2024-03-26 RX ADMIN — INSULIN ASPART 20 UNITS: 100 INJECTION, SOLUTION INTRAVENOUS; SUBCUTANEOUS at 12:03

## 2024-03-26 RX ADMIN — PANTOPRAZOLE SODIUM 40 MG: 40 TABLET, DELAYED RELEASE ORAL at 08:03

## 2024-03-26 RX ADMIN — INSULIN ASPART 20 UNITS: 100 INJECTION, SOLUTION INTRAVENOUS; SUBCUTANEOUS at 08:03

## 2024-03-26 RX ADMIN — FUROSEMIDE 20 MG: 10 INJECTION, SOLUTION INTRAMUSCULAR; INTRAVENOUS at 08:03

## 2024-03-26 RX ADMIN — BENZONATATE 100 MG: 100 CAPSULE ORAL at 12:03

## 2024-03-26 RX ADMIN — SPIRONOLACTONE 25 MG: 25 TABLET ORAL at 08:03

## 2024-03-26 RX ADMIN — MUPIROCIN: 20 OINTMENT TOPICAL at 08:03

## 2024-03-26 RX ADMIN — ASPIRIN 81 MG: 81 TABLET, COATED ORAL at 08:03

## 2024-03-26 NOTE — SUBJECTIVE & OBJECTIVE
Interval History: increased edema. Afebrile    Review of Systems   Constitutional:  Negative for chills and fever.   HENT:  Positive for voice change. Negative for congestion, ear pain, postnasal drip, rhinorrhea, sore throat and trouble swallowing.    Eyes:  Negative for redness and itching.   Respiratory:  Positive for cough. Negative for shortness of breath and wheezing.    Cardiovascular:  Positive for leg swelling. Negative for chest pain and palpitations.   Gastrointestinal:  Negative for abdominal pain, diarrhea, nausea and vomiting.        Black stool   Genitourinary:  Positive for scrotal swelling. Negative for decreased urine volume, dysuria and frequency.   Skin:  Negative for rash.   Neurological:  Negative for weakness and headaches.     Objective:     Vital Signs (Most Recent):  Temp: 98.6 °F (37 °C) (03/26/24 0724)  Pulse: 95 (03/26/24 0800)  Resp: 20 (03/26/24 0724)  BP: (!) 183/77 (03/26/24 0724)  SpO2: 95 % (03/26/24 0859) Vital Signs (24h Range):  Temp:  [97.8 °F (36.6 °C)-98.9 °F (37.2 °C)] 98.6 °F (37 °C)  Pulse:  [66-95] 95  Resp:  [16-22] 20  SpO2:  [91 %-100 %] 95 %  BP: (130-183)/(70-82) 183/77     Weight: 85.9 kg (189 lb 6 oz)  Body mass index is 29.66 kg/m².    Intake/Output Summary (Last 24 hours) at 3/26/2024 0901  Last data filed at 3/26/2024 0658  Gross per 24 hour   Intake 1960 ml   Output 4150 ml   Net -2190 ml           Physical Exam  Vitals and nursing note reviewed.   Constitutional:       General: He is not in acute distress.     Appearance: He is well-developed. He is obese.   HENT:      Head: Normocephalic and atraumatic.   Eyes:      Conjunctiva/sclera: Conjunctivae normal.      Pupils: Pupils are equal, round, and reactive to light.   Neck:      Thyroid: No thyromegaly.   Cardiovascular:      Rate and Rhythm: Normal rate and regular rhythm.      Heart sounds: Normal heart sounds.   Pulmonary:      Effort: Pulmonary effort is normal. No respiratory distress.      Breath  "sounds: Normal breath sounds. No wheezing.   Abdominal:      General: Bowel sounds are normal.      Palpations: Abdomen is soft.      Tenderness: There is no abdominal tenderness.      Comments: Edema through abdominal wall   Musculoskeletal:         General: Swelling (equal bilateral upper extrem.) present. Normal range of motion.      Cervical back: Normal range of motion and neck supple.      Right lower leg: Edema (2+) present.      Left lower leg: Edema (2+) present.   Lymphadenopathy:      Cervical: No cervical adenopathy.   Skin:     General: Skin is warm and dry.      Findings: No rash.   Neurological:      Mental Status: He is alert and oriented to person, place, and time.   Psychiatric:         Behavior: Behavior normal.             Significant Labs: All pertinent labs within the past 24 hours have been reviewed.  A1C:   Recent Labs   Lab 03/21/24  1911 03/22/24  0426   HGBA1C >14.0* >14.0*       CBC:   Recent Labs   Lab 03/25/24  0622   WBC 15.63*   HGB 12.3*   HCT 36.7*          CMP:   Recent Labs   Lab 03/25/24  0622      K 3.7      CO2 28   *   BUN 37*   CREATININE 1.7*   CALCIUM 7.6*   ANIONGAP 7*       Cardiac Markers: No results for input(s): "CKMB", "MYOGLOBIN", "BNP", "TROPISTAT" in the last 48 hours.  Coagulation:   No results for input(s): "PT", "INR", "APTT" in the last 48 hours.    POCT Glucose:   Recent Labs   Lab 03/25/24  1926 03/25/24  2208 03/26/24  0729   POCTGLUCOSE 114* 254* 173*       Prealbumin: No results for input(s): "PREALBUMIN" in the last 48 hours.    Troponin:   No results for input(s): "TROPONINI", "TROPONINIHS" in the last 48 hours.      Significant Imaging: I have reviewed all pertinent imaging results/findings within the past 24 hours.    Liver: 15.5 cm, normal in size. Homogeneous parenchymal echotexture. No focal lesions.     Gallbladder: No calculi, wall thickening, or pericholecystic fluid.  Negative sonographic Ramos's sign.     Biliary " system: 3.2 mm common bile duct.  No intrahepatic ductal dilatation.     Inferior vena cava: Normal in appearance.     Right kidney: 10 cm. No hydronephrosis.     Left kidney: 12.7 cm. No hydronephrosis.     Spleen: 8.6 cm.  Normal in size with homogeneous echotexture.     Miscellaneous: No ascites.

## 2024-03-26 NOTE — ASSESSMENT & PLAN NOTE
"Patient has hypocalcemia due to Critical Illness which is currently controlled, and has been confirmed with ionized and/or corrected calcium. Will replace calcium and monitor electrolytes closely. The latest calcium labs have been reviewed and are listed below.  No results for input(s): "CALCIUM" in the last 24 hours.      No results for input(s): "CAION" in the last 24 hours.        "

## 2024-03-26 NOTE — ASSESSMENT & PLAN NOTE
Likely cause of anasarca    ?renal - recheck urine. +++ proteinuria. Patient with known and admitted meth use.  ?hepatic - meld 10. Work up in progress.  ?poor nutrition - add jd.   Echo EF 60-65%

## 2024-03-26 NOTE — PLAN OF CARE
Problem: Adult Inpatient Plan of Care  Goal: Plan of Care Review  Outcome: Ongoing, Progressing  Flowsheets (Taken 3/26/2024 8726)  Plan of Care Reviewed With: patient  Goal: Absence of Hospital-Acquired Illness or Injury  Outcome: Ongoing, Progressing  Goal: Optimal Comfort and Wellbeing  Outcome: Ongoing, Progressing     Problem: Diabetes Comorbidity  Goal: Blood Glucose Level Within Targeted Range  Outcome: Ongoing, Progressing     Problem: Fluid and Electrolyte Imbalance (Acute Kidney Injury/Impairment)  Goal: Fluid and Electrolyte Balance  Outcome: Ongoing, Progressing     Problem: Oral Intake Inadequate (Acute Kidney Injury/Impairment)  Goal: Optimal Nutrition Intake  Outcome: Ongoing, Progressing     Problem: Renal Function Impairment (Acute Kidney Injury/Impairment)  Goal: Effective Renal Function  Outcome: Ongoing, Progressing     Problem: Fluid Imbalance (Pneumonia)  Goal: Fluid Balance  Outcome: Ongoing, Progressing     Problem: Infection (Pneumonia)  Goal: Resolution of Infection Signs and Symptoms  Outcome: Ongoing, Progressing     Problem: Respiratory Compromise (Pneumonia)  Goal: Effective Oxygenation and Ventilation  Outcome: Ongoing, Progressing     Problem: Infection  Goal: Absence of Infection Signs and Symptoms  Outcome: Ongoing, Progressing

## 2024-03-26 NOTE — NURSING
Responded to call light patient holding right arm up in air SL ext taped to arm with catheter tip pulled out. No bleeding noted. Gauze bandaid to site.

## 2024-03-26 NOTE — ASSESSMENT & PLAN NOTE
Patient has chronic liver disease due to hepatitis c. They have a history of ascites/volume overload. Their liver disease is compensated. We will obtain  CBC, CMP, and INR. Their most current Na-MELD is listed below.  MELD 3.0: 13 at 3/23/2024  4:24 AM  MELD-Na: 10 at 3/23/2024  4:24 AM  Calculated from:  Serum Creatinine: 1.5 mg/dL at 3/23/2024  4:24 AM  Serum Sodium: 137 mmol/L at 3/23/2024  4:24 AM  Total Bilirubin: 0.3 mg/dL (Using min of 1 mg/dL) at 3/22/2024  4:26 AM  Serum Albumin: 1.1 g/dL (Using min of 1.5 g/dL) at 3/22/2024  4:26 AM  INR(ratio): 0.9 (Using min of 1) at 3/21/2024  8:00 PM  Age at listing (hypothetical): 53 years  Sex: Male at 3/23/2024  4:24 AM    Will check PCR. Patient asking about treatment.  Plt count okay. Albumin significantly decreased. Anasarca noted. Will add in spironolactone and lasix for now. Check AFP and get u/s this morning.    3/24  No ascites noted, liver without masses/cirrhotic appearance. Not likely the cause of his fluid overload.    Defer to pcp for further referrals

## 2024-03-26 NOTE — NURSING
Pt discharged to home.  Pt is Aox4 and states verbal understanding of all discharge instructions. IV access  x1 removed.  Cardiac monitor removed. Pt medications called into pt specific pharmacy.  Pt left via wheelchair with all personal belongings. Pt denies pain or discomfort.  Pt safe.

## 2024-03-26 NOTE — DISCHARGE SUMMARY
[facility] Surg ([facility])  [facility] Medicine  Discharge Summary      Patient Name: Onofre Ceballos  MRN: [id number]  [facility]: [id number]  Patient Class: IP- Inpatient  Admission Date: 3/21/2024  Hospital Length of Stay: 5 days  Discharge Date and Time:  03/26/2024 9:06 AM  Attending Physician: Heather Estevez MD   Discharging Provider: Liza Craft PA-C  Primary Care Provider: Honey Galaviz NP    Primary Care Team: Networked reference to record PCT     HPI:   53yM with DM2, HTN, HLD, polysubstance abuse presented to ER with elevated glucose after being sent from his clinic. Pt states he was in his usual state of health aside from generalized swelling for the last couple months. He also reports he was being treated for bronchitis with antibiotics and cough medication. He has no SOB or chest pain. No fever/chills. He reports he has not been compliant with DM meds including insulin. He reports last meth use was about 2 weeks ago.    * No surgery found *      Hospital Course:   Pt HD stable on room air.  Glucose levels improved and off insulin gtt.  PNA on CT Chest.  Continues to have leukocytosis on IV abx.  Will consider switching abx if doesn't improve in the next 24 hours.  Duo nebulizer and abx ordered.      3/23  Patient with significant anasarca. He is complaining of 'hushed' sounding voice and dyspnea with exertion as well as a dry cough. He has had no fever. He also denies sputum production. He has h/o hep c - dx'd over 10 years ago. Swelling significantly worse over last 48 hours. Also notes 'black' stool this morning.    3/24  Patient concerned about his upper and lower extremity swelling. He has had improved breathing and cough/shortness of breath. No fevers since admission. Blood sugars very labile. No bloody/black stools noted. Significant output yesterday though still remains positive overall.    3/25 PT HD stable on room air.  Waiting for echo and possibly discharge this afternoon or  tomorrow pending echo results.      3/26 Echo with EF 60%.  Discharge today with close follow up nephrology and PCP.  Defer to PCP for GI/ID f/u for hepatitis.       Goals of Care Treatment Preferences:  Code Status: Full Code      Consults:   Consults (From admission, onward)          Status Ordering Provider     Inpatient consult to Registered Dietitian/Nutritionist  Once        Provider:  (Not yet assigned)    Acknowledged LINDA BLAIR     Inpatient consult to Registered Dietitian/Nutritionist  Once        Provider:  (Not yet assigned)    Completed LEWIS GALVEZ            Pulmonary  Pneumonia of left lower lobe due to infectious organism  Seen on CT chest  Cont rocephin/azithro  Duo nebulizer/mucinex   Persistent leukocytosis  Blood cultures pending.    Afebrile.    3/24  No change to WBC ct. Repeat cxr. Procal normal. Afebrile.     Augmentin at discharge for 7 more days       Cardiac/Vascular  Pure hypercholesterolemia  Cont home statin      Benign essential HTN  Chronic, controlled. Latest blood pressure and vitals reviewed-     Temp:  [97.8 °F (36.6 °C)-98.9 °F (37.2 °C)]   Pulse:  [66-95]   Resp:  [16-22]   BP: (130-183)/(70-82)   SpO2:  [91 %-100 %] .   Home meds for hypertension were reviewed and noted below.   Hypertension Medications               lisinopriL (PRINIVIL,ZESTRIL) 5 MG tablet Take 5 mg by mouth once daily.    propranoloL (INDERAL) 20 MG tablet Take 20 mg by mouth 2 (two) times daily.            While in the hospital, will manage blood pressure as follows; Continue home antihypertensive regimen    Will utilize p.r.n. blood pressure medication only if patient's blood pressure greater than 180/110 and he develops symptoms such as worsening chest pain or shortness of breath.    Propranolol.  Give lasix and spironolactone this am.    3/24  Tolerated am dosing of diuretic but in the evening his bp was elevated. He really doesn't seem INTRAVASCULARLY fluid overloaded, but has significant  peripheral edema. Hold on lisinopril, cont on lasix, propranolol and spironolactone in attempt to preserve renal function - though noting increasing creatine and cont on jd.    D/c lisinopril at discharge due to YAMIL.  Lasix and spironolactone.      Renal/  YAMIL (acute kidney injury)  Patient with acute kidney injury/acute renal failure likely due to pre-renal azotemia due to IVVD YAMIL is currently improving. Baseline creatinine  1.2-1.3  - Labs reviewed- Renal function/electrolytes with Estimated Creatinine Clearance: 52 mL/min (A) (based on SCr of 1.7 mg/dL (H)). according to latest data. Monitor urine output and serial BMP and adjust therapy as needed. Avoid nephrotoxins and renally dose meds for GFR listed above.    Close to baseline     Patient with intravascular fluid depletion but significant anasarca. Will attempt diuresis and monitor bmp.    3/24  Urine with proteinuria. Significant anasarca secondary to hypoalbuminemia likely because of this. Would like renal consult. May be a candidate for albumin infusion if pressure doesn't allow for loop dosing. Minimize iv overload and monitor accurate I/os.    3/25 PT without SOB and YAMIL stable.  Discharge with referral to nephrology     Hypokalemia  Patient has hypokalemia which is Acute and currently uncontrolled. Most recent potassium levels reviewed-   Lab Results   Component Value Date    K 3.7 03/25/2024   . Will continue potassium replacement per protocol and recheck repeat levels after replacement completed.     Resolved    Replace with 40 po this morning. Give add'l this afternoon. Add in spironolactone.    3/24  Cnt on spironolactone. Check bmp in am.    Resolved  Labs this week     Hypocalcemia  Patient has hypocalcemia due to Critical Illness which is currently controlled, and has been confirmed with ionized and/or corrected calcium. Will replace calcium and monitor electrolytes closely. The latest calcium labs have been reviewed and are listed  "below.  No results for input(s): "CALCIUM" in the last 24 hours.      No results for input(s): "CAION" in the last 24 hours.          Endocrine  * Hyperosmolar hyperglycemic state (HHS)  Glucose improved  Mental status improved  Stop insulin gtt; start subQ  Start diabetic diet  Stop IVF as pt with anasarca likely 2/2 low albumin    Glucose in the 200's.  Hg A1C >14 noncompliant.  Diabetic education referral at discharge     Resolved.Increased home insulin to 35 units long acting and 20 units short acting       Severe protein-calorie malnutrition  Nutrition consulted. Most recent weight and BMI monitored-     Measurements:  Wt Readings from Last 1 Encounters:   03/22/24 85.9 kg (189 lb 6 oz)   Body mass index is 29.66 kg/m².      1. Continue diabetic diet. 2. RD to add low sodium diet modifications to diet order. 3. RD to reduce frequency of Tyler from TID to BID. 4. Continue frequent weight assessments. 5. RD to follow and perform NFPE, and assess nutrition education needs at in-person follow up.        Type 2 diabetes mellitus with microalbuminuria, with long-term current use of insulin  A1C >14   ISS  Detemir 25u BID  Aspart 15u with meals    Decrease levemir this am - blood sugar 94.    3/24  NPH this morning. Cont to adjust SSI. Tonight will give 35 of levemir.  Discussed importance of improved glycemic control as it relates to renal health    Long acting 35 units and 20 units short acting TID with meals.  Discussed with pt     GI  Hep C w/o coma, chronic  Patient has chronic liver disease due to hepatitis c. They have a history of ascites/volume overload. Their liver disease is compensated. We will obtain  CBC, CMP, and INR. Their most current Na-MELD is listed below.  MELD 3.0: 13 at 3/23/2024  4:24 AM  MELD-Na: 10 at 3/23/2024  4:24 AM  Calculated from:  Serum Creatinine: 1.5 mg/dL at 3/23/2024  4:24 AM  Serum Sodium: 137 mmol/L at 3/23/2024  4:24 AM  Total Bilirubin: 0.3 mg/dL (Using min of 1 mg/dL) at " 3/22/2024  4:26 AM  Serum Albumin: 1.1 g/dL (Using min of 1.5 g/dL) at 3/22/2024  4:26 AM  INR(ratio): 0.9 (Using min of 1) at 3/21/2024  8:00 PM  Age at listing (hypothetical): 53 years  Sex: Male at 3/23/2024  4:24 AM    Will check PCR. Patient asking about treatment.  Plt count okay. Albumin significantly decreased. Anasarca noted. Will add in spironolactone and lasix for now. Check AFP and get u/s this morning.    3/24  No ascites noted, liver without masses/cirrhotic appearance. Not likely the cause of his fluid overload.    Defer to pcp for further referrals     Other  Low serum albumin  Likely cause of anasarca    ?renal - recheck urine. +++ proteinuria. Patient with known and admitted meth use.  ?hepatic - meld 10. Work up in progress.  ?poor nutrition - add jd.   Echo EF 60-65%    Tobacco abuse  Cessation counseling  Nicotine patch      Noncompliance with medication regimen  Stressed compliance  He hasn't been taking any of his meds.      Final Active Diagnoses:    Diagnosis Date Noted POA    PRINCIPAL PROBLEM:  Hyperosmolar hyperglycemic state (HHS) [E11.00] 03/21/2024 Yes    Hypocalcemia [E83.51] 03/21/2024 Yes    Hypokalemia [E87.6] 03/21/2024 Yes    YAMIL (acute kidney injury) [N17.9] 03/21/2024 Yes    Pneumonia of left lower lobe due to infectious organism [J18.9] 03/21/2024 Yes    Severe protein-calorie malnutrition [E43] 03/21/2024 Yes    Low serum albumin [R77.0] 03/21/2024 Yes    Pure hypercholesterolemia [E78.00] 06/05/2017 Yes    Benign essential HTN [I10] 03/02/2017 Yes    Type 2 diabetes mellitus with microalbuminuria, with long-term current use of insulin [E11.29, R80.9, Z79.4] 03/02/2017 Not Applicable    Hep C w/o coma, chronic [B18.2] 08/24/2016 Yes    Noncompliance with medication regimen [Z91.148] 07/02/2016 Not Applicable    Tobacco abuse [Z72.0] 07/02/2016 Yes      Problems Resolved During this Admission:       Discharged Condition: stable    Disposition: Home or Self Care    Follow  Up:   Follow-up Information       No, Primary Doctor Follow up.                           Patient Instructions:      AFP tumor marker   Standing Status: Future Standing Exp. Date: 06/21/25     Order Specific Question Answer Comments   Release to patient Immediate      Hepatitis C genotype   Standing Status: Future Standing Exp. Date: 06/21/25     Hepatitis C RNA, quantitative, PCR   Standing Status: Future Standing Exp. Date: 06/21/25     Protime-INR   Standing Status: Future Standing Exp. Date: 06/21/25     BASIC METABOLIC PANEL   Standing Status: Future Standing Exp. Date: 06/23/25     Magnesium   Standing Status: Future Standing Exp. Date: 06/23/25     Ambulatory referral/consult to Nephrology   Standing Status: Future   Referral Priority: Routine Referral Type: Consultation   Referral Reason: Specialty Services Required   Requested Specialty: Nephrology   Number of Visits Requested: 1     Ambulatory referral/consult to Smoking Cessation Program   Standing Status: Future   Referral Priority: Routine Referral Type: Consultation   Referral Reason: Specialty Services Required   Requested Specialty: CTTS   Number of Visits Requested: 1     Reason for not Prescribing Nicotine Replacement     Order Specific Question Answer Comments   Reason for not Prescribing: Patient refused        Significant Diagnostic Studies: see A&P     Pending Diagnostic Studies:       Procedure Component Value Units Date/Time    Hepatitis C Genotype [[id number]] Collected: 03/23/24 1155    Order Status: Sent Lab Status: In process Updated: 03/23/24 1939    Specimen: Blood            Medications:  Reconciled Home Medications:      Medication List        START taking these medications      amoxicillin-clavulanate 875-125mg 875-125 mg per tablet  Commonly known as: AUGMENTIN  Take 1 tablet by mouth every 12 (twelve) hours. for 7 days     CULTURELLE 10 billion cell capsule  Generic drug: Lactobacillus rhamnosus GG  Take 1 capsule by mouth once  "daily.     furosemide 20 MG tablet  Commonly known as: LASIX  Take 2 tablets (40 mg total) by mouth once daily.     pantoprazole 40 MG tablet  Commonly known as: PROTONIX  Take 1 tablet (40 mg total) by mouth once daily.     spironolactone 25 MG tablet  Commonly known as: ALDACTONE  Take 1 tablet (25 mg total) by mouth once daily.            CHANGE how you take these medications      BASAGLAR KWIKPEN U-100 INSULIN glargine 100 units/mL SubQ pen  Generic drug: insulin  Inject 35 Units into the skin every evening. Within 15 minutes of eating  What changed: how much to take     insulin aspart U-100 100 unit/mL (3 mL) Inpn pen  Commonly known as: NovoLOG  Inject 20 Units into the skin 3 (three) times daily with meals.  What changed: how much to take            CONTINUE taking these medications      alcohol swabs Padm  Commonly known as: ALCOHOL WIPES  Use topical 4 x daily for insulin     aspirin 81 MG EC tablet  Commonly known as: ECOTRIN  Take 81 mg by mouth once daily.     atorvastatin 80 MG tablet  Commonly known as: LIPITOR  Take 80 mg by mouth every evening.     blood sugar diagnostic Strp  1 each by Misc.(Non-Drug; Combo Route) route 3 (three) times daily before meals.     blood-glucose meter Misc  Commonly known as: TRUE METRIX GLUCOSE METER  1 each by Misc.(Non-Drug; Combo Route) route once daily.     docusate sodium 100 MG capsule  Commonly known as: COLACE  Take 1 capsule (100 mg total) by mouth 2 (two) times daily as needed for Constipation.     ergocalciferol 50,000 unit Cap  Commonly known as: ERGOCALCIFEROL  Take 50,000 Units by mouth every 7 days.     insulin syringe-needle U-100 1/2 mL 28 gauge x 1/2" Syrg  Commonly known as: INSULIN SYRINGE  To use with meal time insulin tid     JARDIANCE 10 mg tablet  Generic drug: empagliflozin  Take 10 mg by mouth once daily.     lancets 32 gauge Misc  Inject 1 lancet into the skin 3 (three) times daily before meals.     pen needle, diabetic 32 gauge x 1/5" " "Ndle  Commonly known as: NOVOTWIST  To use nightly with levemir     polyethylene glycol 17 gram/dose powder  Commonly known as: GLYCOLAX  Take 17 g by mouth once daily.     propranoloL 20 MG tablet  Commonly known as: INDERAL  Take 20 mg by mouth 2 (two) times daily.     SURE COMFORT INSULIN SYRINGE 0.3 mL 31 gauge x 5/16" Syrg  Generic drug: insulin syringe-needle U-100  Inject 1 each into the skin 4 (four) times daily before meals and nightly.     tamsulosin 0.4 mg Cap  Commonly known as: FLOMAX  Take 0.4 mg by mouth once daily.            STOP taking these medications      benzonatate 100 MG capsule  Commonly known as: TESSALON     doxycycline 100 MG Cap  Commonly known as: VIBRAMYCIN     lisinopriL 5 MG tablet  Commonly known as: PRINIVILZESTRIL     metFORMIN 1000 MG tablet  Commonly known as: GLUCOPHAGE              Indwelling Lines/Drains at time of discharge:   Lines/Drains/Airways       None                   Time spent on the discharge of patient: 35 minutes         Liza Craft PA-C  Department of Hospital Medicine  [facility] Surg (3rd Fl)  "

## 2024-03-26 NOTE — NURSING
Notified STONE Chen house supervisor patient with loss of IV site. Handoff reports patient is hard stick requiring US for IV insertion.

## 2024-03-26 NOTE — ASSESSMENT & PLAN NOTE
Chronic, controlled. Latest blood pressure and vitals reviewed-     Temp:  [97.8 °F (36.6 °C)-98.9 °F (37.2 °C)]   Pulse:  [66-95]   Resp:  [16-22]   BP: (130-183)/(70-82)   SpO2:  [91 %-100 %] .   Home meds for hypertension were reviewed and noted below.   Hypertension Medications               lisinopriL (PRINIVIL,ZESTRIL) 5 MG tablet Take 5 mg by mouth once daily.    propranoloL (INDERAL) 20 MG tablet Take 20 mg by mouth 2 (two) times daily.            While in the hospital, will manage blood pressure as follows; Continue home antihypertensive regimen    Will utilize p.r.n. blood pressure medication only if patient's blood pressure greater than 180/110 and he develops symptoms such as worsening chest pain or shortness of breath.    Propranolol.  Give lasix and spironolactone this am.    3/24  Tolerated am dosing of diuretic but in the evening his bp was elevated. He really doesn't seem INTRAVASCULARLY fluid overloaded, but has significant peripheral edema. Hold on lisinopril, cont on lasix, propranolol and spironolactone in attempt to preserve renal function - though noting increasing creatine and cont on jd.    D/c lisinopril at discharge due to YAMIL.  Lasix and spironolactone.

## 2024-03-26 NOTE — PROGRESS NOTES
[facility] ([facility])  [facility] Medicine  Progress Note    Patient Name: Onofre Ceballos  MRN: [id number]  Patient Class: IP- Inpatient   Admission Date: 3/21/2024  Length of Stay: 5 days  Attending Physician: Heather Estevez MD  Primary Care Provider: Honey Galaviz NP        Subjective:     Principal Problem:Hyperosmolar hyperglycemic state (HHS)        HPI:  53yM with DM2, HTN, HLD, polysubstance abuse presented to ER with elevated glucose after being sent from his clinic. Pt states he was in his usual state of health aside from generalized swelling for the last couple months. He also reports he was being treated for bronchitis with antibiotics and cough medication. He has no SOB or chest pain. No fever/chills. He reports he has not been compliant with DM meds including insulin. He reports last meth use was about 2 weeks ago.    Overview/Hospital Course:  Pt HD stable on room air.  Glucose levels improved and off insulin gtt.  PNA on CT Chest.  Continues to have leukocytosis on IV abx.  Will consider switching abx if doesn't improve in the next 24 hours.  Duo nebulizer and abx ordered.      3/23  Patient with significant anasarca. He is complaining of 'hushed' sounding voice and dyspnea with exertion as well as a dry cough. He has had no fever. He also denies sputum production. He has h/o hep c - dx'd over 10 years ago. Swelling significantly worse over last 48 hours. Also notes 'black' stool this morning.    3/24  Patient concerned about his upper and lower extremity swelling. He has had improved breathing and cough/shortness of breath. No fevers since admission. Blood sugars very labile. No bloody/black stools noted. Significant output yesterday though still remains positive overall.    3/25 PT HD stable on room air.  Waiting for echo and possibly discharge this afternoon or tomorrow pending echo results.      3/26 Echo with EF 60%.  Discharge today with close follow up nephrology and PCP.  Defer  to PCP for GI/ID f/u for hepatitis.      Interval History: increased edema. Afebrile    Review of Systems   Constitutional:  Negative for chills and fever.   HENT:  Positive for voice change. Negative for congestion, ear pain, postnasal drip, rhinorrhea, sore throat and trouble swallowing.    Eyes:  Negative for redness and itching.   Respiratory:  Positive for cough. Negative for shortness of breath and wheezing.    Cardiovascular:  Positive for leg swelling. Negative for chest pain and palpitations.   Gastrointestinal:  Negative for abdominal pain, diarrhea, nausea and vomiting.        Black stool   Genitourinary:  Positive for scrotal swelling. Negative for decreased urine volume, dysuria and frequency.   Skin:  Negative for rash.   Neurological:  Negative for weakness and headaches.     Objective:     Vital Signs (Most Recent):  Temp: 98.6 °F (37 °C) (03/26/24 0724)  Pulse: 95 (03/26/24 0800)  Resp: 20 (03/26/24 0724)  BP: (!) 183/77 (03/26/24 0724)  SpO2: 95 % (03/26/24 0859) Vital Signs (24h Range):  Temp:  [97.8 °F (36.6 °C)-98.9 °F (37.2 °C)] 98.6 °F (37 °C)  Pulse:  [66-95] 95  Resp:  [16-22] 20  SpO2:  [91 %-100 %] 95 %  BP: (130-183)/(70-82) 183/77     Weight: 85.9 kg (189 lb 6 oz)  Body mass index is 29.66 kg/m².    Intake/Output Summary (Last 24 hours) at 3/26/2024 0901  Last data filed at 3/26/2024 0658  Gross per 24 hour   Intake 1960 ml   Output 4150 ml   Net -2190 ml           Physical Exam  Vitals and nursing note reviewed.   Constitutional:       General: He is not in acute distress.     Appearance: He is well-developed. He is obese.   HENT:      Head: Normocephalic and atraumatic.   Eyes:      Conjunctiva/sclera: Conjunctivae normal.      Pupils: Pupils are equal, round, and reactive to light.   Neck:      Thyroid: No thyromegaly.   Cardiovascular:      Rate and Rhythm: Normal rate and regular rhythm.      Heart sounds: Normal heart sounds.   Pulmonary:      Effort: Pulmonary effort is normal. No  "respiratory distress.      Breath sounds: Normal breath sounds. No wheezing.   Abdominal:      General: Bowel sounds are normal.      Palpations: Abdomen is soft.      Tenderness: There is no abdominal tenderness.      Comments: Edema through abdominal wall   Musculoskeletal:         General: Swelling (equal bilateral upper extrem.) present. Normal range of motion.      Cervical back: Normal range of motion and neck supple.      Right lower leg: Edema (2+) present.      Left lower leg: Edema (2+) present.   Lymphadenopathy:      Cervical: No cervical adenopathy.   Skin:     General: Skin is warm and dry.      Findings: No rash.   Neurological:      Mental Status: He is alert and oriented to person, place, and time.   Psychiatric:         Behavior: Behavior normal.             Significant Labs: All pertinent labs within the past 24 hours have been reviewed.  A1C:   Recent Labs   Lab 03/21/24  1911 03/22/24  0426   HGBA1C >14.0* >14.0*       CBC:   Recent Labs   Lab 03/25/24  0622   WBC 15.63*   HGB 12.3*   HCT 36.7*          CMP:   Recent Labs   Lab 03/25/24  0622      K 3.7      CO2 28   *   BUN 37*   CREATININE 1.7*   CALCIUM 7.6*   ANIONGAP 7*       Cardiac Markers: No results for input(s): "CKMB", "MYOGLOBIN", "BNP", "TROPISTAT" in the last 48 hours.  Coagulation:   No results for input(s): "PT", "INR", "APTT" in the last 48 hours.    POCT Glucose:   Recent Labs   Lab 03/25/24  1926 03/25/24  2208 03/26/24  0729   POCTGLUCOSE 114* 254* 173*       Prealbumin: No results for input(s): "PREALBUMIN" in the last 48 hours.    Troponin:   No results for input(s): "TROPONINI", "TROPONINIHS" in the last 48 hours.      Significant Imaging: I have reviewed all pertinent imaging results/findings within the past 24 hours.    Liver: 15.5 cm, normal in size. Homogeneous parenchymal echotexture. No focal lesions.     Gallbladder: No calculi, wall thickening, or pericholecystic fluid.  Negative " sonographic Ramos's sign.     Biliary system: 3.2 mm common bile duct.  No intrahepatic ductal dilatation.     Inferior vena cava: Normal in appearance.     Right kidney: 10 cm. No hydronephrosis.     Left kidney: 12.7 cm. No hydronephrosis.     Spleen: 8.6 cm.  Normal in size with homogeneous echotexture.     Miscellaneous: No ascites.    Assessment/Plan:      * Hyperosmolar hyperglycemic state (HHS)  Glucose improved  Mental status improved  Stop insulin gtt; start subQ  Start diabetic diet  Stop IVF as pt with anasarca likely 2/2 low albumin    Glucose in the 200's.  Hg A1C >14 noncompliant.  Diabetic education referral at discharge     Resolved.Increased home insulin to 35 units long acting and 20 units short acting       Low serum albumin  Likely cause of anasarca    ?renal - recheck urine. +++ proteinuria. Patient with known and admitted meth use.  ?hepatic - meld 10. Work up in progress.  ?poor nutrition - add tyler.   Echo EF 60-65%    Severe protein-calorie malnutrition  Nutrition consulted. Most recent weight and BMI monitored-     Measurements:  Wt Readings from Last 1 Encounters:   03/22/24 85.9 kg (189 lb 6 oz)   Body mass index is 29.66 kg/m².      1. Continue diabetic diet. 2. RD to add low sodium diet modifications to diet order. 3. RD to reduce frequency of Tyler from TID to BID. 4. Continue frequent weight assessments. 5. RD to follow and perform NFPE, and assess nutrition education needs at in-person follow up.        Pneumonia of left lower lobe due to infectious organism  Seen on CT chest  Cont rocephin/azithro  Duo nebulizer/mucinex   Persistent leukocytosis  Blood cultures pending.    Afebrile.    3/24  No change to WBC ct. Repeat cxr. Procal normal. Afebrile.     Augmentin at discharge for 7 more days       YAMIL (acute kidney injury)  Patient with acute kidney injury/acute renal failure likely due to pre-renal azotemia due to IVVD YAMIL is currently improving. Baseline creatinine  1.2-1.3  -  "Labs reviewed- Renal function/electrolytes with Estimated Creatinine Clearance: 52 mL/min (A) (based on SCr of 1.7 mg/dL (H)). according to latest data. Monitor urine output and serial BMP and adjust therapy as needed. Avoid nephrotoxins and renally dose meds for GFR listed above.    Close to baseline     Patient with intravascular fluid depletion but significant anasarca. Will attempt diuresis and monitor bmp.    3/24  Urine with proteinuria. Significant anasarca secondary to hypoalbuminemia likely because of this. Would like renal consult. May be a candidate for albumin infusion if pressure doesn't allow for loop dosing. Minimize iv overload and monitor accurate I/os.    3/25 PT without SOB and YAMIL stable.  Discharge with referral to nephrology     Hypokalemia  Patient has hypokalemia which is Acute and currently uncontrolled. Most recent potassium levels reviewed-   Lab Results   Component Value Date    K 3.7 03/25/2024   . Will continue potassium replacement per protocol and recheck repeat levels after replacement completed.     Resolved    Replace with 40 po this morning. Give add'l this afternoon. Add in spironolactone.    3/24  Cnt on spironolactone. Check bmp in am.    Resolved  Labs this week     Hypocalcemia  Patient has hypocalcemia due to Critical Illness which is currently controlled, and has been confirmed with ionized and/or corrected calcium. Will replace calcium and monitor electrolytes closely. The latest calcium labs have been reviewed and are listed below.  No results for input(s): "CALCIUM" in the last 24 hours.      No results for input(s): "CAION" in the last 24 hours.          Pure hypercholesterolemia  Cont home statin      Type 2 diabetes mellitus with microalbuminuria, with long-term current use of insulin  A1C >14   ISS  Detemir 25u BID  Aspart 15u with meals    Decrease levemir this am - blood sugar 94.    3/24  NPH this morning. Cont to adjust SSI. Tonight will give 35 of " levemir.  Discussed importance of improved glycemic control as it relates to renal health    Long acting 35 units and 20 units short acting TID with meals.  Discussed with pt     Benign essential HTN  Chronic, controlled. Latest blood pressure and vitals reviewed-     Temp:  [97.8 °F (36.6 °C)-98.9 °F (37.2 °C)]   Pulse:  [66-95]   Resp:  [16-22]   BP: (130-183)/(70-82)   SpO2:  [91 %-100 %] .   Home meds for hypertension were reviewed and noted below.   Hypertension Medications               lisinopriL (PRINIVIL,ZESTRIL) 5 MG tablet Take 5 mg by mouth once daily.    propranoloL (INDERAL) 20 MG tablet Take 20 mg by mouth 2 (two) times daily.            While in the hospital, will manage blood pressure as follows; Continue home antihypertensive regimen    Will utilize p.r.n. blood pressure medication only if patient's blood pressure greater than 180/110 and he develops symptoms such as worsening chest pain or shortness of breath.    Propranolol.  Give lasix and spironolactone this am.    3/24  Tolerated am dosing of diuretic but in the evening his bp was elevated. He really doesn't seem INTRAVASCULARLY fluid overloaded, but has significant peripheral edema. Hold on lisinopril, cont on lasix, propranolol and spironolactone in attempt to preserve renal function - though noting increasing creatine and cont on jd.    D/c lisinopril at discharge due to YAMIL.  Lasix and spironolactone.      Hep C w/o coma, chronic  Patient has chronic liver disease due to hepatitis c. They have a history of ascites/volume overload. Their liver disease is compensated. We will obtain  CBC, CMP, and INR. Their most current Na-MELD is listed below.  MELD 3.0: 13 at 3/23/2024  4:24 AM  MELD-Na: 10 at 3/23/2024  4:24 AM  Calculated from:  Serum Creatinine: 1.5 mg/dL at 3/23/2024  4:24 AM  Serum Sodium: 137 mmol/L at 3/23/2024  4:24 AM  Total Bilirubin: 0.3 mg/dL (Using min of 1 mg/dL) at 3/22/2024  4:26 AM  Serum Albumin: 1.1 g/dL (Using min  of 1.5 g/dL) at 3/22/2024  4:26 AM  INR(ratio): 0.9 (Using min of 1) at 3/21/2024  8:00 PM  Age at listing (hypothetical): 53 years  Sex: Male at 3/23/2024  4:24 AM    Will check PCR. Patient asking about treatment.  Plt count okay. Albumin significantly decreased. Anasarca noted. Will add in spironolactone and lasix for now. Check AFP and get u/s this morning.    3/24  No ascites noted, liver without masses/cirrhotic appearance. Not likely the cause of his fluid overload.    Defer to pcp for further referrals     Tobacco abuse  Cessation counseling  Nicotine patch      Noncompliance with medication regimen  Stressed compliance  He hasn't been taking any of his meds.      VTE Risk Mitigation (From admission, onward)           Ordered     IP VTE LOW RISK PATIENT  Once         03/21/24 1433                    Discharge Planning   LYNDA: 3/26/2024     Code Status: Full Code   Is the patient medically ready for discharge?:     Reason for patient still in hospital (select all that apply): Other (specify) discharge today  Discharge Plan A: Home                  Liza Craft PA-C  Department of Hospital Medicine   [facility] (3rd Fl)

## 2024-03-27 NOTE — TELEPHONE ENCOUNTER
"Pt states penis swelling "like a baseball". Pt denies pain. Pt states recently discharged, states "whole body was swollen, taking meds to get fluid off." Pt reports leg and ankle swelling present. Per protocol, see hcp within 4 hours. Advised to call back for any further questions or concerns.   Reason for Disposition   Leg or ankle swelling also present    Additional Information   Negative: Scrotum is painful or tender to touch   Negative: Vomiting   Negative: Scrotum looks infected (e.g., draining sore, ulcer, red rash)   Negative: Patient sounds very sick or weak to the triager    Protocols used: Scrotum Swelling-A-AH    "

## 2024-03-28 ENCOUNTER — HOSPITAL ENCOUNTER (EMERGENCY)
Facility: HOSPITAL | Age: 54
Discharge: ANOTHER HOSPITAL | End: 2024-03-28
Attending: EMERGENCY MEDICINE
Payer: MEDICAID

## 2024-03-28 VITALS
BODY MASS INDEX: 28.79 KG/M2 | SYSTOLIC BLOOD PRESSURE: 110 MMHG | TEMPERATURE: 98 F | HEART RATE: 97 BPM | HEIGHT: 68 IN | OXYGEN SATURATION: 97 % | WEIGHT: 190 LBS | RESPIRATION RATE: 17 BRPM | DIASTOLIC BLOOD PRESSURE: 66 MMHG

## 2024-03-28 DIAGNOSIS — R60.1 ANASARCA: ICD-10-CM

## 2024-03-28 DIAGNOSIS — I50.9 NEW ONSET OF CONGESTIVE HEART FAILURE: Primary | ICD-10-CM

## 2024-03-28 PROBLEM — R74.01 TRANSAMINASEMIA: Status: ACTIVE | Noted: 2024-03-28

## 2024-03-28 PROBLEM — D53.9 MACROCYTIC ANEMIA: Status: ACTIVE | Noted: 2024-03-28

## 2024-03-28 PROBLEM — E11.65 UNCONTROLLED DIABETES MELLITUS WITH HYPERGLYCEMIA: Status: ACTIVE | Noted: 2017-03-02

## 2024-03-28 PROBLEM — I25.10 CAD (CORONARY ARTERY DISEASE): Status: ACTIVE | Noted: 2024-03-28

## 2024-03-28 PROBLEM — I50.32 CHRONIC HEART FAILURE WITH PRESERVED EJECTION FRACTION (HFPEF): Status: ACTIVE | Noted: 2024-03-28

## 2024-03-28 LAB
ALBUMIN SERPL BCP-MCNC: 1.4 G/DL (ref 3.5–5.2)
ALP SERPL-CCNC: 80 U/L (ref 55–135)
ALT SERPL W/O P-5'-P-CCNC: 33 U/L (ref 10–44)
AMMONIA PLAS-SCNC: 47 UMOL/L (ref 10–50)
AMMONIA PLAS-SCNC: 47 UMOL/L (ref 10–50)
ANION GAP SERPL CALC-SCNC: 7 MMOL/L (ref 8–16)
APTT PPP: <21 SEC (ref 21–32)
AST SERPL-CCNC: 47 U/L (ref 10–40)
BACTERIA #/AREA URNS HPF: ABNORMAL /HPF
BASOPHILS # BLD AUTO: 0.09 K/UL (ref 0–0.2)
BASOPHILS NFR BLD: 0.6 % (ref 0–1.9)
BILIRUB SERPL-MCNC: 0.2 MG/DL (ref 0.1–1)
BILIRUB UR QL STRIP: NEGATIVE
BNP SERPL-MCNC: 181 PG/ML (ref 0–99)
BUN SERPL-MCNC: 20 MG/DL (ref 6–20)
CALCIUM SERPL-MCNC: 7.8 MG/DL (ref 8.7–10.5)
CHLORIDE SERPL-SCNC: 105 MMOL/L (ref 95–110)
CLARITY UR: CLEAR
CO2 SERPL-SCNC: 28 MMOL/L (ref 23–29)
COLOR UR: YELLOW
CREAT SERPL-MCNC: 1.3 MG/DL (ref 0.5–1.4)
DIFFERENTIAL METHOD BLD: ABNORMAL
EOSINOPHIL # BLD AUTO: 0.4 K/UL (ref 0–0.5)
EOSINOPHIL NFR BLD: 2.6 % (ref 0–8)
ERYTHROCYTE [DISTWIDTH] IN BLOOD BY AUTOMATED COUNT: 13.3 % (ref 11.5–14.5)
EST. GFR  (NO RACE VARIABLE): >60 ML/MIN/1.73 M^2
GLUCOSE SERPL-MCNC: 226 MG/DL (ref 70–110)
GLUCOSE UR QL STRIP: ABNORMAL
HCT VFR BLD AUTO: 32.4 % (ref 40–54)
HCV GENTYP SERPL NAA+PROBE: ABNORMAL
HCV RNA SERPL NAA+PROBE-LOG IU: 5.63 LOG (10) IU/ML
HCV RNA SERPL QL NAA+PROBE: DETECTED
HCV RNA SPEC NAA+PROBE-ACNC: ABNORMAL IU/ML
HGB BLD-MCNC: 10.4 G/DL (ref 14–18)
HGB UR QL STRIP: ABNORMAL
HYALINE CASTS #/AREA URNS LPF: 2 /LPF
IMM GRANULOCYTES # BLD AUTO: 0.11 K/UL (ref 0–0.04)
IMM GRANULOCYTES NFR BLD AUTO: 0.7 % (ref 0–0.5)
INR PPP: 0.9 (ref 0.8–1.2)
KETONES UR QL STRIP: NEGATIVE
LEUKOCYTE ESTERASE UR QL STRIP: NEGATIVE
LYMPHOCYTES # BLD AUTO: 2.6 K/UL (ref 1–4.8)
LYMPHOCYTES NFR BLD: 17.4 % (ref 18–48)
MCH RBC QN AUTO: 33 PG (ref 27–31)
MCHC RBC AUTO-ENTMCNC: 32.1 G/DL (ref 32–36)
MCV RBC AUTO: 103 FL (ref 82–98)
MICROSCOPIC COMMENT: ABNORMAL
MONOCYTES # BLD AUTO: 1.1 K/UL (ref 0.3–1)
MONOCYTES NFR BLD: 6.9 % (ref 4–15)
NEUTROPHILS # BLD AUTO: 10.9 K/UL (ref 1.8–7.7)
NEUTROPHILS NFR BLD: 71.8 % (ref 38–73)
NITRITE UR QL STRIP: NEGATIVE
NRBC BLD-RTO: 0 /100 WBC
PH UR STRIP: 7 [PH] (ref 5–8)
PLATELET # BLD AUTO: 337 K/UL (ref 150–450)
PLATELET BLD QL SMEAR: ABNORMAL
PMV BLD AUTO: 11.7 FL (ref 9.2–12.9)
POTASSIUM SERPL-SCNC: 4 MMOL/L (ref 3.5–5.1)
PROT SERPL-MCNC: 4.8 G/DL (ref 6–8.4)
PROT UR QL STRIP: ABNORMAL
PROTHROMBIN TIME: 9.8 SEC (ref 9–12.5)
RBC # BLD AUTO: 3.15 M/UL (ref 4.6–6.2)
RBC #/AREA URNS HPF: 3 /HPF (ref 0–4)
SODIUM SERPL-SCNC: 140 MMOL/L (ref 136–145)
SP GR UR STRIP: 1.01 (ref 1–1.03)
URN SPEC COLLECT METH UR: ABNORMAL
UROBILINOGEN UR STRIP-ACNC: NEGATIVE EU/DL
WBC # BLD AUTO: 15.18 K/UL (ref 3.9–12.7)
WBC #/AREA URNS HPF: 1 /HPF (ref 0–5)
YEAST URNS QL MICRO: ABNORMAL

## 2024-03-28 PROCEDURE — 99285 EMERGENCY DEPT VISIT HI MDM: CPT | Mod: 25

## 2024-03-28 PROCEDURE — 36415 COLL VENOUS BLD VENIPUNCTURE: CPT | Performed by: EMERGENCY MEDICINE

## 2024-03-28 PROCEDURE — 85025 COMPLETE CBC W/AUTO DIFF WBC: CPT | Performed by: EMERGENCY MEDICINE

## 2024-03-28 PROCEDURE — 25000003 PHARM REV CODE 250: Performed by: EMERGENCY MEDICINE

## 2024-03-28 PROCEDURE — 80053 COMPREHEN METABOLIC PANEL: CPT | Performed by: EMERGENCY MEDICINE

## 2024-03-28 PROCEDURE — 63600175 PHARM REV CODE 636 W HCPCS: Performed by: EMERGENCY MEDICINE

## 2024-03-28 PROCEDURE — 93005 ELECTROCARDIOGRAM TRACING: CPT

## 2024-03-28 PROCEDURE — 83880 ASSAY OF NATRIURETIC PEPTIDE: CPT | Performed by: EMERGENCY MEDICINE

## 2024-03-28 PROCEDURE — 96375 TX/PRO/DX INJ NEW DRUG ADDON: CPT

## 2024-03-28 PROCEDURE — 85730 THROMBOPLASTIN TIME PARTIAL: CPT | Performed by: EMERGENCY MEDICINE

## 2024-03-28 PROCEDURE — 85610 PROTHROMBIN TIME: CPT | Performed by: EMERGENCY MEDICINE

## 2024-03-28 PROCEDURE — 93010 ELECTROCARDIOGRAM REPORT: CPT | Mod: ,,, | Performed by: INTERNAL MEDICINE

## 2024-03-28 PROCEDURE — 96374 THER/PROPH/DIAG INJ IV PUSH: CPT

## 2024-03-28 PROCEDURE — 96376 TX/PRO/DX INJ SAME DRUG ADON: CPT

## 2024-03-28 PROCEDURE — 82140 ASSAY OF AMMONIA: CPT | Performed by: EMERGENCY MEDICINE

## 2024-03-28 PROCEDURE — 81000 URINALYSIS NONAUTO W/SCOPE: CPT | Performed by: EMERGENCY MEDICINE

## 2024-03-28 RX ORDER — FUROSEMIDE 10 MG/ML
40 INJECTION INTRAMUSCULAR; INTRAVENOUS ONCE
Status: COMPLETED | OUTPATIENT
Start: 2024-03-28 | End: 2024-03-28

## 2024-03-28 RX ORDER — FUROSEMIDE 10 MG/ML
40 INJECTION INTRAMUSCULAR; INTRAVENOUS
Status: COMPLETED | OUTPATIENT
Start: 2024-03-28 | End: 2024-03-28

## 2024-03-28 RX ORDER — DEXAMETHASONE SODIUM PHOSPHATE 4 MG/ML
12 INJECTION, SOLUTION INTRA-ARTICULAR; INTRALESIONAL; INTRAMUSCULAR; INTRAVENOUS; SOFT TISSUE
Status: COMPLETED | OUTPATIENT
Start: 2024-03-28 | End: 2024-03-28

## 2024-03-28 RX ADMIN — DEXAMETHASONE SODIUM PHOSPHATE 12 MG: 4 INJECTION, SOLUTION INTRA-ARTICULAR; INTRALESIONAL; INTRAMUSCULAR; INTRAVENOUS; SOFT TISSUE at 09:03

## 2024-03-28 RX ADMIN — NITROGLYCERIN 0.5 INCH: 20 OINTMENT TOPICAL at 10:03

## 2024-03-28 RX ADMIN — FUROSEMIDE 40 MG: 10 INJECTION, SOLUTION INTRAMUSCULAR; INTRAVENOUS at 09:03

## 2024-03-28 RX ADMIN — FUROSEMIDE 40 MG: 10 INJECTION, SOLUTION INTRAMUSCULAR; INTRAVENOUS at 01:03

## 2024-03-28 NOTE — ED PROVIDER NOTES
"Encounter Date: 3/28/2024       History     Chief Complaint   Patient presents with    Generalized Edema     HPI    Patient is a 53y WM hx DM, HTN, HCV presenting with diffuse swelling.  Was admitted 3/21/24 for HHS and given IVF.  He had an ER visit 3/26/24 for swelling of his testicles. Patient states since his admission he has been swelling notable in the hands and feet.  Today when he woke up he noticed his face, neck, arms and legs were diffusely swollen.  He complains of feeling short of breath like he cannot breathe because the neck swelling is so severe.      Review of patient's allergies indicates:  No Known Allergies  Past Medical History:   Diagnosis Date    Diabetes mellitus     Diabetes mellitus type I     Hepatitis     Hepatitis C     Hypertension     Shingles 10/2018     Past Surgical History:   Procedure Laterality Date    NO PAST SURGERIES       Family History   Problem Relation Age of Onset    Diabetes Mother     Lung cancer Father      Social History     Tobacco Use    Smoking status: Every Day     Current packs/day: 1.00     Types: Cigarettes    Smokeless tobacco: Former   Substance Use Topics    Alcohol use: No    Drug use: Yes     Types: Methamphetamines     Comment: Patient stated "I'm use IV drug user with meth in the last 2 weeks."     Review of Systems   Constitutional:  Negative for chills and fever.   HENT:  Positive for facial swelling.    Respiratory:  Positive for shortness of breath.    Cardiovascular:  Positive for leg swelling. Negative for chest pain.   Gastrointestinal:  Negative for abdominal pain.   Musculoskeletal:  Negative for back pain.   Neurological:  Negative for dizziness and weakness.     Social Determinants of Health     Tobacco Use: High Risk (3/28/2024)    Patient History     Smoking Tobacco Use: Every Day     Smokeless Tobacco Use: Former     Passive Exposure: Not on file   Alcohol Use: Not on file   Financial Resource Strain: Patient Declined (3/22/2024)    Overall " Financial Resource Strain (CARDIA)     Difficulty of Paying Living Expenses: Patient declined   Food Insecurity: Patient Declined (3/22/2024)    Hunger Vital Sign     Worried About Running Out of Food in the Last Year: Patient declined     Ran Out of Food in the Last Year: Patient declined   Transportation Needs: Patient Declined (3/22/2024)    PRAPARE - Transportation     Lack of Transportation (Medical): Patient declined     Lack of Transportation (Non-Medical): Patient declined   Physical Activity: Inactive (3/22/2024)    Exercise Vital Sign     Days of Exercise per Week: 0 days     Minutes of Exercise per Session: 0 min   Stress: Patient Declined (3/22/2024)    [employer] - Occupational Stress Questionnaire     Feeling of Stress : Patient declined   Social Connections: Unknown (3/22/2024)    Social Connection and Isolation Panel [NHANES]     Frequency of Communication with Friends and Family: Patient declined     Frequency of Social Gatherings with Friends and Family: Patient declined     Attends Christian Services: Not on file     Active Member of Clubs or Organizations: Not on file     Attends Club or Organization Meetings: Not on file     Marital Status: Not on file   Housing Stability: Patient Declined (3/22/2024)    Housing Stability Vital Sign     Unable to Pay for Housing in the Last Year: Patient declined     Number of Places Lived in the Last Year: Not on file     Unstable Housing in the Last Year: Patient declined   Depression: Low Risk  (9/22/2020)    Depression     Last PHQ-4: Flowsheet Data: 0       Physical Exam     Initial Vitals [03/28/24 0847]   BP Pulse Resp Temp SpO2   (!) 142/89 95 20 97.7 °F (36.5 °C) 97 %      MAP       --         Physical Exam    Nursing note and vitals reviewed.  Constitutional: He appears well-developed and well-nourished.   HENT:   Head: Normocephalic and atraumatic.   Mouth/Throat: Oropharynx is clear and moist.   Swelling of periorbital  area, eyelids edematous, hands edematous, arms edematous, legs edematous   Eyes: EOM are normal. Pupils are equal, round, and reactive to light.   Neck:   Normal range of motion.  Cardiovascular:  Normal rate and intact distal pulses.           Pulmonary/Chest: Breath sounds normal.   Abdominal: Abdomen is soft.   Musculoskeletal:         General: Edema present.      Cervical back: Normal range of motion.     Neurological: He is alert and oriented to person, place, and time. GCS score is 15. GCS eye subscore is 4. GCS verbal subscore is 5. GCS motor subscore is 6.   Skin: Skin is warm. Capillary refill takes less than 2 seconds.         ED Course   Critical Care    Date/Time: 3/28/2024 1:04 PM    Performed by: Daja Salas MD  Authorized by: Daja Salas MD  Direct patient critical care time: 55 minutes  Total critical care time (exclusive of procedural time) : 55 minutes  Critical care was necessary to treat or prevent imminent or life-threatening deterioration of the following conditions: cardiac failure.  Critical care was time spent personally by me on the following activities: re-evaluation of patient's condition, review of old charts and ordering and performing treatments and interventions.        Labs Reviewed   CBC W/ AUTO DIFFERENTIAL - Abnormal; Notable for the following components:       Result Value    WBC 15.18 (*)     RBC 3.15 (*)     Hemoglobin 10.4 (*)     Hematocrit 32.4 (*)      (*)     MCH 33.0 (*)     Immature Granulocytes 0.7 (*)     Gran # (ANC) 10.9 (*)     Immature Grans (Abs) 0.11 (*)     Mono # 1.1 (*)     Lymph % 17.4 (*)     Platelet Estimate Clumped (*)     All other components within normal limits   COMPREHENSIVE METABOLIC PANEL - Abnormal; Notable for the following components:    Glucose 226 (*)     Calcium 7.8 (*)     Total Protein 4.8 (*)     Albumin 1.4 (*)     AST 47 (*)     Anion Gap 7 (*)     All other components within normal limits   B-TYPE NATRIURETIC PEPTIDE  - Abnormal; Notable for the following components:     (*)     All other components within normal limits   URINALYSIS, REFLEX TO URINE CULTURE - Abnormal; Notable for the following components:    Protein, UA 3+ (*)     Glucose, UA 4+ (*)     Occult Blood UA 3+ (*)     All other components within normal limits    Narrative:     Specimen Source->Urine   URINALYSIS MICROSCOPIC - Abnormal; Notable for the following components:    Hyaline Casts, UA 2 (*)     All other components within normal limits    Narrative:     Specimen Source->Urine   AMMONIA   PROTIME-INR   APTT   AMMONIA     EKG Readings: (Independently Interpreted)   Initial Reading: No STEMI. Rhythm: Normal Sinus Rhythm. Heart Rate: 69. Ectopy: No Ectopy. Conduction: Normal. ST Segments: Normal ST Segments. Axis: Normal.       Imaging Results              X-Ray Chest AP Portable (Final result)  Result time 03/28/24 09:56:22      Final result by Jill Ellis MD (03/28/24 09:56:22)                   Impression:      As above.      Electronically signed by: Jill Ellis MD  Date:    03/28/2024  Time:    09:56               Narrative:    EXAMINATION:  XR CHEST AP PORTABLE    CLINICAL HISTORY:  Generalized edema    TECHNIQUE:  Single frontal view of the chest was performed.    COMPARISON:  03/24/2024    FINDINGS:  There is pulmonary vascular congestion with suspected mild pulmonary edema with patchy opacity in the bilateral lung bases.  The heart is at the upper limits of normal in size.  Skeletal structures are intact.                                       Medications   furosemide injection 40 mg (40 mg Intravenous Given 3/28/24 0938)   dexAMETHasone injection 12 mg (12 mg Intravenous Given 3/28/24 0940)   nitroGLYCERIN 2% TD oint ointment 0.5 inch (0.5 inches Transdermal Given 3/28/24 1040)   furosemide injection 40 mg (40 mg Intravenous Given 3/28/24 1308)     Medical Decision Making  Patient is a 53y WM hx DM, HTN, HCV presenting with diffuse body  edema and shortness of breath.  Exam he is non toxic, afebrile with decreased breath sounds in the base and diffuse pitting edema from the face to the lower legs. Labs remarkable for , leukocytosis 15, anemia with H/H 10/32 and hyperglycemia 226.  Patient has been given a dose of 40 mg lasix and 0.5 inch nitroglycerine paste.  No chest pain throughout his ER stay.    DDX: heart failure, ACS, renal failure, metabolic derangement      He has been accepted to [facility] for new onset heart failure.     Patient noted to desaturate to 93% when laying flat.  Placed on 2L NC. Nurse reports he may have aspirated on water.     Amount and/or Complexity of Data Reviewed  Labs: ordered. Decision-making details documented in ED Course.  Radiology: ordered.    Risk  Prescription drug management.               ED Course as of 03/28/24 1412   Thu Mar 28, 2024   1321 BNP(!): 181 [AP]   1321 WBC(!): 15.18 [AP]   1321 Hemoglobin(!): 10.4 [AP]   1321 Hematocrit(!): 32.4 [AP]   1321 Glucose(!): 226 [AP]      ED Course User Index  [AP] Daja Salas MD                           Clinical Impression:  Final diagnoses:  [R60.1] Anasarca  [I50.9] New onset of congestive heart failure (Primary)          ED Disposition Condition    Transfer to Another Facility Stable                Daja Salsa MD  03/28/24 1343       Daja Salas MD  03/28/24 1412

## 2024-03-28 NOTE — ED NOTES
Patient c/o of dryness to his mouth. Patient requested something to drink. Notified MD patient wanted  something to drink. Per verbal from MD patient can have ice chips & water. Patient given ice & water and immediately started vomiting . MD notified . Patient bed linens changed purewick placed and bedside commode at the bedside . Assisted patient to get up to use the bathroom .

## 2024-03-28 NOTE — ED NOTES
Called [phone] and gave report to Sushma RAMIREZ RN . Patient awaiting for transfer to [facility] .

## 2024-03-28 NOTE — ED TRIAGE NOTES
C/o generalized swelling, facial swelling, swelling in bilateral hands, and c/o difficulty breathing for a few days.

## 2024-03-28 NOTE — ED NOTES
Received call from patient flow center . Patient has acceptance @ [facility]. Report # [phone]. Accepting provider Dr. Jeronimo Lee.

## 2024-03-29 PROBLEM — I50.9 CHF (CONGESTIVE HEART FAILURE): Status: ACTIVE | Noted: 2024-03-29

## 2024-04-01 LAB
OHS QRS DURATION: 90 MS
OHS QTC CALCULATION: 428 MS

## 2024-04-06 ENCOUNTER — HOSPITAL ENCOUNTER (EMERGENCY)
Facility: HOSPITAL | Age: 54
Discharge: ANOTHER HOSPITAL | End: 2024-04-06
Attending: EMERGENCY MEDICINE
Payer: MEDICAID

## 2024-04-06 VITALS
WEIGHT: 200 LBS | OXYGEN SATURATION: 98 % | HEART RATE: 59 BPM | BODY MASS INDEX: 30.41 KG/M2 | RESPIRATION RATE: 21 BRPM | TEMPERATURE: 99 F | DIASTOLIC BLOOD PRESSURE: 81 MMHG | SYSTOLIC BLOOD PRESSURE: 162 MMHG

## 2024-04-06 DIAGNOSIS — R09.02 HYPOXIA: Primary | ICD-10-CM

## 2024-04-06 DIAGNOSIS — T68.XXXA HYPOTHERMIA, INITIAL ENCOUNTER: ICD-10-CM

## 2024-04-06 DIAGNOSIS — R41.82 AMS (ALTERED MENTAL STATUS): ICD-10-CM

## 2024-04-06 DIAGNOSIS — I50.9 CONGESTIVE HEART FAILURE, UNSPECIFIED HF CHRONICITY, UNSPECIFIED HEART FAILURE TYPE: ICD-10-CM

## 2024-04-06 LAB
ALBUMIN SERPL BCP-MCNC: 1.7 G/DL (ref 3.5–5.2)
ALP SERPL-CCNC: 76 U/L (ref 55–135)
ALT SERPL W/O P-5'-P-CCNC: 32 U/L (ref 10–44)
AMPHET+METHAMPHET UR QL: NEGATIVE
ANION GAP SERPL CALC-SCNC: 8 MMOL/L (ref 8–16)
AST SERPL-CCNC: 30 U/L (ref 10–40)
BACTERIA #/AREA URNS HPF: NORMAL /HPF
BARBITURATES UR QL SCN>200 NG/ML: NEGATIVE
BASOPHILS # BLD AUTO: 0.08 K/UL (ref 0–0.2)
BASOPHILS NFR BLD: 0.5 % (ref 0–1.9)
BENZODIAZ UR QL SCN>200 NG/ML: NEGATIVE
BILIRUB SERPL-MCNC: 0.2 MG/DL (ref 0.1–1)
BILIRUB UR QL STRIP: NEGATIVE
BNP SERPL-MCNC: 169 PG/ML (ref 0–99)
BUN SERPL-MCNC: 24 MG/DL (ref 6–20)
BZE UR QL SCN: NEGATIVE
CALCIUM SERPL-MCNC: 7.6 MG/DL (ref 8.7–10.5)
CANNABINOIDS UR QL SCN: NEGATIVE
CHLORIDE SERPL-SCNC: 106 MMOL/L (ref 95–110)
CLARITY UR: CLEAR
CO2 SERPL-SCNC: 26 MMOL/L (ref 23–29)
COLOR UR: YELLOW
CREAT SERPL-MCNC: 1.3 MG/DL (ref 0.5–1.4)
CREAT UR-MCNC: 54.2 MG/DL (ref 23–375)
DIFFERENTIAL METHOD BLD: ABNORMAL
EOSINOPHIL # BLD AUTO: 0.3 K/UL (ref 0–0.5)
EOSINOPHIL NFR BLD: 2.1 % (ref 0–8)
ERYTHROCYTE [DISTWIDTH] IN BLOOD BY AUTOMATED COUNT: 13.2 % (ref 11.5–14.5)
EST. GFR  (NO RACE VARIABLE): >60 ML/MIN/1.73 M^2
ETHANOL SERPL-MCNC: <10 MG/DL
GLUCOSE SERPL-MCNC: 104 MG/DL (ref 70–110)
GLUCOSE UR QL STRIP: ABNORMAL
HCT VFR BLD AUTO: 32.8 % (ref 40–54)
HGB BLD-MCNC: 11.1 G/DL (ref 14–18)
HGB UR QL STRIP: ABNORMAL
HYALINE CASTS #/AREA URNS LPF: 0 /LPF
IMM GRANULOCYTES # BLD AUTO: 0.18 K/UL (ref 0–0.04)
IMM GRANULOCYTES NFR BLD AUTO: 1.2 % (ref 0–0.5)
KETONES UR QL STRIP: NEGATIVE
LACTATE SERPL-SCNC: 0.8 MMOL/L (ref 0.5–2.2)
LEUKOCYTE ESTERASE UR QL STRIP: NEGATIVE
LYMPHOCYTES # BLD AUTO: 2.1 K/UL (ref 1–4.8)
LYMPHOCYTES NFR BLD: 14.5 % (ref 18–48)
MCH RBC QN AUTO: 33.3 PG (ref 27–31)
MCHC RBC AUTO-ENTMCNC: 33.8 G/DL (ref 32–36)
MCV RBC AUTO: 99 FL (ref 82–98)
METHADONE UR QL SCN>300 NG/ML: NEGATIVE
MICROSCOPIC COMMENT: NORMAL
MONOCYTES # BLD AUTO: 0.9 K/UL (ref 0.3–1)
MONOCYTES NFR BLD: 6.1 % (ref 4–15)
NEUTROPHILS # BLD AUTO: 11 K/UL (ref 1.8–7.7)
NEUTROPHILS NFR BLD: 75.6 % (ref 38–73)
NITRITE UR QL STRIP: NEGATIVE
NRBC BLD-RTO: 0 /100 WBC
OPIATES UR QL SCN: NEGATIVE
PCP UR QL SCN>25 NG/ML: NEGATIVE
PH UR STRIP: 6 [PH] (ref 5–8)
PLATELET # BLD AUTO: 390 K/UL (ref 150–450)
PMV BLD AUTO: 10.8 FL (ref 9.2–12.9)
POCT GLUCOSE: 118 MG/DL (ref 70–110)
POCT GLUCOSE: 85 MG/DL (ref 70–110)
POTASSIUM SERPL-SCNC: 3.3 MMOL/L (ref 3.5–5.1)
PROT SERPL-MCNC: 5.1 G/DL (ref 6–8.4)
PROT UR QL STRIP: ABNORMAL
RBC # BLD AUTO: 3.33 M/UL (ref 4.6–6.2)
RBC #/AREA URNS HPF: 0 /HPF (ref 0–4)
SODIUM SERPL-SCNC: 140 MMOL/L (ref 136–145)
SP GR UR STRIP: 1.01 (ref 1–1.03)
TOXICOLOGY INFORMATION: NORMAL
TROPONIN I SERPL DL<=0.01 NG/ML-MCNC: 0.01 NG/ML (ref 0–0.03)
URN SPEC COLLECT METH UR: ABNORMAL
UROBILINOGEN UR STRIP-ACNC: NEGATIVE EU/DL
WBC # BLD AUTO: 14.6 K/UL (ref 3.9–12.7)
WBC #/AREA URNS HPF: 2 /HPF (ref 0–5)

## 2024-04-06 PROCEDURE — 84484 ASSAY OF TROPONIN QUANT: CPT | Performed by: EMERGENCY MEDICINE

## 2024-04-06 PROCEDURE — 87040 BLOOD CULTURE FOR BACTERIA: CPT | Performed by: EMERGENCY MEDICINE

## 2024-04-06 PROCEDURE — 80307 DRUG TEST PRSMV CHEM ANLYZR: CPT | Performed by: EMERGENCY MEDICINE

## 2024-04-06 PROCEDURE — 93005 ELECTROCARDIOGRAM TRACING: CPT

## 2024-04-06 PROCEDURE — 96365 THER/PROPH/DIAG IV INF INIT: CPT

## 2024-04-06 PROCEDURE — 96375 TX/PRO/DX INJ NEW DRUG ADDON: CPT

## 2024-04-06 PROCEDURE — 99285 EMERGENCY DEPT VISIT HI MDM: CPT | Mod: 25

## 2024-04-06 PROCEDURE — 85025 COMPLETE CBC W/AUTO DIFF WBC: CPT | Performed by: EMERGENCY MEDICINE

## 2024-04-06 PROCEDURE — 27000221 HC OXYGEN, UP TO 24 HOURS

## 2024-04-06 PROCEDURE — 96367 TX/PROPH/DG ADDL SEQ IV INF: CPT

## 2024-04-06 PROCEDURE — 25000003 PHARM REV CODE 250: Performed by: SURGERY

## 2024-04-06 PROCEDURE — 80053 COMPREHEN METABOLIC PANEL: CPT | Performed by: EMERGENCY MEDICINE

## 2024-04-06 PROCEDURE — 63600175 PHARM REV CODE 636 W HCPCS: Performed by: SURGERY

## 2024-04-06 PROCEDURE — 82077 ASSAY SPEC XCP UR&BREATH IA: CPT | Performed by: EMERGENCY MEDICINE

## 2024-04-06 PROCEDURE — 82962 GLUCOSE BLOOD TEST: CPT

## 2024-04-06 PROCEDURE — 94761 N-INVAS EAR/PLS OXIMETRY MLT: CPT

## 2024-04-06 PROCEDURE — 25000003 PHARM REV CODE 250: Performed by: EMERGENCY MEDICINE

## 2024-04-06 PROCEDURE — 93010 ELECTROCARDIOGRAM REPORT: CPT | Mod: ,,, | Performed by: INTERNAL MEDICINE

## 2024-04-06 PROCEDURE — 99900035 HC TECH TIME PER 15 MIN (STAT)

## 2024-04-06 PROCEDURE — 83880 ASSAY OF NATRIURETIC PEPTIDE: CPT | Performed by: EMERGENCY MEDICINE

## 2024-04-06 PROCEDURE — 83605 ASSAY OF LACTIC ACID: CPT | Performed by: EMERGENCY MEDICINE

## 2024-04-06 PROCEDURE — 63600175 PHARM REV CODE 636 W HCPCS: Performed by: EMERGENCY MEDICINE

## 2024-04-06 PROCEDURE — 81000 URINALYSIS NONAUTO W/SCOPE: CPT | Mod: 59 | Performed by: EMERGENCY MEDICINE

## 2024-04-06 PROCEDURE — 94760 N-INVAS EAR/PLS OXIMETRY 1: CPT

## 2024-04-06 RX ORDER — FUROSEMIDE 10 MG/ML
40 INJECTION INTRAMUSCULAR; INTRAVENOUS
Status: COMPLETED | OUTPATIENT
Start: 2024-04-06 | End: 2024-04-06

## 2024-04-06 RX ORDER — DEXTROSE MONOHYDRATE 100 MG/ML
INJECTION, SOLUTION INTRAVENOUS
Status: COMPLETED | OUTPATIENT
Start: 2024-04-06 | End: 2024-04-06

## 2024-04-06 RX ADMIN — CEFTRIAXONE SODIUM 1 G: 1 INJECTION, POWDER, FOR SOLUTION INTRAMUSCULAR; INTRAVENOUS at 05:04

## 2024-04-06 RX ADMIN — AZITHROMYCIN MONOHYDRATE 500 MG: 500 INJECTION, POWDER, LYOPHILIZED, FOR SOLUTION INTRAVENOUS at 07:04

## 2024-04-06 RX ADMIN — DEXTROSE MONOHYDRATE: 100 INJECTION, SOLUTION INTRAVENOUS at 02:04

## 2024-04-06 RX ADMIN — FUROSEMIDE 40 MG: 10 INJECTION, SOLUTION INTRAMUSCULAR; INTRAVENOUS at 02:04

## 2024-04-06 NOTE — ED NOTES
RECEIVED CALL FROM TRANSFER CENTER ASKING WHICH SERVICE PATIENT NEEDS FOR TRANSFER. NOTIFIED DR. KELSEY AND PER VERBAL PATIENT NEEDS CARDIAC SERVICES . NOTIFIED TRANSFER CENTER.

## 2024-04-06 NOTE — ED NOTES
CBG 81   PT note: PT consult received and chart reviewed. Attempted physical therapy evaluation. Pt stated, \"I have done told that other therapy lady earlier that I don't want any. \" With some further discussion, pt admitted that he was unsteady and felt weak and therapist encouraged pt that with therapy, those things could improve. Pt finally agreed to \"possibly\" participating tomorrow as today he was \"too weak. \" Will attempt again as schedule allows and if pt con't to refuse, will consider discharging the order per pt wishes. No treatment provided.  Gil Phillip, PT

## 2024-04-06 NOTE — ED PROVIDER NOTES
"Encounter Date: 4/6/2024       History     Chief Complaint   Patient presents with    Altered Mental Status     EMS reports 911 was called by pt's roommate for altered mental status and hypoglyemia, blood sugar was 44. EMS gave oral glucose PTA but patient was drooling. Pt awake and alert on arrival but confused.     HPI    Patient is a 53y WM hx DM, HCV, diastolic HF, HTN BIB EMS with AMS and hypoglycemia.  Upon EMS arrival patient was altered and given oral glucose.  Upon ER arrival patient speaking in short full sentences to staff, some confusion noted.      Patient reports taking his insulin and not eating. He forgot.      Review of patient's allergies indicates:  No Known Allergies  Past Medical History:   Diagnosis Date    Diabetes mellitus     Diabetes mellitus type I     Hepatitis     Hepatitis C     Hypertension     Shingles 10/2018     Past Surgical History:   Procedure Laterality Date    NO PAST SURGERIES       Family History   Problem Relation Age of Onset    Diabetes Mother     Lung cancer Father      Social History     Tobacco Use    Smoking status: Every Day     Current packs/day: 1.00     Types: Cigarettes    Smokeless tobacco: Former   Substance Use Topics    Alcohol use: No    Drug use: Yes     Types: Methamphetamines     Comment: Patient stated "I'm use IV drug user with meth in the last 2 weeks."     Review of Systems   Constitutional:  Negative for chills and fever.   Respiratory:  Positive for shortness of breath.    Cardiovascular:  Negative for chest pain.   Gastrointestinal:  Negative for abdominal pain.   All other systems reviewed and are negative.    Social Determinants of Health     Tobacco Use: High Risk (4/6/2024)    Patient History     Smoking Tobacco Use: Every Day     Smokeless Tobacco Use: Former     Passive Exposure: Not on file   Alcohol Use: Not At Risk (3/28/2024)    AUDIT-C     Frequency of Alcohol Consumption: Never     Average Number of Drinks: Patient does not drink     " Frequency of Binge Drinking: Never   Financial Resource Strain: Low Risk  (3/31/2024)    Overall Financial Resource Strain (CARDIA)     Difficulty of Paying Living Expenses: Not very hard   Food Insecurity: No Food Insecurity (3/31/2024)    Hunger Vital Sign     Worried About Running Out of Food in the Last Year: Never true     Ran Out of Food in the Last Year: Never true   Transportation Needs: No Transportation Needs (3/31/2024)    PRAPARE - Transportation     Lack of Transportation (Medical): No     Lack of Transportation (Non-Medical): No   Physical Activity: Inactive (3/28/2024)    Exercise Vital Sign     Days of Exercise per Week: 0 days     Minutes of Exercise per Session: 0 min   Stress: Stress Concern Present (3/31/2024)    [employer] - Occupational Stress Questionnaire     Feeling of Stress : To some extent   Social Connections: Socially Isolated (3/28/2024)    Social Connection and Isolation Panel [NHANES]     Frequency of Communication with Friends and Family: Twice a week     Frequency of Social Gatherings with Friends and Family: Never     Attends Jewish Services: Never     Active Member of Clubs or Organizations: No     Attends Club or Organization Meetings: Never     Marital Status: Never    Housing Stability: Low Risk  (3/31/2024)    Housing Stability Vital Sign     Unable to Pay for Housing in the Last Year: No     Number of Places Lived in the Last Year: 1     Unstable Housing in the Last Year: No   Depression: Low Risk  (9/22/2020)    Depression     Last PHQ-4: Flowsheet Data: 0       Physical Exam     Initial Vitals   BP Pulse Resp Temp SpO2   04/06/24 1431 04/06/24 1428 04/06/24 1428 04/06/24 1434 04/06/24 1430   (!) 182/89 (!) 48 (!) 24 (!) 92.2 °F (33.4 °C) (!) 91 %      MAP       --                Physical Exam    Nursing note and vitals reviewed.  Constitutional: He appears well-developed and well-nourished.   HENT:   Head: Normocephalic and  atraumatic.   Mouth/Throat: Oropharynx is clear and moist.   Eyes: EOM are normal. Pupils are equal, round, and reactive to light. Right eye exhibits no discharge. Left eye exhibits no discharge.   Neck: No JVD present.   Normal range of motion.  Cardiovascular:  Normal heart sounds.   Bradycardia present.         Pulmonary/Chest: He has decreased breath sounds.   Abdominal: Abdomen is soft. There is no abdominal tenderness.   Musculoskeletal:         General: Normal range of motion.      Cervical back: Normal range of motion.     Neurological:   Knows  however not name of hospital.  Recognized RT from last admission  GCS 14     Skin: Capillary refill takes 2 to 3 seconds. No rash noted.         ED Course   Procedures  Labs Reviewed   CBC W/ AUTO DIFFERENTIAL - Abnormal; Notable for the following components:       Result Value    WBC 14.60 (*)     RBC 3.33 (*)     Hemoglobin 11.1 (*)     Hematocrit 32.8 (*)     MCV 99 (*)     MCH 33.3 (*)     Immature Granulocytes 1.2 (*)     Gran # (ANC) 11.0 (*)     Immature Grans (Abs) 0.18 (*)     Gran % 75.6 (*)     Lymph % 14.5 (*)     All other components within normal limits   COMPREHENSIVE METABOLIC PANEL - Abnormal; Notable for the following components:    Potassium 3.3 (*)     BUN 24 (*)     Calcium 7.6 (*)     Total Protein 5.1 (*)     Albumin 1.7 (*)     All other components within normal limits   URINALYSIS - Abnormal; Notable for the following components:    Protein, UA 2+ (*)     Glucose, UA 2+ (*)     Occult Blood UA Trace (*)     All other components within normal limits    Narrative:     Specimen Source->Urine   POCT GLUCOSE - Abnormal; Notable for the following components:    POCT Glucose 118 (*)     All other components within normal limits   CULTURE, BLOOD   CULTURE, BLOOD   TROPONIN I   DRUG SCREEN PANEL, URINE EMERGENCY    Narrative:     Specimen Source->Urine   ALCOHOL,MEDICAL (ETHANOL)   URINALYSIS MICROSCOPIC    Narrative:     Specimen Source->Urine    B-TYPE NATRIURETIC PEPTIDE   LACTIC ACID, PLASMA   B-TYPE NATRIURETIC PEPTIDE   POCT GLUCOSE   POCT GLUCOSE MONITORING CONTINUOUS     EKG Readings: (Independently Interpreted)   Initial Reading: No STEMI. Rhythm: Sinus Bradycardia. Heart Rate: 49. Ectopy: No Ectopy. Conduction: Normal. ST Segments: Non-Specific ST Segment Depression.       Imaging Results              X-Ray Chest AP Portable (Final result)  Result time 04/06/24 17:01:52      Final result by Susana Rinaldi MD (04/06/24 17:01:52)                   Impression:      No acute abnormality.      Electronically signed by: Susana Rinaldi MD  Date:    04/06/2024  Time:    17:01               Narrative:    EXAMINATION:  XR CHEST AP PORTABLE    CLINICAL HISTORY:  Altered mental status, unspecified    TECHNIQUE:  Single frontal view of the chest was performed.    COMPARISON:  03/30/2024    FINDINGS:  The lungs are clear, with normal appearance of pulmonary vasculature and no pleural effusion or pneumothorax.    The cardiac silhouette is normal in size. The hilar and mediastinal contours are unremarkable.    Bones are intact.                                       CT Head Without Contrast (Final result)  Result time 04/06/24 16:41:56      Final result by Susana Rinaldi MD (04/06/24 16:41:56)                   Impression:      No acute abnormality.      Electronically signed by: Susana Rinaldi MD  Date:    04/06/2024  Time:    16:41               Narrative:    EXAMINATION:  CT HEAD WITHOUT CONTRAST    CLINICAL HISTORY:  ams;    TECHNIQUE:  Low dose axial CT images obtained throughout the head without intravenous contrast. Sagittal and coronal reconstructions were performed.    COMPARISON:  03/21/2024    FINDINGS:  Intracranial compartment:    Ventricles and sulci are normal in size for age without evidence of hydrocephalus. No extra-axial blood or fluid collections.    The brain parenchyma appears normal. No parenchymal mass, hemorrhage, edema or major  vascular distribution infarct.    Skull/extracranial contents (limited evaluation): No fracture. Mastoid air cells and paranasal sinuses are essentially clear.                                       Medications   nitroGLYCERIN 2% TD oint ointment 1 inch (0 inches Transdermal Hold 4/6/24 1445)   furosemide injection 40 mg (40 mg Intravenous Given 4/6/24 1442)   dextrose 10 % infusion ( Intravenous New Bag 4/6/24 1458)   cefTRIAXone (Rocephin) 1 g in dextrose 5 % in water (D5W) 100 mL IVPB (MB+) (1 g Intravenous New Bag 4/6/24 1728)     Medical Decision Making  This is an emergent evaluation of a 53y WM hx DM, CHF presenting with hypoglycemia and AMS.  Exam he is confused, bradycardic, hypoxic and GCS 14 with decreased breath sounds.  He is requiring 2L supplemental oxygen.  Given D10.   Labs remarkable for leukocytosis 14.  He was empirically treated for sepsis with rocephin and IVF.  Lactic acid returned normal.    He has been given nitro paste and 40 mg IV lasix and 1g rocephin. CT head negative.  CXR on my independent interpretation negative for acute disease.     BNP pending.  [facility] awaiting results to accept for presumed heart failure exacerbation.    DDX: iatrogenic hypoglycemia, heart failure, metabolic derangement, electrolyte abnormality.    Amount and/or Complexity of Data Reviewed  Labs: ordered.  Radiology: ordered.    Risk  Prescription drug management.               ED Course as of 04/06/24 1812   Sat Apr 06, 2024   1638 WBC(!): 14.60 [AP]   1638 Hemoglobin(!): 11.1 [AP]   1638 Hematocrit(!): 32.8 [AP]      ED Course User Index  [AP] Daja Salas MD                           Clinical Impression:  Final diagnoses:  [R41.82] AMS (altered mental status)                 Daja Salas MD  04/06/24 1812

## 2024-04-06 NOTE — PROVIDER PROGRESS NOTES - EMERGENCY DEPT.
ER Physician Progress/Interval Note     This patient presented to the ER with altered mental status  I took over this patient from Dr. Salas at 6:00 p.m. shift change  Patient was noted by EMS to have a low blood sugar on arrival  That blood sugar was corrected in his altered mental status resolved  CT of the head showed no acute findings in the emergency room today    Patient was discharged from [facility] 3 days ago on the 3rd with CHF  Strangely today the patient has hypothermic, addressed with a Nikolai Hugger today  Strangely as well the patient was also hypoxic, placed on 2 liters today as well    White count 95738 with a normal lactic acid, blood cultures are currently pending  Urinalysis shows no infection, urine drug screen was (-), ethanol level is 0 today  Patient was given Lasix by the previous ER physician, chest x-ray appears stable    This patient is hypoxic & hypothermic, I can not account for why this is the case  He has getting a Nikolai Hugger now & broad-spectrum antibiotics sepsis coverage  However this patient does not appear to be overtly septic based on evaluation now    Patient was accepted by [facility] for further evaluation, very difficult case  We certainly appreciate [facility] for taking this patient back for treatment    Critical Care ED Physician Time (minutes):  -- Performed by: Jose Antonio Dowd M.D.  -- Date/Time: 7:02 PM 4/6/2024   -- Direct Patient Care (Face Time): 15  -- Additional History from Records or Taking Additional History: 15  -- Ordering, Reviewing, and Interpreting Diagnostic Studies: 15  -- Total Time in Documentation: 15  -- Consultation with Other Physicians: 15  -- Consultation with Family Related to Condition: 15  -- Total Critical Care Time: 75  -- Critical care was necessary to treat hypothermia, hypoglycemia, hypoxia  -- Critical care was time spent personally by me on the following activities:   -- discussions with consultants regarding treatment plan  today  -- development of treatment plan with patient & their family  -- examination of patient, ordering and performing treatments   -- review of radiographic studies, re-evaluation of pt's condition  -- review of labs and evaluation of response to treatment         Jose Antonio Dowd M.D. 6:59 PM 4/6/2024

## 2024-04-08 LAB
OHS QRS DURATION: 112 MS
OHS QTC CALCULATION: 485 MS

## 2024-04-12 LAB — BACTERIA BLD CULT: NORMAL

## 2024-05-03 ENCOUNTER — HOSPITAL ENCOUNTER (OUTPATIENT)
Dept: RADIOLOGY | Facility: HOSPITAL | Age: 54
Discharge: HOME | End: 2024-05-03
Attending: INTERNAL MEDICINE
Payer: MEDICAID

## 2024-05-03 DIAGNOSIS — R06.02 SOB (SHORTNESS OF BREATH): ICD-10-CM

## 2024-05-03 PROCEDURE — 71046 X-RAY EXAM CHEST 2 VIEWS: CPT | Mod: 26,,, | Performed by: RADIOLOGY

## 2024-05-03 PROCEDURE — 71046 X-RAY EXAM CHEST 2 VIEWS: CPT | Mod: TC

## 2024-05-04 ENCOUNTER — HOSPITAL ENCOUNTER (EMERGENCY)
Facility: HOSPITAL | Age: 54
Discharge: HOME | End: 2024-05-04
Attending: SURGERY
Payer: MEDICAID

## 2024-05-04 VITALS
RESPIRATION RATE: 22 BRPM | HEART RATE: 102 BPM | TEMPERATURE: 98 F | OXYGEN SATURATION: 97 % | BODY MASS INDEX: 30.12 KG/M2 | HEIGHT: 68 IN | DIASTOLIC BLOOD PRESSURE: 65 MMHG | WEIGHT: 198.75 LBS | SYSTOLIC BLOOD PRESSURE: 110 MMHG

## 2024-05-04 DIAGNOSIS — J69.0 ASPIRATION PNEUMONIA OF BOTH LOWER LOBES DUE TO GASTRIC SECRETIONS: Primary | ICD-10-CM

## 2024-05-04 DIAGNOSIS — R06.02 SHORTNESS OF BREATH: ICD-10-CM

## 2024-05-04 LAB
ALBUMIN SERPL BCP-MCNC: 2 G/DL (ref 3.5–5.2)
ALLENS TEST: ABNORMAL
ALP SERPL-CCNC: 88 U/L (ref 55–135)
ALT SERPL W/O P-5'-P-CCNC: 28 U/L (ref 10–44)
AMPHET+METHAMPHET UR QL: NEGATIVE
ANION GAP SERPL CALC-SCNC: 12 MMOL/L (ref 8–16)
AST SERPL-CCNC: 29 U/L (ref 10–40)
BACTERIA #/AREA URNS HPF: ABNORMAL /HPF
BARBITURATES UR QL SCN>200 NG/ML: NEGATIVE
BASOPHILS # BLD AUTO: 0.09 K/UL (ref 0–0.2)
BASOPHILS NFR BLD: 0.5 % (ref 0–1.9)
BENZODIAZ UR QL SCN>200 NG/ML: NEGATIVE
BILIRUB SERPL-MCNC: 0.4 MG/DL (ref 0.1–1)
BILIRUB UR QL STRIP: NEGATIVE
BNP SERPL-MCNC: 80 PG/ML (ref 0–99)
BUN SERPL-MCNC: 54 MG/DL (ref 6–20)
BZE UR QL SCN: NEGATIVE
CALCIUM SERPL-MCNC: 8.9 MG/DL (ref 8.7–10.5)
CANNABINOIDS UR QL SCN: NEGATIVE
CHLORIDE SERPL-SCNC: 102 MMOL/L (ref 95–110)
CLARITY UR: CLEAR
CO2 SERPL-SCNC: 25 MMOL/L (ref 23–29)
COLOR UR: YELLOW
CREAT SERPL-MCNC: 2.3 MG/DL (ref 0.5–1.4)
CREAT UR-MCNC: 24.9 MG/DL (ref 23–375)
DELSYS: ABNORMAL
DIFFERENTIAL METHOD BLD: ABNORMAL
EOSINOPHIL # BLD AUTO: 0.2 K/UL (ref 0–0.5)
EOSINOPHIL NFR BLD: 1.1 % (ref 0–8)
ERYTHROCYTE [DISTWIDTH] IN BLOOD BY AUTOMATED COUNT: 12.6 % (ref 11.5–14.5)
EST. GFR  (NO RACE VARIABLE): 33 ML/MIN/1.73 M^2
FIO2: 21 (ref 21–100)
GLUCOSE SERPL-MCNC: 272 MG/DL (ref 70–110)
GLUCOSE UR QL STRIP: ABNORMAL
GROUP A STREP, MOLECULAR: NEGATIVE
HCO3 UR-SCNC: 26.2 MMOL/L (ref 22–26)
HCT VFR BLD AUTO: 39.5 % (ref 40–54)
HGB BLD-MCNC: 13.4 G/DL (ref 14–18)
HGB UR QL STRIP: ABNORMAL
HYALINE CASTS #/AREA URNS LPF: 2 /LPF
IMM GRANULOCYTES # BLD AUTO: 0.06 K/UL (ref 0–0.04)
IMM GRANULOCYTES NFR BLD AUTO: 0.4 % (ref 0–0.5)
INFLUENZA A, MOLECULAR: NEGATIVE
INFLUENZA B, MOLECULAR: NEGATIVE
INR PPP: 0.9 (ref 0.8–1.2)
KETONES UR QL STRIP: NEGATIVE
LACTATE SERPL-SCNC: 1.7 MMOL/L (ref 0.5–2.2)
LEUKOCYTE ESTERASE UR QL STRIP: NEGATIVE
LYMPHOCYTES # BLD AUTO: 1.3 K/UL (ref 1–4.8)
LYMPHOCYTES NFR BLD: 7.5 % (ref 18–48)
MCH RBC QN AUTO: 33.1 PG (ref 27–31)
MCHC RBC AUTO-ENTMCNC: 33.9 G/DL (ref 32–36)
MCV RBC AUTO: 98 FL (ref 82–98)
METHADONE UR QL SCN>300 NG/ML: NEGATIVE
MICROSCOPIC COMMENT: ABNORMAL
MONOCYTES # BLD AUTO: 1 K/UL (ref 0.3–1)
MONOCYTES NFR BLD: 5.8 % (ref 4–15)
NEUTROPHILS # BLD AUTO: 14.4 K/UL (ref 1.8–7.7)
NEUTROPHILS NFR BLD: 84.7 % (ref 38–73)
NITRITE UR QL STRIP: NEGATIVE
NRBC BLD-RTO: 0 /100 WBC
OPIATES UR QL SCN: NEGATIVE
PCO2 BLDA: 36 MMHG (ref 35–45)
PCP UR QL SCN>25 NG/ML: NEGATIVE
PH SMN: 7.47 [PH] (ref 7.35–7.45)
PH UR STRIP: 6 [PH] (ref 5–8)
PLATELET # BLD AUTO: 443 K/UL (ref 150–450)
PMV BLD AUTO: 10.7 FL (ref 9.2–12.9)
PO2 BLDA: 57 MMHG (ref 75–100)
POC BE: 2.7 MMOL/L (ref -2–2)
POC COHB: 1.7 % (ref 0–3)
POC METHB: 0.3 % (ref 0–1.5)
POC O2HB ARTERIAL: 87.7 % (ref 94–100)
POC SATURATED O2: 89.5 % (ref 90–100)
POC TCO2: 27.3 MMOL/L
POC THB: 13.5 G/DL (ref 12–18)
POCT GLUCOSE: 255 MG/DL (ref 70–110)
POTASSIUM SERPL-SCNC: 3.9 MMOL/L (ref 3.5–5.1)
PROT SERPL-MCNC: 6.5 G/DL (ref 6–8.4)
PROT UR QL STRIP: ABNORMAL
PROTHROMBIN TIME: 9.8 SEC (ref 9–12.5)
RBC # BLD AUTO: 4.05 M/UL (ref 4.6–6.2)
RBC #/AREA URNS HPF: 0 /HPF (ref 0–4)
SARS-COV-2 RDRP RESP QL NAA+PROBE: NEGATIVE
SITE: ABNORMAL
SODIUM SERPL-SCNC: 139 MMOL/L (ref 136–145)
SP GR UR STRIP: 1.01 (ref 1–1.03)
SPECIMEN SOURCE: NORMAL
TOXICOLOGY INFORMATION: NORMAL
TROPONIN I SERPL DL<=0.01 NG/ML-MCNC: 0.01 NG/ML (ref 0–0.03)
URN SPEC COLLECT METH UR: ABNORMAL
UROBILINOGEN UR STRIP-ACNC: NEGATIVE EU/DL
WBC # BLD AUTO: 17.03 K/UL (ref 3.9–12.7)
WBC #/AREA URNS HPF: 0 /HPF (ref 0–5)
YEAST URNS QL MICRO: ABNORMAL

## 2024-05-04 PROCEDURE — 80307 DRUG TEST PRSMV CHEM ANLYZR: CPT | Performed by: SURGERY

## 2024-05-04 PROCEDURE — 96365 THER/PROPH/DIAG IV INF INIT: CPT

## 2024-05-04 PROCEDURE — U0002 COVID-19 LAB TEST NON-CDC: HCPCS | Performed by: SURGERY

## 2024-05-04 PROCEDURE — 85610 PROTHROMBIN TIME: CPT | Performed by: SURGERY

## 2024-05-04 PROCEDURE — 27000221 HC OXYGEN, UP TO 24 HOURS

## 2024-05-04 PROCEDURE — 36600 WITHDRAWAL OF ARTERIAL BLOOD: CPT

## 2024-05-04 PROCEDURE — 82803 BLOOD GASES ANY COMBINATION: CPT | Performed by: SURGERY

## 2024-05-04 PROCEDURE — 99900035 HC TECH TIME PER 15 MIN (STAT)

## 2024-05-04 PROCEDURE — 85025 COMPLETE CBC W/AUTO DIFF WBC: CPT | Performed by: SURGERY

## 2024-05-04 PROCEDURE — 87651 STREP A DNA AMP PROBE: CPT | Performed by: SURGERY

## 2024-05-04 PROCEDURE — 81000 URINALYSIS NONAUTO W/SCOPE: CPT | Performed by: SURGERY

## 2024-05-04 PROCEDURE — 83605 ASSAY OF LACTIC ACID: CPT | Performed by: SURGERY

## 2024-05-04 PROCEDURE — 80053 COMPREHEN METABOLIC PANEL: CPT | Performed by: SURGERY

## 2024-05-04 PROCEDURE — 94761 N-INVAS EAR/PLS OXIMETRY MLT: CPT | Mod: XB

## 2024-05-04 PROCEDURE — 63600175 PHARM REV CODE 636 W HCPCS: Mod: JZ,TB | Performed by: SURGERY

## 2024-05-04 PROCEDURE — 87040 BLOOD CULTURE FOR BACTERIA: CPT | Performed by: SURGERY

## 2024-05-04 PROCEDURE — 84484 ASSAY OF TROPONIN QUANT: CPT | Performed by: SURGERY

## 2024-05-04 PROCEDURE — 87502 INFLUENZA DNA AMP PROBE: CPT | Performed by: SURGERY

## 2024-05-04 PROCEDURE — 83880 ASSAY OF NATRIURETIC PEPTIDE: CPT | Performed by: SURGERY

## 2024-05-04 PROCEDURE — 96372 THER/PROPH/DIAG INJ SC/IM: CPT | Mod: 59 | Performed by: SURGERY

## 2024-05-04 PROCEDURE — 93010 ELECTROCARDIOGRAM REPORT: CPT | Mod: ,,, | Performed by: INTERNAL MEDICINE

## 2024-05-04 PROCEDURE — 82962 GLUCOSE BLOOD TEST: CPT

## 2024-05-04 PROCEDURE — 93005 ELECTROCARDIOGRAM TRACING: CPT

## 2024-05-04 PROCEDURE — 99285 EMERGENCY DEPT VISIT HI MDM: CPT | Mod: 25

## 2024-05-04 RX ORDER — FUROSEMIDE 10 MG/ML
80 INJECTION INTRAMUSCULAR; INTRAVENOUS
Status: COMPLETED | OUTPATIENT
Start: 2024-05-04 | End: 2024-05-04

## 2024-05-04 RX ORDER — LEVOFLOXACIN 500 MG/1
500 TABLET, FILM COATED ORAL DAILY
Qty: 7 TABLET | Refills: 0 | Status: SHIPPED | OUTPATIENT
Start: 2024-05-04

## 2024-05-04 RX ORDER — CLINDAMYCIN PHOSPHATE 600 MG/50ML
600 INJECTION, SOLUTION INTRAVENOUS
Status: COMPLETED | OUTPATIENT
Start: 2024-05-04 | End: 2024-05-04

## 2024-05-04 RX ORDER — CLINDAMYCIN PHOSPHATE 150 MG/ML
600 INJECTION, SOLUTION INTRAVENOUS
Status: DISCONTINUED | OUTPATIENT
Start: 2024-05-04 | End: 2024-05-04 | Stop reason: RX

## 2024-05-04 RX ORDER — CLINDAMYCIN PHOSPHATE 600 MG/50ML
600 INJECTION, SOLUTION INTRAVENOUS
Status: DISCONTINUED | OUTPATIENT
Start: 2024-05-04 | End: 2024-05-04 | Stop reason: SDUPTHER

## 2024-05-04 RX ORDER — FUROSEMIDE 10 MG/ML
80 INJECTION INTRAMUSCULAR; INTRAVENOUS
Status: DISCONTINUED | OUTPATIENT
Start: 2024-05-04 | End: 2024-05-04 | Stop reason: HOSPADM

## 2024-05-04 RX ORDER — CLINDAMYCIN HYDROCHLORIDE 300 MG/1
300 CAPSULE ORAL EVERY 6 HOURS
Qty: 28 CAPSULE | Refills: 0 | Status: SHIPPED | OUTPATIENT
Start: 2024-05-04 | End: 2024-05-11

## 2024-05-04 RX ORDER — CLINDAMYCIN PHOSPHATE 600 MG/50ML
INJECTION, SOLUTION INTRAVENOUS
Status: DISCONTINUED
Start: 2024-05-04 | End: 2024-05-04 | Stop reason: HOSPADM

## 2024-05-04 RX ORDER — CEFTRIAXONE 1 G/1
1 INJECTION, POWDER, FOR SOLUTION INTRAMUSCULAR; INTRAVENOUS
Status: COMPLETED | OUTPATIENT
Start: 2024-05-04 | End: 2024-05-04

## 2024-05-04 RX ADMIN — CLINDAMYCIN PHOSPHATE 600 MG: 600 INJECTION, SOLUTION INTRAVENOUS at 02:05

## 2024-05-04 RX ADMIN — CEFTRIAXONE SODIUM 1 G: 1 INJECTION, POWDER, FOR SOLUTION INTRAMUSCULAR; INTRAVENOUS at 02:05

## 2024-05-04 RX ADMIN — FUROSEMIDE 80 MG: 10 INJECTION, SOLUTION INTRAMUSCULAR; INTRAVENOUS at 09:05

## 2024-05-04 NOTE — ED PROVIDER NOTES
"Encounter Date: 5/4/2024       History     Chief Complaint   Patient presents with    General Illness    Cough     Patient developed cough and chills since last night is a history of CHF diabetes    The history is provided by the patient.   Cough  This is a new problem. The current episode started yesterday. The problem occurs constantly. The problem has been unchanged. The cough is Non-productive. There has been no fever. Associated symptoms include chills. Pertinent negatives include no chest pain and no ear congestion. He is a smoker. His past medical history is significant for pneumonia.     Review of patient's allergies indicates:  No Known Allergies  Past Medical History:   Diagnosis Date    Diabetes mellitus     Diabetes mellitus type I     Hepatitis     Hepatitis C     Hypertension     Shingles 10/2018     Past Surgical History:   Procedure Laterality Date    NO PAST SURGERIES       Family History   Problem Relation Name Age of Onset    Diabetes Mother      Lung cancer Father       Social History     Tobacco Use    Smoking status: Former     Current packs/day: 1.00     Types: Cigarettes    Smokeless tobacco: Former   Substance Use Topics    Alcohol use: No    Drug use: Not Currently     Types: Methamphetamines     Comment: Patient stated "I'm use IV drug user with meth in the last 2 weeks."     Review of Systems   Constitutional:  Positive for chills.   HENT: Negative.     Eyes: Negative.    Respiratory:  Positive for cough.    Cardiovascular:  Negative for chest pain.   Endocrine: Negative.    Genitourinary: Negative.    Musculoskeletal: Negative.    Allergic/Immunologic: Negative.    Neurological: Negative.    Psychiatric/Behavioral: Negative.         Physical Exam     Initial Vitals [05/04/24 0848]   BP Pulse Resp Temp SpO2   (!) 144/93 (!) 114 (!) 32 98.1 °F (36.7 °C) (!) 91 %      MAP       --         Physical Exam    Nursing note and vitals reviewed.  Constitutional: He appears well-developed and " well-nourished.   Eyes: Conjunctivae are normal.   Neck:   Normal range of motion.  Cardiovascular:  Normal rate.           Pulmonary/Chest:   Decreased breath sounds both bases   Abdominal: Abdomen is soft.   Musculoskeletal:      Cervical back: Normal range of motion.     Skin: Skin is warm and dry.         ED Course   Procedures  Labs Reviewed   CBC W/ AUTO DIFFERENTIAL - Abnormal; Notable for the following components:       Result Value    WBC 17.03 (*)     RBC 4.05 (*)     Hemoglobin 13.4 (*)     Hematocrit 39.5 (*)     MCH 33.1 (*)     Gran # (ANC) 14.4 (*)     Immature Grans (Abs) 0.06 (*)     Gran % 84.7 (*)     Lymph % 7.5 (*)     All other components within normal limits   COMPREHENSIVE METABOLIC PANEL - Abnormal; Notable for the following components:    Glucose 272 (*)     BUN 54 (*)     Creatinine 2.3 (*)     Albumin 2.0 (*)     eGFR 33 (*)     All other components within normal limits   INFLUENZA A & B BY MOLECULAR   GROUP A STREP, MOLECULAR   SARS-COV-2 RNA AMPLIFICATION, QUAL   TROPONIN I   B-TYPE NATRIURETIC PEPTIDE   LACTIC ACID, PLASMA   PROTIME-INR   DRUG SCREEN PANEL, URINE EMERGENCY   URINALYSIS, REFLEX TO URINE CULTURE   POCT GLUCOSE MONITORING CONTINUOUS          Imaging Results              CT Chest Without Contrast (Final result)  Result time 05/04/24 12:34:22      Final result by Soraida Johnston MD (05/04/24 12:34:22)                   Impression:      1. Interval development of innumerable ground-glass airspace opacities in an acinar pattern throughout the right hemithorax (and to a lesser extent left hemithorax) with developing airspace consolidation noted in the right lower lobe.  In this patient with a markedly dilated esophagus and intraluminal fluid/debris within the esophagus, the most likely differential considerations include aspiration and multifocal pneumonia.  A short-term follow-up study in 3 months after therapy is recommended to ensure stability/resolution.  2. Stable  solid pulmonary nodules elsewhere in the chest, the largest of which measures 8 mm in the right upper lobe, unchanged when compared to the prior study.  3. Enlarged mediastinal, hilar, and axillary lymph nodes.  Infectious, inflammatory, granulomatous, and neoplastic etiologies are possible.  4. Trace left basilar pleural fluid.  5. 21 mm lipid rich left adrenal adenoma.      Electronically signed by: Ruddy Johnston MD  Date:    05/04/2024  Time:    12:34               Narrative:    EXAMINATION:  CT CHEST WITHOUT CONTRAST    CLINICAL HISTORY:  Abnormal xray - lung nodule, < 1 cm, low risk;    TECHNIQUE:  Low-dose non-contrast axial images were acquired through the chest.  Subsequently, coronal and sagittal reconstructed images were generated from the source data.  Axial MIPs were generated to improve nodule detection.  Image quality is degraded by patient motion.    COMPARISON:  Chest x-ray-05/04/2024.  CT of the chest-03/21/2024    FINDINGS:  The soft tissue structures at the base of the neck are unremarkable.  There is atherosclerotic calcification present within the thoracic aorta and coronary arteries.  There is trace pericardial fluid present.  There is a 13 mm short axis right paratracheal lymph node present on series 2, image 53.  There is a 10 mm short axis left hilar lymph node on series 2, image 61.  There is a 9 mm short axis right axillary lymph node.  There is a 12 mm short axis left axillary lymph node on series 2, image 27.    The esophagus is dilated and shows fluid and debris/food material within its lumen.  The most likely differential considerations include achalasia and connective tissue disorder such as scleroderma.  No obvious distal esophageal/gastric mass.  The trachea and mainstem bronchi are unremarkable.    There has been interval development of diffuse irregular ground-glass and part solid nodules with a halo of ground-glass in an acinar pattern throughout the right upper lobe, right middle  lobe, and right lower lobe suggesting an acute infectious/inflammatory process, most likely multifocal pneumonitis or aspiration.  There are similar, less pronounced changes noted within the left upper lobe and left lower lobe (for example series 4, image 173 in the left upper lobe and series 4 image 288 in the left lower lobe).  There is an 8 mm solid nodule present within the right upper lobe on series 4, image 181, unchanged when compared to the previous study.  There is developing airspace consolidation at the right lung base.  There is a 5 mm subpleural solid nodule present in the left lower lobe on series 4, image 185.  There is a 4 mm solid nodule in the left lower lobe on series 4, image 289.  There is trace left basilar pleural fluid.  No pneumothorax.    The visualized upper abdominal soft tissues are remarkable for a 21 mm left adrenal nodule which measures -11 HU on series 2, image 117, most likely a lipid rich left adrenal adenoma as an incidental finding.  Otherwise, the upper abdominal soft tissues show no significant abnormalities.    There is degenerative change of the cervical, thoracic, and lumbar spine with chronic anterior wedging of a few midthoracic vertebral bodies.                                       X-Ray Chest AP Portable (Final result)  Result time 05/04/24 09:39:48      Final result by Soraida Johnston MD (05/04/24 09:39:48)                   Impression:      Interval development of new patchy segmental airspace opacity in the right mid and lower lung zones.  Differential considerations include multifocal pneumonia, aspiration, and less likely, unilateral pulmonary edema.      Electronically signed by: Ruddy Johnston MD  Date:    05/04/2024  Time:    09:39               Narrative:    EXAMINATION:  XR CHEST AP PORTABLE    CLINICAL HISTORY:  CHF;    TECHNIQUE:  A single portable AP chest radiograph was acquired.    COMPARISON:  Chest x-ray-05/03/2024    FINDINGS:  The cardiac  silhouette is not enlarged.  No central pulmonary vascular congestion.  There has been interval development of new patchy segmental airspace opacity/consolidative change in the right lower lung zone and right midlung zone.  There is no significant volume of pleural fluid or pneumothorax, and the left chest is clear.                                    X-Rays:   Independently Interpreted Readings:   Chest X-Ray: Multiple patchy densities right lower lobe consistent with aspirin     Medications   furosemide injection 80 mg (80 mg Intravenous Not Given 5/4/24 0915)   furosemide injection 80 mg (80 mg Intramuscular Given 5/4/24 0928)     Medical Decision Making  Patient with evidence of aspiration pneumonia on chest x-ray and CT scan showed a dilated esophagus air-fluid level in the esophagus chest x-ray done yesterday was negative patient has oxygen saturation on room air between 96 and 98 he has not tachypneic he will be given clindamycin and Rocephin for antibiotic coverage and discharged on clindamycin p.o. and Levaquin p.o. and was told to return on Monday for repeat chest Xray he is told to return to the ED if he develops shortness of breath    Amount and/or Complexity of Data Reviewed  Labs: ordered.  Radiology: ordered.    Risk  Prescription drug management.                                      Clinical Impression:  Final diagnoses:  [R06.02] Shortness of breath                 CURRY Coppola III, MD  05/04/24 9305

## 2024-05-04 NOTE — DISCHARGE INSTRUCTIONS
Return to primary doctor for chest x-ray on Monday return to ED for shortness of breath avoid aspiration by not drinking as much and make sure your head is elevated when he go to bed

## 2024-05-05 LAB
OHS QRS DURATION: 94 MS
OHS QTC CALCULATION: 461 MS

## 2024-05-09 LAB — BACTERIA BLD CULT: NORMAL

## 2024-05-17 ENCOUNTER — HOSPITAL ENCOUNTER (EMERGENCY)
Facility: HOSPITAL | Age: 54
Discharge: HOME | End: 2024-05-17
Attending: FAMILY MEDICINE
Payer: MEDICAID

## 2024-05-17 VITALS
RESPIRATION RATE: 20 BRPM | WEIGHT: 200.38 LBS | TEMPERATURE: 98 F | OXYGEN SATURATION: 98 % | HEART RATE: 68 BPM | DIASTOLIC BLOOD PRESSURE: 86 MMHG | SYSTOLIC BLOOD PRESSURE: 136 MMHG | BODY MASS INDEX: 30.47 KG/M2

## 2024-05-17 DIAGNOSIS — E87.5 HYPERKALEMIA: Primary | ICD-10-CM

## 2024-05-17 DIAGNOSIS — I73.9 INTERMITTENT CLAUDICATION: ICD-10-CM

## 2024-05-17 DIAGNOSIS — N18.32 STAGE 3B CHRONIC KIDNEY DISEASE: ICD-10-CM

## 2024-05-17 LAB
ALBUMIN SERPL BCP-MCNC: 2 G/DL (ref 3.5–5.2)
ALP SERPL-CCNC: 87 U/L (ref 55–135)
ALT SERPL W/O P-5'-P-CCNC: 33 U/L (ref 10–44)
ANION GAP SERPL CALC-SCNC: 10 MMOL/L (ref 8–16)
AST SERPL-CCNC: 37 U/L (ref 10–40)
BASOPHILS # BLD AUTO: 0.06 K/UL (ref 0–0.2)
BASOPHILS NFR BLD: 0.5 % (ref 0–1.9)
BILIRUB SERPL-MCNC: 0.1 MG/DL (ref 0.1–1)
BUN SERPL-MCNC: 44 MG/DL (ref 6–20)
CALCIUM SERPL-MCNC: 8.3 MG/DL (ref 8.7–10.5)
CHLORIDE SERPL-SCNC: 104 MMOL/L (ref 95–110)
CO2 SERPL-SCNC: 25 MMOL/L (ref 23–29)
CREAT SERPL-MCNC: 2 MG/DL (ref 0.5–1.4)
DIFFERENTIAL METHOD BLD: ABNORMAL
EOSINOPHIL # BLD AUTO: 0.5 K/UL (ref 0–0.5)
EOSINOPHIL NFR BLD: 3.7 % (ref 0–8)
ERYTHROCYTE [DISTWIDTH] IN BLOOD BY AUTOMATED COUNT: 13 % (ref 11.5–14.5)
EST. GFR  (NO RACE VARIABLE): 39 ML/MIN/1.73 M^2
GLUCOSE SERPL-MCNC: 196 MG/DL (ref 70–110)
HCT VFR BLD AUTO: 35.9 % (ref 40–54)
HGB BLD-MCNC: 11.7 G/DL (ref 14–18)
IMM GRANULOCYTES # BLD AUTO: 0.05 K/UL (ref 0–0.04)
IMM GRANULOCYTES NFR BLD AUTO: 0.4 % (ref 0–0.5)
LYMPHOCYTES # BLD AUTO: 2.6 K/UL (ref 1–4.8)
LYMPHOCYTES NFR BLD: 21.7 % (ref 18–48)
MCH RBC QN AUTO: 32.6 PG (ref 27–31)
MCHC RBC AUTO-ENTMCNC: 32.6 G/DL (ref 32–36)
MCV RBC AUTO: 100 FL (ref 82–98)
MONOCYTES # BLD AUTO: 0.6 K/UL (ref 0.3–1)
MONOCYTES NFR BLD: 5.3 % (ref 4–15)
NEUTROPHILS # BLD AUTO: 8.2 K/UL (ref 1.8–7.7)
NEUTROPHILS NFR BLD: 68.4 % (ref 38–73)
NRBC BLD-RTO: 0 /100 WBC
PLATELET # BLD AUTO: 392 K/UL (ref 150–450)
PMV BLD AUTO: 10.2 FL (ref 9.2–12.9)
POTASSIUM SERPL-SCNC: 5.4 MMOL/L (ref 3.5–5.1)
PROT SERPL-MCNC: 5.8 G/DL (ref 6–8.4)
RBC # BLD AUTO: 3.59 M/UL (ref 4.6–6.2)
SODIUM SERPL-SCNC: 139 MMOL/L (ref 136–145)
WBC # BLD AUTO: 12.05 K/UL (ref 3.9–12.7)

## 2024-05-17 PROCEDURE — 25000003 PHARM REV CODE 250

## 2024-05-17 PROCEDURE — 99900035 HC TECH TIME PER 15 MIN (STAT)

## 2024-05-17 PROCEDURE — 93005 ELECTROCARDIOGRAM TRACING: CPT

## 2024-05-17 PROCEDURE — 85025 COMPLETE CBC W/AUTO DIFF WBC: CPT

## 2024-05-17 PROCEDURE — 93010 ELECTROCARDIOGRAM REPORT: CPT | Mod: ,,, | Performed by: INTERNAL MEDICINE

## 2024-05-17 PROCEDURE — 99284 EMERGENCY DEPT VISIT MOD MDM: CPT | Mod: 25

## 2024-05-17 PROCEDURE — 80053 COMPREHEN METABOLIC PANEL: CPT

## 2024-05-17 RX ADMIN — SODIUM ZIRCONIUM CYCLOSILICATE 5 G: 5 POWDER, FOR SUSPENSION ORAL at 02:05

## 2024-05-17 NOTE — ED PROVIDER NOTES
"Encounter Date: 5/17/2024       History     Chief Complaint   Patient presents with    Sent by Dr. Junior Hale     This note is dictated on M*Modal word recognition program.  There are word recognition mistakes and grammatical errors that are occasionally missed on review.     Onofrerachel Ceballos is a 53 y.o. male presents to ER today with complaints of being sent to ER for abnormal kidney function.  Patient was seen by his primary care provider, blood work was done and he was advised to come to ER to follow-up blood work.  Patient denies any symptoms at this time no chest pain.  No shortness of breath.  No leg swelling.  Patient reports he recently quit smoking within the last 2 months.  Patient does admit that he has been having intermittent claudication went exercising to bilateral lower extremities.          Review of patient's allergies indicates:  No Known Allergies  Past Medical History:   Diagnosis Date    Diabetes mellitus     Diabetes mellitus type I     Hepatitis     Hepatitis C     Hypertension     Shingles 10/2018     Past Surgical History:   Procedure Laterality Date    NO PAST SURGERIES       Family History   Problem Relation Name Age of Onset    Diabetes Mother      Lung cancer Father       Social History     Tobacco Use    Smoking status: Former     Current packs/day: 1.00     Types: Cigarettes    Smokeless tobacco: Former   Substance Use Topics    Alcohol use: No    Drug use: Not Currently     Types: Methamphetamines     Comment: Patient stated "I'm use IV drug user with meth in the last 2 weeks."     Review of Systems   Constitutional: Negative.    HENT: Negative.     Eyes: Negative.    Respiratory: Negative.     Cardiovascular: Negative.    Gastrointestinal: Negative.    Genitourinary: Negative.    Musculoskeletal: Negative.         Bilateral lower extremity intermittent claudication   Skin: Negative.    Allergic/Immunologic: Negative.    Neurological: Negative.  "   Hematological: Negative.    Psychiatric/Behavioral: Negative.         Physical Exam     Initial Vitals [05/17/24 1135]   BP Pulse Resp Temp SpO2   (!) 157/78 70 19 97.9 °F (36.6 °C) 98 %      MAP       --         Physical Exam    Constitutional: He appears well-developed and well-nourished. He is not diaphoretic. No distress.   HENT:   Head: Normocephalic.   Right Ear: External ear normal.   Left Ear: External ear normal.   Eyes: Pupils are equal, round, and reactive to light. Right eye exhibits no discharge. Left eye exhibits no discharge.   Neck: Neck supple.   Normal range of motion.  Cardiovascular:  Normal rate, regular rhythm and normal heart sounds.           Pulmonary/Chest: Breath sounds normal. No respiratory distress. He has no wheezes. He has no rales.   Abdominal: Abdomen is soft. Bowel sounds are normal.   Musculoskeletal:         General: No tenderness or edema.      Cervical back: Normal range of motion and neck supple.     Neurological: He is alert and oriented to person, place, and time. GCS score is 15. GCS eye subscore is 4. GCS verbal subscore is 5. GCS motor subscore is 6.   Skin: Skin is warm. Capillary refill takes less than 2 seconds.   Psychiatric: He has a normal mood and affect. Thought content normal.         ED Course   Procedures  Labs Reviewed   CBC W/ AUTO DIFFERENTIAL - Abnormal; Notable for the following components:       Result Value    RBC 3.59 (*)     Hemoglobin 11.7 (*)     Hematocrit 35.9 (*)      (*)     MCH 32.6 (*)     Gran # (ANC) 8.2 (*)     Immature Grans (Abs) 0.05 (*)     All other components within normal limits   COMPREHENSIVE METABOLIC PANEL - Abnormal; Notable for the following components:    Potassium 5.4 (*)     Glucose 196 (*)     BUN 44 (*)     Creatinine 2.0 (*)     Calcium 8.3 (*)     Total Protein 5.8 (*)     Albumin 2.0 (*)     eGFR 39 (*)     All other components within normal limits   URINALYSIS, REFLEX TO URINE CULTURE     EKG Readings:  (Independently Interpreted)   Initial Reading: No STEMI. Rhythm: Normal Sinus Rhythm. Heart Rate: 61. Ectopy: No Ectopy. Clinical Impression: Normal Sinus Rhythm       Imaging Results    None          Medications   sodium zirconium cyclosilicate packet 5 g (5 g Oral Given 5/17/24 1431)     Medical Decision Making  Differential diagnosis includes peripheral artery disease, intermittent claudication, hyperkalemia, ckd    CBC reveals hemoglobin 11.7 hematocrit 35.9.  This is consistent with patient's baseline CBC results.    Patient denies any active bleeding.  Patient's potassium 5.4.  Minimally elevated.  Will treat patient with a regimen of Lokelma.  Corrected calcium for albumin is 9.5  Patient was also having intermittent claudication with exercise.  Patient reports bilateral lower extremities cramping when he walks.  Patient reports rest makes the pain dissipate.  This is consistent with peripheral artery disease.  Patient has been a smoker for most of his life.  Recently quit smoking in the last 2 months.  I have advised patient to follow-up with cardiology for intermittent claudication.  EKG reveals normal sinus rhythm no cardiac arrhythmia.  Patient stable at time of discharge in no acute distress.  No life-threatening illnesses were found during ER visit today.  Patient was instructed to follow-up with PCP or other recommended specialist within the next 48-72 hours.  Patient was instructed to return to ER immediately for any worsening or concerning symptoms.  All discharge instructions discussed with patient, and patient agrees to comply with discharge instructions given today.         Amount and/or Complexity of Data Reviewed  Labs: ordered.    Risk  Prescription drug management.                                      Clinical Impression:  Final diagnoses:  [E87.5] Hyperkalemia (Primary)  [I73.9] Intermittent claudication  [N18.32] Stage 3b chronic kidney disease          ED Disposition Condition    Discharge  Stable          ED Prescriptions       Medication Sig Dispense Start Date End Date Auth. Provider    sodium zirconium cyclosilicate (LOKELMA) 10 gram packet Take 1 packet (10 g total) by mouth 3 (three) times daily. Mix entire contents of packet(s) into drinking glass containing 3 tablespoons of water; stir well and drink immediately. Add water and repeat until no powder remains to receive entire dose. for 2 days 6 packet 5/17/2024 5/19/2024 Jose Antonio Farrar NP          Follow-up Information       Follow up With Specialties Details Why Contact Info    Honey Galaviz NP Family Medicine Schedule an appointment as soon as possible for a visit in 3 days  [address] [facility] [place] [address]  [phone]                          Jose Antonio Farrar NP  05/17/24 2353

## 2024-05-17 NOTE — DISCHARGE INSTRUCTIONS
Please follow-up with your primary care or nephrologist to have blood work repeated next week to check potassium/kidney function levels.  Please return to ER immediately if you develop any worsening or concerning symptoms.

## 2024-05-20 LAB
OHS QRS DURATION: 100 MS
OHS QTC CALCULATION: 432 MS

## 2024-05-22 ENCOUNTER — LAB VISIT (OUTPATIENT)
Dept: LAB | Facility: HOSPITAL | Age: 54
End: 2024-05-22
Attending: INTERNAL MEDICINE
Payer: MEDICAID

## 2024-05-22 DIAGNOSIS — Q63.1 LOBULATED, FUSED AND HORSESHOE KIDNEY: Primary | ICD-10-CM

## 2024-05-22 LAB
ALBUMIN SERPL BCP-MCNC: 1.9 G/DL (ref 3.5–5.2)
ALBUMIN/CREAT UR: NORMAL UG/MG (ref 0–30)
ALP SERPL-CCNC: 73 U/L (ref 55–135)
ALT SERPL W/O P-5'-P-CCNC: 27 U/L (ref 10–44)
ANION GAP SERPL CALC-SCNC: 6 MMOL/L (ref 8–16)
AST SERPL-CCNC: 28 U/L (ref 10–40)
BASOPHILS # BLD AUTO: 0.09 K/UL (ref 0–0.2)
BASOPHILS NFR BLD: 0.7 % (ref 0–1.9)
BILIRUB SERPL-MCNC: 0.2 MG/DL (ref 0.1–1)
BUN SERPL-MCNC: 31 MG/DL (ref 6–20)
CALCIUM SERPL-MCNC: 7.9 MG/DL (ref 8.7–10.5)
CHLORIDE SERPL-SCNC: 111 MMOL/L (ref 95–110)
CO2 SERPL-SCNC: 22 MMOL/L (ref 23–29)
CREAT SERPL-MCNC: 1.8 MG/DL (ref 0.5–1.4)
CREAT UR-MCNC: 95.8 MG/DL (ref 23–375)
DIFFERENTIAL METHOD BLD: ABNORMAL
EOSINOPHIL # BLD AUTO: 0.7 K/UL (ref 0–0.5)
EOSINOPHIL NFR BLD: 5.3 % (ref 0–8)
ERYTHROCYTE [DISTWIDTH] IN BLOOD BY AUTOMATED COUNT: 13.2 % (ref 11.5–14.5)
EST. GFR  (NO RACE VARIABLE): 44 ML/MIN/1.73 M^2
GLUCOSE SERPL-MCNC: 103 MG/DL (ref 70–110)
HCT VFR BLD AUTO: 35.9 % (ref 40–54)
HGB BLD-MCNC: 11.7 G/DL (ref 14–18)
IMM GRANULOCYTES # BLD AUTO: 0.03 K/UL (ref 0–0.04)
IMM GRANULOCYTES NFR BLD AUTO: 0.2 % (ref 0–0.5)
LYMPHOCYTES # BLD AUTO: 2.9 K/UL (ref 1–4.8)
LYMPHOCYTES NFR BLD: 23.5 % (ref 18–48)
MCH RBC QN AUTO: 32.7 PG (ref 27–31)
MCHC RBC AUTO-ENTMCNC: 32.6 G/DL (ref 32–36)
MCV RBC AUTO: 100 FL (ref 82–98)
MICROALBUMIN UR DL<=1MG/L-MCNC: >5000 UG/ML
MONOCYTES # BLD AUTO: 1.2 K/UL (ref 0.3–1)
MONOCYTES NFR BLD: 9.5 % (ref 4–15)
NEUTROPHILS # BLD AUTO: 7.5 K/UL (ref 1.8–7.7)
NEUTROPHILS NFR BLD: 60.8 % (ref 38–73)
NRBC BLD-RTO: 0 /100 WBC
PHOSPHATE SERPL-MCNC: 5.1 MG/DL (ref 2.7–4.5)
PLATELET # BLD AUTO: 348 K/UL (ref 150–450)
PMV BLD AUTO: 10.3 FL (ref 9.2–12.9)
POTASSIUM SERPL-SCNC: 4.8 MMOL/L (ref 3.5–5.1)
PROT SERPL-MCNC: 4.8 G/DL (ref 6–8.4)
PTH-INTACT SERPL-MCNC: 200.5 PG/ML (ref 9–77)
RBC # BLD AUTO: 3.58 M/UL (ref 4.6–6.2)
SODIUM SERPL-SCNC: 139 MMOL/L (ref 136–145)
WBC # BLD AUTO: 12.41 K/UL (ref 3.9–12.7)

## 2024-05-22 PROCEDURE — 82043 UR ALBUMIN QUANTITATIVE: CPT | Performed by: INTERNAL MEDICINE

## 2024-05-22 PROCEDURE — 83970 ASSAY OF PARATHORMONE: CPT | Performed by: INTERNAL MEDICINE

## 2024-05-22 PROCEDURE — 84100 ASSAY OF PHOSPHORUS: CPT | Performed by: INTERNAL MEDICINE

## 2024-05-22 PROCEDURE — 36415 COLL VENOUS BLD VENIPUNCTURE: CPT | Performed by: INTERNAL MEDICINE

## 2024-05-22 PROCEDURE — 80053 COMPREHEN METABOLIC PANEL: CPT | Performed by: INTERNAL MEDICINE

## 2024-05-22 PROCEDURE — 85025 COMPLETE CBC W/AUTO DIFF WBC: CPT | Performed by: INTERNAL MEDICINE

## 2024-06-20 DIAGNOSIS — J44.9 COPD (CHRONIC OBSTRUCTIVE PULMONARY DISEASE): Primary | ICD-10-CM

## 2024-06-24 PROBLEM — N17.9 AKI (ACUTE KIDNEY INJURY): Status: RESOLVED | Noted: 2024-03-21 | Resolved: 2024-06-24

## 2024-06-24 PROBLEM — J18.9 PNEUMONIA OF LEFT LOWER LOBE DUE TO INFECTIOUS ORGANISM: Status: RESOLVED | Noted: 2024-03-21 | Resolved: 2024-06-24

## 2024-06-28 ENCOUNTER — HOSPITAL ENCOUNTER (OUTPATIENT)
Dept: PULMONOLOGY | Facility: HOSPITAL | Age: 54
Discharge: HOME | End: 2024-06-28
Attending: INTERNAL MEDICINE
Payer: MEDICAID

## 2024-06-28 ENCOUNTER — HOSPITAL ENCOUNTER (EMERGENCY)
Facility: HOSPITAL | Age: 54
Discharge: HOME | End: 2024-06-28
Attending: SURGERY
Payer: MEDICAID

## 2024-06-28 VITALS
OXYGEN SATURATION: 96 % | TEMPERATURE: 98 F | RESPIRATION RATE: 20 BRPM | WEIGHT: 204.06 LBS | HEART RATE: 60 BPM | DIASTOLIC BLOOD PRESSURE: 58 MMHG | SYSTOLIC BLOOD PRESSURE: 124 MMHG | HEIGHT: 68 IN | BODY MASS INDEX: 30.93 KG/M2

## 2024-06-28 DIAGNOSIS — L25.9 CONTACT DERMATITIS, UNSPECIFIED CONTACT DERMATITIS TYPE, UNSPECIFIED TRIGGER: ICD-10-CM

## 2024-06-28 DIAGNOSIS — M54.6 THORACIC BACK PAIN: ICD-10-CM

## 2024-06-28 DIAGNOSIS — D17.1 LIPOMA OF TORSO: Primary | ICD-10-CM

## 2024-06-28 DIAGNOSIS — J44.9 COPD (CHRONIC OBSTRUCTIVE PULMONARY DISEASE): ICD-10-CM

## 2024-06-28 LAB
ALBUMIN SERPL BCP-MCNC: 1.7 G/DL (ref 3.5–5.2)
ALLENS TEST: NORMAL
ALP SERPL-CCNC: 70 U/L (ref 55–135)
ALT SERPL W/O P-5'-P-CCNC: 35 U/L (ref 10–44)
ANION GAP SERPL CALC-SCNC: 11 MMOL/L (ref 8–16)
AST SERPL-CCNC: 34 U/L (ref 10–40)
BASOPHILS # BLD AUTO: 0.1 K/UL (ref 0–0.2)
BASOPHILS NFR BLD: 0.8 % (ref 0–1.9)
BILIRUB SERPL-MCNC: 0.2 MG/DL (ref 0.1–1)
BNP SERPL-MCNC: 91 PG/ML (ref 0–99)
BUN SERPL-MCNC: 39 MG/DL (ref 6–20)
CALCIUM SERPL-MCNC: 8.4 MG/DL (ref 8.7–10.5)
CHLORIDE SERPL-SCNC: 109 MMOL/L (ref 95–110)
CO2 SERPL-SCNC: 22 MMOL/L (ref 23–29)
CREAT SERPL-MCNC: 2.3 MG/DL (ref 0.5–1.4)
DELSYS: NORMAL
DIFFERENTIAL METHOD BLD: ABNORMAL
EOSINOPHIL # BLD AUTO: 0.6 K/UL (ref 0–0.5)
EOSINOPHIL NFR BLD: 5.1 % (ref 0–8)
ERYTHROCYTE [DISTWIDTH] IN BLOOD BY AUTOMATED COUNT: 12.4 % (ref 11.5–14.5)
EST. GFR  (NO RACE VARIABLE): 33 ML/MIN/1.73 M^2
FIO2: 21 (ref 21–100)
GLUCOSE SERPL-MCNC: 162 MG/DL (ref 70–110)
HCO3 UR-SCNC: 23.6 MMOL/L (ref 22–26)
HCT VFR BLD AUTO: 37.8 % (ref 40–54)
HGB BLD-MCNC: 12.9 G/DL (ref 14–18)
IMM GRANULOCYTES # BLD AUTO: 0.05 K/UL (ref 0–0.04)
IMM GRANULOCYTES NFR BLD AUTO: 0.4 % (ref 0–0.5)
LYMPHOCYTES # BLD AUTO: 2.7 K/UL (ref 1–4.8)
LYMPHOCYTES NFR BLD: 22.7 % (ref 18–48)
MCH RBC QN AUTO: 32.3 PG (ref 27–31)
MCHC RBC AUTO-ENTMCNC: 34.1 G/DL (ref 32–36)
MCV RBC AUTO: 95 FL (ref 82–98)
MONOCYTES # BLD AUTO: 0.9 K/UL (ref 0.3–1)
MONOCYTES NFR BLD: 7.2 % (ref 4–15)
NEUTROPHILS # BLD AUTO: 7.7 K/UL (ref 1.8–7.7)
NEUTROPHILS NFR BLD: 63.8 % (ref 38–73)
NRBC BLD-RTO: 0 /100 WBC
PCO2 BLDA: 39 MMHG (ref 35–45)
PH SMN: 7.39 [PH] (ref 7.35–7.45)
PLATELET # BLD AUTO: 382 K/UL (ref 150–450)
PMV BLD AUTO: 10.1 FL (ref 9.2–12.9)
PO2 BLDA: 82 MMHG (ref 75–100)
POC BE: -1.2 MMOL/L (ref -2–2)
POC COHB: 0.8 % (ref 0–3)
POC METHB: 0.6 % (ref 0–1.5)
POC O2HB ARTERIAL: 95.1 % (ref 94–100)
POC SATURATED O2: 96.5 % (ref 90–100)
POC TCO2: 24.8 MMOL/L
POC THB: 12.8 G/DL (ref 12–18)
POCT GLUCOSE: 148 MG/DL (ref 70–110)
POTASSIUM SERPL-SCNC: 4.1 MMOL/L (ref 3.5–5.1)
PROT SERPL-MCNC: 5.7 G/DL (ref 6–8.4)
RBC # BLD AUTO: 4 M/UL (ref 4.6–6.2)
SITE: NORMAL
SODIUM SERPL-SCNC: 142 MMOL/L (ref 136–145)
TROPONIN I SERPL DL<=0.01 NG/ML-MCNC: 0.01 NG/ML (ref 0–0.03)
WBC # BLD AUTO: 12.05 K/UL (ref 3.9–12.7)

## 2024-06-28 PROCEDURE — 94729 DIFFUSING CAPACITY: CPT

## 2024-06-28 PROCEDURE — 36600 WITHDRAWAL OF ARTERIAL BLOOD: CPT

## 2024-06-28 PROCEDURE — 99285 EMERGENCY DEPT VISIT HI MDM: CPT | Mod: 25

## 2024-06-28 PROCEDURE — 99900031 HC PATIENT EDUCATION (STAT)

## 2024-06-28 PROCEDURE — 83880 ASSAY OF NATRIURETIC PEPTIDE: CPT | Performed by: NURSE PRACTITIONER

## 2024-06-28 PROCEDURE — 27100098 HC SPACER

## 2024-06-28 PROCEDURE — 84484 ASSAY OF TROPONIN QUANT: CPT | Performed by: NURSE PRACTITIONER

## 2024-06-28 PROCEDURE — 93005 ELECTROCARDIOGRAM TRACING: CPT

## 2024-06-28 PROCEDURE — 99900035 HC TECH TIME PER 15 MIN (STAT)

## 2024-06-28 PROCEDURE — 94727 GAS DIL/WSHOT DETER LNG VOL: CPT

## 2024-06-28 PROCEDURE — 82962 GLUCOSE BLOOD TEST: CPT

## 2024-06-28 PROCEDURE — 85025 COMPLETE CBC W/AUTO DIFF WBC: CPT | Performed by: NURSE PRACTITIONER

## 2024-06-28 PROCEDURE — 94060 EVALUATION OF WHEEZING: CPT

## 2024-06-28 PROCEDURE — 25000003 PHARM REV CODE 250: Performed by: NURSE PRACTITIONER

## 2024-06-28 PROCEDURE — 82803 BLOOD GASES ANY COMBINATION: CPT | Performed by: INTERNAL MEDICINE

## 2024-06-28 PROCEDURE — 80053 COMPREHEN METABOLIC PANEL: CPT | Performed by: NURSE PRACTITIONER

## 2024-06-28 PROCEDURE — 93010 ELECTROCARDIOGRAM REPORT: CPT | Mod: ,,, | Performed by: INTERNAL MEDICINE

## 2024-06-28 RX ORDER — CLONIDINE HYDROCHLORIDE 0.1 MG/1
0.1 TABLET ORAL
Status: COMPLETED | OUTPATIENT
Start: 2024-06-28 | End: 2024-06-28

## 2024-06-28 RX ORDER — METHOCARBAMOL 500 MG/1
1000 TABLET, FILM COATED ORAL
Status: COMPLETED | OUTPATIENT
Start: 2024-06-28 | End: 2024-06-28

## 2024-06-28 RX ORDER — TRIAMCINOLONE ACETONIDE 1 MG/G
CREAM TOPICAL 2 TIMES DAILY
Qty: 15 G | Refills: 0 | Status: SHIPPED | OUTPATIENT
Start: 2024-06-28 | End: 2024-07-05

## 2024-06-28 RX ORDER — METHOCARBAMOL 500 MG/1
1000 TABLET, FILM COATED ORAL 3 TIMES DAILY PRN
Qty: 18 TABLET | Refills: 0 | Status: SHIPPED | OUTPATIENT
Start: 2024-06-28 | End: 2024-07-01

## 2024-06-28 RX ADMIN — CLONIDINE HYDROCHLORIDE 0.1 MG: 0.1 TABLET ORAL at 04:06

## 2024-06-28 RX ADMIN — METHOCARBAMOL 1000 MG: 500 TABLET ORAL at 03:06

## 2024-06-28 RX ADMIN — CLONIDINE HYDROCHLORIDE 0.1 MG: 0.1 TABLET ORAL at 03:06

## 2024-06-28 NOTE — ED NOTES
@ BEDSIDE TO DISCHARGED PATIENT & PATIENT STATED HE HAS A TINGLING / PRESSURE IN THE CENTER OF HIS CHEST . Np Brett NOTIFIED    x1 loose

## 2024-06-28 NOTE — ED PROVIDER NOTES
"Encounter Date: 6/28/2024       History     Chief Complaint   Patient presents with    Back Pain     Onofre Ceballos is a 53 y.o. male with PMH of DM type 1, hep C, hypertension, shingles presenting to the ED for evaluation of thoracic back pain.  Patient presents with a 4-5 day history of an aching pain to his R upper back. Pain is constant, exacerbated by movement, currently 6/10 in severity. He reports that area of pain also itches. He looked at his back in the mirror but did not see a rash. Denies any injury, heavy lifting or straining. No prior back injuries. Denies CP or SOB.     The history is provided by the patient.     Review of patient's allergies indicates:  No Known Allergies  Past Medical History:   Diagnosis Date    Diabetes mellitus     Diabetes mellitus type I     Hepatitis     Hepatitis C     Hypertension     Shingles 10/2018     Past Surgical History:   Procedure Laterality Date    NO PAST SURGERIES       Family History   Problem Relation Name Age of Onset    Diabetes Mother      Lung cancer Father       Social History     Tobacco Use    Smoking status: Former     Current packs/day: 1.00     Types: Cigarettes    Smokeless tobacco: Former   Substance Use Topics    Alcohol use: No    Drug use: Not Currently     Types: Methamphetamines     Comment: Patient stated "I'm use IV drug user with meth in the last 2 weeks."     Review of Systems   Constitutional:  Negative for appetite change, chills and fever.   HENT:  Negative for congestion, ear discharge, ear pain, postnasal drip, rhinorrhea and sore throat.    Respiratory:  Negative for cough, chest tightness and shortness of breath.    Cardiovascular:  Negative for chest pain.   Gastrointestinal:  Negative for abdominal distention, abdominal pain and nausea.   Genitourinary:  Negative for dysuria, flank pain, hematuria and urgency.   Musculoskeletal:  Positive for arthralgias and back pain (R upper back). Negative for neck stiffness.   Skin:  " Negative for rash.   Neurological:  Negative for dizziness, weakness, numbness and headaches.   Hematological:  Does not bruise/bleed easily.       Physical Exam     Initial Vitals [06/28/24 1440]   BP Pulse Resp Temp SpO2   (!) 202/94 61 20 97.6 °F (36.4 °C) 98 %      MAP       --         Physical Exam    Nursing note and vitals reviewed.  Constitutional: He appears well-developed and well-nourished.   HENT:   Head: Normocephalic and atraumatic.   Eyes: Conjunctivae and EOM are normal. Pupils are equal, round, and reactive to light.   Neck: Neck supple.   Cardiovascular:  Normal rate, regular rhythm, normal heart sounds and intact distal pulses.           Pulmonary/Chest: Breath sounds normal.   Abdominal: Abdomen is soft. Bowel sounds are normal.   Musculoskeletal:         General: Normal range of motion.      Cervical back: Normal and neck supple.      Thoracic back: Tenderness present.      Lumbar back: Normal.        Back:      Neurological: He is alert and oriented to person, place, and time. He has normal strength.   Skin: Skin is warm and dry. Capillary refill takes less than 2 seconds. Rash noted. Rash is maculopapular (+ rash that is itchy to mid/upper back. No secondary signs of cellulitis). Rash is not vesicular.   No vesicular lesions    Psychiatric: He has a normal mood and affect. His behavior is normal. Judgment and thought content normal.         ED Course   Procedures  Labs Reviewed   CBC W/ AUTO DIFFERENTIAL - Abnormal; Notable for the following components:       Result Value    RBC 4.00 (*)     Hemoglobin 12.9 (*)     Hematocrit 37.8 (*)     MCH 32.3 (*)     Immature Grans (Abs) 0.05 (*)     Eos # 0.6 (*)     All other components within normal limits   COMPREHENSIVE METABOLIC PANEL - Abnormal; Notable for the following components:    CO2 22 (*)     Glucose 162 (*)     BUN 39 (*)     Creatinine 2.3 (*)     Calcium 8.4 (*)     Total Protein 5.7 (*)     Albumin 1.7 (*)     eGFR 33 (*)     All other  components within normal limits   POCT GLUCOSE - Abnormal; Notable for the following components:    POCT Glucose 148 (*)     All other components within normal limits   TROPONIN I   B-TYPE NATRIURETIC PEPTIDE   POCT GLUCOSE MONITORING CONTINUOUS          Imaging Results              X-Ray Chest PA And Lateral (Final result)  Result time 06/28/24 16:47:42      Final result by Alex Negrete MD (06/28/24 16:47:42)                   Impression:      No radiographic evidence of active chest disease.      Electronically signed by: Alex Negrete MD  Date:    06/28/2024  Time:    16:47               Narrative:    EXAMINATION:  XR CHEST PA AND LATERAL    CLINICAL HISTORY:  chest pain;    TECHNIQUE:  PA and lateral views of the chest were performed.    COMPARISON:  05/04/2024    FINDINGS:  Heart size and pulmonary vessels are normal.  The lungs are clear.  No pleural effusion or pneumothorax are identified.  Skeletal structures are intact.                                       X-Ray Thoracic Spine AP Lateral (Final result)  Result time 06/28/24 15:24:41      Final result by Yoav Tubbs MD (06/28/24 15:24:41)                   Impression:      As above      Electronically signed by: Yoav Tubbs MD  Date:    06/28/2024  Time:    15:24               Narrative:    EXAMINATION:  XR THORACIC SPINE AP LATERAL    CLINICAL HISTORY:  Pain in thoracic spine    TECHNIQUE:  AP, lateral and swimmer's views of the thoracic spine were obtained.    COMPARISON:  None.    FINDINGS:  There is degenerative change throughout the thoracic spine with multilevel disc space narrowing and osteophyte formation.  Mild anterior wedging of T6 and T7 is unchanged compared to chest CT dated 03/21/2024 and appears chronic.  The posterior elements are grossly intact.  The paraspinous soft tissues are normal.                                       Medications   cloNIDine tablet 0.1 mg (0.1 mg Oral Given 6/28/24 1529)   methocarbamoL tablet  "1,000 mg (1,000 mg Oral Given 6/28/24 1548)   cloNIDine tablet 0.1 mg (0.1 mg Oral Given 6/28/24 1612)     Medical Decision Making  Evaluation of a 53-year-old male with thoracic back pain and itching for the past week.  Presents with stable vital signs.  Patient also reports that he feels a "lump" with the pain is. On exam, he has a maculopapular rash with no secondary signs of cellulitis. No vesicular lesions. He does have a small lump to his R upper back that is cw lipoma. Tenderness is reproducible.     Differential diagnosis includes contact dermatitis, shingles, musculoskeletal pain, lipoma, degenerative disc disease, thoracic strain    Amount and/or Complexity of Data Reviewed  Labs: ordered.  Radiology: ordered. Decision-making details documented in ED Course.  ECG/medicine tests: ordered and independent interpretation performed.     Details: Normal sinus rhythm, rate 66.  Normal NJ and QRS intervals.  No STEMI.  No significant change when compared to EKG of 05/17/2024    Risk  Prescription drug management.  Risk Details: Stable for discharge home.  Patient presented with an itchy, painful area to his right upper back for the past several days.  Exam with a maculopapular rash likely contact dermatitis. Reproducible tenderness to R Upper back just under scapula that is likely MSK in nature. Xray of the Thoracic spine shows degenerative changes throughout the thoracic spine with multilevel disc space narrowing.  Anterior wedging of T6 and T7 that is unchanged compared to prior imaging.  EKG was performed given patient history that shows no changes in addition to a cardiac workup that was also negative.  He was given Robaxin in the ED for likely musculoskeletal pain and will be discharged home with pain control and Kenalog cream for contact dermatitis.                                      Clinical Impression:  Final diagnoses:  [M54.6] Thoracic back pain  [D17.1] Lipoma of torso (Primary)  [L25.9] Contact " dermatitis, unspecified contact dermatitis type, unspecified trigger          ED Disposition Condition    Discharge Stable          ED Prescriptions       Medication Sig Dispense Start Date End Date Auth. Provider    triamcinolone acetonide 0.1% (KENALOG) 0.1 % cream Apply topically 2 (two) times daily. for 7 days 15 g 6/28/2024 7/5/2024 Shannan Galaviz NP    methocarbamoL (ROBAXIN) 500 MG Tab Take 2 tablets (1,000 mg total) by mouth 3 (three) times daily as needed (pain). 18 tablet 6/28/2024 7/1/2024 Shannan Galaviz NP          Follow-up Information       Follow up With Specialties Details Why Contact Info    Honey Galaviz NP Family Medicine Schedule an appointment as soon as possible for a visit in 2 days  [address] [facility] [place] [address]  [phone]               Shannan Galaviz NP  06/28/24 9143

## 2024-06-29 LAB
OHS QRS DURATION: 94 MS
OHS QTC CALCULATION: 457 MS

## 2024-07-02 ENCOUNTER — LAB VISIT (OUTPATIENT)
Dept: LAB | Facility: HOSPITAL | Age: 54
End: 2024-07-02
Attending: INTERNAL MEDICINE
Payer: MEDICAID

## 2024-07-02 DIAGNOSIS — N17.9 ACUTE KIDNEY FAILURE, UNSPECIFIED: Primary | ICD-10-CM

## 2024-07-02 LAB
ALBUMIN SERPL BCP-MCNC: 1.7 G/DL (ref 3.5–5.2)
ANION GAP SERPL CALC-SCNC: 5 MMOL/L (ref 8–16)
BASOPHILS # BLD AUTO: 0.08 K/UL (ref 0–0.2)
BASOPHILS NFR BLD: 0.5 % (ref 0–1.9)
BUN SERPL-MCNC: 39 MG/DL (ref 6–20)
CALCIUM SERPL-MCNC: 8 MG/DL (ref 8.7–10.5)
CHLORIDE SERPL-SCNC: 112 MMOL/L (ref 95–110)
CO2 SERPL-SCNC: 23 MMOL/L (ref 23–29)
CREAT SERPL-MCNC: 2.1 MG/DL (ref 0.5–1.4)
DIFFERENTIAL METHOD BLD: ABNORMAL
EOSINOPHIL # BLD AUTO: 0.7 K/UL (ref 0–0.5)
EOSINOPHIL NFR BLD: 4.3 % (ref 0–8)
ERYTHROCYTE [DISTWIDTH] IN BLOOD BY AUTOMATED COUNT: 12.5 % (ref 11.5–14.5)
EST. GFR  (NO RACE VARIABLE): 37 ML/MIN/1.73 M^2
GLUCOSE SERPL-MCNC: 116 MG/DL (ref 70–110)
HCT VFR BLD AUTO: 36.4 % (ref 40–54)
HGB BLD-MCNC: 12.3 G/DL (ref 14–18)
IMM GRANULOCYTES # BLD AUTO: 0.05 K/UL (ref 0–0.04)
IMM GRANULOCYTES NFR BLD AUTO: 0.3 % (ref 0–0.5)
LYMPHOCYTES # BLD AUTO: 3.1 K/UL (ref 1–4.8)
LYMPHOCYTES NFR BLD: 19.4 % (ref 18–48)
MCH RBC QN AUTO: 31.9 PG (ref 27–31)
MCHC RBC AUTO-ENTMCNC: 33.8 G/DL (ref 32–36)
MCV RBC AUTO: 95 FL (ref 82–98)
MONOCYTES # BLD AUTO: 1.1 K/UL (ref 0.3–1)
MONOCYTES NFR BLD: 6.7 % (ref 4–15)
NEUTROPHILS # BLD AUTO: 11.2 K/UL (ref 1.8–7.7)
NEUTROPHILS NFR BLD: 68.8 % (ref 38–73)
NRBC BLD-RTO: 0 /100 WBC
PHOSPHATE SERPL-MCNC: 4.5 MG/DL (ref 2.7–4.5)
PLATELET # BLD AUTO: 352 K/UL (ref 150–450)
PMV BLD AUTO: 9.9 FL (ref 9.2–12.9)
POTASSIUM SERPL-SCNC: 4 MMOL/L (ref 3.5–5.1)
RBC # BLD AUTO: 3.85 M/UL (ref 4.6–6.2)
SODIUM SERPL-SCNC: 140 MMOL/L (ref 136–145)
WBC # BLD AUTO: 16.22 K/UL (ref 3.9–12.7)

## 2024-07-02 PROCEDURE — 80069 RENAL FUNCTION PANEL: CPT | Performed by: INTERNAL MEDICINE

## 2024-07-02 PROCEDURE — 36415 COLL VENOUS BLD VENIPUNCTURE: CPT | Performed by: INTERNAL MEDICINE

## 2024-07-02 PROCEDURE — 85025 COMPLETE CBC W/AUTO DIFF WBC: CPT | Performed by: INTERNAL MEDICINE

## 2024-07-03 LAB
BRPFT: ABNORMAL
DLCO ADJ PRE: 13.05 ML/(MIN*MMHG) (ref 21.72–35.58)
DLCO SINGLE BREATH LLN: 21.72
DLCO SINGLE BREATH PRE REF: 43.1 %
DLCO SINGLE BREATH REF: 28.65
DLCOC SBVA LLN: 3
DLCOC SBVA PRE REF: 84.9 %
DLCOC SBVA REF: 4.26
DLCOC SINGLE BREATH LLN: 21.72
DLCOC SINGLE BREATH PRE REF: 45.5 %
DLCOC SINGLE BREATH REF: 28.65
DLCOVA LLN: 3
DLCOVA PRE REF: 80.3 %
DLCOVA PRE: 3.42 ML/(MIN*MMHG*L) (ref 3–5.53)
DLCOVA REF: 4.26
DLVAADJ PRE: 3.62 ML/(MIN*MMHG*L) (ref 3–5.53)
ERVN2 LLN: -16448.78
ERVN2 PRE REF: 36.2 %
ERVN2 PRE: 0.44 L (ref -16448.78–16451.22)
ERVN2 REF: 1.22
FEF 25 75 CHG: 44 %
FEF 25 75 LLN: 1.64
FEF 25 75 POST REF: 108.2 %
FEF 25 75 PRE REF: 75.1 %
FEF 25 75 REF: 3.15
FET100 CHG: -42.5 %
FEV1 CHG: 5.8 %
FEV1 FVC CHG: 6.9 %
FEV1 FVC LLN: 67
FEV1 FVC POST REF: 111.4 %
FEV1 FVC PRE REF: 104.2 %
FEV1 FVC REF: 79
FEV1 LLN: 2.73
FEV1 POST REF: 66.7 %
FEV1 PRE REF: 63.1 %
FEV1 REF: 3.54
FRCN2 LLN: 2.45
FRCN2 PRE REF: 46 %
FRCN2 REF: 3.44
FVC CHG: -1.1 %
FVC LLN: 3.49
FVC POST REF: 59.8 %
FVC PRE REF: 60.5 %
FVC REF: 4.51
IVC PRE: 2.21 L (ref 3.49–5.55)
IVC SINGLE BREATH LLN: 3.49
IVC SINGLE BREATH PRE REF: 49.1 %
IVC SINGLE BREATH REF: 4.51
MEP LLN: 83
MEP PRE REF: 105.3 %
MEP PRE: 105.26 CMH2O (ref 83.23–116.78)
MEP REF: 100
MIP LLN: 58
MIP PRE REF: 58.7 %
MIP PRE: 44.05 CMH2O (ref 58.23–91.78)
MIP REF: 75
MVV LLN: 115
MVV PRE REF: 50.1 %
MVV REF: 135
PEF CHG: -21.9 %
PEF LLN: 6.99
PEF POST REF: 63 %
PEF PRE REF: 80.7 %
PEF REF: 9.17
POST FEF 25 75: 3.41 L/S (ref 1.64–5.16)
POST FET 100: 2.24 SEC
POST FEV1 FVC: 87.66 % (ref 67.29–88.56)
POST FEV1: 2.37 L (ref 2.73–4.32)
POST FVC: 2.7 L (ref 3.49–5.55)
POST PEF: 5.78 L/S (ref 6.99–11.36)
PRE DLCO: 12.33 ML/(MIN*MMHG) (ref 21.72–35.58)
PRE FEF 25 75: 2.37 L/S (ref 1.64–5.16)
PRE FET 100: 3.89 SEC
PRE FEV1 FVC: 81.98 % (ref 67.29–88.56)
PRE FEV1: 2.24 L (ref 2.73–4.32)
PRE FRC N2: 1.58 L (ref 2.45–4.42)
PRE FVC: 2.73 L (ref 3.49–5.55)
PRE MVV: 67.5 L/MIN (ref 114.61–155.06)
PRE PEF: 7.4 L/S (ref 6.99–11.36)
RVN2 LLN: 1.55
RVN2 PRE REF: 51.3 %
RVN2 PRE: 1.14 L (ref 1.55–2.9)
RVN2 REF: 2.22
RVN2TLCN2 LLN: 26.04
RVN2TLCN2 PRE REF: 79.1 %
RVN2TLCN2 PRE: 27.71 % (ref 26.04–44)
RVN2TLCN2 REF: 35.02
TLCN2 LLN: 5.57
TLCN2 PRE REF: 61.2 %
TLCN2 PRE: 4.11 L (ref 5.57–7.87)
TLCN2 REF: 6.72
VA PRE: 3.6 L (ref 6.57–6.57)
VA SINGLE BREATH LLN: 6.57
VA SINGLE BREATH PRE REF: 54.8 %
VA SINGLE BREATH REF: 6.57
VCMAXN2 LLN: 3.49
VCMAXN2 PRE REF: 65.9 %
VCMAXN2 PRE: 2.97 L (ref 3.49–5.55)
VCMAXN2 REF: 4.51

## 2024-07-15 ENCOUNTER — HOSPITAL ENCOUNTER (EMERGENCY)
Facility: HOSPITAL | Age: 54
Discharge: HOME | End: 2024-07-15
Payer: MEDICAID

## 2024-07-15 VITALS
BODY MASS INDEX: 32.38 KG/M2 | RESPIRATION RATE: 20 BRPM | TEMPERATURE: 98 F | DIASTOLIC BLOOD PRESSURE: 93 MMHG | SYSTOLIC BLOOD PRESSURE: 171 MMHG | HEART RATE: 106 BPM | OXYGEN SATURATION: 97 % | WEIGHT: 213.63 LBS | HEIGHT: 68 IN

## 2024-07-15 DIAGNOSIS — R22.0 FACIAL SWELLING: Primary | ICD-10-CM

## 2024-07-15 LAB
ALBUMIN SERPL BCP-MCNC: 1.8 G/DL (ref 3.5–5.2)
ALP SERPL-CCNC: 79 U/L (ref 55–135)
ALT SERPL W/O P-5'-P-CCNC: 24 U/L (ref 10–44)
ANION GAP SERPL CALC-SCNC: 11 MMOL/L (ref 8–16)
AST SERPL-CCNC: 21 U/L (ref 10–40)
BASOPHILS # BLD AUTO: 0.05 K/UL (ref 0–0.2)
BASOPHILS NFR BLD: 0.5 % (ref 0–1.9)
BILIRUB SERPL-MCNC: 0.1 MG/DL (ref 0.1–1)
BUN SERPL-MCNC: 50 MG/DL (ref 6–20)
CALCIUM SERPL-MCNC: 8.4 MG/DL (ref 8.7–10.5)
CHLORIDE SERPL-SCNC: 110 MMOL/L (ref 95–110)
CO2 SERPL-SCNC: 20 MMOL/L (ref 23–29)
CREAT SERPL-MCNC: 3 MG/DL (ref 0.5–1.4)
DIFFERENTIAL METHOD BLD: ABNORMAL
EOSINOPHIL # BLD AUTO: 0.6 K/UL (ref 0–0.5)
EOSINOPHIL NFR BLD: 5.5 % (ref 0–8)
ERYTHROCYTE [DISTWIDTH] IN BLOOD BY AUTOMATED COUNT: 12.6 % (ref 11.5–14.5)
EST. GFR  (NO RACE VARIABLE): 24 ML/MIN/1.73 M^2
GLUCOSE SERPL-MCNC: 120 MG/DL (ref 70–110)
HCT VFR BLD AUTO: 37 % (ref 40–54)
HGB BLD-MCNC: 12.4 G/DL (ref 14–18)
IMM GRANULOCYTES # BLD AUTO: 0.07 K/UL (ref 0–0.04)
IMM GRANULOCYTES NFR BLD AUTO: 0.6 % (ref 0–0.5)
LYMPHOCYTES # BLD AUTO: 2.4 K/UL (ref 1–4.8)
LYMPHOCYTES NFR BLD: 22.5 % (ref 18–48)
MCH RBC QN AUTO: 31.6 PG (ref 27–31)
MCHC RBC AUTO-ENTMCNC: 33.5 G/DL (ref 32–36)
MCV RBC AUTO: 94 FL (ref 82–98)
MONOCYTES # BLD AUTO: 0.9 K/UL (ref 0.3–1)
MONOCYTES NFR BLD: 8.3 % (ref 4–15)
NEUTROPHILS # BLD AUTO: 6.8 K/UL (ref 1.8–7.7)
NEUTROPHILS NFR BLD: 62.6 % (ref 38–73)
NRBC BLD-RTO: 0 /100 WBC
PLATELET # BLD AUTO: 346 K/UL (ref 150–450)
PMV BLD AUTO: 9.9 FL (ref 9.2–12.9)
POTASSIUM SERPL-SCNC: 3.9 MMOL/L (ref 3.5–5.1)
PROT SERPL-MCNC: 5.8 G/DL (ref 6–8.4)
RBC # BLD AUTO: 3.92 M/UL (ref 4.6–6.2)
SODIUM SERPL-SCNC: 141 MMOL/L (ref 136–145)
WBC # BLD AUTO: 10.82 K/UL (ref 3.9–12.7)

## 2024-07-15 PROCEDURE — 63600175 PHARM REV CODE 636 W HCPCS: Performed by: NURSE PRACTITIONER

## 2024-07-15 PROCEDURE — 99284 EMERGENCY DEPT VISIT MOD MDM: CPT | Mod: 25

## 2024-07-15 PROCEDURE — 96372 THER/PROPH/DIAG INJ SC/IM: CPT | Performed by: NURSE PRACTITIONER

## 2024-07-15 PROCEDURE — 80053 COMPREHEN METABOLIC PANEL: CPT | Performed by: NURSE PRACTITIONER

## 2024-07-15 PROCEDURE — 85025 COMPLETE CBC W/AUTO DIFF WBC: CPT | Performed by: NURSE PRACTITIONER

## 2024-07-15 RX ORDER — DIPHENHYDRAMINE HYDROCHLORIDE 50 MG/ML
25 INJECTION INTRAMUSCULAR; INTRAVENOUS
Status: COMPLETED | OUTPATIENT
Start: 2024-07-15 | End: 2024-07-15

## 2024-07-15 RX ORDER — DIPHENHYDRAMINE HCL 50 MG
50 CAPSULE ORAL EVERY 6 HOURS PRN
Qty: 20 CAPSULE | Refills: 0 | Status: SHIPPED | OUTPATIENT
Start: 2024-07-15

## 2024-07-15 RX ADMIN — DIPHENHYDRAMINE HYDROCHLORIDE 25 MG: 50 INJECTION, SOLUTION INTRAMUSCULAR; INTRAVENOUS at 08:07

## 2024-07-16 NOTE — ED PROVIDER NOTES
"Encounter Date: 7/15/2024       History     Chief Complaint   Patient presents with    Facial Swelling     Patient reports being vaccinated for hepatitis A and B yesterday.  Reports swelling in the right side of his face.     Onofre Ceballos is a 53 y.o. male with PMH of DM type 1, hep C, hypertension, shingles presenting to the ED for evaluation of right-sided facial swelling.  Patient reports that he received a shingles vaccination and Hep A&B vaccination X 2 days ago. He woke up this morning with mild, R sided facial swelling and is concerned that he is having an allergic rxn.  Denies pain, redness or warmth.  Reports no dental issues.  Denies difficulty swallowing or breathing.    The history is provided by the patient.     Review of patient's allergies indicates:  No Known Allergies  Past Medical History:   Diagnosis Date    Diabetes mellitus     Diabetes mellitus type I     Hepatitis     Hepatitis C     Hypertension     Shingles 10/2018     Past Surgical History:   Procedure Laterality Date    NO PAST SURGERIES       Family History   Problem Relation Name Age of Onset    Diabetes Mother      Lung cancer Father       Social History     Tobacco Use    Smoking status: Former     Current packs/day: 1.00     Types: Cigarettes    Smokeless tobacco: Former   Substance Use Topics    Alcohol use: No    Drug use: Not Currently     Types: Methamphetamines     Comment: Patient stated "I'm use IV drug user with meth in the last 2 weeks."     Review of Systems   Constitutional:  Negative for appetite change, chills and fever.   HENT:  Positive for facial swelling (Right-sided). Negative for congestion, ear discharge, ear pain, postnasal drip, rhinorrhea and sore throat.    Respiratory:  Negative for cough, chest tightness and shortness of breath.    Cardiovascular:  Negative for chest pain.   Gastrointestinal:  Negative for abdominal distention, abdominal pain and nausea.   Genitourinary:  Negative for dysuria, flank " pain, hematuria and urgency.   Musculoskeletal:  Negative for arthralgias and back pain.   Skin: Negative.  Negative for rash.   Neurological:  Negative for dizziness, weakness, numbness and headaches.   Hematological:  Does not bruise/bleed easily.       Physical Exam     Initial Vitals [07/15/24 2010]   BP Pulse Resp Temp SpO2   (!) 171/93 106 20 97.6 °F (36.4 °C) 97 %      MAP       --         Physical Exam    Nursing note and vitals reviewed.  Constitutional: He appears well-developed and well-nourished.   HENT:   Head: Normocephalic and atraumatic.   Mild, right-sided facial swelling  No redness or warmth  No gingival tenderness  No palpable lymphadenopathy  Airway is patent with normal phonation   Eyes: Conjunctivae and EOM are normal. Pupils are equal, round, and reactive to light.   Neck: Neck supple.   Cardiovascular:  Normal rate, regular rhythm, normal heart sounds and intact distal pulses.           Pulmonary/Chest: Breath sounds normal.   Abdominal: Abdomen is soft. Bowel sounds are normal.   Musculoskeletal:         General: Normal range of motion.      Cervical back: Neck supple.     Neurological: He is alert and oriented to person, place, and time. He has normal strength.   Skin: Skin is warm and dry.   Psychiatric: He has a normal mood and affect. His behavior is normal. Judgment and thought content normal.         ED Course   Procedures  Labs Reviewed   CBC W/ AUTO DIFFERENTIAL - Abnormal; Notable for the following components:       Result Value    RBC 3.92 (*)     Hemoglobin 12.4 (*)     Hematocrit 37.0 (*)     MCH 31.6 (*)     Immature Granulocytes 0.6 (*)     Immature Grans (Abs) 0.07 (*)     Eos # 0.6 (*)     All other components within normal limits   COMPREHENSIVE METABOLIC PANEL - Abnormal; Notable for the following components:    CO2 20 (*)     Glucose 120 (*)     BUN 50 (*)     Creatinine 3.0 (*)     Calcium 8.4 (*)     Total Protein 5.8 (*)     Albumin 1.8 (*)     eGFR 24 (*)     All  other components within normal limits          Imaging Results    None          Medications   diphenhydrAMINE injection 25 mg (25 mg Intramuscular Given 7/15/24 2028)     Medical Decision Making  Evaluation of a 53-year-old male with right-sided facial swelling after receiving vaccinations 2 days ago.  Presents with mild, right-sided facial swelling.  Patent airway with normal phonation.  No redness, warmth, or palpable lymphadenopathy.    Differential diagnosis includes allergic reaction, cellulitis, lymphadenopathy    Problems Addressed:  Facial swelling: acute illness or injury    Amount and/or Complexity of Data Reviewed  Labs: ordered. Decision-making details documented in ED Course.    Risk  Prescription drug management.  Risk Details: Stable discharge home.  Benadryl for likely reaction.  Will discharge, continue Benadryl as directed.  Close outpatient follow-up with PCP if no improvement in symptoms. Patient/caregiver voices understanding and feels comfortable with discharge plan.      The patient acknowledges that close follow up with medical provider is required. Instructed to follow up with PCP within 2 days. Patient was given specific return precautions. The patient agrees to comply with all instruction and directions given in the ER.                                        Clinical Impression:  Final diagnoses:  [R22.0] Facial swelling (Primary)          ED Disposition Condition    Discharge Stable          ED Prescriptions       Medication Sig Dispense Start Date End Date Auth. Provider    diphenhydrAMINE (BENADRYL) 50 MG capsule Take 1 capsule (50 mg total) by mouth every 6 (six) hours as needed for Itching. 20 capsule 7/15/2024 -- Jose Antonio Dowd MD          Follow-up Information       Follow up With Specialties Details Why Contact Info    Honey Galaviz NP Family Medicine Schedule an appointment as soon as possible for a visit in 2 days  [address] [facility]  [facility] [address]  [phone]               Shannan Galaviz, ELMO  07/15/24 8475

## 2024-07-19 ENCOUNTER — HOSPITAL ENCOUNTER (EMERGENCY)
Facility: HOSPITAL | Age: 54
Discharge: HOME | End: 2024-07-19
Attending: STUDENT IN AN ORGANIZED HEALTH CARE EDUCATION/TRAINING PROGRAM
Payer: MEDICAID

## 2024-07-19 VITALS
RESPIRATION RATE: 18 BRPM | WEIGHT: 212 LBS | DIASTOLIC BLOOD PRESSURE: 81 MMHG | OXYGEN SATURATION: 96 % | HEIGHT: 68 IN | TEMPERATURE: 97 F | HEART RATE: 73 BPM | SYSTOLIC BLOOD PRESSURE: 137 MMHG | BODY MASS INDEX: 32.13 KG/M2

## 2024-07-19 DIAGNOSIS — K22.9 ESOPHAGEAL ABNORMALITY: Primary | ICD-10-CM

## 2024-07-19 DIAGNOSIS — R22.0 FACIAL SWELLING: ICD-10-CM

## 2024-07-19 DIAGNOSIS — H10.9 CONJUNCTIVITIS, UNSPECIFIED CONJUNCTIVITIS TYPE, UNSPECIFIED LATERALITY: ICD-10-CM

## 2024-07-19 LAB
ALBUMIN SERPL BCP-MCNC: 1.9 G/DL (ref 3.5–5.2)
ALP SERPL-CCNC: 81 U/L (ref 55–135)
ALT SERPL W/O P-5'-P-CCNC: 29 U/L (ref 10–44)
ANION GAP SERPL CALC-SCNC: 8 MMOL/L (ref 8–16)
AST SERPL-CCNC: 36 U/L (ref 10–40)
BASOPHILS # BLD AUTO: 0.08 K/UL (ref 0–0.2)
BASOPHILS NFR BLD: 0.7 % (ref 0–1.9)
BILIRUB SERPL-MCNC: 0.2 MG/DL (ref 0.1–1)
BUN SERPL-MCNC: 39 MG/DL (ref 6–20)
CALCIUM SERPL-MCNC: 8.4 MG/DL (ref 8.7–10.5)
CHLORIDE SERPL-SCNC: 108 MMOL/L (ref 95–110)
CO2 SERPL-SCNC: 23 MMOL/L (ref 23–29)
CREAT SERPL-MCNC: 2.9 MG/DL (ref 0.5–1.4)
DIFFERENTIAL METHOD BLD: ABNORMAL
EOSINOPHIL # BLD AUTO: 0.6 K/UL (ref 0–0.5)
EOSINOPHIL NFR BLD: 5 % (ref 0–8)
ERYTHROCYTE [DISTWIDTH] IN BLOOD BY AUTOMATED COUNT: 12.5 % (ref 11.5–14.5)
EST. GFR  (NO RACE VARIABLE): 25 ML/MIN/1.73 M^2
GLUCOSE SERPL-MCNC: 135 MG/DL (ref 70–110)
HCT VFR BLD AUTO: 37.5 % (ref 40–54)
HGB BLD-MCNC: 12.7 G/DL (ref 14–18)
IMM GRANULOCYTES # BLD AUTO: 0.06 K/UL (ref 0–0.04)
IMM GRANULOCYTES NFR BLD AUTO: 0.5 % (ref 0–0.5)
LACTATE SERPL-SCNC: 0.6 MMOL/L (ref 0.5–2.2)
LYMPHOCYTES # BLD AUTO: 2 K/UL (ref 1–4.8)
LYMPHOCYTES NFR BLD: 18 % (ref 18–48)
MCH RBC QN AUTO: 31.2 PG (ref 27–31)
MCHC RBC AUTO-ENTMCNC: 33.9 G/DL (ref 32–36)
MCV RBC AUTO: 92 FL (ref 82–98)
MONOCYTES # BLD AUTO: 0.7 K/UL (ref 0.3–1)
MONOCYTES NFR BLD: 6.3 % (ref 4–15)
NEUTROPHILS # BLD AUTO: 7.9 K/UL (ref 1.8–7.7)
NEUTROPHILS NFR BLD: 69.5 % (ref 38–73)
NRBC BLD-RTO: 0 /100 WBC
PLATELET # BLD AUTO: 363 K/UL (ref 150–450)
PMV BLD AUTO: 9.8 FL (ref 9.2–12.9)
POTASSIUM SERPL-SCNC: 4.3 MMOL/L (ref 3.5–5.1)
PROT SERPL-MCNC: 5.8 G/DL (ref 6–8.4)
RBC # BLD AUTO: 4.07 M/UL (ref 4.6–6.2)
SODIUM SERPL-SCNC: 139 MMOL/L (ref 136–145)
WBC # BLD AUTO: 11.35 K/UL (ref 3.9–12.7)

## 2024-07-19 PROCEDURE — 85025 COMPLETE CBC W/AUTO DIFF WBC: CPT | Performed by: STUDENT IN AN ORGANIZED HEALTH CARE EDUCATION/TRAINING PROGRAM

## 2024-07-19 PROCEDURE — 83605 ASSAY OF LACTIC ACID: CPT | Performed by: STUDENT IN AN ORGANIZED HEALTH CARE EDUCATION/TRAINING PROGRAM

## 2024-07-19 PROCEDURE — 99285 EMERGENCY DEPT VISIT HI MDM: CPT | Mod: 25

## 2024-07-19 PROCEDURE — 80053 COMPREHEN METABOLIC PANEL: CPT | Performed by: STUDENT IN AN ORGANIZED HEALTH CARE EDUCATION/TRAINING PROGRAM

## 2024-07-19 RX ORDER — OFLOXACIN 3 MG/ML
1 SOLUTION/ DROPS OPHTHALMIC 4 TIMES DAILY
Qty: 10 ML | Refills: 0 | Status: SHIPPED | OUTPATIENT
Start: 2024-07-19

## 2024-07-19 NOTE — DISCHARGE INSTRUCTIONS
Please follow up with your primary care physician within 2 days. Ensure that you review all lab work results and/or imaging results. If you have any questions about your discharge paperwork please call the Emergency Department.     Return to the ED for any chest pain, trouble swallowing or breathing, upper abdominal pain, shortness of breath, lightheadedness, or any new or worsening symptoms.       If you were prescribed antibiotics, please take them to completion. If you were prescribed a narcotic or any sedating medication, do not drive or operate heavy equipment or machinery while taking these medications.  If you were diagnosed with a seizure, syncope, any loss of consciousness or decreased alertness, do not drive, swim, operate heavy machinery, or put yourself in any position where a sudden loss of consciousness could put yourself or others in danger.    Thank you for visiting [facility], Department of Emergency Medicine. Please see the entirety of the educational materials provided. Please note that a visit to the emergency department does not substitute ongoing care from a primary medical provider or specialist. Please ensure to follow up as recommended. However, please return to the emergency department immediately if symptoms do not improve as discussed, symptoms worsen, new symptoms develop, difficulty in following up or for any of your concerns or issues. Please note on discharge you are acknowledging understanding and agreement on medical evaluation, management recommendations and follow up recommendations.

## 2024-07-19 NOTE — ED TRIAGE NOTES
53 y.o. male presents to ER Room/bed info not found   Chief Complaint   Patient presents with    Facial Swelling   .   C/o right sided facial swelling for a week, denies pain

## 2024-07-19 NOTE — ED PROVIDER NOTES
"Encounter Date: 7/19/2024       History     Chief Complaint   Patient presents with    Facial Swelling     53-year-old male with history of hypertension, diabetes, hep C, presenting with one week of right-sided facial swelling.  Patient reports that the swelling is located around his jaw and under the mandible.  Denies any trouble swallowing or breathing, throat itching, throat pain.  No fever, nausea, vomiting, chest pain.  Patient reports that he does have a small amount of discharge coming from his right eye with some right eye itching, but states this has mild.      Review of patient's allergies indicates:  No Known Allergies  Past Medical History:   Diagnosis Date    Diabetes mellitus     Diabetes mellitus type I     Hepatitis     Hepatitis C     Hypertension     Shingles 10/2018     Past Surgical History:   Procedure Laterality Date    NO PAST SURGERIES       Family History   Problem Relation Name Age of Onset    Diabetes Mother      Lung cancer Father       Social History     Tobacco Use    Smoking status: Former     Current packs/day: 1.00     Types: Cigarettes    Smokeless tobacco: Former   Substance Use Topics    Alcohol use: No    Drug use: Not Currently     Types: Methamphetamines     Comment: Patient stated "I'm use IV drug user with meth in the last 2 weeks."     Review of Systems   Constitutional:  Negative for fever.   HENT:  Positive for facial swelling. Negative for sore throat.    Eyes:  Positive for discharge and itching. Negative for photophobia, pain, redness and visual disturbance.   Respiratory:  Negative for shortness of breath.    Cardiovascular:  Negative for chest pain.   Gastrointestinal:  Negative for nausea.   Genitourinary:  Negative for dysuria.   Musculoskeletal:  Negative for back pain.   Skin:  Negative for rash.   Neurological:  Negative for weakness.   Hematological:  Does not bruise/bleed easily.       Physical Exam     Initial Vitals [07/19/24 1326]   BP Pulse Resp Temp SpO2 "   (!) 152/88 83 18 97.4 °F (36.3 °C) 97 %      MAP       --         Physical Exam    Nursing note and vitals reviewed.  Constitutional: He appears well-developed.   HENT:   Swelling around right side of patient's face.  No erythema, induration, fluctuance, tenderness to palpation.  I do not palpate any induration under the jaw.  Patient able to range his mandible without any pain or discomfort.  Airways patent without pooling secretions.  Phonating normally.   Eyes: EOM are normal.   Left eye unremarkable.  Right eye has small amount of conjunctival injection with clear discharge.  Pupils 3 mm equal round reactive to light bilaterally.   Neck:   Normal range of motion.  Cardiovascular:            No murmur heard.  Pulmonary/Chest: No respiratory distress.   Abdominal: He exhibits no distension.   Musculoskeletal:         General: Normal range of motion.      Cervical back: Normal range of motion.     Neurological: He is alert.   Skin: Skin is warm.   Psychiatric: He has a normal mood and affect.         ED Course   Procedures  Labs Reviewed   CBC W/ AUTO DIFFERENTIAL - Abnormal; Notable for the following components:       Result Value    RBC 4.07 (*)     Hemoglobin 12.7 (*)     Hematocrit 37.5 (*)     MCH 31.2 (*)     Gran # (ANC) 7.9 (*)     Immature Grans (Abs) 0.06 (*)     Eos # 0.6 (*)     All other components within normal limits   COMPREHENSIVE METABOLIC PANEL - Abnormal; Notable for the following components:    Glucose 135 (*)     BUN 39 (*)     Creatinine 2.9 (*)     Calcium 8.4 (*)     Total Protein 5.8 (*)     Albumin 1.9 (*)     eGFR 25 (*)     All other components within normal limits   LACTIC ACID, PLASMA          Imaging Results              CT Soft Tissue Neck WO Contrast (Final result)  Result time 07/19/24 15:14:34      Final result by Neftali Dominguez Jr., MD (07/19/24 15:14:34)                   Impression:      A neck abscess is not identified.  Significant adenopathy in the neck is not seen.   Esophagus however is abnormal within the mediastinum being dilated with an air-fluid level and a thickened wall consistent with a distal esophageal pathology.  There is a new irregular 1.5 cm mass in the left upper lobe and a reticulonodular infiltrate of the right upper lobe is no longer seen.  Repeat CT of the chest is recommended.  There is bilateral carotid bifurcation calcified atherosclerotic plaque.      Electronically signed by: Neftali Dominguez MD  Date:    07/19/2024  Time:    15:14               Narrative:    EXAMINATION:  CT SOFT TISSUE NECK WITHOUT CONTRAST    CLINICAL HISTORY:  Neck abscess, deep tissue;    TECHNIQUE:  Low dose axial images, sagittal and coronal reformations were performed from the skull base to the level of the clavicles.  Contrast was not administered.    COMPARISON:  CT chest of May 4, 2024.    FINDINGS:  The parotid glands are bilaterally symmetric and of normal CT density.  The submandibular salivary glands are bilaterally symmetric and of normal CT density.  The thyroid is small without a focal nodule seen.  The structures of the pharynx and larynx appear within normal limits without asymmetry or mass identified.  Significant adenopathy is not demonstrated.    There is atherosclerotic plaque and calcification of both carotid artery bifurcations.  The internal jugular veins are not compressed on this study.  The patient is noted have dilation of the esophagus within the mediastinum with thickening of the wall and an air-fluid level suggesting distal esophageal pathology.    A 1.5 cm irregular soft tissue density mass is seen in the left upper lobe.  This was not seen on the prior CT of May 4, 2021.  The extensive reticulonodular infiltrate of the right upper lobe is no longer seen.                                       Medications - No data to display  Medical Decision Making  DDX:  Facial swelling for one week.  Patient has no systemic signs of infection, however given  progressively worsening swelling will image to rule out deep space infection.  DX:  CBC, CMP, lactate, CT  TX:  Analgesia PRN  Dispo:  Pending workup        Amount and/or Complexity of Data Reviewed  Labs: ordered.  Radiology: ordered.    Risk  Prescription drug management.               ED Course as of 07/19/24 1525   Fri Jul 19, 2024   1439 Given patient's poor GFR will scan without contrast.  Kidney function at baseline. [NB]   1525 Patient informed of abnormal CT findings including the esophageal pathology, as well as the new nodule in the lung field.  Patient will follow up with Gastroenterology for his esophagus, and will follow up with his primary care physician for repeat imaging to further evaluate the lung findings.  Patient was in agreement with this plan. [NB]      ED Course User Index  [NB] Ian Collado MD                           Clinical Impression:  Final diagnoses:  [R22.0] Facial swelling  [H10.9] Conjunctivitis, unspecified conjunctivitis type, unspecified laterality  [K22.9] Esophageal abnormality (Primary)          ED Disposition Condition    Discharge Stable          ED Prescriptions       Medication Sig Dispense Start Date End Date Auth. Provider    ofloxacin (OCUFLOX) 0.3 % ophthalmic solution Place 1 drop into the left eye 4 (four) times daily. 10 mL 7/19/2024 -- Ian Collado MD          Follow-up Information       Follow up With Specialties Details Why Contact Info    Honey Galaviz, NP Family Medicine Schedule an appointment as soon as possible for a visit in 2 days  [address] [facility] [place] [address]  [phone]      [facility] - Emergency Dept Emergency Medicine  If symptoms worsen [address] [place] [id number] [phone]    [facility] Gastroenterology Schedule an appointment as soon as possible for a visit in 2 days  [address] [facility] [id number] [phone]              Ian Collado MD  07/19/24 1349       Ian Collado MD  07/19/24 6125

## 2024-07-21 ENCOUNTER — NURSE TRIAGE (OUTPATIENT)
Dept: ADMINISTRATIVE | Facility: CLINIC | Age: 54
End: 2024-07-21
Payer: MEDICAID

## 2024-07-21 ENCOUNTER — HOSPITAL ENCOUNTER (EMERGENCY)
Facility: HOSPITAL | Age: 54
Discharge: HOME | End: 2024-07-21
Attending: SURGERY
Payer: MEDICAID

## 2024-07-21 VITALS
DIASTOLIC BLOOD PRESSURE: 85 MMHG | BODY MASS INDEX: 32.83 KG/M2 | SYSTOLIC BLOOD PRESSURE: 143 MMHG | RESPIRATION RATE: 20 BRPM | TEMPERATURE: 97 F | OXYGEN SATURATION: 99 % | HEART RATE: 75 BPM | WEIGHT: 215.94 LBS

## 2024-07-21 DIAGNOSIS — K21.00 GASTROESOPHAGEAL REFLUX DISEASE WITH ESOPHAGITIS WITHOUT HEMORRHAGE: ICD-10-CM

## 2024-07-21 DIAGNOSIS — R22.0 FACIAL SWELLING: Primary | ICD-10-CM

## 2024-07-21 DIAGNOSIS — K11.20 SALIVARY GLAND ADENITIS: ICD-10-CM

## 2024-07-21 LAB
ALBUMIN SERPL BCP-MCNC: 2 G/DL (ref 3.5–5.2)
ALP SERPL-CCNC: 81 U/L (ref 55–135)
ALT SERPL W/O P-5'-P-CCNC: 36 U/L (ref 10–44)
ANION GAP SERPL CALC-SCNC: 7 MMOL/L (ref 8–16)
AST SERPL-CCNC: 37 U/L (ref 10–40)
BASOPHILS # BLD AUTO: 0.08 K/UL (ref 0–0.2)
BASOPHILS NFR BLD: 0.5 % (ref 0–1.9)
BILIRUB SERPL-MCNC: 0.2 MG/DL (ref 0.1–1)
BNP SERPL-MCNC: 144 PG/ML (ref 0–99)
BUN SERPL-MCNC: 44 MG/DL (ref 6–20)
CALCIUM SERPL-MCNC: 8.1 MG/DL (ref 8.7–10.5)
CHLORIDE SERPL-SCNC: 110 MMOL/L (ref 95–110)
CO2 SERPL-SCNC: 22 MMOL/L (ref 23–29)
CREAT SERPL-MCNC: 2.8 MG/DL (ref 0.5–1.4)
DIFFERENTIAL METHOD BLD: ABNORMAL
EOSINOPHIL # BLD AUTO: 0.9 K/UL (ref 0–0.5)
EOSINOPHIL NFR BLD: 5.1 % (ref 0–8)
ERYTHROCYTE [DISTWIDTH] IN BLOOD BY AUTOMATED COUNT: 12.5 % (ref 11.5–14.5)
EST. GFR  (NO RACE VARIABLE): 26 ML/MIN/1.73 M^2
GLUCOSE SERPL-MCNC: 137 MG/DL (ref 70–110)
HCT VFR BLD AUTO: 37.1 % (ref 40–54)
HGB BLD-MCNC: 12.6 G/DL (ref 14–18)
IMM GRANULOCYTES # BLD AUTO: 0.11 K/UL (ref 0–0.04)
IMM GRANULOCYTES NFR BLD AUTO: 0.6 % (ref 0–0.5)
LYMPHOCYTES # BLD AUTO: 2.4 K/UL (ref 1–4.8)
LYMPHOCYTES NFR BLD: 13.6 % (ref 18–48)
MCH RBC QN AUTO: 31.8 PG (ref 27–31)
MCHC RBC AUTO-ENTMCNC: 34 G/DL (ref 32–36)
MCV RBC AUTO: 94 FL (ref 82–98)
MONOCYTES # BLD AUTO: 1.2 K/UL (ref 0.3–1)
MONOCYTES NFR BLD: 6.7 % (ref 4–15)
NEUTROPHILS # BLD AUTO: 12.9 K/UL (ref 1.8–7.7)
NEUTROPHILS NFR BLD: 73.5 % (ref 38–73)
NRBC BLD-RTO: 0 /100 WBC
PLATELET # BLD AUTO: 353 K/UL (ref 150–450)
PMV BLD AUTO: 9.3 FL (ref 9.2–12.9)
POTASSIUM SERPL-SCNC: 4.8 MMOL/L (ref 3.5–5.1)
PROT SERPL-MCNC: 5.7 G/DL (ref 6–8.4)
RBC # BLD AUTO: 3.96 M/UL (ref 4.6–6.2)
SODIUM SERPL-SCNC: 139 MMOL/L (ref 136–145)
WBC # BLD AUTO: 17.54 K/UL (ref 3.9–12.7)

## 2024-07-21 PROCEDURE — 85025 COMPLETE CBC W/AUTO DIFF WBC: CPT | Performed by: NURSE PRACTITIONER

## 2024-07-21 PROCEDURE — 80053 COMPREHEN METABOLIC PANEL: CPT | Performed by: NURSE PRACTITIONER

## 2024-07-21 PROCEDURE — 83880 ASSAY OF NATRIURETIC PEPTIDE: CPT | Performed by: NURSE PRACTITIONER

## 2024-07-21 PROCEDURE — 99284 EMERGENCY DEPT VISIT MOD MDM: CPT | Mod: 25

## 2024-07-21 RX ORDER — AZITHROMYCIN 250 MG/1
TABLET, FILM COATED ORAL
Qty: 6 TABLET | Refills: 0 | Status: SHIPPED | OUTPATIENT
Start: 2024-07-21 | End: 2024-07-26

## 2024-07-21 NOTE — TELEPHONE ENCOUNTER
"Right facial swelling. Recently evaluated for this but reports this is now worse. He also has swelling to his lower extremities, worse on the left side. Advised, per protocol to be evaluated w/in 4 hours. He verbalizes understanding.    Reason for Disposition   Swelling of both lower legs (i.e., bilateral pedal edema)    Additional Information   Negative: [1] Life-threatening reaction (anaphylaxis) in the past to similar substance (e.g., food, insect bite/sting, chemical, etc.) AND [2] < 2 hours since exposure   Negative: Unresponsive, passed out or very weak   Negative: Swollen tongue   Negative: Difficulty breathing or wheezing   Negative: Sounds like a life-threatening emergency to the triager   Negative: [1] SEVERE swelling (e.g., entire face) AND [2] < 2 hours since exposure to high-risk allergen (e.g., peanuts, tree nuts, fish, shellfish or 1st dose of drug) AND [3] no serious symptoms AND [4] no serious allergic reaction in the past   Negative: Fever   Negative: Taking an ACE Inhibitor medicine (e.g., benazepril / LOTENSIN, captopril / CAPOTEN, enalapril / VASOTEC, lisinopril / ZESTRIL)   Negative: Patient sounds very sick or weak to the triager   Negative: Pregnant 20 or more weeks   Negative: Postpartum (from 0 to 6 weeks after delivery)   Negative: SEVERE swelling (e.g., entire face)   Negative: [1] Swelling is red AND [2] very painful to touch   Negative: Face swelling began after taking a drug   Negative: [1] Painful rash AND [2] multiple small blisters grouped together (i.e., dermatomal distribution or "band" or "stripe")   Negative: [1] Looks infected (e.g., spreading redness, pus) AND [2] large red area (> 2 in. or 5 cm)   Negative: Toothache    Protocols used: Face Swelling-A-AH    "

## 2024-07-21 NOTE — ED PROVIDER NOTES
"Encounter Date: 7/21/2024       History     Chief Complaint   Patient presents with    Facial Swelling     Onofre Ceballos is a 53 y.o. male with PMH of DM, hepatitis-C, hypertension, shingles presenting to the ED for evaluation of continued facial swelling.  This is patient's 3rd ED visit for right-sided facial/neck swelling.  Initial symptoms started after receiving the shingles vaccination and it was thought that he was having an allergic reaction.  He was subsequently seen again for same complaint on 7/19/24 where he had a CT soft tissue neck that showed no adenopathy or abscess.  He presents again today for continued swelling.  He denies difficulty swallowing or breathing.  Denies fever or night sweats.  He is concerned that he might be in heart failure due to swelling.  Denies any significant worsening shortness a breath.  He has chronic lower extremity swelling, left greater than right.     The history is provided by the patient.     Review of patient's allergies indicates:  No Known Allergies  Past Medical History:   Diagnosis Date    Diabetes mellitus     Diabetes mellitus type I     Hepatitis     Hepatitis C     Hypertension     Shingles 10/2018     Past Surgical History:   Procedure Laterality Date    NO PAST SURGERIES       Family History   Problem Relation Name Age of Onset    Diabetes Mother      Lung cancer Father       Social History     Tobacco Use    Smoking status: Former     Current packs/day: 1.00     Types: Cigarettes    Smokeless tobacco: Former   Substance Use Topics    Alcohol use: No    Drug use: Not Currently     Types: Methamphetamines     Comment: Patient stated "I'm use IV drug user with meth in the last 2 weeks."     Review of Systems   Constitutional:  Negative for appetite change, chills and fever.   HENT:  Positive for facial swelling. Negative for congestion, ear discharge, ear pain, postnasal drip, rhinorrhea and sore throat.    Respiratory:  Negative for cough, chest " tightness and shortness of breath.    Cardiovascular:  Negative for chest pain.   Gastrointestinal:  Negative for abdominal distention, abdominal pain and nausea.   Genitourinary:  Negative for dysuria, flank pain, hematuria and urgency.   Musculoskeletal:  Negative for arthralgias and back pain.   Skin:  Negative for rash.   Neurological:  Negative for dizziness, weakness, numbness and headaches.   Hematological:  Does not bruise/bleed easily.       Physical Exam     Initial Vitals [07/21/24 1448]   BP Pulse Resp Temp SpO2   (!) 165/82 77 20 97 °F (36.1 °C) 99 %      MAP       --         Physical Exam    Nursing note and vitals reviewed.  Constitutional: He appears well-developed and well-nourished.   HENT:   Head: Normocephalic and atraumatic.   Mouth/Throat: Uvula is midline, oropharynx is clear and moist and mucous membranes are normal.   Eyes: Conjunctivae and EOM are normal. Pupils are equal, round, and reactive to light.   Neck: Neck supple.   Patient has a fatty neck on palpation trachea is midline there are no significant lymph nodes he has tenderness over both submandibular and digastric muscle but no palpable neck mass   Cardiovascular:  Normal rate, regular rhythm, normal heart sounds and intact distal pulses.           Pulmonary/Chest: Breath sounds normal.   Abdominal: Abdomen is soft. Bowel sounds are normal.   Musculoskeletal:         General: Normal range of motion.      Cervical back: Neck supple.      Right lower leg: Swelling present. 1+ Pitting Edema present.      Left lower leg: Swelling present. 2+ Pitting Edema present.     Neurological: He is alert and oriented to person, place, and time. He has normal strength.   Skin: Skin is warm and dry.   Psychiatric: He has a normal mood and affect. His behavior is normal. Judgment and thought content normal.         ED Course   Procedures  Labs Reviewed   CBC W/ AUTO DIFFERENTIAL - Abnormal       Result Value    WBC 17.54 (*)     RBC 3.96 (*)      Hemoglobin 12.6 (*)     Hematocrit 37.1 (*)     MCV 94      MCH 31.8 (*)     MCHC 34.0      RDW 12.5      Platelets 353      MPV 9.3      Immature Granulocytes 0.6 (*)     Gran # (ANC) 12.9 (*)     Immature Grans (Abs) 0.11 (*)     Lymph # 2.4      Mono # 1.2 (*)     Eos # 0.9 (*)     Baso # 0.08      nRBC 0      Gran % 73.5 (*)     Lymph % 13.6 (*)     Mono % 6.7      Eosinophil % 5.1      Basophil % 0.5      Differential Method Automated     COMPREHENSIVE METABOLIC PANEL - Abnormal    Sodium 139      Potassium 4.8      Chloride 110      CO2 22 (*)     Glucose 137 (*)     BUN 44 (*)     Creatinine 2.8 (*)     Calcium 8.1 (*)     Total Protein 5.7 (*)     Albumin 2.0 (*)     Total Bilirubin 0.2      Alkaline Phosphatase 81      AST 37      ALT 36      eGFR 26 (*)     Anion Gap 7 (*)    B-TYPE NATRIURETIC PEPTIDE - Abnormal     (*)           Imaging Results              X-Ray Chest PA And Lateral (Final result)  Result time 07/21/24 16:18:34      Final result by Neftali Dominguez Jr., MD (07/21/24 16:18:34)                   Impression:      No acute abnormality.      Electronically signed by: Neftali Dominguez MD  Date:    07/21/2024  Time:    16:18               Narrative:    EXAMINATION:  XR CHEST PA AND LATERAL    CLINICAL HISTORY:  edema;    TECHNIQUE:  PA and lateral views of the chest were performed.    COMPARISON:  Chest x-ray of June 28, 2024    FINDINGS:  The lungs are clear, with normal appearance of pulmonary vasculature and no pleural effusion or pneumothorax.    The cardiac silhouette is normal in size. The hilar and mediastinal contours are unremarkable.    Bones are intact.                                    X-Rays:   Independently Interpreted Readings:   Chest X-Ray: No infiltrates.     Medications - No data to display  Medical Decision Making  Patient has been seen twice in last several days for same problem his main medical problem is he has got air-fluid levels in his distal esophagus with  an underlying obstruction either  stricture or mass he is fixated on the soft tissue swelling in his anterior neck below his mandible in the midline she was worked up here 2 days ago with a CT scan soft tissue of his neck which was negative for significant adenopathy or neck masses his white count is mildly elevated there is no other source readily identified his aspiration pneumonias cleared and his chest x-ray is normal recommend he has been referred to a gastroenterologist for gastroscopy and the patient is going to call tomorrow he will take antibiotics on a trial basis to see if the salivary gland swelling decreases after 7-10 days the patient was again counseled that his main problem is distal esophageal obstruction with secondary aspiration reflux    Amount and/or Complexity of Data Reviewed  Labs: ordered.  Radiology: ordered.    Risk  Prescription drug management.                                      Clinical Impression:  Final diagnoses:  [R22.0] Facial swelling (Primary)  [K21.00] Gastroesophageal reflux disease with esophagitis without hemorrhage  [K11.20] Salivary gland adenitis          ED Disposition Condition    Discharge Stable          ED Prescriptions       Medication Sig Dispense Start Date End Date Auth. Provider    azithromycin (Z-YUMIKO) 250 MG tablet Take 2 tablets by mouth on day 1; Take 1 tablet by mouth on days 2-5 6 tablet 7/21/2024 7/26/2024 Shannan Galaviz NP          Follow-up Information       Follow up With Specialties Details Why Contact Info    Honey Galaviz NP Family Medicine Schedule an appointment as soon as possible for a visit in 2 days  [address] [facility] [place] [address]  [phone]               CURRY Coppola III, MD  07/21/24 5594       CURRY Coppola III, MD  07/21/24 4429

## 2024-07-21 NOTE — ED TRIAGE NOTES
53 y.o. male presents to ER Room/bed info not found   Chief Complaint   Patient presents with    Facial Swelling   .   C/o neck/facial swelling, seen here a few days ago for same, reports swelling getting worse, denies breathing difficulty

## 2024-07-21 NOTE — DISCHARGE INSTRUCTIONS
Main problem is esophagitis and stricture with reflux symptoms aspiration you need to follow-up with gastroenterologist for esophagoscopy and gastrostomy

## 2024-08-06 PROBLEM — R60.1 ANASARCA: Status: ACTIVE | Noted: 2024-08-06

## 2024-08-19 NOTE — H&P
"  [facility] Medicine  History & Physical    Patient Name: Onofre Ceballos  MRN: [id number]  Patient Class: IP- Inpatient  Admission Date: 3/21/2024  Attending Physician: Carter Paula MD   Primary Care Provider: Cinthya Primary Doctor         Patient information was obtained from patient and ER records.     Subjective:     Principal Problem:Hyperosmolar hyperglycemic state (HHS)    Chief Complaint:   Chief Complaint   Patient presents with    Hyperglycemia     To ED via [facility] from PCP clinic for scheduled checkup, sent to ED due to "HI" glucose reading at office. Did not take prescribed insulin. Denies complaints. POCT glucose >500        HPI: 53yM with DM2, HTN, HLD, polysubstance abuse presented to ER with elevated glucose after being sent from his clinic. Pt states he was in his usual state of health aside from generalized swelling for the last couple months. He also reports he was being treated for bronchitis with antibiotics and cough medication. He has no SOB or chest pain. No fever/chills. He reports he has not been compliant with DM meds including insulin. He reports last meth use was about 2 weeks ago.    Past Medical History:   Diagnosis Date    Diabetes mellitus     Diabetes mellitus type I     Hepatitis     Hepatitis C     Hypertension     Shingles 10/2018       Past Surgical History:   Procedure Laterality Date    NO PAST SURGERIES         Review of patient's allergies indicates:  No Known Allergies    No current facility-administered medications on file prior to encounter.     Current Outpatient Medications on File Prior to Encounter   Medication Sig    aspirin (ECOTRIN) 81 MG EC tablet Take 81 mg by mouth once daily.    atorvastatin (LIPITOR) 80 MG tablet Take 80 mg by mouth every evening.    doxycycline (VIBRAMYCIN) 100 MG Cap Take 1 capsule (100 mg total) by mouth 2 (two) times daily. for 10 days    ergocalciferol (ERGOCALCIFEROL) 50,000 unit Cap Take 50,000 Units by " Vancomycin Dosing by Pharmacy- INITIAL    Fernando Cuevas is a 62 y.o. female for whom Pharmacy has been consulted to dose vancomycin for other /abdominal infection . Based on the patient's indication and renal status this patient will be dosed based on a goal AUC of 400-600.     Renal function is currently stable.    Visit Vitals  BP (!) 166/105   Pulse (!) 123   Temp 36.7 °C (98 °F) (Oral)   Resp 18        Lab Results   Component Value Date    CREATININE 0.62 2024    CREATININE 0.87 2024    CREATININE 0.95 08/10/2024    CREATININE 0.86 2024        Patient weight is as follows:   Vitals:    24 1851   Weight: 61.7 kg (136 lb)       Cultures:  No results found for the encounter in last 14 days.        I/O last 3 completed shifts:  In: 300 (4.9 mL/kg) [IV Piggyback:300]  Out: - (0 mL/kg)   Weight: 61.7 kg   I/O during current shift:  No intake/output data recorded.    Temp (24hrs), Av.7 °C (98 °F), Min:36.7 °C (98 °F), Max:36.7 °C (98 °F)         Assessment/Plan     Patient has already been given a loading dose of 1000 mg.  Will initiate vancomycin maintenance,  1000 mg every 12 hours.    This dosing regimen is predicted by [employer] to result in the following pharmacokinetic parameters:  Regimen: 1000 mg IV every 12 hours.  Start time: 03:00 on 2024  Exposure target: AUC24 (range)400-600 mg/L.hr   AUC24,ss: 552 mg/L.hr  Probability of AUC24 > 400: 81 %  Ctrough,ss: 16.8 mg/L  Probability of Ctrough,ss > 20: 36 %  Probability of nephrotoxicity (Lodise LEO ): 12 %    Follow-up level will be ordered on 24 at 1000 unless clinically indicated sooner.  Will continue to monitor renal function daily while on vancomycin and order serum creatinine at least every 48 hours if not already ordered.  Follow for continued vancomycin needs, clinical response, and signs/symptoms of toxicity.       Romana A Kuchta, PharmD        "mouth every 7 days.    insulin (BASAGLAR KWIKPEN U-100 INSULIN) glargine 100 units/mL SubQ pen Inject 30 Units into the skin every evening. Within 15 minutes of eating    insulin aspart U-100 (NOVOLOG) 100 unit/mL (3 mL) InPn pen Inject 15 Units into the skin 3 (three) times daily with meals.    JARDIANCE 10 mg tablet Take 10 mg by mouth once daily.    lisinopriL (PRINIVIL,ZESTRIL) 5 MG tablet Take 5 mg by mouth once daily.    tamsulosin (FLOMAX) 0.4 mg Cap Take 0.4 mg by mouth once daily.    alcohol swabs (ALCOHOL WIPES) PadM Use topical 4 x daily for insulin    benzonatate (TESSALON) 100 MG capsule Take 2 capsules (200 mg total) by mouth 3 (three) times daily as needed for Cough.    blood sugar diagnostic Strp 1 each by Misc.(Non-Drug; Combo Route) route 3 (three) times daily before meals.    blood-glucose meter (TRUE METRIX GLUCOSE METER) Misc 1 each by Misc.(Non-Drug; Combo Route) route once daily.    docusate sodium (COLACE) 100 MG capsule Take 1 capsule (100 mg total) by mouth 2 (two) times daily as needed for Constipation.    insulin syringe-needle U-100 (INSULIN SYRINGE) 1/2 mL 28 gauge x 1/2" Syrg To use with meal time insulin tid    lancets 32 gauge Misc Inject 1 lancet into the skin 3 (three) times daily before meals.    metFORMIN (GLUCOPHAGE) 1000 MG tablet Take 1 tablet (1,000 mg total) by mouth 2 (two) times daily with meals. (Patient not taking: Reported on 3/21/2024)    pen needle, diabetic (NOVOTWIST) 32 gauge x 1/5" Ndle To use nightly with levemir    polyethylene glycol (GLYCOLAX) 17 gram/dose powder Take 17 g by mouth once daily.    propranoloL (INDERAL) 20 MG tablet Take 20 mg by mouth 2 (two) times daily.    SURE COMFORT INSULIN SYRINGE 0.3 mL 31 gauge x 5/16" Syrg Inject 1 each into the skin 4 (four) times daily before meals and nightly.    [DISCONTINUED] atorvastatin (LIPITOR) 80 MG tablet Take 1 tablet (80 mg total) by mouth every evening.    [DISCONTINUED] BASAGLAR KWIKPEN U-100 INSULIN " "glargine 100 units/mL (3mL) SubQ pen Inject 50 Units into the skin every evening.    [DISCONTINUED] gabapentin (NEURONTIN) 300 MG capsule Take 1 capsule (300 mg total) by mouth every 8 (eight) hours as needed (pain). (Patient not taking: Reported on 9/22/2020)    [DISCONTINUED] HYDROcodone-acetaminophen (NORCO) 5-325 mg per tablet Take 1 tablet by mouth every 4 (four) hours as needed for Pain. (Patient not taking: Reported on 3/21/2024)    [DISCONTINUED] insulin aspart U-100 (NOVOLOG FLEXPEN U-100 INSULIN) 100 unit/mL (3 mL) InPn pen Inject 20 Units into the skin 3 (three) times daily with meals.    [DISCONTINUED] lisinopriL (PRINIVIL,ZESTRIL) 2.5 MG tablet Take 1 tablet (2.5 mg total) by mouth once daily.    [DISCONTINUED] tamsulosin (FLOMAX) 0.4 mg Cap Take 1 capsule (0.4 mg total) by mouth once daily.     Family History       Problem Relation (Age of Onset)    Diabetes Mother    Lung cancer Father          Tobacco Use    Smoking status: Every Day     Current packs/day: 1.00     Types: Cigarettes    Smokeless tobacco: Former   Substance and Sexual Activity    Alcohol use: No    Drug use: Yes     Types: Methamphetamines     Comment: Patient stated "I'm use IV drug user with meth in the last 2 weeks."    Sexual activity: Not on file     Review of Systems   Constitutional:  Negative for chills and fever.   HENT:  Negative for congestion and sore throat.    Eyes:  Negative for visual disturbance.   Respiratory:  Positive for cough. Negative for shortness of breath.    Cardiovascular:  Positive for leg swelling.   Gastrointestinal:  Negative for abdominal pain, diarrhea, nausea and vomiting.   Genitourinary:  Negative for dysuria and hematuria.   Skin:  Negative for rash and wound.   Neurological:  Negative for dizziness, syncope and light-headedness.   Psychiatric/Behavioral:  Negative for confusion.      Objective:     Vital Signs (Most Recent):  Temp: 98 °F (36.7 °C) (03/21/24 1703)  Pulse: 91 (03/21/24 1803)  Resp: " "15 (03/21/24 1803)  BP: 135/85 (03/21/24 1803)  SpO2: (!) 93 % (03/21/24 1803) Vital Signs (24h Range):  Temp:  [98 °F (36.7 °C)] 98 °F (36.7 °C)  Pulse:  [83-92] 91  Resp:  [15-20] 15  SpO2:  [92 %-100 %] 93 %  BP: (135-183)/() 135/85     Weight: 85.9 kg (189 lb 6 oz)  Body mass index is 29.66 kg/m².     Physical Exam  Vitals reviewed.   Constitutional:       General: He is not in acute distress.  HENT:      Head: Normocephalic and atraumatic.      Right Ear: External ear normal.      Left Ear: External ear normal.      Mouth/Throat:      Mouth: Mucous membranes are moist.   Eyes:      Extraocular Movements: Extraocular movements intact.      Conjunctiva/sclera: Conjunctivae normal.      Pupils: Pupils are equal, round, and reactive to light.   Cardiovascular:      Rate and Rhythm: Normal rate and regular rhythm.      Heart sounds: Normal heart sounds. No murmur heard.  Pulmonary:      Effort: Pulmonary effort is normal. No respiratory distress.      Breath sounds: Normal breath sounds.   Abdominal:      General: Bowel sounds are normal. There is no distension.      Palpations: Abdomen is soft.      Tenderness: There is no abdominal tenderness.   Musculoskeletal:      Cervical back: Neck supple.      Right lower leg: Edema present.      Left lower leg: Edema present.      Comments: anasarca   Neurological:      General: No focal deficit present.      Mental Status: He is alert and oriented to person, place, and time.   Psychiatric:         Mood and Affect: Mood normal.         Behavior: Behavior normal.              CRANIAL NERVES     CN III, IV, VI   Pupils are equal, round, and reactive to light.       Significant Labs: All pertinent labs within the past 24 hours have been reviewed.  A1C: No results for input(s): "HGBA1C" in the last 4320 hours.  Blood Culture: No results for input(s): "LABBLOO" in the last 48 hours.  BMP:   Recent Labs   Lab 03/21/24  0953 03/21/24  1345   * 562*   * 140   K " "3.4* 3.3*   CL 93* 101   CO2 29 28   BUN 15 14   CREATININE 2.0* 1.5*   CALCIUM 8.3* 7.7*   MG 2.2  --      CBC:   Recent Labs   Lab 03/21/24  0953   WBC 18.04*   HGB 14.0   HCT 42.2        CMP:   Recent Labs   Lab 03/21/24  0953 03/21/24  1345   * 140   K 3.4* 3.3*   CL 93* 101   CO2 29 28   * 562*   BUN 15 14   CREATININE 2.0* 1.5*   CALCIUM 8.3* 7.7*   PROT 5.5*  --    ALBUMIN 1.2*  --    BILITOT 0.2  --    ALKPHOS 180*  --    AST 20  --    ALT 25  --    ANIONGAP 13 11     Cardiac Markers: No results for input(s): "CKMB", "MYOGLOBIN", "BNP", "TROPISTAT" in the last 48 hours.  Coagulation: No results for input(s): "PT", "INR", "APTT" in the last 48 hours.  Lactic Acid:   Recent Labs   Lab 03/21/24  1237   LACTATE 6.0*     Magnesium:   Recent Labs   Lab 03/21/24  0953   MG 2.2     POCT Glucose:   Recent Labs   Lab 03/21/24  1659 03/21/24  1814 03/21/24  1824   POCTGLUCOSE 303* 214* 140*     Troponin:   Recent Labs   Lab 03/21/24  0953 03/21/24  1302   TROPONINI 0.042* 0.031*     TSH: No results for input(s): "TSH" in the last 4320 hours.  Urine Studies:   Recent Labs   Lab 03/21/24  1003   COLORU Straw   APPEARANCEUA Clear   PHUR 6.0   SPECGRAV 1.010   PROTEINUA 3+*   GLUCUA 4+*   KETONESU Negative   BILIRUBINUA Negative   OCCULTUA 1+*   NITRITE Negative   UROBILINOGEN Negative   LEUKOCYTESUR Negative   RBCUA 1   WBCUA 3   BACTERIA Few*   SQUAMEPITHEL 3   HYALINECASTS 15*       Significant Imaging: I have reviewed all pertinent imaging results/findings within the past 24 hours.    CT chest:     Scattered micronodular densities with a tree-in-bud distribution seen throughout both lungs with associated regions of ground-glass opacity seen in the lingula and left lower lobe.  In the appropriate clinical setting, this could represent an acute inflammatory/infectious process with a differential to include atypical infectious processes, indolent infectious processes as well as microaspiration and " To get better and follow your care plan as instructed. hypersensitivity pneumonitis.  Correlation with patient symptoms is recommended.     Whole-body anasarca.     Mediastinal and axillary adenopathy, the etiology of which is uncertain.     Suspected hepatic cysts, unchanged from 2019 and large left adrenal gland nodule, likely related to an adenoma given internal density, also unchanged from 2019.    Ct head: No acute abnormality.     CXR: Chronic lung changes. No consolidation or pleural effusions. Heart size is normal. Skeletal structures are intact.   Assessment/Plan:     * Hyperosmolar hyperglycemic state (HHS)  Glucose improved  Mental status improved  Stop insulin gtt; start subQ  Start diabetic diet  Stop IVF as pt with anasarca likely 2/2 low albumin      Low serum albumin  Likely cause of anasarca      Severe protein-calorie malnutrition  Nutrition consulted. Most recent weight and BMI monitored-     Measurements:  Wt Readings from Last 1 Encounters:   03/21/24 85.9 kg (189 lb 6 oz)   Body mass index is 29.66 kg/m².             Pneumonia of left lower lobe due to infectious organism  Cont rocephin/azithro      YAMIL (acute kidney injury)  Patient with acute kidney injury/acute renal failure likely due to pre-renal azotemia due to IVVD YAMIL is currently improving. Baseline creatinine  1.2-1.3  - Labs reviewed- Renal function/electrolytes with Estimated Creatinine Clearance: 58.9 mL/min (A) (based on SCr of 1.5 mg/dL (H)). according to latest data. Monitor urine output and serial BMP and adjust therapy as needed. Avoid nephrotoxins and renally dose meds for GFR listed above.    Hypokalemia  Patient has hypokalemia which is Acute and currently uncontrolled. Most recent potassium levels reviewed-   Lab Results   Component Value Date    K 3.3 (L) 03/21/2024   . Will continue potassium replacement per protocol and recheck repeat levels after replacement completed.     Hypocalcemia  Patient has hypocalcemia due to Critical Illness which is currently controlled, and has  "been confirmed with ionized and/or corrected calcium. Will replace calcium and monitor electrolytes closely. The latest calcium labs have been reviewed and are listed below.  Recent Labs   Lab 03/21/24  1345   CALCIUM 7.7*       No results for input(s): "CAION" in the last 24 hours.        Pure hypercholesterolemia  Cont home statin      Type 2 diabetes mellitus with microalbuminuria, with long-term current use of insulin  Check A1c  ISS  Detemir 25u BID  Aspart 15u with meals      Benign essential HTN  Chronic, controlled. Latest blood pressure and vitals reviewed-     Temp:  [98 °F (36.7 °C)]   Pulse:  [83-95]   Resp:  [15-20]   BP: (135-183)/()   SpO2:  [92 %-100 %] .   Home meds for hypertension were reviewed and noted below.   Hypertension Medications               lisinopriL (PRINIVIL,ZESTRIL) 5 MG tablet Take 5 mg by mouth once daily.    propranoloL (INDERAL) 20 MG tablet Take 20 mg by mouth 2 (two) times daily.            While in the hospital, will manage blood pressure as follows; Continue home antihypertensive regimen    Will utilize p.r.n. blood pressure medication only if patient's blood pressure greater than 180/110 and he develops symptoms such as worsening chest pain or shortness of breath.    Hep C w/o coma, chronic  Patient has chronic liver disease due to hepatitis c. They have a history of ascites/volume overload. Their liver disease is compensated. We will obtain  CBC, CMP, and INR. Their most current Na-MELD is listed below.  Computed MELD 3.0 unavailable. Necessary lab results were not found in the last year.  Computed MELD-Na unavailable. Necessary lab results were not found in the last year.        Tobacco abuse  Cessation counseling  Nicotine patch      Noncompliance with medication regimen  Stressed compliance        VTE Risk Mitigation (From admission, onward)           Ordered     IP VTE LOW RISK PATIENT  Once         03/21/24 4401                  Critical care time spent on the " evaluation and treatment of severe organ dysfunction, review of pertinent labs and imaging studies, discussions with consulting providers and discussions with patient/family: 49 minutes.                  Carter Paula MD  Department of Hospital Medicine  [place] - Intensive Care

## 2024-09-06 ENCOUNTER — OFFICE VISIT (OUTPATIENT)
Dept: GASTROENTEROLOGY | Facility: CLINIC | Age: 54
End: 2024-09-06
Payer: MEDICAID

## 2024-09-06 VITALS
HEART RATE: 63 BPM | HEIGHT: 68 IN | WEIGHT: 224.88 LBS | BODY MASS INDEX: 34.08 KG/M2 | DIASTOLIC BLOOD PRESSURE: 88 MMHG | SYSTOLIC BLOOD PRESSURE: 142 MMHG

## 2024-09-06 DIAGNOSIS — K21.9 GASTROESOPHAGEAL REFLUX DISEASE, UNSPECIFIED WHETHER ESOPHAGITIS PRESENT: ICD-10-CM

## 2024-09-06 DIAGNOSIS — K22.9 ESOPHAGEAL ABNORMALITY: Primary | ICD-10-CM

## 2024-09-06 PROCEDURE — 1159F MED LIST DOCD IN RCRD: CPT | Mod: CPTII,,, | Performed by: NURSE PRACTITIONER

## 2024-09-06 PROCEDURE — 99204 OFFICE O/P NEW MOD 45 MIN: CPT | Mod: S$PBB,,, | Performed by: NURSE PRACTITIONER

## 2024-09-06 PROCEDURE — 3077F SYST BP >= 140 MM HG: CPT | Mod: CPTII,,, | Performed by: NURSE PRACTITIONER

## 2024-09-06 PROCEDURE — 1111F DSCHRG MED/CURRENT MED MERGE: CPT | Mod: CPTII,,, | Performed by: NURSE PRACTITIONER

## 2024-09-06 PROCEDURE — 3051F HG A1C>EQUAL 7.0%<8.0%: CPT | Mod: CPTII,,, | Performed by: NURSE PRACTITIONER

## 2024-09-06 PROCEDURE — 99215 OFFICE O/P EST HI 40 MIN: CPT | Mod: PBBFAC,PN | Performed by: NURSE PRACTITIONER

## 2024-09-06 PROCEDURE — 4010F ACE/ARB THERAPY RXD/TAKEN: CPT | Mod: CPTII,,, | Performed by: NURSE PRACTITIONER

## 2024-09-06 PROCEDURE — 3079F DIAST BP 80-89 MM HG: CPT | Mod: CPTII,,, | Performed by: NURSE PRACTITIONER

## 2024-09-06 PROCEDURE — 99999 PR PBB SHADOW E&M-EST. PATIENT-LVL V: CPT | Mod: PBBFAC,,, | Performed by: NURSE PRACTITIONER

## 2024-09-06 PROCEDURE — 1160F RVW MEDS BY RX/DR IN RCRD: CPT | Mod: CPTII,,, | Performed by: NURSE PRACTITIONER

## 2024-09-06 PROCEDURE — 3061F NEG MICROALBUMINURIA REV: CPT | Mod: CPTII,,, | Performed by: NURSE PRACTITIONER

## 2024-09-06 PROCEDURE — 3008F BODY MASS INDEX DOCD: CPT | Mod: CPTII,,, | Performed by: NURSE PRACTITIONER

## 2024-09-06 PROCEDURE — 3066F NEPHROPATHY DOC TX: CPT | Mod: CPTII,,, | Performed by: NURSE PRACTITIONER

## 2024-09-06 RX ORDER — SEMAGLUTIDE 1.34 MG/ML
0.5 INJECTION, SOLUTION SUBCUTANEOUS
COMMUNITY

## 2024-09-06 RX ORDER — METOLAZONE 2.5 MG/1
2.5 TABLET ORAL
COMMUNITY

## 2024-09-06 RX ORDER — TORSEMIDE 20 MG/1
20 TABLET ORAL 2 TIMES DAILY
COMMUNITY

## 2024-09-06 NOTE — PROGRESS NOTES
"Subjective:       Patient ID: Onofre Ceballos is a 53 y.o. male.    Chief Complaint: abnormal finding on imaging    52 y/o male with HTN, T2DM, CHF, and hep C referred for esophageal abnormality noted on imaging. Patient presented to ER 7/19/2024 with c/o rt-sided facial swelling. No associated dysphagia, shortness of breath, cough, chest pain, fever, nausea, or vomiting. CT neck revealed no adenopathy or abscess but the esophagus was abnormal within the mediastinum being dilated with an air-fluid level and a thickened wall consistent with a distal esophageal pathology. Patient has history of reflux controlled with daily PPI prescribed by PCP. He has not seen GI in years and does not recall previous endoscopy history. Today he reports feeling well. No facial swelling, dysphagia, abdominal pain, n/v, or unintentional weight loss.         Past Medical History:   Diagnosis Date    Diabetes mellitus     Diabetes mellitus type I     Hepatitis     Hepatitis C     Hypertension     Shingles 10/2018       Past Surgical History:   Procedure Laterality Date    NO PAST SURGERIES         Family History   Problem Relation Name Age of Onset    Diabetes Mother      Lung cancer Father         Social History     Socioeconomic History    Marital status: Single   Tobacco Use    Smoking status: Former     Current packs/day: 1.00     Types: Cigarettes    Smokeless tobacco: Former   Substance and Sexual Activity    Alcohol use: No    Drug use: Not Currently     Types: Methamphetamines     Comment: Patient stated "I'm use IV drug user with meth in the last 2 weeks."     Social Determinants of Health     Financial Resource Strain: Low Risk  (8/30/2024)    Overall Financial Resource Strain (CARDIA)     Difficulty of Paying Living Expenses: Not hard at all   Food Insecurity: No Food Insecurity (8/30/2024)    Hunger Vital Sign     Worried About Running Out of Food in the Last Year: Never true     Ran Out of Food in the Last Year: Never " "true   Transportation Needs: No Transportation Needs (2024)    TRANSPORTATION NEEDS     Transportation : No   Physical Activity: Inactive (3/28/2024)    Exercise Vital Sign     Days of Exercise per Week: 0 days     Minutes of Exercise per Session: 0 min   Stress: Stress Concern Present (2024)    [employer] - Occupational Stress Questionnaire     Feeling of Stress : To some extent   Housing Stability: Low Risk  (2024)    Housing Stability Vital Sign     Unable to Pay for Housing in the Last Year: No     Homeless in the Last Year: No       Review of Systems   Constitutional:  Negative for appetite change and unexpected weight change.   HENT:  Negative for trouble swallowing.    Respiratory:  Positive for shortness of breath (on exertion).    Cardiovascular:  Positive for leg swelling. Negative for chest pain.   Gastrointestinal:  Negative for abdominal pain, blood in stool, diarrhea and nausea.   Hematological:  Negative for adenopathy. Does not bruise/bleed easily.   Psychiatric/Behavioral:  Negative for dysphoric mood.          Objective:     Vitals:    24 0950   BP: (!) 142/88   BP Location: Left arm   Patient Position: Sitting   BP Method: Medium (Automatic)   Pulse: 63   Weight: 102 kg (224 lb 13.9 oz)   Height: 5' 8" (1.727 m)          Physical Exam  Constitutional:       General: He is not in acute distress.     Appearance: Normal appearance.   HENT:      Head: Normocephalic.   Eyes:      Conjunctiva/sclera: Conjunctivae normal.   Cardiovascular:      Rate and Rhythm: Normal rate and regular rhythm.   Pulmonary:      Effort: Pulmonary effort is normal. No respiratory distress.   Musculoskeletal:         General: Swelling (BLE edema) present.      Cervical back: Normal range of motion.      Right lower le+ Pitting Edema present.      Left lower le+ Pitting Edema present.   Skin:     General: Skin is warm and dry.   Neurological:      Mental Status: He is " alert and oriented to person, place, and time.   Psychiatric:         Mood and Affect: Mood normal.         Behavior: Behavior normal.               Assessment:         ICD-10-CM ICD-9-CM   1. Esophageal abnormality  K22.9 530.9   2. Gastroesophageal reflux disease, unspecified whether esophagitis present  K21.9 530.81       Plan:       Esophageal abnormality  -     Ambulatory referral/consult to Gastroenterology  -     [place] Upper GI; Future; Expected date: 09/06/2024    Gastroesophageal reflux disease, unspecified whether esophagitis present  -     [place] Upper GI; Future; Expected date: 09/06/2024  -     Continue pantoprazole 40 mg daily    - EGD pending cardiac clearance.     Follow up if symptoms worsen or fail to improve.     Patient's Medications   New Prescriptions    No medications on file   Previous Medications    ACETAMINOPHEN (TYLENOL) 650 MG TBSR    Take 1 tablet (650 mg total) by mouth every 8 (eight) hours as needed (pain).    ALCOHOL SWABS (ALCOHOL WIPES) PADM    Use topical 4 x daily for insulin    ASPIRIN 81 MG CHEW    Take 81 mg by mouth once daily.    ATORVASTATIN (LIPITOR) 80 MG TABLET    Take 80 mg by mouth every evening.    BLOOD SUGAR DIAGNOSTIC STRP    1 each by Misc.(Non-Drug; Combo Route) route 3 (three) times daily before meals.    BLOOD-GLUCOSE METER (TRUE METRIX GLUCOSE METER) MISC    1 each by Misc.(Non-Drug; Combo Route) route once daily.    CARVEDILOL (COREG) 12.5 MG TABLET    Take 1 tablet (12.5 mg total) by mouth 2 (two) times daily.    EMPAGLIFLOZIN (JARDIANCE) 10 MG TABLET    Take 10 mg by mouth once daily.    ERGOCALCIFEROL (ERGOCALCIFEROL) 50,000 UNIT CAP    Take 50,000 Units by mouth every 7 days.    EZETIMIBE (ZETIA) 10 MG TABLET    Take 1 tablet (10 mg total) by mouth every evening.    GLUCOSE 4 GM CHEWABLE TABLET    Take 4 tablets (16 g total) by mouth as needed for Low blood sugar.    INSULIN (BASAGLAR KWIKPEN U-100 INSULIN) GLARGINE 100 UNITS/ML SUBQ PEN    Inject 35 Units into  "the skin every evening. Within 15 minutes of eating    INSULIN ASPART U-100 (NOVOLOG) 100 UNIT/ML (3 ML) INPN PEN    Inject 20 Units into the skin 3 (three) times daily with meals.    INSULIN SYRINGE-NEEDLE U-100 (INSULIN SYRINGE) 1/2 ML 28 GAUGE X 1/2" SYRG    To use with meal time insulin tid    LANCETS 32 GAUGE MISC    Inject 1 lancet into the skin 3 (three) times daily before meals.    METOLAZONE (ZAROXOLYN) 2.5 MG TABLET    Take 2.5 mg by mouth every Mon, Wed, Fri.    NIFEDIPINE (PROCARDIA-XL) 60 MG (OSM) 24 HR TABLET    Take 1 tablet (60 mg total) by mouth 2 (two) times a day.    PANTOPRAZOLE (PROTONIX) 40 MG TABLET    Take 1 tablet (40 mg total) by mouth once daily.    PEN NEEDLE, DIABETIC (NOVOTWIST) 32 GAUGE X 1/5" NDLE    To use nightly with levemir    POLYETHYLENE GLYCOL (GLYCOLAX) 17 GRAM/DOSE POWDER    Take 17 g by mouth once daily.    SEMAGLUTIDE (OZEMPIC) 0.25 MG OR 0.5 MG(2 MG/1.5 ML) PEN INJECTOR    Inject 0.5 mg into the skin every 7 days.    SURE COMFORT INSULIN SYRINGE 0.3 ML 31 GAUGE X 5/16" SYRG    Inject 1 each into the skin 4 (four) times daily before meals and nightly.    TAMSULOSIN (FLOMAX) 0.4 MG CAP    Take 0.4 mg by mouth once daily.    TORSEMIDE (DEMADEX) 20 MG TAB    Take 20 mg by mouth 2 (two) times a day.    TRIAMCINOLONE ACETONIDE 0.1% (KENALOG) 0.1 % CREAM    Apply topically 2 (two) times daily. for 7 days   Modified Medications    No medications on file   Discontinued Medications    FUROSEMIDE (LASIX) 40 MG TABLET    Take 2 tablets (80 mg total) by mouth 2 (two) times a day.             "

## 2024-09-09 PROBLEM — T43.615A: Status: RESOLVED | Noted: 2017-06-05 | Resolved: 2024-09-09

## 2024-09-09 PROBLEM — Z28.21 REFUSED INFLUENZA VACCINE: Status: RESOLVED | Noted: 2017-09-21 | Resolved: 2024-09-09

## 2024-09-17 ENCOUNTER — TELEPHONE (OUTPATIENT)
Dept: GASTROENTEROLOGY | Facility: CLINIC | Age: 54
End: 2024-09-17
Payer: MEDICAID

## 2024-09-17 NOTE — TELEPHONE ENCOUNTER
Clearance sent 09/17/2024.    ----- Message from MICHELLE Hernandez sent at 9/9/2024  8:42 AM CDT -----  Please obtain cardiac clearance for EGD from [facility].

## 2024-09-24 ENCOUNTER — PATIENT MESSAGE (OUTPATIENT)
Dept: GASTROENTEROLOGY | Facility: CLINIC | Age: 54
End: 2024-09-24

## 2024-09-24 DIAGNOSIS — K22.9 ESOPHAGEAL ABNORMALITY: ICD-10-CM

## 2024-09-24 DIAGNOSIS — K22.4 ESOPHAGEAL DYSMOTILITY: Primary | ICD-10-CM

## 2024-09-24 NOTE — PROGRESS NOTES
Last dose of Ozempic on 9/25/2024. Hold week of endoscopy. Do not take after 9/26/2024.      EGD Prep Instructions    [facility] [address] [place]  [address]    You are scheduled for an EGD with Dr. Lacy on 10/04/2024 at [facility].  You will enter through the [facility] entrance and check in at Same Day Surgery.    Start a CLEAR LIQUID DIET at 12 noon ONE DAY BEFORE YOUR PROCEDURE.  This means no solid food after 12 noon.   CLEAR LIQUID DIET:   - NO DAIRY   - You can have:  Coffee with sugar (no creamer), tea, water, soda, apple or white grape juice, chicken or beef broth/bouillon (no meat, noodles, or veggies), popsicles, Jell-O, lemonade.     Nothing to eat or drink after midnight before the procedure.  You MAY brush your teeth.    You MAY take your blood pressure, heart, and seizure medication on the morning of the procedure, with a SIP of water.  Hold ALL other medications until after the procedure.    You must have someone with you to DRIVE YOU HOME since you will be receiving IV sedation for the procedure.    If you are on blood thinners THAT YOU HAVE BEEN INSTRUCTED TO HOLD BY YOUR DOCTOR FOR THIS PROCEDURE, then do NOT take this the morning of your EGD.  Do NOT stop these medications on your own, they must be approved to be held by your doctor.  Your EGD can NOT be done if you are on these medications.  Examples of blood thinners include: Coumadin, Aggrenox, Plavix, Pradaxa, Reapro, Pletal, Xarelto, Ticagrelor, Brilinta, Eliquis, and high dose aspirin (325 mg).  You do not have to stop baby aspirin 81 mg.    You will receive a call the afternoon before your EGD to tell you the time to arrive.  If you have not received a call by the day before your procedure, call the Pre-op Coordinator at [phone].

## 2024-09-27 ENCOUNTER — TELEPHONE (OUTPATIENT)
Dept: ENDOSCOPY | Facility: HOSPITAL | Age: 54
End: 2024-09-27
Payer: MEDICAID

## 2024-09-27 NOTE — TELEPHONE ENCOUNTER
Spoke to patient for pre-call to confirm scheduled Upper Endoscopy (EGD) and patient verbalized understanding of the following:       Date of Procedure (s)  verified 10/4/24  Arrival Time 7:00 AM verified.  Location of Procedure(s) [address]  verified.  NPO status reinforced. Ok to continue clear liquids up until 4 hours prior to the Endoscopy procedure.   Confirmed Pt is currently taking Ozempic (Semaglutide), Pt instructed to stop stop taking Ozempic (Semaglutide) on 9/25/24.     Pt confirmed receipt of prep instructions and Rx prep (if applicable).  Instructions provided to patient via [facility]  Pt confirmed ride home after procedure if procedure requires anesthesia.   Pre-call screening questionnaire reviewed and completed with patient.   Appointment details are tentative, including check-in time.  If the patient begins taking any blood thinning medications, injectable weight loss/diabetes medications (other than insulin), or Adipex (phentermine) patient was instructed to contact the endoscopy scheduling department as soon as possible.  Patient was advised to call the endoscopy scheduling department if any questions or concerns arise.       [facility] Endoscopy Scheduling Department

## 2024-09-30 ENCOUNTER — TELEPHONE (OUTPATIENT)
Dept: ENDOSCOPY | Facility: HOSPITAL | Age: 54
End: 2024-09-30

## 2024-09-30 NOTE — TELEPHONE ENCOUNTER
Informed pt he was no longer scheduled for egd on 10/4/24 due to md emergency.informed pt someone would call him back to re-schedule procedure. Pt removed from 10/4 endoscopy schedule at [facility].

## 2024-10-01 ENCOUNTER — TELEPHONE (OUTPATIENT)
Dept: GASTROENTEROLOGY | Facility: CLINIC | Age: 54
End: 2024-10-01

## 2024-10-01 NOTE — TELEPHONE ENCOUNTER
Attempted to contact patient to reschedule EGD, no answer, Patient VM box not set up.    ----- Message from MICHELLE Hernandez sent at 10/1/2024  8:33 AM CDT -----  Reschedule EGD. See previous instructions. He is also on Ozempic.

## 2024-10-01 NOTE — TELEPHONE ENCOUNTER
Spoke with patient to reschedule EGD, EGD rescheduled for 10/11/2024. Patient informed to continue to hold his Ozempic until after his EGD. Patient has instructions from previously scheduled procedure.    ----- Message from Med Assistant Smyth sent at 10/1/2024  8:46 AM CDT -----  Call patient to reschedule EGD, patient is on Ozempic.

## 2024-10-04 ENCOUNTER — TELEPHONE (OUTPATIENT)
Dept: ENDOSCOPY | Facility: HOSPITAL | Age: 54
End: 2024-10-04

## 2024-10-04 NOTE — TELEPHONE ENCOUNTER
Spoke to pt for pre-call to confirm scheduled Upper Endoscopy (EGD) and patient verbalized understanding of the following:       Date of Procedure (s)  verified 10/11/24  Arrival Time 10:30 AM verified.  Location of Procedure(s) [address]  verified.  NPO status reinforced. Ok to continue clear liquids up until 4 hours prior to the Endoscopy procedure.   Confirmed Pt is currently taking Ozempic (Semaglutide), Pt instructed to stop stop taking Ozempic (Semaglutide) on 10/3/24.     Denies blood thinners.  Pt confirmed receipt of prep instructions and Rx prep (if applicable).  Instructions provided to patient via [facility]  Pt confirmed ride home after procedure if procedure requires anesthesia.   Pre-call screening questionnaire reviewed and completed with patient.   Appointment details are tentative, including check-in time.  If the patient begins taking any blood thinning medications, injectable weight loss/diabetes medications (other than insulin), or Adipex (phentermine) patient was instructed to contact the endoscopy scheduling department as soon as possible.  Patient was advised to call the endoscopy scheduling department if any questions or concerns arise.       [facility] Endoscopy Scheduling Department

## 2024-10-29 PROBLEM — J96.01 ACUTE HYPOXEMIC RESPIRATORY FAILURE: Status: ACTIVE | Noted: 2024-10-29

## 2024-11-02 PROBLEM — E11.9 TYPE 2 DIABETES MELLITUS: Status: ACTIVE | Noted: 2024-11-02

## 2024-11-04 PROBLEM — R94.31 QT PROLONGATION: Status: ACTIVE | Noted: 2024-11-04

## 2024-11-06 PROBLEM — Z98.890: Status: ACTIVE | Noted: 2024-11-06

## 2024-11-08 PROBLEM — D62 ACUTE BLOOD LOSS ANEMIA: Status: ACTIVE | Noted: 2024-11-08

## 2024-12-17 DIAGNOSIS — Z01.818 PRE-OP EVALUATION: Primary | ICD-10-CM

## 2024-12-24 ENCOUNTER — TELEPHONE (OUTPATIENT)
Dept: TRANSPLANT | Facility: CLINIC | Age: 54
End: 2024-12-24
Payer: MEDICAID

## 2024-12-29 ENCOUNTER — DOCUMENTATION ONLY (OUTPATIENT)
Dept: TRANSPLANT | Facility: CLINIC | Age: 54
End: 2024-12-29
Payer: MEDICAID

## 2025-01-15 ENCOUNTER — HOSPITAL ENCOUNTER (OUTPATIENT)
Dept: RADIOLOGY | Facility: HOSPITAL | Age: 55
Discharge: HOME | End: 2025-01-15
Attending: SURGERY
Payer: MEDICAID

## 2025-01-15 ENCOUNTER — HOSPITAL ENCOUNTER (OUTPATIENT)
Dept: VASCULAR SURGERY | Facility: CLINIC | Age: 55
Discharge: HOME | End: 2025-01-15
Attending: SURGERY
Payer: MEDICAID

## 2025-01-15 ENCOUNTER — INITIAL CONSULT (OUTPATIENT)
Dept: VASCULAR SURGERY | Facility: CLINIC | Age: 55
End: 2025-01-15
Payer: MEDICAID

## 2025-01-15 VITALS
DIASTOLIC BLOOD PRESSURE: 90 MMHG | SYSTOLIC BLOOD PRESSURE: 157 MMHG | HEIGHT: 68 IN | BODY MASS INDEX: 28.4 KG/M2 | TEMPERATURE: 97 F | WEIGHT: 187.38 LBS | HEART RATE: 72 BPM

## 2025-01-15 DIAGNOSIS — Z01.818 PRE-OP EVALUATION: ICD-10-CM

## 2025-01-15 DIAGNOSIS — Z01.818 PRE-OP EVALUATION: Primary | ICD-10-CM

## 2025-01-15 DIAGNOSIS — N18.6 ESRD (END STAGE RENAL DISEASE) ON DIALYSIS: ICD-10-CM

## 2025-01-15 DIAGNOSIS — Z99.2 ESRD (END STAGE RENAL DISEASE) ON DIALYSIS: ICD-10-CM

## 2025-01-15 DIAGNOSIS — I73.9 PERIPHERAL VASCULAR DISEASE: Primary | ICD-10-CM

## 2025-01-15 PROCEDURE — 99214 OFFICE O/P EST MOD 30 MIN: CPT | Mod: S$PBB,,, | Performed by: SURGERY

## 2025-01-15 PROCEDURE — 71046 X-RAY EXAM CHEST 2 VIEWS: CPT | Mod: TC,FY

## 2025-01-15 PROCEDURE — 93985 DUP-SCAN HEMO COMPL BI STD: CPT | Mod: 26,,, | Performed by: SURGERY

## 2025-01-15 PROCEDURE — 99999 PR PBB SHADOW E&M-EST. PATIENT-LVL IV: CPT | Mod: PBBFAC,,, | Performed by: SURGERY

## 2025-01-15 PROCEDURE — 99214 OFFICE O/P EST MOD 30 MIN: CPT | Mod: PBBFAC | Performed by: SURGERY

## 2025-01-15 PROCEDURE — 71046 X-RAY EXAM CHEST 2 VIEWS: CPT | Mod: 26,,, | Performed by: RADIOLOGY

## 2025-01-15 RX ORDER — TORSEMIDE 20 MG/1
TABLET ORAL
COMMUNITY

## 2025-01-15 RX ORDER — BRIMONIDINE TARTRATE 2 MG/ML
SOLUTION/ DROPS OPHTHALMIC
COMMUNITY
Start: 2025-01-09

## 2025-01-15 RX ORDER — ASPIRIN 81 MG/1
81 TABLET ORAL
COMMUNITY

## 2025-01-15 RX ORDER — ESCITALOPRAM OXALATE 10 MG/1
10 TABLET ORAL
COMMUNITY
Start: 2024-12-23

## 2025-01-15 RX ORDER — ATORVASTATIN CALCIUM 80 MG/1
1 TABLET, FILM COATED ORAL
COMMUNITY
Start: 2024-11-15

## 2025-01-15 RX ORDER — TAMSULOSIN HYDROCHLORIDE 0.4 MG/1
CAPSULE ORAL
COMMUNITY
Start: 2024-11-15

## 2025-01-15 RX ORDER — SEMAGLUTIDE 0.68 MG/ML
0.5 INJECTION, SOLUTION SUBCUTANEOUS
COMMUNITY

## 2025-01-15 RX ORDER — CARVEDILOL 12.5 MG/1
12.5 TABLET ORAL 2 TIMES DAILY
COMMUNITY

## 2025-01-15 RX ORDER — SACUBITRIL AND VALSARTAN 97; 103 MG/1; MG/1
1 TABLET, FILM COATED ORAL 2 TIMES DAILY
COMMUNITY

## 2025-01-15 NOTE — PROGRESS NOTES
Onofre Ceballos  01/15/2025    HPI:  Patient is a 54 y.o. male with a h/o CKD, T2D, HTN, 60 pck year smoking hx, States had h/o IVDU 2023, CHF who is here today for evaluation of AVG vs AVF placement. He was diagnosed with CKD one year ago and undergoes dialysis MWF. He has been He denies any issues with dialysis. He denies chest pain, SOB, or claudication symptoms in the legs when walking.  He quit smoking in March 2024.     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, NP   Employment: Unemployed      Current Outpatient Medications:     acetaminophen (TYLENOL) 650 MG TbSR, Take 1 tablet (650 mg total) by mouth every 8 (eight) hours as needed (pain)., Disp: , Rfl:     alcohol swabs (ALCOHOL WIPES) PadM, Use topical 4 x daily for insulin, Disp: 200 each, Rfl: 11    aspirin (ECOTRIN) 81 MG EC tablet, Take 81 mg by mouth., Disp: , Rfl:     atorvastatin (LIPITOR) 80 MG tablet, Take 1 tablet by mouth., Disp: , Rfl:     blood sugar diagnostic Strp, 1 each by Misc.(Non-Drug; Combo Route) route 3 (three) times daily before meals., Disp: 200 each, Rfl: 11    brimonidine 0.2% (ALPHAGAN) 0.2 % Drop, Place into both eyes., Disp: , Rfl:     carvediloL (COREG) 12.5 MG tablet, Take 12.5 mg by mouth 2 (two) times daily., Disp: , Rfl:     ENTRESTO  mg per tablet, Take 1 tablet by mouth 2 (two) times daily., Disp: , Rfl:     ergocalciferol (ERGOCALCIFEROL) 50,000 unit Cap, Take 50,000 Units by mouth every 7 days., Disp: , Rfl:     EScitalopram oxalate (LEXAPRO) 10 MG tablet, Take 10 mg by mouth., Disp: , Rfl:     ezetimibe (ZETIA) 10 mg tablet, Take 1 tablet (10 mg total) by mouth every evening., Disp: 90 tablet, Rfl: 3    glucose 4 GM chewable tablet, Take 4 tablets (16 g total) by mouth as needed for Low blood sugar., Disp: 4 tablet, Rfl: 12    insulin aspart U-100 (NOVOLOG) 100 unit/mL (3 mL) InPn pen, Inject 10 Units into the skin 3  "(three) times daily with meals., Disp: , Rfl:     insulin glargine U-100, Lantus, (BASAGLAR KWIKPEN U-100 INSULIN) 100 unit/mL (3 mL) InPn pen, Inject 10 Units into the skin every evening. Within 15 minutes of eating, Disp: , Rfl:     insulin syringe-needle U-100 (INSULIN SYRINGE) 1/2 mL 28 gauge x 1/2" Syrg, To use with meal time insulin tid, Disp: 200 each, Rfl: 9    lancets 32 gauge Misc, Inject 1 lancet into the skin 3 (three) times daily before meals., Disp: 200 each, Rfl: 11    OZEMPIC 0.25 mg or 0.5 mg (2 mg/3 mL) pen injector, Inject 0.5 mg into the skin every 7 days., Disp: , Rfl:     pantoprazole (PROTONIX) 40 MG tablet, Take 1 tablet (40 mg total) by mouth once daily., Disp: 30 tablet, Rfl: 0    pen needle, diabetic (NOVOTWIST) 32 gauge x 1/5" Ndle, To use nightly with levemir, Disp: 100 each, Rfl: 3    polyethylene glycol (GLYCOLAX) 17 gram/dose powder, Take 17 g by mouth once daily., Disp: 1 Bottle, Rfl: 0    SURE COMFORT INSULIN SYRINGE 0.3 mL 31 gauge x 5/16" Syrg, Inject 1 each into the skin 4 (four) times daily before meals and nightly., Disp: 200 each, Rfl: 11    tamsulosin (FLOMAX) 0.4 mg Cap, Take by mouth., Disp: , Rfl:     torsemide (DEMADEX) 20 MG Tab, Take by mouth., Disp: , Rfl:     blood-glucose meter (TRUE METRIX GLUCOSE METER) Misc, 1 each by Misc.(Non-Drug; Combo Route) route once daily., Disp: 1 each, Rfl: 0    sevelamer carbonate (RENVELA) 800 mg Tab, Take 2 tablets (1,600 mg total) by mouth 3 (three) times daily with meals., Disp: 180 tablet, Rfl: 0    PHYSICAL EXAM:   Right Arm BP - Sittin/92 (01/15/25 1020)  Left Arm BP - Sittin/90 (01/15/25 1020)  Pulse: 72  Temp: 97.4 °F (36.3 °C)                   Neck: No carotid bruit can be auscultated             Cardiac: Normal rate and regular rhythm, S1 and S2 normal; PMI non-displaced          Abdomen: Soft, nontender, non-distended     Pulsatile aortic mass is not palpable.      Extremities:   2+ femoral pulses " bilaterally     Both pedal pulses palpable.     No pre-tibial edema     No ulcerations    LAB RESULTS:  Lab Results   Component Value Date    K 4.2 11/12/2024    K 4.7 11/11/2024    K 4.6 11/10/2024    CREATININE 5.10 (H) 11/12/2024    CREATININE 5.73 (H) 11/11/2024    CREATININE 3.62 (H) 11/10/2024     Lab Results   Component Value Date    WBC 10.70 11/12/2024    WBC 12.60 11/11/2024    WBC 18.10 (H) 11/10/2024    HCT 25.8 (L) 11/12/2024    HCT 25.7 (L) 11/11/2024    HCT 29.4 (L) 11/10/2024    HCT 29.4 (L) 11/10/2024     11/12/2024     11/11/2024     11/10/2024     Lab Results   Component Value Date    HGBA1C 7.2 (H) 11/06/2024    HGBA1C 7.1 (H) 08/06/2024    HGBA1C 9.0 (H) 05/03/2024       IMAGING (I have independently reviewed and interpreted the following tests):  Vein mapping - no suitable cephalic / basilic veins    IMP/PLAN:     54 y.o. male with a h/o CKD, T2D, HTN, 60 pck year smoking hx - quit, States had h/o IVDU 2023, CHF in need of AV access; veins appear too small for autogenous AVF  LUE AVG 2/6/25  Cont ASA 81 po QD       Luis F Berry MD DFSVS FACS   Vascular/Endovascular Surgery    Time spent personally reviewing the patient's chart, interpreting images and test, and conferring with physicians was HBtimespent2: 45 mins.

## 2025-01-16 ENCOUNTER — DOCUMENTATION ONLY (OUTPATIENT)
Dept: VASCULAR SURGERY | Facility: CLINIC | Age: 55
End: 2025-01-16
Payer: MEDICAID

## 2025-01-16 DIAGNOSIS — Z99.2 ESRD (END STAGE RENAL DISEASE) ON DIALYSIS: ICD-10-CM

## 2025-01-16 DIAGNOSIS — N18.6 ESRD (END STAGE RENAL DISEASE) ON DIALYSIS: ICD-10-CM

## 2025-01-16 DIAGNOSIS — Z98.890 S/P ARTERIOVENOUS (AV) FISTULA CREATION: ICD-10-CM

## 2025-01-16 NOTE — PROGRESS NOTES
"Spoke with the pt in reference to ozempic noted on med lists.Pt verbalized "I don't take that"Informed him if he starts to take ozempic before surgery to contact the clinic for directives and he verbalizes understanding of information received.    "

## 2025-02-03 ENCOUNTER — TELEPHONE (OUTPATIENT)
Dept: VASCULAR SURGERY | Facility: CLINIC | Age: 55
End: 2025-02-03
Payer: MEDICAID

## 2025-02-03 NOTE — TELEPHONE ENCOUNTER
-Return to the emergency department if you develop new or worsening symptoms such as worsening headache, one-sided weakness, numbness, tingling or high fevers.  Migraine Headache  This often severe type of headache is different from other types of headaches in that symptoms other than pain occur with the headache. Nausea and vomiting, lightheadedness, sensitivity to light (photophobia), and other visual disturbances are common migraine symptoms. The pain may last from a few hours to several days. It is not clear why migraines occur but transient factors called “triggers” can raise the risk of having a migraine attack. A migraine may be triggered by emotional stress or depression, or by hormone changes during the menstrual cycle. Other triggers include birth control pills, overuse of migraine medicines, alcohol or caffeine, foods with tyramine, eye strain, weather changes, missed meals, or too little or too much sleep.  Home care  Follow these tips when taking care of yourself at home:  · Don’t drive yourself home if you were given pain medicine for your headache. Instead, have someone else drive you home. Try to sleep when you get home. You should feel much better when you wake up.  · Cold can help ease migraine symptoms. Put an ice pack on your forehead or at the base of your skull. Put heat on the back of your neck to help ease any neck spasm.  · Drink only clear liquids or eat a light diet until your symptoms get better. This will help you avoid nausea and vomiting.  How to prevent migraines  Pay attention to what seems to trigger your headache. Try to avoid the triggers when you can. If you have frequent headaches, consider keeping a headache diary. In it, write down what you were doing, feeling, or eating in the hours before each headache. Show this to your health care provider to help find the cause of your headaches.  If stress seems to be a trigger for your headaches, figure out what is causing stress in  Spoke with the pt and informed him of surgery 2/06/25 needs to be rescheduled due to an emergency case.Pt informed that he will be contacted with a new date when available.Pt verbalized understanding of information received.   your life. Learn new ways to handle your stress. Ideas include regular exercise, biofeedback, self-hypnosis, and meditation. Talk with your health care provider to find out more information about managing stress. Many books and digital media are also available on this subject.  Tyramine is a substance found in many foods. It can trigger a migraine in some people. These foods contain tyramine:  · Chocolate  · Yogurt  · All cheeses but cottage cheese and cream cheese  · Smoked or pickled fish and meat, including herring, caviar, bologna, pepperoni, and salami  · Liver  · Avocados  · Bananas  · Figs  · Raisins  · Red wine  Try staying away from these foods for 1 to 2 months to see if you have fewer headaches.  How to treat future headaches  · Take time out at the first sign of a headache, if possible. Find a quiet, dark, comfortable place to sit or lie down. Let yourself relax or sleep.  · Put an ice pack on your forehead or on the area of greatest pain. A heating pad and massage may help if you are having a muscle spasm and tightness in your neck.  · If you have been prescribed a medicine to stop a migraine headache, use this at the first warning sign of the headache for best results. First signs may be an aura or pain.  · If you need to take medicine often for your migraine, talk with your healthcare provider about other ways to prevent your headaches.  Follow-up care  Follow up with your healthcare provider if your headache doesn’t get better within the next 24 hours. Talk with your provider if you have frequent headaches. He or she can figure out a treatment plan. Ask if you can have medicine to take at home the next time you get a bad headache. This may keep you from having to visit the emergency department in the future. You may need to see a headache specialist (neurologist) if you continue to have headaches.  When to seek medical advice  Call your healthcare provider right away if any of these occur:  · Your  head pain gets worse, or doesn’t get better within 24 hours  · You can’t keep liquids down (repeated vomiting)  · Pain in your sinuses, ears, or throat  · Fever of 100.4º F (38º C) or higher, or as directed by your healthcare provider  · Stiff neck  · Extreme drowsiness, confusion, or fainting  · Dizziness, or dizziness with spinning sensation (vertigo)  · Weakness in an arm or leg, or on one side of your face  · Difficulty talking or seeing     © 4023-6622 [employer]. [address] [place] [address]. All rights reserved. This information is not intended as a substitute for professional medical care. Always follow your healthcare professional's instructions.

## 2025-02-05 ENCOUNTER — TELEPHONE (OUTPATIENT)
Dept: VASCULAR SURGERY | Facility: CLINIC | Age: 55
End: 2025-02-05
Payer: MEDICAID

## 2025-02-05 NOTE — TELEPHONE ENCOUNTER
"Spoke with the pt in reference to message below.Pt informed that his surgery date is 2/25/25 and he will be contacted by the pre-op nurse prior to surgery to discuss meds.Also questioned pt again about ozempic and he initially hesitated  then verbalized "I didn't take it this week". Pt informed on the importance of being honest about taking med so that he ca receive the appropriate directives.Pt then verbalized "no I don't take it". Pt informed if anything changes to contact the clinic for further directives.Pt verbalized understanding of information received.  ----- Message from Ernestine sent at 2/5/2025  3:07 PM CST -----  Type:  Needs Medical Advice    Who Called: Mr Adhikari    Would the patient rather a call back or a response via [facility]? Call   Best Call Back Number: [phone]  Additional Information: He is calling to schedule his surgery with the provider please call. Needs to know about taking his medication  "

## 2025-02-24 ENCOUNTER — TELEPHONE (OUTPATIENT)
Dept: VASCULAR SURGERY | Facility: CLINIC | Age: 55
End: 2025-02-24
Payer: MEDICAID

## 2025-02-24 ENCOUNTER — ANESTHESIA EVENT (OUTPATIENT)
Dept: SURGERY | Facility: HOSPITAL | Age: 55
End: 2025-02-24
Payer: MEDICAID

## 2025-02-24 RX ORDER — FENTANYL CITRATE 50 UG/ML
25-200 INJECTION, SOLUTION INTRAMUSCULAR; INTRAVENOUS
Refills: 0 | OUTPATIENT
Start: 2025-02-24

## 2025-02-24 RX ORDER — MIDAZOLAM HYDROCHLORIDE 1 MG/ML
.5-4 INJECTION, SOLUTION INTRAMUSCULAR; INTRAVENOUS
OUTPATIENT
Start: 2025-02-24

## 2025-02-24 NOTE — PRE-PROCEDURE INSTRUCTIONS
PreOp Instructions given:   - Verbal medication information (what to hold and what to take)   - NPO guidelines 2400  - Arrival place directions given; time to be given the day before procedure by the   Surgeon's Office 0700 DOSC  - Bathing with antibacterial soap   - Don't wear any jewelry or bring any valuables AM of surgery   - No makeup or moisturizer to face   - No perfume/cologne, powder, lotions or aftershave   Pt. verbalized understanding.   Pt denies any h/o Anesthesia/Sedation complications or side effects.

## 2025-02-24 NOTE — TELEPHONE ENCOUNTER
Spoke with the pt and informed of time of arrival for surgery tomorrow is 7am at the main campus on [facility],second floor,[facility].Pt also reminded not to eat or drink anything after midnight and he verbalizes understanding of information received.

## 2025-02-25 ENCOUNTER — HOSPITAL ENCOUNTER (OUTPATIENT)
Facility: HOSPITAL | Age: 55
Discharge: HOME | End: 2025-02-25
Attending: SURGERY | Admitting: SURGERY
Payer: MEDICAID

## 2025-02-25 ENCOUNTER — ANESTHESIA (OUTPATIENT)
Dept: SURGERY | Facility: HOSPITAL | Age: 55
End: 2025-02-25
Payer: MEDICAID

## 2025-02-25 VITALS
TEMPERATURE: 98 F | DIASTOLIC BLOOD PRESSURE: 76 MMHG | OXYGEN SATURATION: 95 % | HEIGHT: 68 IN | BODY MASS INDEX: 28.4 KG/M2 | WEIGHT: 187.38 LBS | SYSTOLIC BLOOD PRESSURE: 119 MMHG | HEART RATE: 69 BPM | RESPIRATION RATE: 16 BRPM

## 2025-02-25 DIAGNOSIS — Z99.2 HEMODIALYSIS ACCESS, AV GRAFT: Primary | ICD-10-CM

## 2025-02-25 LAB
POCT GLUCOSE: 109 MG/DL (ref 70–110)
POCT GLUCOSE: 126 MG/DL (ref 70–110)

## 2025-02-25 PROCEDURE — 71000044 HC DOSC ROUTINE RECOVERY FIRST HOUR: Performed by: SURGERY

## 2025-02-25 PROCEDURE — 63600175 PHARM REV CODE 636 W HCPCS: Performed by: SURGERY

## 2025-02-25 PROCEDURE — 71000015 HC POSTOP RECOV 1ST HR: Performed by: SURGERY

## 2025-02-25 PROCEDURE — 37000008 HC ANESTHESIA 1ST 15 MINUTES: Performed by: SURGERY

## 2025-02-25 PROCEDURE — 37000009 HC ANESTHESIA EA ADD 15 MINS: Performed by: SURGERY

## 2025-02-25 PROCEDURE — 63600175 PHARM REV CODE 636 W HCPCS: Performed by: NURSE ANESTHETIST, CERTIFIED REGISTERED

## 2025-02-25 PROCEDURE — 25000003 PHARM REV CODE 250: Performed by: NURSE ANESTHETIST, CERTIFIED REGISTERED

## 2025-02-25 PROCEDURE — 36000707: Performed by: SURGERY

## 2025-02-25 PROCEDURE — 82962 GLUCOSE BLOOD TEST: CPT | Performed by: SURGERY

## 2025-02-25 PROCEDURE — 25000003 PHARM REV CODE 250

## 2025-02-25 PROCEDURE — 63600175 PHARM REV CODE 636 W HCPCS

## 2025-02-25 PROCEDURE — 36821 AV FUSION DIRECT ANY SITE: CPT | Mod: LT,,, | Performed by: SURGERY

## 2025-02-25 PROCEDURE — 27201423 OPTIME MED/SURG SUP & DEVICES STERILE SUPPLY: Performed by: SURGERY

## 2025-02-25 PROCEDURE — 36000706: Performed by: SURGERY

## 2025-02-25 RX ORDER — ONDANSETRON HYDROCHLORIDE 2 MG/ML
INJECTION, SOLUTION INTRAVENOUS
Status: DISCONTINUED | OUTPATIENT
Start: 2025-02-25 | End: 2025-02-25

## 2025-02-25 RX ORDER — ONDANSETRON 8 MG/1
8 TABLET, ORALLY DISINTEGRATING ORAL EVERY 8 HOURS PRN
Status: DISCONTINUED | OUTPATIENT
Start: 2025-02-25 | End: 2025-02-25 | Stop reason: HOSPADM

## 2025-02-25 RX ORDER — FENTANYL CITRATE 50 UG/ML
INJECTION, SOLUTION INTRAMUSCULAR; INTRAVENOUS
Status: DISCONTINUED | OUTPATIENT
Start: 2025-02-25 | End: 2025-02-25

## 2025-02-25 RX ORDER — HEPARIN SODIUM 1000 [USP'U]/ML
INJECTION, SOLUTION INTRAVENOUS; SUBCUTANEOUS
Status: DISCONTINUED | OUTPATIENT
Start: 2025-02-25 | End: 2025-02-25

## 2025-02-25 RX ORDER — PROTAMINE SULFATE 10 MG/ML
INJECTION, SOLUTION INTRAVENOUS
Status: DISCONTINUED | OUTPATIENT
Start: 2025-02-25 | End: 2025-02-25

## 2025-02-25 RX ORDER — ACETAMINOPHEN 500 MG
1000 TABLET ORAL
Status: COMPLETED | OUTPATIENT
Start: 2025-02-25 | End: 2025-02-25

## 2025-02-25 RX ORDER — PAPAVERINE HYDROCHLORIDE 30 MG/ML
INJECTION INTRAMUSCULAR; INTRAVENOUS
Status: DISCONTINUED | OUTPATIENT
Start: 2025-02-25 | End: 2025-02-25 | Stop reason: HOSPADM

## 2025-02-25 RX ORDER — PHENYLEPHRINE HCL IN 0.9% NACL 1 MG/10 ML
SYRINGE (ML) INTRAVENOUS
Status: DISCONTINUED | OUTPATIENT
Start: 2025-02-25 | End: 2025-02-25

## 2025-02-25 RX ORDER — MIDAZOLAM HYDROCHLORIDE 1 MG/ML
INJECTION INTRAMUSCULAR; INTRAVENOUS
Status: DISCONTINUED | OUTPATIENT
Start: 2025-02-25 | End: 2025-02-25

## 2025-02-25 RX ORDER — PROPOFOL 10 MG/ML
VIAL (ML) INTRAVENOUS CONTINUOUS PRN
Status: DISCONTINUED | OUTPATIENT
Start: 2025-02-25 | End: 2025-02-25

## 2025-02-25 RX ORDER — EPHEDRINE SULFATE 50 MG/ML
INJECTION, SOLUTION INTRAVENOUS
Status: DISCONTINUED | OUTPATIENT
Start: 2025-02-25 | End: 2025-02-25

## 2025-02-25 RX ORDER — HYDRALAZINE HYDROCHLORIDE 20 MG/ML
INJECTION INTRAMUSCULAR; INTRAVENOUS
Status: DISCONTINUED | OUTPATIENT
Start: 2025-02-25 | End: 2025-02-25

## 2025-02-25 RX ORDER — OXYCODONE HYDROCHLORIDE 5 MG/1
5 TABLET ORAL EVERY 4 HOURS PRN
Qty: 15 TABLET | Refills: 0 | Status: SHIPPED | OUTPATIENT
Start: 2025-02-25

## 2025-02-25 RX ORDER — GLUCAGON 1 MG
1 KIT INJECTION
Status: DISCONTINUED | OUTPATIENT
Start: 2025-02-25 | End: 2025-02-25 | Stop reason: HOSPADM

## 2025-02-25 RX ORDER — SODIUM CHLORIDE 9 MG/ML
INJECTION, SOLUTION INTRAVENOUS CONTINUOUS
Status: DISCONTINUED | OUTPATIENT
Start: 2025-02-25 | End: 2025-02-25 | Stop reason: HOSPADM

## 2025-02-25 RX ORDER — HYDROMORPHONE HYDROCHLORIDE 1 MG/ML
0.2 INJECTION, SOLUTION INTRAMUSCULAR; INTRAVENOUS; SUBCUTANEOUS EVERY 5 MIN PRN
Status: DISCONTINUED | OUTPATIENT
Start: 2025-02-25 | End: 2025-02-25 | Stop reason: HOSPADM

## 2025-02-25 RX ORDER — CEFAZOLIN 2 G/1
2 INJECTION, POWDER, FOR SOLUTION INTRAMUSCULAR; INTRAVENOUS
Status: COMPLETED | OUTPATIENT
Start: 2025-02-25 | End: 2025-02-25

## 2025-02-25 RX ADMIN — ACETAMINOPHEN 1000 MG: 500 TABLET ORAL at 08:02

## 2025-02-25 RX ADMIN — EPHEDRINE SULFATE 10 MG: 50 INJECTION INTRAVENOUS at 10:02

## 2025-02-25 RX ADMIN — MEPIVACAINE HYDROCHLORIDE 30 ML: 15 INJECTION, SOLUTION EPIDURAL; INFILTRATION at 09:02

## 2025-02-25 RX ADMIN — FENTANYL CITRATE 50 MCG: 50 INJECTION, SOLUTION INTRAMUSCULAR; INTRAVENOUS at 09:02

## 2025-02-25 RX ADMIN — EPHEDRINE SULFATE 10 MG: 50 INJECTION INTRAVENOUS at 11:02

## 2025-02-25 RX ADMIN — PROTAMINE SULFATE 5 MG: 10 INJECTION, SOLUTION INTRAVENOUS at 10:02

## 2025-02-25 RX ADMIN — PROTAMINE SULFATE 35 MG: 10 INJECTION, SOLUTION INTRAVENOUS at 10:02

## 2025-02-25 RX ADMIN — EPHEDRINE SULFATE 5 MG: 50 INJECTION INTRAVENOUS at 09:02

## 2025-02-25 RX ADMIN — PROTAMINE SULFATE 10 MG: 10 INJECTION, SOLUTION INTRAVENOUS at 11:02

## 2025-02-25 RX ADMIN — ONDANSETRON 4 MG: 2 INJECTION INTRAMUSCULAR; INTRAVENOUS at 10:02

## 2025-02-25 RX ADMIN — PROPOFOL 50 MCG/KG/MIN: 10 INJECTION, EMULSION INTRAVENOUS at 09:02

## 2025-02-25 RX ADMIN — FENTANYL CITRATE 50 MCG: 50 INJECTION, SOLUTION INTRAMUSCULAR; INTRAVENOUS at 11:02

## 2025-02-25 RX ADMIN — PROPOFOL 30 MG: 10 INJECTION, EMULSION INTRAVENOUS at 09:02

## 2025-02-25 RX ADMIN — MIDAZOLAM HYDROCHLORIDE 2 MG: 2 INJECTION, SOLUTION INTRAMUSCULAR; INTRAVENOUS at 08:02

## 2025-02-25 RX ADMIN — Medication 100 MCG: at 11:02

## 2025-02-25 RX ADMIN — CEFAZOLIN 2 G: 2 INJECTION, POWDER, FOR SOLUTION INTRAMUSCULAR; INTRAVENOUS at 09:02

## 2025-02-25 RX ADMIN — PROPOFOL 90 MG: 10 INJECTION, EMULSION INTRAVENOUS at 11:02

## 2025-02-25 RX ADMIN — Medication 200 MCG: at 11:02

## 2025-02-25 RX ADMIN — HYDRALAZINE HYDROCHLORIDE 10 MG: 20 INJECTION INTRAMUSCULAR; INTRAVENOUS at 09:02

## 2025-02-25 RX ADMIN — HEPARIN SODIUM 6000 UNITS: 1000 INJECTION, SOLUTION INTRAVENOUS; SUBCUTANEOUS at 10:02

## 2025-02-25 RX ADMIN — SODIUM CHLORIDE: 0.9 INJECTION, SOLUTION INTRAVENOUS at 08:02

## 2025-02-25 RX ADMIN — EPHEDRINE SULFATE 5 MG: 50 INJECTION INTRAVENOUS at 11:02

## 2025-02-25 NOTE — BRIEF OP NOTE
[facility] - Surgery ([facility])  Brief Operative Note    Surgery Date: 2/25/2025     Surgeons and Role:     * Luis F Berry MD - Primary     * Jono Crystal MD - Resident - Assisting    Pre-op Diagnosis:  ESRD (end stage renal disease) on dialysis [N18.6, Z99.2]    Post-op Diagnosis:  Post-Op Diagnosis Codes:     * ESRD (end stage renal disease) on dialysis [N18.6, Z99.2]    Procedure: Creation L BC AVF with transposition L cephalic vein 8cm segment    Anesthesia: Regional    Operative Findings:   Creation L BC AVF with transposition L cephalic vein 8cm segment - L cephalic dilated to 2.5mm distally after block  Good thrill  2+ L radial a. Pulse without significant doppler augmentation with AVF compression    Estimated Blood Loss: < 20cc         Specimens:   Specimen (24h ago, onward)      None              Discharge Note    OUTCOME: Patient tolerated treatment/procedure well without complication and is now ready for discharge.    DISPOSITION: Home or Self Care    FINAL DIAGNOSIS:  ESRD (end stage renal disease) on dialysis [N18.6, Z99.2]    FOLLOWUP: In clinic    DISCHARGE INSTRUCTIONS:    VASCULAR SURGERY DISCHARGE INSTRUCTIONS    Woundcare:  - Take your incision dressing off 2 days after your surgery and gently rinse your incision with soap and water daily. Pad the incision dry afterward  - If you have a dialysis catheter in place, keep your catheter dressing clean and dry  - If you do not have a catheter, take a full shower daily beginning 2 days after the surgery. Allow soap and water to run over your incision. Pad the incision dry afterward  - When resting or sleeping, try to keep your arm elevated to shoulder level on pillows to reduce swelling  - If you notice clear drainage from your incision, you can apply dry gauze daily and secure in place with tape or gentle elastic wrap    Activity:  - Avoid prolonged exertion of the affected arm  - Avoid keeping your arm down below your chest for prolonged periods of  time (this could lead to increased swelling)  - No heavy lifting with the affected arm  - Sleep with your arm elevated on pillows at night to reduce swelling  -- No swimming in pools, lakes, [employer] etc. for 6 weeks after your surgery    Diet:  -Resume your pre-operative home diet    Follow up:  -Refer to follow up instructions     Call Vascular Surgery Office at [phone] if you experience:  -Increased redness, warmth, tenderness, or draining pus at your incision(s)  -Worsening fevers, chills, nausea/vomiting  -Pain, weakness, coldness, or numbness in your hand  -Uncontrolled pain  -Your call will be returned within 24 hours and further instructions will be provided    Go to ER/Urgent Care if you experience:  -Worsening shortness of breath or chest pain    Luis F Berry MD DFSVS FACS   Vascular/Endovascular Surgery

## 2025-02-25 NOTE — ANESTHESIA PREPROCEDURE EVALUATION
02/25/2025  Onofre Ceballos is a 54 y.o., male.      Pre-op Assessment    I have reviewed the Patient Summary Reports.     I have reviewed the Nursing Notes.       Review of Systems  Anesthesia Hx:  No problems with previous Anesthesia                Hematology/Oncology:  Hematology Normal   Oncology Normal                                   EENT/Dental:  EENT/Dental Normal           Cardiovascular:     Hypertension   CAD       CHF                                   Pulmonary:        Sleep Apnea                Renal/:  Chronic Renal Disease                Hepatic/GI:      Liver Disease, Hepatitis              Musculoskeletal:  Musculoskeletal Normal                Neurological:  Neurology Normal                                      Endocrine:  Diabetes           Dermatological:  Skin Normal    Psych:  Psychiatric Normal                    Physical Exam  General: Well nourished    Airway:  Mallampati: II   Mouth Opening: Normal  TM Distance: Normal  Tongue: Normal  Neck ROM: Normal ROM    Dental:  Intact        Anesthesia Plan  Type of Anesthesia, risks & benefits discussed:    Anesthesia Type: Gen ETT  Intra-op Monitoring Plan: Standard ASA Monitors  Post Op Pain Control Plan: multimodal analgesia  Induction:  IV  Airway Plan: Direct  Informed Consent: Informed consent signed with the Patient and all parties understand the risks and agree with anesthesia plan.  All questions answered. Patient consented to blood products? No  ASA Score: 3  Day of Surgery Review of History & Physical: H&P Update referred to the surgeon/provider.    Ready For Surgery From Anesthesia Perspective.     .

## 2025-02-25 NOTE — ANESTHESIA PROCEDURE NOTES
L Supraclavicular SS    Patient location during procedure: OR    Reason for block: primary anesthetic    Diagnosis: ESRD   Start time: 2/25/2025 9:12 AM  Timeout: 2/25/2025 9:11 AM   End time: 2/25/2025 9:18 AM    Staffing  Authorizing Provider: Carter Gamez MD  Performing Provider: Tato Calles MD    Staffing  Performed by: Tato Calles MD  Authorized by: Carter Gamez MD    Preanesthetic Checklist  Completed: patient identified, IV checked, site marked, risks and benefits discussed, surgical consent, monitors and equipment checked, pre-op evaluation and timeout performed  Peripheral Block  Patient position: supine  Prep: ChloraPrep  Patient monitoring: heart rate, cardiac monitor, continuous pulse ox, continuous capnometry and frequent blood pressure checks  Block type: supraclavicular  Laterality: left  Injection technique: single shot  Needle  Needle type: Stimuplex   Needle gauge: 20 G  Needle length: 4 in  Needle localization: anatomical landmarks and ultrasound guidance   -ultrasound image captured on disc.  Assessment  Injection assessment: negative aspiration, negative parasthesia and local visualized surrounding nerve  Paresthesia pain: none  Heart rate change: no  Slow fractionated injection: yes  Pain Tolerance: comfortable throughout block and no complaints  Medications:    Medications: mepivacaine (CARBOCAINE) injection 15 mg/mL (1.5%) - Perineural   30 mL - 2/25/2025 9:15:00 AM    Additional Notes  30cc 1.5% Mepi with Epi 1:300,000 given   Intercostal brachial field block performed with mepivacaine 1.5% 10ml for additional coverage.    VSS.  See anesthesia record.  Patient tolerated procedure well.

## 2025-02-25 NOTE — TRANSFER OF CARE
Anesthesia Transfer of Care Note    Patient: Onofre Ceballos    Procedure(s) Performed: Procedure(s) (LRB):  INSERTION, GRAFT, ARTERIOVENOUS, UPPER EXTREMITY POSSIBLE AVF CREATION (Left)    Patient location: [facility]    Anesthesia Type: general    Transport from OR: Transported from OR on 6-10 L/min O2 by face mask with adequate spontaneous ventilation    Post pain: adequate analgesia    Post assessment: no apparent anesthetic complications and tolerated procedure well    Post vital signs: stable    Level of consciousness: awake, alert and oriented    Nausea/Vomiting: no nausea/vomiting    Complications: none    Transfer of care protocol was followed      Last vitals: 122/78 BP

## 2025-02-25 NOTE — DISCHARGE INSTRUCTIONS
VASCULAR SURGERY DISCHARGE INSTRUCTIONS    Woundcare:  - Take your incision dressing off 2 days after your surgery and gently rinse your incision with soap and water daily. Pad the incision dry afterward  - If you have a dialysis catheter in place, keep your catheter dressing clean and dry  - If you do not have a catheter, take a full shower daily beginning 2 days after the surgery. Allow soap and water to run over your incision. Pad the incision dry afterward  - When resting or sleeping, try to keep your arm elevated to shoulder level on pillows to reduce swelling  - If you notice clear drainage from your incision, you can apply dry gauze daily and secure in place with tape or gentle elastic wrap    Activity:  - Avoid prolonged exertion of the affected arm  - Avoid keeping your arm down below your chest for prolonged periods of time (this could lead to increased swelling)  - No heavy lifting with the affected arm  - Sleep with your arm elevated on pillows at night to reduce swelling  -- No swimming in pools, lakes, [employer] etc. for 6 weeks after your surgery    Diet:  -Resume your pre-operative home diet    Follow up:  -Refer to follow up instructions     Call Vascular Surgery Office at [phone] if you experience:  -Increased redness, warmth, tenderness, or draining pus at your incision(s)  -Worsening fevers, chills, nausea/vomiting  -Pain, weakness, coldness, or numbness in your hand  -Uncontrolled pain  -Your call will be returned within 24 hours and further instructions will be provided    Go to ER/Urgent Care if you experience:  -Worsening shortness of breath or chest pain

## 2025-02-25 NOTE — H&P
HPI:  Patient is a 54 y.o. male with a h/o CKD, T2D, HTN, 60 pck year smoking hx, States had h/o IVDU 2023, CHF who is here today for evaluation of AVG vs AVF placement. He was diagnosed with CKD one year ago and undergoes dialysis MWF. He has been He denies any issues with dialysis. He denies chest pain, SOB, or claudication symptoms in the legs when walking.  He quit smoking in March 2024.      Right-hand dominant      No MI/stroke  Tobacco use: 60 pck year (1.5 pcks/year x 40 years)     Updated H+P: no major changes since we last saw him in clinic. Patient has been fasting since midnight. All questions answered.         Current Medications      Current Outpatient Medications:     acetaminophen (TYLENOL) 650 MG TbSR, Take 1 tablet (650 mg total) by mouth every 8 (eight) hours as needed (pain)., Disp: , Rfl:     alcohol swabs (ALCOHOL WIPES) PadM, Use topical 4 x daily for insulin, Disp: 200 each, Rfl: 11    aspirin (ECOTRIN) 81 MG EC tablet, Take 81 mg by mouth., Disp: , Rfl:     atorvastatin (LIPITOR) 80 MG tablet, Take 1 tablet by mouth., Disp: , Rfl:     blood sugar diagnostic Strp, 1 each by Misc.(Non-Drug; Combo Route) route 3 (three) times daily before meals., Disp: 200 each, Rfl: 11    brimonidine 0.2% (ALPHAGAN) 0.2 % Drop, Place into both eyes., Disp: , Rfl:     carvediloL (COREG) 12.5 MG tablet, Take 12.5 mg by mouth 2 (two) times daily., Disp: , Rfl:     ENTRESTO  mg per tablet, Take 1 tablet by mouth 2 (two) times daily., Disp: , Rfl:     ergocalciferol (ERGOCALCIFEROL) 50,000 unit Cap, Take 50,000 Units by mouth every 7 days., Disp: , Rfl:     EScitalopram oxalate (LEXAPRO) 10 MG tablet, Take 10 mg by mouth., Disp: , Rfl:     ezetimibe (ZETIA) 10 mg tablet, Take 1 tablet (10 mg total) by mouth every evening., Disp: 90 tablet, Rfl: 3    glucose 4 GM chewable tablet, Take 4 tablets (16 g total) by mouth as needed for Low blood sugar., Disp: 4 tablet, Rfl: 12    insulin aspart U-100 (NOVOLOG) 100  "unit/mL (3 mL) InPn pen, Inject 10 Units into the skin 3 (three) times daily with meals., Disp: , Rfl:     insulin glargine U-100, Lantus, (BASAGLAR KWIKPEN U-100 INSULIN) 100 unit/mL (3 mL) InPn pen, Inject 10 Units into the skin every evening. Within 15 minutes of eating, Disp: , Rfl:     insulin syringe-needle U-100 (INSULIN SYRINGE) 1/2 mL 28 gauge x 1/2" Syrg, To use with meal time insulin tid, Disp: 200 each, Rfl: 9    lancets 32 gauge Misc, Inject 1 lancet into the skin 3 (three) times daily before meals., Disp: 200 each, Rfl: 11    OZEMPIC 0.25 mg or 0.5 mg (2 mg/3 mL) pen injector, Inject 0.5 mg into the skin every 7 days., Disp: , Rfl:     pantoprazole (PROTONIX) 40 MG tablet, Take 1 tablet (40 mg total) by mouth once daily., Disp: 30 tablet, Rfl: 0    pen needle, diabetic (NOVOTWIST) 32 gauge x 1/5" Ndle, To use nightly with levemir, Disp: 100 each, Rfl: 3    polyethylene glycol (GLYCOLAX) 17 gram/dose powder, Take 17 g by mouth once daily., Disp: 1 Bottle, Rfl: 0    SURE COMFORT INSULIN SYRINGE 0.3 mL 31 gauge x 5/16" Syrg, Inject 1 each into the skin 4 (four) times daily before meals and nightly., Disp: 200 each, Rfl: 11    tamsulosin (FLOMAX) 0.4 mg Cap, Take by mouth., Disp: , Rfl:     torsemide (DEMADEX) 20 MG Tab, Take by mouth., Disp: , Rfl:     blood-glucose meter (TRUE METRIX GLUCOSE METER) Misc, 1 each by Misc.(Non-Drug; Combo Route) route once daily., Disp: 1 each, Rfl: 0    sevelamer carbonate (RENVELA) 800 mg Tab, Take 2 tablets (1,600 mg total) by mouth 3 (three) times daily with meals., Disp: 180 tablet, Rfl: 0        PHYSICAL EXAM:   Right Arm BP - Sittin/92 (01/15/25 1020)  Left Arm BP - Sittin/90 (01/15/25 1020)  Pulse: 72  Temp: 97.4 °F (36.3 °C)                                     Neck:            No carotid bruit can be auscultated                              Cardiac:           Normal rate and regular rhythm, S1 and S2 normal; PMI non-displaced                           " Abdomen:          Soft, nontender, non-distended                                                        Pulsatile aortic mass is not palpable.                       Extremities:           2+ femoral pulses bilaterally                                                        Both pedal pulses palpable.                                                        No pre-tibial edema                                                        No ulcerations     LAB RESULTS:        Lab Results   Component Value Date     K 4.2 11/12/2024     K 4.7 11/11/2024     K 4.6 11/10/2024     CREATININE 5.10 (H) 11/12/2024     CREATININE 5.73 (H) 11/11/2024     CREATININE 3.62 (H) 11/10/2024            Lab Results   Component Value Date     WBC 10.70 11/12/2024     WBC 12.60 11/11/2024     WBC 18.10 (H) 11/10/2024     HCT 25.8 (L) 11/12/2024     HCT 25.7 (L) 11/11/2024     HCT 29.4 (L) 11/10/2024     HCT 29.4 (L) 11/10/2024      11/12/2024      11/11/2024      11/10/2024            Lab Results   Component Value Date     HGBA1C 7.2 (H) 11/06/2024     HGBA1C 7.1 (H) 08/06/2024     HGBA1C 9.0 (H) 05/03/2024         IMAGING (I have independently reviewed and interpreted the following tests):  Vein mapping - no suitable cephalic / basilic veins     IMP/PLAN:  Plan  54 y.o. male with a h/o CKD, T2D, HTN, 60 pck year smoking hx - quit, States had h/o IVDU 2023, CHF in need of AV access; veins appear too small for autogenous AVF  KIM AVG 2/6/25    Case discussed with Dr. Berry

## 2025-02-25 NOTE — ANESTHESIA POSTPROCEDURE EVALUATION
Anesthesia Post Evaluation    Patient: Onofre Ceballos    Procedure(s) Performed: Procedure(s) (LRB):  INSERTION, GRAFT, ARTERIOVENOUS, UPPER EXTREMITY POSSIBLE AVF CREATION (Left)    Final Anesthesia Type: general      Patient location during evaluation: PACU  Patient participation: Yes- Able to Participate  Level of consciousness: awake and alert  Post-procedure vital signs: reviewed and stable  Pain management: adequate  Airway patency: patent    PONV status at discharge: No PONV  Anesthetic complications: no      Cardiovascular status: blood pressure returned to baseline  Respiratory status: spontaneous ventilation and room air  Hydration status: euvolemic  Follow-up not needed.              Vitals Value Taken Time   /69 02/25/25 12:48   Temp 36.7 °C (98 °F) 02/25/25 12:15   Pulse 67 02/25/25 12:55   Resp 35 02/25/25 12:55   SpO2 93 % 02/25/25 12:55   Vitals shown include unfiled device data.      No case tracking events are documented in the log.      Pain/Nick Score: Pain Rating Prior to Med Admin: 0 (2/25/2025  8:32 AM)  Nick Score: 9 (2/25/2025 12:30 PM)

## 2025-02-25 NOTE — OP NOTE
02/25/2025    Indications: Patient is 54 y.o. with chronic renal insufficiency and need for long-term dialysis access.    Pre-operative Diagnosis:   Stage V chronic kidney disease, on dialysis in need of dialysis access    Post-operative Diagnosis: Same    Anesthesia: Regional arm block    Operation: Creation of L BC AVF    Surgeon: MOISÉS Berry MD DFSVS FACS    Assistant: Jono Crystal MD - Resident     Findings:  Creation L BC AVF with transposition L cephalic vein 8cm segment - L cephalic dilated to 2.5mm distally after block  Good thrill  2+ L radial a. Pulse without significant doppler augmentation with AVF compression    EBL: <20 ml    Implants: * No implants in log *    Antibiotic: 2g IV ancef    After an informed consent was obtained the patient was taken to the operating room and placed in the supine position. Ultrasound was used to identify the cephalic vein, which appeared to be 2.5mm in diameter after the block.   The left arm was prepped and draped in a sterile fashion. A skin incision was made just above the AC fossa and dissection carried down to the cephalic vein. The vein was dissected 8cm proximally and injected with papaverine. We then turned our attention to the medial arm and dissection carried down to the brachial artery. The cephalic vein was divided and dilated with heparin saline. It was noted to have good length with no tension. It was then brought into an end-to-side anastomosis with the brachial artery using 6-O prolene. Hemostasis was secured. Radial artery was noted to have 2+ pulse without significant doppler augmentation with AVF compression. Good thrill was noted in the graft and the incision was then closed in three layers, Deep tissue with interrupted 2.0 Vicryl suture, subcutaneous tissue with 3-0 Vicryl and skin with 3-0 Nylon. Pressure dressings were applied. Patient was transported to the recovery area in stable condition.    Jono Crystal MD   General Surgery PGY3    Luis F Berry,  MD DFSVS FACS   Vascular/Endovascular Surgery

## 2025-02-25 NOTE — PLAN OF CARE
Discharge instructions given and explained to patient with verbalization of understanding all instructions. Prescription given by pharmacy and signed for by patient and explained next time and doses of each medication by rn. Patients v/s stable, denies n/v and tolerating po, rates pain level tolerable, IV removed, and brother picking up patient at front entrance.

## 2025-02-26 RX ORDER — HEPARIN SOD,PORCINE/0.9 % NACL 1000/500ML
INTRAVENOUS SOLUTION INTRAVENOUS
Status: DISCONTINUED | OUTPATIENT
Start: 2025-02-25 | End: 2025-02-26 | Stop reason: HOSPADM

## 2025-03-03 ENCOUNTER — HOSPITAL ENCOUNTER (EMERGENCY)
Facility: HOSPITAL | Age: 55
Discharge: HOME | End: 2025-03-04
Attending: EMERGENCY MEDICINE
Payer: MEDICAID

## 2025-03-03 ENCOUNTER — TELEPHONE (OUTPATIENT)
Dept: VASCULAR SURGERY | Facility: CLINIC | Age: 55
End: 2025-03-03
Payer: MEDICAID

## 2025-03-03 DIAGNOSIS — L03.114 CELLULITIS OF LEFT UPPER EXTREMITY: ICD-10-CM

## 2025-03-03 DIAGNOSIS — T81.31XA POSTOPERATIVE DEHISCENCE OF SKIN WOUND, INITIAL ENCOUNTER: Primary | ICD-10-CM

## 2025-03-03 DIAGNOSIS — M79.89 LEFT ARM SWELLING: ICD-10-CM

## 2025-03-03 LAB
ALBUMIN SERPL BCP-MCNC: 2.3 G/DL (ref 3.5–5.2)
ALLENS TEST: NORMAL
ALP SERPL-CCNC: 95 U/L (ref 40–150)
ALT SERPL W/O P-5'-P-CCNC: 12 U/L (ref 10–44)
ANION GAP SERPL CALC-SCNC: 9 MMOL/L (ref 8–16)
AST SERPL-CCNC: 26 U/L (ref 10–40)
BASOPHILS # BLD AUTO: 0.06 K/UL (ref 0–0.2)
BASOPHILS NFR BLD: 0.8 % (ref 0–1.9)
BILIRUB SERPL-MCNC: 0.3 MG/DL (ref 0.1–1)
BUN SERPL-MCNC: 26 MG/DL (ref 6–20)
CALCIUM SERPL-MCNC: 7.6 MG/DL (ref 8.7–10.5)
CHLORIDE SERPL-SCNC: 101 MMOL/L (ref 95–110)
CO2 SERPL-SCNC: 29 MMOL/L (ref 23–29)
CREAT SERPL-MCNC: 3.1 MG/DL (ref 0.5–1.4)
CRP SERPL-MCNC: 13.8 MG/L (ref 0–8.2)
DIFFERENTIAL METHOD BLD: ABNORMAL
EOSINOPHIL # BLD AUTO: 0.5 K/UL (ref 0–0.5)
EOSINOPHIL NFR BLD: 7 % (ref 0–8)
ERYTHROCYTE [DISTWIDTH] IN BLOOD BY AUTOMATED COUNT: 14.7 % (ref 11.5–14.5)
EST. GFR  (NO RACE VARIABLE): 22.9 ML/MIN/1.73 M^2
GLUCOSE SERPL-MCNC: 173 MG/DL (ref 70–110)
HCT VFR BLD AUTO: 34 % (ref 40–54)
HGB BLD-MCNC: 11.1 G/DL (ref 14–18)
IMM GRANULOCYTES # BLD AUTO: 0.01 K/UL (ref 0–0.04)
IMM GRANULOCYTES NFR BLD AUTO: 0.1 % (ref 0–0.5)
LDH SERPL L TO P-CCNC: 1.43 MMOL/L (ref 0.5–2.2)
LYMPHOCYTES # BLD AUTO: 1.6 K/UL (ref 1–4.8)
LYMPHOCYTES NFR BLD: 22.9 % (ref 18–48)
MCH RBC QN AUTO: 30.8 PG (ref 27–31)
MCHC RBC AUTO-ENTMCNC: 32.6 G/DL (ref 32–36)
MCV RBC AUTO: 94 FL (ref 82–98)
MONOCYTES # BLD AUTO: 0.7 K/UL (ref 0.3–1)
MONOCYTES NFR BLD: 9.6 % (ref 4–15)
NEUTROPHILS # BLD AUTO: 4.3 K/UL (ref 1.8–7.7)
NEUTROPHILS NFR BLD: 59.6 % (ref 38–73)
NRBC BLD-RTO: 0 /100 WBC
PLATELET # BLD AUTO: 237 K/UL (ref 150–450)
PMV BLD AUTO: 10.6 FL (ref 9.2–12.9)
POTASSIUM SERPL-SCNC: 4.2 MMOL/L (ref 3.5–5.1)
PROT SERPL-MCNC: 6 G/DL (ref 6–8.4)
RBC # BLD AUTO: 3.6 M/UL (ref 4.6–6.2)
SAMPLE: NORMAL
SITE: NORMAL
SODIUM SERPL-SCNC: 139 MMOL/L (ref 136–145)
WBC # BLD AUTO: 7.17 K/UL (ref 3.9–12.7)

## 2025-03-03 PROCEDURE — 86140 C-REACTIVE PROTEIN: CPT | Performed by: EMERGENCY MEDICINE

## 2025-03-03 PROCEDURE — 80053 COMPREHEN METABOLIC PANEL: CPT | Performed by: EMERGENCY MEDICINE

## 2025-03-03 PROCEDURE — 99900035 HC TECH TIME PER 15 MIN (STAT)

## 2025-03-03 PROCEDURE — 99284 EMERGENCY DEPT VISIT MOD MDM: CPT | Mod: 25

## 2025-03-03 PROCEDURE — 85025 COMPLETE CBC W/AUTO DIFF WBC: CPT | Performed by: EMERGENCY MEDICINE

## 2025-03-03 PROCEDURE — 83605 ASSAY OF LACTIC ACID: CPT

## 2025-03-03 NOTE — TELEPHONE ENCOUNTER
"Spoke with [facility] charge nurse at [facility] dialysis [place] in [place] who verbalized " pt came dialysis center on Wednesday and his left arm was swollen  but I could feel the thrill and hear bruit and I told him to keep his arm elevated.When he came Friday, the swelling had increase and I told him to make sure he keeps it elevated". Ascertained if the pt had a sling to assist with elevation and Nani verbalized "he did come with the sling and I took it off to dialyze him.When he came in today his left arm is grossly edematous and he's c/o numbness,left arm and hand pain. I can hear the bruit but can't palpate the thrill due to edema.Pt states he's been keeping the left arm elevated."  Informed her ([facility]) that I noticed that pt went to the ER on yesterday but left prior to seeing a provider."The pt did say that he went to the ER yesterday but I just wanted to know what Dr Berry wanted us to do.The pt is still on the machine and he has about 52 minutes left."Informed her that Dr Han is on call and was informed of above.Per Dr Han the pt should report to the ED at the [facility] on [address] [facility].Nani verbalized understanding of information received and stated "I'll let him know to go to the [facility]."  ----- Message from Joycelyn sent at 3/3/2025 11:18 AM CST -----  Name of Caller:Estefany  Nature of Call:Requesting a callBest Call Back Number:[phone] Additional Information:[facility] mark/ Gina JOHNSON calling regarding Patient Advice for requesting a call. Please call back to assist  "

## 2025-03-03 NOTE — Clinical Note
"Onofre Teranduncan Ceballos was seen and treated in our emergency department on 3/3/2025.  He may return to work on 03/07/2025.       If you have any questions or concerns, please don't hesitate to call.      Mars Galvan MD"

## 2025-03-04 VITALS
BODY MASS INDEX: 28.79 KG/M2 | RESPIRATION RATE: 16 BRPM | HEART RATE: 59 BPM | SYSTOLIC BLOOD PRESSURE: 175 MMHG | OXYGEN SATURATION: 95 % | TEMPERATURE: 98 F | HEIGHT: 68 IN | WEIGHT: 190 LBS | DIASTOLIC BLOOD PRESSURE: 78 MMHG

## 2025-03-04 PROBLEM — M79.89 LEFT ARM SWELLING: Status: ACTIVE | Noted: 2025-03-04

## 2025-03-04 PROCEDURE — 25000003 PHARM REV CODE 250

## 2025-03-04 RX ORDER — OXYCODONE AND ACETAMINOPHEN 5; 325 MG/1; MG/1
1 TABLET ORAL EVERY 6 HOURS PRN
Qty: 12 TABLET | Refills: 0 | Status: SHIPPED | OUTPATIENT
Start: 2025-03-04

## 2025-03-04 RX ORDER — OXYCODONE AND ACETAMINOPHEN 5; 325 MG/1; MG/1
1 TABLET ORAL
Refills: 0 | Status: COMPLETED | OUTPATIENT
Start: 2025-03-04 | End: 2025-03-04

## 2025-03-04 RX ORDER — CEPHALEXIN 500 MG/1
500 CAPSULE ORAL 4 TIMES DAILY
Qty: 20 CAPSULE | Refills: 0 | Status: SHIPPED | OUTPATIENT
Start: 2025-03-04 | End: 2025-03-09

## 2025-03-04 RX ADMIN — OXYCODONE HYDROCHLORIDE AND ACETAMINOPHEN 1 TABLET: 5; 325 TABLET ORAL at 01:03

## 2025-03-04 NOTE — ED PROVIDER NOTES
Encounter Date: 3/3/2025       History     Chief Complaint   Patient presents with    Wound Check     Had AV fistula created in his left arm, red swollen incision     Onofre Ceballos is a 54 y.o. male who has a past medical history of CHF (congestive heart failure), Diabetes mellitus, Diabetes mellitus type I, Hepatitis, Hepatitis C, Hypertension, Renal disorder, Shingles (10/2018), and Sleep apnea.    The patient presents to the ED due to erythema, warmth, profound swelling and tingling of his left arm.  Patient recently underwent AV fistula graft of the left arm 6 days ago.  Patient receives dialysis Monday Wednesday Friday, last complete session was today.  Patient denies fever, chills, nausea or vomiting, chest palpitations, purulent drainage or bleeding from the site.  Patient has no other symptoms at this time.  Given the aforementioned concerns, patient contacted vascular surgery clinic who advised him to be seen in the ED for further evaluation by vascular staff.       The history is provided by the patient and medical records. No  was used.     Review of patient's allergies indicates:  No Known Allergies  Past Medical History:   Diagnosis Date    CHF (congestive heart failure)     Diabetes mellitus     Diabetes mellitus type I     Hepatitis     Hepatitis C     Hypertension     Renal disorder     Shingles 10/2018    Sleep apnea      Past Surgical History:   Procedure Laterality Date    ESOPHAGOGASTRODUODENOSCOPY N/A 10/11/2024    Procedure: EGD (ESOPHAGOGASTRODUODENOSCOPY);  Surgeon: Benjamin Lacy MD;  Location: [facility] ENDO;  Service: Endoscopy;  Laterality: N/A;    INSERTION, CATHETER, TUNNELED N/A 10/29/2024    Procedure: Insertion,catheter,tunneled;  Surgeon: Angel Malcolm MD;  Location: [facility] OR;  Service: General;  Laterality: N/A;    INSERTION, CATHETER, TUNNELED N/A 11/8/2024    Procedure: Insertion,catheter,tunneled;  Surgeon: Huseyin Romero MD;  Location: [facility]  OR;  Service: General;  Laterality: N/A;    INSERTION, GRAFT, ARTERIOVENOUS, UPPER EXTREMITY Left 2/25/2025    Procedure: INSERTION, GRAFT, ARTERIOVENOUS, UPPER EXTREMITY POSSIBLE AVF CREATION;  Surgeon: Luis F Berry MD;  Location: [facility] OR [address] [place] [address];  Service: Vascular;  Laterality: Left;    NO PAST SURGERIES       Family History   Problem Relation Name Age of Onset    Diabetes Mother      Lung cancer Father       Social History[1]  Review of Systems   All other systems reviewed and are negative.      Physical Exam     Initial Vitals [03/03/25 1841]   BP Pulse Resp Temp SpO2   137/73 63 16 97.9 °F (36.6 °C) 95 %      MAP       --         Physical Exam    Nursing note and vitals reviewed.  Constitutional: He appears well-developed and well-nourished. He is not diaphoretic. No distress.   HENT:   Head: Normocephalic and atraumatic.   Eyes: Conjunctivae and EOM are normal. Pupils are equal, round, and reactive to light.   Neck: Neck supple.   Normal range of motion.  Cardiovascular:  Normal rate, regular rhythm, normal heart sounds and intact distal pulses.           Pulmonary/Chest: Breath sounds normal.   Abdominal: Bowel sounds are normal.   Musculoskeletal:         General: Normal range of motion.      Cervical back: Normal range of motion and neck supple.      Comments: Adequate  strength bilaterally and passive range of motion of the upper extremities.     Neurological: He is alert and oriented to person, place, and time. He has normal strength. GCS score is 15. GCS eye subscore is 4. GCS verbal subscore is 5. GCS motor subscore is 6.   Sensation intact bilaterally of upper extremity   Skin: Skin is warm and dry. Capillary refill takes less than 2 seconds.   No signs of wound dehiscence, surgical site remains well approximated with no purulent drainage.  There is prominent erythema surrounding surgical incision site with marked swelling of the left upper extremity distal to the AV fistula graft.  Please  see media section for further detail   Psychiatric: He has a normal mood and affect. His behavior is normal. Judgment and thought content normal.         ED Course   Procedures  Labs Reviewed   CBC W/ AUTO DIFFERENTIAL - Abnormal       Result Value    WBC 7.17      RBC 3.60 (*)     Hemoglobin 11.1 (*)     Hematocrit 34.0 (*)     MCV 94      MCH 30.8      MCHC 32.6      RDW 14.7 (*)     Platelets 237      MPV 10.6      Immature Granulocytes 0.1      Gran # (ANC) 4.3      Immature Grans (Abs) 0.01      Lymph # 1.6      Mono # 0.7      Eos # 0.5      Baso # 0.06      nRBC 0      Gran % 59.6      Lymph % 22.9      Mono % 9.6      Eosinophil % 7.0      Basophil % 0.8      Differential Method Automated     COMPREHENSIVE METABOLIC PANEL - Abnormal    Sodium 139      Potassium 4.2      Chloride 101      CO2 29      Glucose 173 (*)     BUN 26 (*)     Creatinine 3.1 (*)     Calcium 7.6 (*)     Total Protein 6.0      Albumin 2.3 (*)     Total Bilirubin 0.3      Alkaline Phosphatase 95      AST 26      ALT 12      eGFR 22.9 (*)     Anion Gap 9     C-REACTIVE PROTEIN - Abnormal    CRP 13.8 (*)    ISTAT LACTATE    POC Lactate 1.43      Sample VENOUS      Site Other      Allens Test N/A     ISTAT CHEM8          Imaging Results              US Upper Extremity Veins Left (Final result)  Result time 03/04/25 02:33:54      Final result by Abdi Thomason MD (03/04/25 02:33:54)                   Impression:      No thrombus in central veins of the left upper extremity.      Electronically signed by: Abdi Thomason MD  Date:    03/04/2025  Time:    02:33               Narrative:    EXAMINATION:  US UPPER EXTREMITY VEINS LEFT    CLINICAL HISTORY:  postop arm swelling;    TECHNIQUE:  Duplex and color flow Doppler evaluation and dynamic compression was performed of the left upper extremity veins.    COMPARISON:  Upper extremity arterial ultrasound, 03/03/2025.    FINDINGS:  Central veins: The internal jugular, subclavian, and axillary  veins are patent and free of thrombus.    Arm veins: The brachial, basilic, and cephalic veins are patent and compressible.    Contralateral subclavian/internal jugular veins: The right subclavian and internal jugular veins are patent and free of thrombus.    Other findings: Soft tissue edema.  No organized fluid collection.                                       US Upper Extremity Arteries Left (Final result)  Result time 03/03/25 23:39:03      Final result by Abdi Thomason MD (03/03/25 23:39:03)                   Impression:      No occlusion or hemodynamically significant stenosis identified in the left upper extremity arteries.      Electronically signed by: Abdi Thomason MD  Date:    03/03/2025  Time:    23:39               Narrative:    EXAMINATION:  US UPPER EXTREMITY ARTERIES LEFT    CLINICAL HISTORY:  Other specified soft tissue disorders    TECHNIQUE:  Real-time sonographic evaluation was performed of the left upper extremity arteries.  Color flow, Doppler, and grayscale spot images were obtained.    COMPARISON:  None    FINDINGS:  Peak systolic velocities in cm/s were obtained as detailed below:    Left subclavian artery: 129 centimeters/second    Left axillary artery: 108 centimeters/second    Left brachial artery proximal: 139 centimeters/second    Left brachial artery mid: 137 centimeters/second    Left brachial artery distal: 97 centimeters/second    Left radial artery: 42 and 76 centimeters/second    Left ulnar artery: 32 and 59 centimeters/second    No severe stenosis or occlusion.    Left upper extremity soft tissue edema.  No organized fluid collection.                                       Medications   oxyCODONE-acetaminophen 5-325 mg per tablet 1 tablet (1 tablet Oral Given 3/4/25 0126)     Medical Decision Making  53 y/o w ESRD on HD presenting with redness or swelling to left upper arm AV graft site. He is post-op from 2/25 from AV graft creation. No fever or chills.  We will order  basic labs, ultrasound imaging of the aforementioned affected extremity and consult vascular surgery for postoperative wound check.     Differentials to consider but not limited to, DVT versus wound dehiscence versus graft failure versus underlying infectious process.    Update/re-evaluation:  Both venous and arterial ultrasounds of left upper extremity without occlusion or underlying pathology.  Vascular surgery consulted and agreed to evaluate patient.  Patient was discharged after surgical team evaluated and found no intervention or admit indications.  Will follow up in their clinic in the coming days with OP antibiotics, pain medications and ace wrap per vascular surgery consult. Patient updated on care plan and in agreement.     Amount and/or Complexity of Data Reviewed  Discussion of management or test interpretation with external provider(s): I discussed the case with Dr. Chen of vascular surgery who agreed to evaluate patient in ED. Appreciate recs    Risk  Prescription drug management.               ED Course as of 03/04/25 0716   Tue Mar 04, 2025   0519 Vascular at bedside   [GM]      ED Course User Index  [GM] Mikayla Elias MD                           Clinical Impression:  Final diagnoses:  [M79.89] Left arm swelling  [T81.31XA] Postoperative dehiscence of skin wound, initial encounter (Primary)  [L03.114] Cellulitis of left upper extremity          ED Disposition Condition    Discharge Stable          ED Prescriptions       Medication Sig Dispense Start Date End Date Auth. Provider    oxyCODONE-acetaminophen (PERCOCET) 5-325 mg per tablet Take 1 tablet by mouth every 6 (six) hours as needed for Pain. 12 tablet 3/4/2025 -- Mars Galvan MD    cephALEXin (KEFLEX) 500 MG capsule Take 1 capsule (500 mg total) by mouth 4 (four) times daily. for 5 days 20 capsule 3/4/2025 3/9/2025 Mars Galvan MD          Follow-up Information       Follow up With Specialties Details Why Contact Rico Galaviz  "[facility] Schedule an appointment as soon as possible for a visit in 1 week  [address] [facility] [place] [address]  [phone]      [facility] - Emergency Dept Emergency Medicine Go to  As needed [address]  [facility] [id number] [phone]    [facility] VASCULAR SURGERY Vascular Surgery Schedule an appointment as soon as possible for a visit in 1 day  [address]  [facility] [id number] [phone]                 [1]   Social History  Tobacco Use    Smoking status: Former     Current packs/day: 1.00     Types: Cigarettes    Smokeless tobacco: Former   Substance Use Topics    Alcohol use: No    Drug use: Not Currently     Types: Methamphetamines     Comment: Patient stated "I'm use IV drug user with meth in the last 2 weeks."        Mars Galvan MD  Resident  03/04/25 2259    "

## 2025-03-04 NOTE — DISCHARGE INSTRUCTIONS
You were seen in the [facility] ED for concerns related to postoperative skin changes related to your fistula graft placed by vascular surgery.  Your workup included ultrasound studies to evaluate for occlusions or obstructions which were normal.  Your laboratory studies were also normal.  Is likely you have inflammation of the surgical site.  You were seen by vascular surgery staff on-call to evaluate his changes.  They recommended follow up in their clinic soon after discharge from ED. please complete your prescription of Keflex that he will be given for possible infection prevention.

## 2025-03-04 NOTE — HPI
54-year-old gentleman with a history of end-stage renal disease on hemodialysis via left chest wall PermCath, CHF, who recently underwent on 02/25 creation left brachiocephalic turned down AV fistula.  He says that since his fistula creation, his arm has become swollen, with some weeping through the incision.  No fevers chills.  Still is able to feel his fingers, no weakness.    Since his time in the ED, his left arm has been elevated, he states that there has been significant improvement in the edema and swelling arm elevation.

## 2025-03-04 NOTE — ED TRIAGE NOTES
Onofre Huber Tucker, an 54 y.o. male presents to the ED with redness and swelling at AV fistula site in L arm. Pt states dialysis told him to go to ED to have it checked. Pt had full HD session today. Denies CP, SOB, NVD, HA, fevers, chills. Hx of DM, CHF, HTN, Hepatitis C.      Chief Complaint   Patient presents with    Wound Check     Had AV fistula created in his left arm, red swollen incision     Review of patient's allergies indicates:  No Known Allergies  Past Medical History:   Diagnosis Date    CHF (congestive heart failure)     Diabetes mellitus     Diabetes mellitus type I     Hepatitis     Hepatitis C     Hypertension     Renal disorder     Shingles 10/2018    Sleep apnea

## 2025-03-04 NOTE — ED NOTES
Received report from STONE Blank.  The patient is awake, alert and cooperative with a calm affect, patient is aware of environment, airway is open and patent, respirations are spontaneous, normal effort and rate noted, skin warm and dry, moves all extremities well, appearance: no apparent distress noted, bed placed in low position, side rails up x 2, call light is within reach of patient, explanation of care provided to patient, alarms set and turned on for monitor, pulse ox, plan of care: observe and reassure, position of comfort, patient offers no complaints at this time, awaiting further MD orders and bed assignment, will continue to monitor.

## 2025-03-04 NOTE — FIRST PROVIDER EVALUATION
"Medical screening examination initiated.  I have conducted a focused provider triage encounter, findings are as follows:    Brief history of present illness:  55 y/o w ESRD on HD presenting with redness or swelling to left upper arm AV graft site. He is post-op from 2/25 from AV graft creation. No fever or chills.     Vitals:    03/03/25 1841   BP: 137/73   BP Location: Right arm   Pulse: 63   Resp: 16   Temp: 97.9 °F (36.6 °C)   TempSrc: Oral   SpO2: 95%   Weight: 86.2 kg (190 lb)   Height: 5' 8" (1.727 m)       Pertinent physical exam:  erythema, edema + pain to left upper arm    Brief workup plan:  labs, CRP, US    Preliminary workup initiated; this workup will be continued and followed by the physician or advanced practice provider that is assigned to the patient when roomed.    Delgado Mcclain DO, MATT  Emergency Staff Physician   Dept of Emergency Medicine   [facility]  Spectralink: [id number]        Disclaimer: This note has been generated using voice-recognition software. There may be typographical errors that have been missed during proof-reading.    "

## 2025-03-04 NOTE — SUBJECTIVE & OBJECTIVE
"Prescriptions Prior to Admission[1]    Review of patient's allergies indicates:  No Known Allergies    Past Medical History:   Diagnosis Date    CHF (congestive heart failure)     Diabetes mellitus     Diabetes mellitus type I     Hepatitis     Hepatitis C     Hypertension     Renal disorder     Shingles 10/2018    Sleep apnea      Past Surgical History:   Procedure Laterality Date    ESOPHAGOGASTRODUODENOSCOPY N/A 10/11/2024    Procedure: EGD (ESOPHAGOGASTRODUODENOSCOPY);  Surgeon: Benjamin Lacy MD;  Location: [facility] ENDO;  Service: Endoscopy;  Laterality: N/A;    INSERTION, CATHETER, TUNNELED N/A 10/29/2024    Procedure: Insertion,catheter,tunneled;  Surgeon: Angel Malcolm MD;  Location: [facility] OR;  Service: General;  Laterality: N/A;    INSERTION, CATHETER, TUNNELED N/A 11/8/2024    Procedure: Insertion,catheter,tunneled;  Surgeon: Huseyin Romero MD;  Location: [facility] OR;  Service: General;  Laterality: N/A;    INSERTION, GRAFT, ARTERIOVENOUS, UPPER EXTREMITY Left 2/25/2025    Procedure: INSERTION, GRAFT, ARTERIOVENOUS, UPPER EXTREMITY POSSIBLE AVF CREATION;  Surgeon: Luis F Berry MD;  Location: [address];  Service: Vascular;  Laterality: Left;    NO PAST SURGERIES       Family History       Problem Relation (Age of Onset)    Diabetes Mother    Lung cancer Father          Tobacco Use    Smoking status: Former     Current packs/day: 1.00     Types: Cigarettes    Smokeless tobacco: Former   Substance and Sexual Activity    Alcohol use: No    Drug use: Not Currently     Types: Methamphetamines     Comment: Patient stated "I'm use IV drug user with meth in the last 2 weeks."    Sexual activity: Not Currently     Review of Systems   Constitutional:  Negative for activity change and appetite change.   Skin:  Positive for wound.   Neurological:  Negative for weakness and numbness.     Objective:     Vital Signs (Most Recent):  Temp: 98 °F (36.7 °C) (03/04/25 0600)  Pulse: (!) 59 (03/04/25 " 0600)  Resp: 16 (03/04/25 0600)  BP: (!) 175/78 (03/04/25 0600)  SpO2: 95 % (03/04/25 0600) Vital Signs (24h Range):  Temp:  [97.6 °F (36.4 °C)-98 °F (36.7 °C)] 98 °F (36.7 °C)  Pulse:  [52-63] 59  Resp:  [16-20] 16  SpO2:  [95 %-97 %] 95 %  BP: (137-183)/(73-85) 175/78     Weight: 86.2 kg (190 lb)  Body mass index is 28.89 kg/m².      Physical Exam  HENT:      Head: Normocephalic.   Eyes:      Pupils: Pupils are equal, round, and reactive to light.   Cardiovascular:      Comments: Left chest wall PermCath in place  Musculoskeletal:      Comments: Left arm swelling with pitting edema.  Left transverse incision with minimal drainage, slight erythema surrounding incision.   Neurological:      Mental Status: He is alert.          Significant Labs:  All pertinent labs from the last 24 hours have been reviewed.    Significant Diagnostics:  I have reviewed all pertinent imaging results/findings within the past 24 hours.  Ultrasound personally reviewed, no DVT present.       [1] (Not in a hospital admission)

## 2025-03-04 NOTE — ASSESSMENT & PLAN NOTE
54-year-old gentleman presenting with left arm swelling status post creation of left arm brachiocephalic AV fistula, in setting of previous PermCath, current left chest wall PermCath.    - Given marked improvement with conservative measures with the arm swelling, we will hold off on fistulogram this time.  - Discussed with the patient to elevate arm above head, compression wrapped with Ace.   - He will follow-up with us on Friday, for re-evaluation of his arm swelling, wounds, need for possible fistulogram to address likely central venous stenosis.  - Short course of antibiotics for arm cellulitis.

## 2025-03-06 ENCOUNTER — PATIENT OUTREACH (OUTPATIENT)
Facility: OTHER | Age: 55
End: 2025-03-06
Payer: MEDICAID

## 2025-03-06 NOTE — PROGRESS NOTES
"Clemencia Fontenot LPN  ED Navigator  Emergency Department    [place]: [facility] [place]  Role: Community Health Worker    Date: 03/06/2025  Patient Name: Onofre Ceballos  MRN: [id number]  PCP: Honey Galaviz NP    Assessment:     Onofre Ceballos is a 54 y.o. male who has presented to ED for wound check. Patient has visited the ED 2 times in the past 3 months. Patient did not contact PCP.     ED Navigator Initial Assessment    ED Navigator Enrollment Documentation  Consent to Services  Does patient consent to completing the assessment?: Yes  Contact  Method of Initial Contact: Phone  Transportation  Insurance Coverage  Do you have coverage/adequate coverage?: Yes  Specialist Appointment  Did the patient come to the ED to see a specialist?: No  Does the patient have a pending specialist referral?: No  Does the patient have a specialist appointment made?: Yes  PCP Follow Up Appointment  Medications  Is patient able to afford medication?: Yes  Psychological  Food  Communication/Education  Other Financial Concerns  Other Social Barriers/Concerns  Primary Barrier  Scheduled Appointment Date: 3/26/25  Plan: Provided information for [facility] 24/7 Nurse triage line, [phone] or [phone] ([phone])         Social History     Socioeconomic History    Marital status: Single   Tobacco Use    Smoking status: Former     Current packs/day: 1.00     Types: Cigarettes    Smokeless tobacco: Former   Substance and Sexual Activity    Alcohol use: No    Drug use: Not Currently     Types: Methamphetamines     Comment: Patient stated "I'm use IV drug user with meth in the last 2 weeks."    Sexual activity: Not Currently     Social Drivers of Health     Financial Resource Strain: Low Risk  (3/6/2025)    Overall Financial Resource Strain (CARDIA)     Difficulty of Paying Living Expenses: Not hard at all   Food Insecurity: No Food Insecurity (3/6/2025)    Hunger Vital Sign     Worried About Running Out of Food in the Last " Year: Never true     Ran Out of Food in the Last Year: Never true   Transportation Needs: No Transportation Needs (3/6/2025)    PRAPARE - Transportation     Lack of Transportation (Medical): No     Lack of Transportation (Non-Medical): No   Physical Activity: Inactive (3/28/2024)    Exercise Vital Sign     Days of Exercise per Week: 0 days     Minutes of Exercise per Session: 0 min   Stress: No Stress Concern Present (3/6/2025)    [employer] [place] [employer] - Occupational Stress Questionnaire     Feeling of Stress : Not at all   Housing Stability: Low Risk  (3/6/2025)    Housing Stability Vital Sign     Unable to Pay for Housing in the Last Year: No     Homeless in the Last Year: No       Plan:   Spoke with Pt regarding his recent ED visit for a wound check. Pt denies having any needs or concerns at this time. Pt does not wish to schedule any appt's at this time. Pt encouraged to reach out with any concerns at they arise. ED navigator will follow-up with patient to assist as needed.

## 2025-03-07 ENCOUNTER — TELEPHONE (OUTPATIENT)
Dept: VASCULAR SURGERY | Facility: CLINIC | Age: 55
End: 2025-03-07
Payer: MEDICAID

## 2025-03-07 NOTE — TELEPHONE ENCOUNTER
"Pt was supposed to be seen by Dr Mendez in clinic for wound check and reevaluation of arm swelling.Spoke with Missy charge nurse at [facility] in [place] on yesterday to attempt to change the pt's dialysis time to this am but none available.Per Missy "pt would have to come at his regular time.Message sent to Dr Chen (vascular fellow) to inform him that we couldn't arrange for the dialysis time change to this am to see  Dr Wilde today.Per Dr Chen"pt can see Dr Berry on Wednesday". Spoke with the pt and appt scheduled to see Dr Berry on 3/12/25.Pt verbalized understanding of information received.  "

## 2025-03-12 ENCOUNTER — HOSPITAL ENCOUNTER (OUTPATIENT)
Dept: VASCULAR SURGERY | Facility: CLINIC | Age: 55
Discharge: HOME | End: 2025-03-12
Attending: SURGERY
Payer: MEDICAID

## 2025-03-12 ENCOUNTER — OFFICE VISIT (OUTPATIENT)
Dept: VASCULAR SURGERY | Facility: CLINIC | Age: 55
End: 2025-03-12
Payer: MEDICAID

## 2025-03-12 VITALS — OXYGEN SATURATION: 95 % | HEIGHT: 68 IN | WEIGHT: 188.69 LBS | BODY MASS INDEX: 28.6 KG/M2 | HEART RATE: 60 BPM

## 2025-03-12 DIAGNOSIS — Z98.890 S/P ARTERIOVENOUS (AV) FISTULA CREATION: ICD-10-CM

## 2025-03-12 DIAGNOSIS — Z98.890 S/P ARTERIOVENOUS (AV) FISTULA CREATION: Primary | ICD-10-CM

## 2025-03-12 DIAGNOSIS — N18.6 ESRD (END STAGE RENAL DISEASE) ON DIALYSIS: Primary | ICD-10-CM

## 2025-03-12 DIAGNOSIS — Z99.2 ESRD (END STAGE RENAL DISEASE) ON DIALYSIS: Primary | ICD-10-CM

## 2025-03-12 PROCEDURE — 99999 PR PBB SHADOW E&M-EST. PATIENT-LVL II: CPT | Mod: PBBFAC,,, | Performed by: SURGERY

## 2025-03-12 PROCEDURE — 99212 OFFICE O/P EST SF 10 MIN: CPT | Mod: PBBFAC | Performed by: SURGERY

## 2025-03-12 PROCEDURE — 99024 POSTOP FOLLOW-UP VISIT: CPT | Mod: ,,, | Performed by: SURGERY

## 2025-03-12 PROCEDURE — 4010F ACE/ARB THERAPY RXD/TAKEN: CPT | Mod: CPTII,,, | Performed by: SURGERY

## 2025-03-12 PROCEDURE — 93990 DOPPLER FLOW TESTING: CPT | Mod: PBBFAC | Performed by: SURGERY

## 2025-03-12 NOTE — PROGRESS NOTES
Onofre Ceballos  03/12/2025    HPI:  Patient is a 54 y.o. male with a h/o CKD, T2D, HTN, 60 pck year smoking hx, States had h/o IVDU 2023, CHF who is here today for evaluation s/p creation L BC AVF with transposition L cephalic vein 2/25/25. He has been getting dialysis MWF through his catheter. He came to the ER on 3/4/25 due to LUE swelling and drainage through his bandages. Decision was made to hold off on fistulogram due to marked improvement in swelling of the LUE. He denies any issues with dialysis. He denies chest pain, SOB, or claudication symptoms in the legs when walking.  He quit smoking in March 2024.   Right-hand dominant     S/p  2/25/25  Creation of L BC AVF    Findings:  Creation L BC AVF with transposition L cephalic vein 8cm segment - L cephalic dilated to 2.5mm distally after block  Good thrill  2+ L radial a. Pulse without significant doppler augmentation with AVF compression     No MI/stroke  Tobacco use: 60 pck year (1.5 pcks/year x 40 years)    Renal is Honey Martino, NP   Employment: Unemployed      Current Outpatient Medications:     acetaminophen (TYLENOL) 650 MG TbSR, Take 1 tablet (650 mg total) by mouth every 8 (eight) hours as needed (pain)., Disp: , Rfl:     alcohol swabs (ALCOHOL WIPES) PadM, Use topical 4 x daily for insulin, Disp: 200 each, Rfl: 11    aspirin (ECOTRIN) 81 MG EC tablet, Take 81 mg by mouth., Disp: , Rfl:     atorvastatin (LIPITOR) 80 MG tablet, Take 1 tablet by mouth every evening., Disp: , Rfl:     blood sugar diagnostic Strp, 1 each by Misc.(Non-Drug; Combo Route) route 3 (three) times daily before meals., Disp: 200 each, Rfl: 11    brimonidine 0.2% (ALPHAGAN) 0.2 % Drop, Place into both eyes., Disp: , Rfl:     carvediloL (COREG) 12.5 MG tablet, Take 12.5 mg by mouth 2 (two) times daily., Disp: , Rfl:     ENTRESTO  mg per tablet, Take 1 tablet by mouth 2 (two) times daily., Disp: , Rfl:     EScitalopram oxalate (LEXAPRO) 10 MG tablet, Take  "10 mg by mouth., Disp: , Rfl:     ezetimibe (ZETIA) 10 mg tablet, Take 1 tablet (10 mg total) by mouth every evening., Disp: 90 tablet, Rfl: 3    glucose 4 GM chewable tablet, Take 4 tablets (16 g total) by mouth as needed for Low blood sugar., Disp: 4 tablet, Rfl: 12    insulin aspart U-100 (NOVOLOG) 100 unit/mL (3 mL) InPn pen, Inject 10 Units into the skin 3 (three) times daily with meals., Disp: , Rfl:     insulin glargine U-100, Lantus, (BASAGLAR KWIKPEN U-100 INSULIN) 100 unit/mL (3 mL) InPn pen, Inject 10 Units into the skin every evening. Within 15 minutes of eating (Patient taking differently: Inject 30 Units into the skin every evening. Within 15 minutes of eating), Disp: , Rfl:     insulin syringe-needle U-100 (INSULIN SYRINGE) 1/2 mL 28 gauge x 1/2" Syrg, To use with meal time insulin tid, Disp: 200 each, Rfl: 9    lancets 32 gauge Misc, Inject 1 lancet into the skin 3 (three) times daily before meals., Disp: 200 each, Rfl: 11    oxyCODONE (ROXICODONE) 5 MG immediate release tablet, Take 1 tablet (5 mg total) by mouth every 4 (four) hours as needed., Disp: 15 tablet, Rfl: 0    oxyCODONE-acetaminophen (PERCOCET) 5-325 mg per tablet, Take 1 tablet by mouth every 6 (six) hours as needed for Pain., Disp: 12 tablet, Rfl: 0    OZEMPIC 0.25 mg or 0.5 mg (2 mg/3 mL) pen injector, Inject 0.5 mg into the skin every 7 days., Disp: , Rfl:     pen needle, diabetic (NOVOTWIST) 32 gauge x 1/5" Ndle, To use nightly with levemir, Disp: 100 each, Rfl: 3    polyethylene glycol (GLYCOLAX) 17 gram/dose powder, Take 17 g by mouth once daily., Disp: 1 Bottle, Rfl: 0    SURE COMFORT INSULIN SYRINGE 0.3 mL 31 gauge x 5/16" Syrg, Inject 1 each into the skin 4 (four) times daily before meals and nightly., Disp: 200 each, Rfl: 11    tamsulosin (FLOMAX) 0.4 mg Cap, Take by mouth., Disp: , Rfl:     blood-glucose meter (TRUE METRIX GLUCOSE METER) Misc, 1 each by Misc.(Non-Drug; Combo Route) route once daily., Disp: 1 each, Rfl: 0    " ergocalciferol (ERGOCALCIFEROL) 50,000 unit Cap, Take 50,000 Units by mouth every 7 days. (Patient not taking: Reported on 3/12/2025), Disp: , Rfl:     pantoprazole (PROTONIX) 40 MG tablet, Take 1 tablet (40 mg total) by mouth once daily. (Patient not taking: Reported on 3/12/2025), Disp: 30 tablet, Rfl: 0    sevelamer carbonate (RENVELA) 800 mg Tab, Take 2 tablets (1,600 mg total) by mouth 3 (three) times daily with meals., Disp: 180 tablet, Rfl: 0    torsemide (DEMADEX) 20 MG Tab, Take by mouth. (Patient not taking: Reported on 3/12/2025), Disp: , Rfl:     PHYSICAL EXAM:   Right Arm BP - Sittin/60 (25 0852)  Pulse: 60         Extremities:   L BC AVF - transposed; + audible bruit     L mid-arm edema; no drainage    LAB RESULTS:  Lab Results   Component Value Date    K 4.2 2025    K 3.5 01/15/2025    K 4.2 2024    CREATININE 3.1 (H) 2025    CREATININE 4.5 (H) 01/15/2025    CREATININE 5.10 (H) 2024     Lab Results   Component Value Date    WBC 7.17 2025    WBC 8.93 01/15/2025    WBC 10.70 2024    HCT 34.0 (L) 2025    HCT 35.2 (L) 01/15/2025    HCT 25.8 (L) 2024     2025     01/15/2025     2024     Lab Results   Component Value Date    HGBA1C 7.2 (H) 2024    HGBA1C 7.1 (H) 2024    HGBA1C 9.0 (H) 2024     IMAGING (I have independently reviewed and interpreted the following tests):  AVF u/s: L BC AVF (transposed) flow 412 mL/min  Hematoma surrounding anastomosis to proximal outflow vein, 5.2 cm in length.  Stenosis in mid-biceps - proximal outflow    Depth 6.7, 9.0, 14.2 mm  Diam  2.7 -5.1 mm     IMP/PLAN:     54 y.o. male with a h/o CKD, T2D, HTN, 60 pck year smoking hx - quit, States had h/o IVDU , CHF, s/p creation L BC AVF with transposition L cephalic vein 25   No thrombosis L brachial vein or basilic vein  Await for L hematoma to absorb    Cont ASA 81 po QD  Follow up in clinic in 6-8 weeks with  new u.s and will them decide if needs intervention       Luis F Berry MD DFSVS FACS   Vascular/Endovascular Surgery    Time spent personally reviewing the patient's chart, interpreting images and test, and conferring with physicians was HBtimespent2: 45 mins.

## 2025-03-24 ENCOUNTER — PATIENT OUTREACH (OUTPATIENT)
Facility: OTHER | Age: 55
End: 2025-03-24
Payer: MEDICAID

## 2025-03-24 NOTE — PROGRESS NOTES
Call placed per ED Navigator to f/u from last encounter. Pt denies having any current questions or concerns at this time. ED Navigator will continue to follow and assist as needed. Pt encouraged to reach out with any concerns at they arise.

## 2025-03-24 NOTE — PROGRESS NOTES
Call placed to remind Pt of his appt with vascular surgery on 3-26-25. Appt has been canceled and rescheduled for 4-23-25. Pt is aware.

## 2025-04-29 ENCOUNTER — TELEPHONE (OUTPATIENT)
Dept: VASCULAR SURGERY | Facility: CLINIC | Age: 55
End: 2025-04-29
Payer: MEDICAID

## 2025-04-29 NOTE — TELEPHONE ENCOUNTER
Spoke with the pt and informed him of emergent clinic closing and appt will have to be rescheduled.Appt rescheduled and appt letter placed in the mail.

## 2025-05-06 ENCOUNTER — TELEPHONE (OUTPATIENT)
Dept: VASCULAR SURGERY | Facility: CLINIC | Age: 55
End: 2025-05-06
Payer: MEDICAID

## 2025-05-06 NOTE — TELEPHONE ENCOUNTER
Spoke with the pt and informed him of appt on 5/14/25 will need to be rescheduled due to Dr Berry is no longer available. Apologies given for any inconvenience and appt scheduled and confirmed with the pt.

## 2025-05-07 ENCOUNTER — PATIENT OUTREACH (OUTPATIENT)
Facility: OTHER | Age: 55
End: 2025-05-07
Payer: MEDICAID

## 2025-05-07 NOTE — PROGRESS NOTES
Call placed per ED Navigator to f/u from last encounter. No answer. V/m left. ED navigator will follow-up with patient and assist.

## 2025-05-16 ENCOUNTER — HOSPITAL ENCOUNTER (OUTPATIENT)
Dept: VASCULAR SURGERY | Facility: CLINIC | Age: 55
Discharge: HOME | End: 2025-05-16
Attending: STUDENT IN AN ORGANIZED HEALTH CARE EDUCATION/TRAINING PROGRAM
Payer: MEDICAID

## 2025-05-16 ENCOUNTER — HOSPITAL ENCOUNTER (OUTPATIENT)
Dept: VASCULAR SURGERY | Facility: CLINIC | Age: 55
Discharge: HOME | End: 2025-05-16
Attending: SURGERY
Payer: MEDICAID

## 2025-05-16 ENCOUNTER — OFFICE VISIT (OUTPATIENT)
Dept: VASCULAR SURGERY | Facility: CLINIC | Age: 55
End: 2025-05-16
Attending: SURGERY
Payer: MEDICAID

## 2025-05-16 VITALS
BODY MASS INDEX: 28.4 KG/M2 | SYSTOLIC BLOOD PRESSURE: 129 MMHG | WEIGHT: 187.38 LBS | DIASTOLIC BLOOD PRESSURE: 74 MMHG | HEIGHT: 68 IN | HEART RATE: 69 BPM | TEMPERATURE: 98 F

## 2025-05-16 DIAGNOSIS — N18.6 ESRD ON DIALYSIS: Primary | ICD-10-CM

## 2025-05-16 DIAGNOSIS — Z98.890 S/P ARTERIOVENOUS (AV) FISTULA CREATION: ICD-10-CM

## 2025-05-16 DIAGNOSIS — Z99.2 ESRD ON DIALYSIS: Primary | ICD-10-CM

## 2025-05-16 DIAGNOSIS — Z01.818 PRE-OPERATIVE CLEARANCE: Primary | ICD-10-CM

## 2025-05-16 DIAGNOSIS — Z99.2 ESRD ON DIALYSIS: ICD-10-CM

## 2025-05-16 DIAGNOSIS — N18.6 ESRD ON DIALYSIS: ICD-10-CM

## 2025-05-16 PROCEDURE — 99214 OFFICE O/P EST MOD 30 MIN: CPT | Mod: PBBFAC | Performed by: STUDENT IN AN ORGANIZED HEALTH CARE EDUCATION/TRAINING PROGRAM

## 2025-05-16 PROCEDURE — 99999 PR PBB SHADOW E&M-EST. PATIENT-LVL IV: CPT | Mod: PBBFAC,,, | Performed by: STUDENT IN AN ORGANIZED HEALTH CARE EDUCATION/TRAINING PROGRAM

## 2025-05-16 PROCEDURE — 93990 DOPPLER FLOW TESTING: CPT | Mod: PBBFAC | Performed by: STUDENT IN AN ORGANIZED HEALTH CARE EDUCATION/TRAINING PROGRAM

## 2025-05-16 NOTE — PROGRESS NOTES
Onofre Ceballos  05/16/2025    HPI:  Patient is a 54 y.o. male with a h/o CKD, T2D, HTN, 60 pck year smoking hx, States had h/o IVDU 2023, CHF who is here today for evaluation s/p creation L BC AVF with transposition L cephalic vein 2/25/25. He has been getting dialysis MWF through his catheter. He came to the ER on 3/4/25 due to LUE swelling and drainage through his bandages. Decision was made to hold off on fistulogram due to marked improvement in swelling of the LUE. He denies any issues with dialysis. He denies chest pain, SOB, or claudication symptoms in the legs when walking.  He quit smoking in March 2024.   Right-hand dominant     INTERVAL HISTORY:   He returns to clinic today for follow up.  He is still having continued left arm swelling.  He denies any drainage from his incision.  Unfortunately, it looks as though his fistula is failing on ultrasound.  This was discussed today.    S/p  2/25/25  Creation of L BC AVF    Findings:  Creation L BC AVF with transposition L cephalic vein 8cm segment - L cephalic dilated to 2.5mm distally after block  Good thrill  2+ L radial a. Pulse without significant doppler augmentation with AVF compression     No MI/stroke  Tobacco use: 60 pck year (1.5 pcks/year x 40 years)    Renal is Honey Martino, NP   Employment: Unemployed      Current Outpatient Medications:     acetaminophen (TYLENOL) 650 MG TbSR, Take 1 tablet (650 mg total) by mouth every 8 (eight) hours as needed (pain)., Disp: , Rfl:     alcohol swabs (ALCOHOL WIPES) PadM, Use topical 4 x daily for insulin, Disp: 200 each, Rfl: 11    aspirin (ECOTRIN) 81 MG EC tablet, Take 81 mg by mouth., Disp: , Rfl:     atorvastatin (LIPITOR) 80 MG tablet, Take 1 tablet by mouth every evening., Disp: , Rfl:     blood sugar diagnostic Strp, 1 each by Misc.(Non-Drug; Combo Route) route 3 (three) times daily before meals., Disp: 200 each, Rfl: 11    blood-glucose meter (TRUE METRIX GLUCOSE METER) Misc, 1 each by  "Misc.(Non-Drug; Combo Route) route once daily., Disp: 1 each, Rfl: 0    brimonidine 0.2% (ALPHAGAN) 0.2 % Drop, Place into both eyes., Disp: , Rfl:     carvediloL (COREG) 12.5 MG tablet, Take 12.5 mg by mouth 2 (two) times daily., Disp: , Rfl:     ENTRESTO  mg per tablet, Take 1 tablet by mouth 2 (two) times daily., Disp: , Rfl:     ergocalciferol (ERGOCALCIFEROL) 50,000 unit Cap, Take 50,000 Units by mouth every 7 days. (Patient not taking: Reported on 3/12/2025), Disp: , Rfl:     EScitalopram oxalate (LEXAPRO) 10 MG tablet, Take 10 mg by mouth., Disp: , Rfl:     ezetimibe (ZETIA) 10 mg tablet, Take 1 tablet (10 mg total) by mouth every evening., Disp: 90 tablet, Rfl: 3    glucose 4 GM chewable tablet, Take 4 tablets (16 g total) by mouth as needed for Low blood sugar., Disp: 4 tablet, Rfl: 12    insulin aspart U-100 (NOVOLOG) 100 unit/mL (3 mL) InPn pen, Inject 10 Units into the skin 3 (three) times daily with meals., Disp: , Rfl:     insulin glargine U-100, Lantus, (BASAGLAR KWIKPEN U-100 INSULIN) 100 unit/mL (3 mL) InPn pen, Inject 10 Units into the skin every evening. Within 15 minutes of eating (Patient taking differently: Inject 30 Units into the skin every evening. Within 15 minutes of eating), Disp: , Rfl:     insulin syringe-needle U-100 (INSULIN SYRINGE) 1/2 mL 28 gauge x 1/2" Syrg, To use with meal time insulin tid, Disp: 200 each, Rfl: 9    lancets 32 gauge Misc, Inject 1 lancet into the skin 3 (three) times daily before meals., Disp: 200 each, Rfl: 11    oxyCODONE (ROXICODONE) 5 MG immediate release tablet, Take 1 tablet (5 mg total) by mouth every 4 (four) hours as needed., Disp: 15 tablet, Rfl: 0    oxyCODONE-acetaminophen (PERCOCET) 5-325 mg per tablet, Take 1 tablet by mouth every 6 (six) hours as needed for Pain., Disp: 12 tablet, Rfl: 0    OZEMPIC 0.25 mg or 0.5 mg (2 mg/3 mL) pen injector, Inject 0.5 mg into the skin every 7 days., Disp: , Rfl:     pantoprazole (PROTONIX) 40 MG tablet, " "Take 1 tablet (40 mg total) by mouth once daily. (Patient not taking: Reported on 3/12/2025), Disp: 30 tablet, Rfl: 0    pen needle, diabetic (NOVOTWIST) 32 gauge x 1/5" Ndle, To use nightly with levemir, Disp: 100 each, Rfl: 3    polyethylene glycol (GLYCOLAX) 17 gram/dose powder, Take 17 g by mouth once daily., Disp: 1 Bottle, Rfl: 0    sevelamer carbonate (RENVELA) 800 mg Tab, Take 2 tablets (1,600 mg total) by mouth 3 (three) times daily with meals., Disp: 180 tablet, Rfl: 0    SURE COMFORT INSULIN SYRINGE 0.3 mL 31 gauge x 5/16" Syrg, Inject 1 each into the skin 4 (four) times daily before meals and nightly., Disp: 200 each, Rfl: 11    tamsulosin (FLOMAX) 0.4 mg Cap, Take by mouth., Disp: , Rfl:     torsemide (DEMADEX) 20 MG Tab, Take by mouth. (Patient not taking: Reported on 3/12/2025), Disp: , Rfl:     PHYSICAL EXAM:                Extremities:   L BC AVF - transposed; + audible bruit     L mid-arm edema; no drainage    LAB RESULTS:  Lab Results   Component Value Date    K 4.2 03/03/2025    K 3.5 01/15/2025    K 4.2 11/12/2024    CREATININE 3.1 (H) 03/03/2025    CREATININE 4.5 (H) 01/15/2025    CREATININE 5.10 (H) 11/12/2024     Lab Results   Component Value Date    WBC 7.17 03/03/2025    WBC 8.93 01/15/2025    WBC 10.70 11/12/2024    HCT 34.0 (L) 03/03/2025    HCT 35.2 (L) 01/15/2025    HCT 25.8 (L) 11/12/2024     03/03/2025     01/15/2025     11/12/2024     Lab Results   Component Value Date    HGBA1C 7.2 (H) 11/06/2024    HGBA1C 7.1 (H) 08/06/2024    HGBA1C 9.0 (H) 05/03/2024     IMAGING (I have independently reviewed and interpreted the following tests):  AVF u/s: L BC AVF (transposed) flow flow is 164 mL/minute.  The vein itself is measuring 2.3, 3.6 and 4.1 mm in diameter.    IMP/PLAN:     54 y.o. male with a h/o CKD, T2D, HTN, 60 pck year smoking hx - quit, States had h/o IVDU 2023, CHF, s/p creation L BC AVF with transposition L cephalic vein 2/25/25.  Unfortunately, it looks as " though his fistula is dying.  I explained this to him in clinic today.  I would like to perform a fistulogram from a transradial approach to see if there is anything that could potentially be treated in his fistula as well as look at his outflow, as he may have an outflow stenosis based on his continued arm swelling.  I did explain to him that he will likely need new access.  This may be in the form of a graft versus a fistula.  Based on his previous vein mapping, I do not think that he will be able to have a fistula, but we will repeat this just in case.  We will start with a fistulogram and then go from there.      Cont ASA 81 po QD             Time spent personally reviewing the patient's chart, interpreting images and test, and conferring with physicians was HBtimespent2: 45 mins.

## 2025-05-20 ENCOUNTER — HOSPITAL ENCOUNTER (OUTPATIENT)
Dept: RADIOLOGY | Facility: HOSPITAL | Age: 55
Discharge: HOME | End: 2025-05-20
Attending: STUDENT IN AN ORGANIZED HEALTH CARE EDUCATION/TRAINING PROGRAM
Payer: MEDICAID

## 2025-05-20 DIAGNOSIS — Z01.818 PRE-OPERATIVE CLEARANCE: ICD-10-CM

## 2025-05-20 PROCEDURE — 71045 X-RAY EXAM CHEST 1 VIEW: CPT | Mod: TC

## 2025-05-20 PROCEDURE — 71045 X-RAY EXAM CHEST 1 VIEW: CPT | Mod: 26,,, | Performed by: RADIOLOGY

## 2025-05-21 ENCOUNTER — PATIENT MESSAGE (OUTPATIENT)
Dept: VASCULAR SURGERY | Facility: CLINIC | Age: 55
End: 2025-05-21
Payer: MEDICAID

## 2025-05-21 ENCOUNTER — TELEPHONE (OUTPATIENT)
Dept: VASCULAR SURGERY | Facility: CLINIC | Age: 55
End: 2025-05-21
Payer: MEDICAID

## 2025-05-21 ENCOUNTER — HOSPITAL ENCOUNTER (OUTPATIENT)
Facility: HOSPITAL | Age: 55
Discharge: HOME | End: 2025-05-22
Attending: EMERGENCY MEDICINE | Admitting: STUDENT IN AN ORGANIZED HEALTH CARE EDUCATION/TRAINING PROGRAM
Payer: MEDICAID

## 2025-05-21 ENCOUNTER — RESULTS FOLLOW-UP (OUTPATIENT)
Dept: VASCULAR SURGERY | Facility: HOSPITAL | Age: 55
End: 2025-05-21

## 2025-05-21 DIAGNOSIS — R06.02 EXERTIONAL SHORTNESS OF BREATH: ICD-10-CM

## 2025-05-21 DIAGNOSIS — K20.90 ESOPHAGITIS: ICD-10-CM

## 2025-05-21 DIAGNOSIS — J90 PLEURAL EFFUSION ON LEFT: Primary | ICD-10-CM

## 2025-05-21 DIAGNOSIS — J90 LARGE PLEURAL EFFUSION: ICD-10-CM

## 2025-05-21 DIAGNOSIS — R91.1 LUNG NODULE: ICD-10-CM

## 2025-05-21 DIAGNOSIS — R07.9 CHEST PAIN: ICD-10-CM

## 2025-05-21 LAB
ABSOLUTE EOSINOPHIL (OHS): 0.43 K/UL
ABSOLUTE MONOCYTE (OHS): 0.54 K/UL (ref 0.3–1)
ABSOLUTE NEUTROPHIL COUNT (OHS): 5.17 K/UL (ref 1.8–7.7)
ALBUMIN SERPL BCP-MCNC: 2.4 G/DL (ref 3.5–5.2)
ALP SERPL-CCNC: 74 UNIT/L (ref 40–150)
ALT SERPL W/O P-5'-P-CCNC: 16 UNIT/L (ref 10–44)
ANION GAP (OHS): 13 MMOL/L (ref 8–16)
AST SERPL-CCNC: 18 UNIT/L (ref 11–45)
BASOPHILS # BLD AUTO: 0.06 K/UL
BASOPHILS NFR BLD AUTO: 0.7 %
BILIRUB SERPL-MCNC: 0.4 MG/DL (ref 0.1–1)
BNP SERPL-MCNC: 258 PG/ML (ref 0–99)
BUN SERPL-MCNC: 29 MG/DL (ref 6–20)
CALCIUM SERPL-MCNC: 8.1 MG/DL (ref 8.7–10.5)
CHLORIDE SERPL-SCNC: 100 MMOL/L (ref 95–110)
CO2 SERPL-SCNC: 25 MMOL/L (ref 23–29)
CREAT SERPL-MCNC: 6.3 MG/DL (ref 0.5–1.4)
ERYTHROCYTE [DISTWIDTH] IN BLOOD BY AUTOMATED COUNT: 12.6 % (ref 11.5–14.5)
GFR SERPLBLD CREATININE-BSD FMLA CKD-EPI: 10 ML/MIN/1.73/M2
GLUCOSE SERPL-MCNC: 97 MG/DL (ref 70–110)
HCT VFR BLD AUTO: 35.5 % (ref 40–54)
HCV AB SERPL QL IA: REACTIVE
HGB BLD-MCNC: 11.8 GM/DL (ref 14–18)
HIV 1+2 AB+HIV1 P24 AG SERPL QL IA: NORMAL
HOLD SPECIMEN: NORMAL
IMM GRANULOCYTES # BLD AUTO: 0.03 K/UL (ref 0–0.04)
IMM GRANULOCYTES NFR BLD AUTO: 0.4 % (ref 0–0.5)
LYMPHOCYTES # BLD AUTO: 2.01 K/UL (ref 1–4.8)
MCH RBC QN AUTO: 31.5 PG (ref 27–31)
MCHC RBC AUTO-ENTMCNC: 33.2 G/DL (ref 32–36)
MCV RBC AUTO: 95 FL (ref 82–98)
NUCLEATED RBC (/100WBC) (OHS): 0 /100 WBC
PLATELET # BLD AUTO: 221 K/UL (ref 150–450)
PMV BLD AUTO: 10.9 FL (ref 9.2–12.9)
POTASSIUM SERPL-SCNC: 4.9 MMOL/L (ref 3.5–5.1)
PROT SERPL-MCNC: 6 GM/DL (ref 6–8.4)
RBC # BLD AUTO: 3.75 M/UL (ref 4.6–6.2)
RELATIVE EOSINOPHIL (OHS): 5.2 %
RELATIVE LYMPHOCYTE (OHS): 24.4 % (ref 18–48)
RELATIVE MONOCYTE (OHS): 6.6 % (ref 4–15)
RELATIVE NEUTROPHIL (OHS): 62.7 % (ref 38–73)
SODIUM SERPL-SCNC: 138 MMOL/L (ref 136–145)
TROPONIN I SERPL HS-MCNC: 13 NG/L
WBC # BLD AUTO: 8.24 K/UL (ref 3.9–12.7)

## 2025-05-21 PROCEDURE — 87389 HIV-1 AG W/HIV-1&-2 AB AG IA: CPT | Performed by: PHYSICIAN ASSISTANT

## 2025-05-21 PROCEDURE — 93005 ELECTROCARDIOGRAM TRACING: CPT

## 2025-05-21 PROCEDURE — 93010 ELECTROCARDIOGRAM REPORT: CPT | Mod: ,,, | Performed by: INTERNAL MEDICINE

## 2025-05-21 PROCEDURE — 25000003 PHARM REV CODE 250: Performed by: EMERGENCY MEDICINE

## 2025-05-21 PROCEDURE — 32554 ASPIRATE PLEURA W/O IMAGING: CPT

## 2025-05-21 PROCEDURE — 83880 ASSAY OF NATRIURETIC PEPTIDE: CPT | Performed by: EMERGENCY MEDICINE

## 2025-05-21 PROCEDURE — 99285 EMERGENCY DEPT VISIT HI MDM: CPT | Mod: 25

## 2025-05-21 PROCEDURE — G0378 HOSPITAL OBSERVATION PER HR: HCPCS

## 2025-05-21 PROCEDURE — 80053 COMPREHEN METABOLIC PANEL: CPT | Performed by: EMERGENCY MEDICINE

## 2025-05-21 PROCEDURE — 84484 ASSAY OF TROPONIN QUANT: CPT | Performed by: EMERGENCY MEDICINE

## 2025-05-21 PROCEDURE — 83615 LACTATE (LD) (LDH) ENZYME: CPT | Performed by: STUDENT IN AN ORGANIZED HEALTH CARE EDUCATION/TRAINING PROGRAM

## 2025-05-21 PROCEDURE — 87522 HEPATITIS C REVRS TRNSCRPJ: CPT | Performed by: PHYSICIAN ASSISTANT

## 2025-05-21 PROCEDURE — 85025 COMPLETE CBC W/AUTO DIFF WBC: CPT | Performed by: EMERGENCY MEDICINE

## 2025-05-21 PROCEDURE — 86803 HEPATITIS C AB TEST: CPT | Performed by: PHYSICIAN ASSISTANT

## 2025-05-21 RX ORDER — CARVEDILOL 12.5 MG/1
12.5 TABLET ORAL
Status: COMPLETED | OUTPATIENT
Start: 2025-05-21 | End: 2025-05-21

## 2025-05-21 RX ORDER — TORSEMIDE 10 MG/1
20 TABLET ORAL
Status: ACTIVE | OUTPATIENT
Start: 2025-05-21 | End: 2025-05-22

## 2025-05-21 RX ADMIN — CARVEDILOL 12.5 MG: 12.5 TABLET, FILM COATED ORAL at 10:05

## 2025-05-21 RX ADMIN — SACUBITRIL AND VALSARTAN 1 TABLET: 97; 103 TABLET, FILM COATED ORAL at 10:05

## 2025-05-21 NOTE — TELEPHONE ENCOUNTER
----- Message from Tia sent at 5/21/2025 11:44 AM CDT -----  Who Called: PtWhat is the request in detail: Requesting call back to discuss recent missed call. Please adviseCan the clinic reply by [facility]? Alisia Call Back Number: [phone] Information:

## 2025-05-21 NOTE — TELEPHONE ENCOUNTER
"Received a voice message from Missy nurse at [facility] in [place] ascertaining "if we were trying to reach the pt".Contacted the dialysis center and spoke with Estefany (dialysis nurse) who verbalized "Missy is with a pt and she wanted to know if you guys were looking for Mr Carpenter". Informed her that I hadn't contacted the pt but review of the chart shows that Kourtney Mendez's MA needed to speak with him about CXR report and that he needed to report to the ER for eval of pleural effusion in left lung according to the notes.Estefany verbalized that "he will be here for 4hrs and I don't want to be in the middle of the conversation.She can contact him directly at [phone] and we will provide a phone for him to speak with her". Estefany also cofirmed with the pt that his number on file is correct.Message sent to Kourtney to contact the pt directly.    "

## 2025-05-21 NOTE — TELEPHONE ENCOUNTER
I spoke to the pt finally. He was at dialysis , he did not recieved treatment because of shortness of breath, [place] [facility] [place]

## 2025-05-21 NOTE — ED PROVIDER NOTES
Emergency Department Provider Note    Onofre Ceballos   54 y.o. male   [id number]      5/21/2025       History     This history was obtained from the patient without limitations.  He was driven to the ED by his brother.      He is a 54-year-old with the below past medical history as well as ESRD for which he undergoes hemodialysis on Mondays, Wednesdays, and Fridays.  He was at his hemodialysis center preparing for treatment today when he received a phone call from an [facility] representative instructed him to present to the ED for evaluation after an outpatient chest x-ray yesterday showed a large left pleural effusion.  He complains of exertional shortness of breath for one week with episodes of nausea, vomiting, and diarrhea.  He has a malfunctioning left upper extremity AV fistula and is scheduled for fistulogram [place] tomorrow.  He denies fever, chills, headache, dizziness, chest pain, and abdominal pain.  He only producing small amounts of urine.         Past Medical History:   Diagnosis Date    CHF (congestive heart failure)     Diabetes mellitus     Diabetes mellitus type I     Hepatitis     Hepatitis C     Hypertension     Renal disorder     Shingles 10/2018    Sleep apnea       Past Surgical History:   Procedure Laterality Date    ESOPHAGOGASTRODUODENOSCOPY N/A 10/11/2024    Procedure: EGD (ESOPHAGOGASTRODUODENOSCOPY);  Surgeon: Benjamin Lacy MD;  Location: [facility];  Service: Endoscopy;  Laterality: N/A;    INSERTION, CATHETER, TUNNELED N/A 10/29/2024    Procedure: Insertion,catheter,tunneled;  Surgeon: Angel Malcolm MD;  Location: [facility] OR;  Service: General;  Laterality: N/A;    INSERTION, CATHETER, TUNNELED N/A 11/8/2024    Procedure: Insertion,catheter,tunneled;  Surgeon: Huseyin Romero MD;  Location: [facility] OR;  Service: General;  Laterality: N/A;    INSERTION, GRAFT, ARTERIOVENOUS, UPPER EXTREMITY Left 2/25/2025    Procedure: INSERTION, GRAFT, ARTERIOVENOUS, UPPER  "EXTREMITY POSSIBLE AVF CREATION;  Surgeon: Luis F Berry MD;  Location: [facility] OR [address] [place] [address];  Service: Vascular;  Laterality: Left;    NO PAST SURGERIES        Family History   Problem Relation Name Age of Onset    Diabetes Mother      Lung cancer Father        Social History     Socioeconomic History    Marital status: Single   Tobacco Use    Smoking status: Former     Current packs/day: 1.00     Types: Cigarettes    Smokeless tobacco: Former   Substance and Sexual Activity    Alcohol use: No    Drug use: Not Currently     Types: Methamphetamines     Comment: Patient stated "I'm use IV drug user with meth in the last 2 weeks."    Sexual activity: Not Currently     Social Drivers of Health     Financial Resource Strain: Low Risk  (5/22/2025)    Overall Financial Resource Strain (CARDIA)     Difficulty of Paying Living Expenses: Not very hard   Food Insecurity: No Food Insecurity (5/22/2025)    Hunger Vital Sign     Worried About Running Out of Food in the Last Year: Never true     Ran Out of Food in the Last Year: Never true   Transportation Needs: No Transportation Needs (5/22/2025)    PRAPARE - Transportation     Lack of Transportation (Medical): No     Lack of Transportation (Non-Medical): No   Physical Activity: Inactive (5/22/2025)    Exercise Vital Sign     Days of Exercise per Week: 0 days     Minutes of Exercise per Session: 0 min   Stress: No Stress Concern Present (5/22/2025)    [employer] [place] [employer] - Occupational Stress Questionnaire     Feeling of Stress : Only a little   Housing Stability: Low Risk  (5/22/2025)    Housing Stability Vital Sign     Unable to Pay for Housing in the Last Year: No     Homeless in the Last Year: No      Review of patient's allergies indicates:  No Known Allergies        Physical Examination     Initial Vitals [05/21/25 1432]   BP Pulse Resp Temp SpO2   (!) 143/86 64 20 97.9 °F (36.6 °C) 95 %      MAP       --           Physical Exam    Nursing note and " vitals reviewed.  Constitutional: He is not diaphoretic. No distress.   Eyes: Conjunctivae are normal. No scleral icterus.   Cardiovascular:  Normal rate, regular rhythm and normal heart sounds.     Exam reveals no gallop and no friction rub.       No murmur heard.  Pulmonary/Chest: No stridor. No respiratory distress. He has decreased breath sounds in the left upper field, the left middle field and the left lower field. He has no wheezes. He has no rhonchi.   Musculoskeletal:         General: No edema.     Neurological: He is alert and oriented to person, place, and time. GCS score is 15. GCS eye subscore is 4. GCS verbal subscore is 5. GCS motor subscore is 6.   Skin: Skin is warm and dry. No pallor.            Labs     Labs Reviewed   HEPATITIS C ANTIBODY - Abnormal       Result Value    Hep C Ab Interp Reactive (*)    COMPREHENSIVE METABOLIC PANEL - Abnormal    Sodium 138      Potassium 4.9      Chloride 100      CO2 25      Glucose 97      BUN 29 (*)     Creatinine 6.3 (*)     Calcium 8.1 (*)     Protein Total 6.0      Albumin 2.4 (*)     Bilirubin Total 0.4      ALP 74      AST 18      ALT 16      Anion Gap 13      eGFR 10 (*)    B-TYPE NATRIURETIC PEPTIDE - Abnormal     (*)    CBC WITH DIFFERENTIAL - Abnormal    WBC 8.24      RBC 3.75 (*)     HGB 11.8 (*)     HCT 35.5 (*)     MCV 95      MCH 31.5 (*)     MCHC 33.2      RDW 12.6      Platelet Count 221      MPV 10.9      Nucleated RBC 0      Neut % 62.7      Lymph % 24.4      Mono % 6.6      Eos % 5.2      Basophil % 0.7      Imm Grans % 0.4      Neut # 5.17      Lymph # 2.01      Mono # 0.54      Eos # 0.43      Baso # 0.06      Imm Grans # 0.03     BASIC METABOLIC PANEL - Abnormal    Sodium 136      Potassium 4.3      Chloride 104      CO2 25      Glucose 142 (*)     BUN 32 (*)     Creatinine 6.4 (*)     Calcium 7.9 (*)     Anion Gap 7 (*)     eGFR 10 (*)    PHOSPHORUS - Abnormal    Phosphorus Level 4.7 (*)    CBC WITH DIFFERENTIAL - Abnormal    WBC  7.85      RBC 3.47 (*)     HGB 10.9 (*)     HCT 33.4 (*)     MCV 96      MCH 31.4 (*)     MCHC 32.6      RDW 12.6      Platelet Count 198      MPV 10.4      Nucleated RBC 0      Neut % 59.1      Lymph % 24.2      Mono % 9.0      Eos % 6.5      Basophil % 0.8      Imm Grans % 0.4      Neut # 4.64      Lymph # 1.90      Mono # 0.71      Eos # 0.51 (*)     Baso # 0.06      Imm Grans # 0.03     POCT GLUCOSE - Abnormal    POCT Glucose 135 (*)    HIV 1 / 2 ANTIBODY - Normal    HIV 1/2 Ag/Ab Non-Reactive     TROPONIN I HIGH SENSITIVITY - Normal    Troponin High Sensitive 13     MAGNESIUM - Normal    Magnesium  1.9     TROPONIN I HIGH SENSITIVITY - Normal    Troponin High Sensitive 16     LACTATE DEHYDROGENASE - Normal    Lactate Dehydrogenase 209      Narrative:     Results are increased in hemolyzed samples.    CULTURE, FLUID  (AEROBIC) WITH GRAM STAIN   HEP C VIRUS HOLD SPECIMEN    Extra Tube Hold for add-ons.     CBC W/ AUTO DIFFERENTIAL    Narrative:     The following orders were created for panel order CBC auto differential.  Procedure                               Abnormality         Status                     ---------                               -----------         ------                     CBC with Differential[[id number]]       Abnormal            Final result                 Please view results for these tests on the individual orders.   CBC W/ AUTO DIFFERENTIAL    Narrative:     The following orders were created for panel order CBC with Automated Differential.  Procedure                               Abnormality         Status                     ---------                               -----------         ------                     CBC with Differential[[id number]]       Abnormal            Final result                 Please view results for these tests on the individual orders.   ALBUMIN, PERITONEAL, PLEURAL FLUID OR ROBBIE DRAINAGE, IN-HOUSE    Body Fluid, Albumin 1.5     LDH, PERITONEAL, PLEURAL FLUID OR ROBBIE  DRAINAGE, IN-HOUSE    LD, Fluid 113     PROTEIN, PERITONEAL, PLEURAL FLUID OR ROBBIE DRAINAGE, IN-HOUSE    Body Fluid, Protein 2.9     GLUCOSE, PERITONEAL, PLEURAL FLUID OR ROBBIE DRAINAGE, IN-HOUSE    Glucose Body Fluid 146     HEPATITIS C RNA, QUANTITATIVE, PCR   WBC & DIFF, BODY FLUID   POCT GLUCOSE, HAND-HELD DEVICE   POCT GLUCOSE, HAND-HELD DEVICE   CYTOLOGY SPECIMEN- MEDICAL CYTOLOGY (FLUID/WASH/BRUSH)   POCT GLUCOSE    POCT Glucose 80     POCT GLUCOSE    POCT Glucose 108          Imaging     Imaging Results              CT Chest Without Contrast (Final result)  Result time 05/22/25 12:04:28      Final result by Shannan Cason MD (05/22/25 12:04:28)                   Impression:      1. Moderate left pleural effusion and associated left lower lobe atelectasis.  2. Left perihilar ground-glass opacities, nonspecific.  This can be seen with infection, aspiration, edema, hemorrhage, among other things.  3. Left axillary lymph adenopathy that has worsened when compared to 05/04/2024.  Targeted ultrasound and ultrasound-guided biopsy could be performed if it would aid in clinical management.  4. Markedly dilated esophagus with CT findings suggestive of esophagitis.  Fluid is seen in the distal esophagus, indicative of reflux/esophageal dysmotility.      Electronically signed by: Shannan Cason  Date:    05/22/2025  Time:    12:04               Narrative:    EXAMINATION:  CT CHEST WITHOUT CONTRAST    CLINICAL HISTORY:  Pleural effusion, malignancy suspected;    TECHNIQUE:  Low dose axial images, sagittal and coronal reformations were obtained from the thoracic inlet to the lung bases. Contrast was not administered.    COMPARISON:  05/04/2024 and additional priors    FINDINGS:  Support tubes and lines: The tip of a left subclavian central venous catheter terminates in the high SVC    Aorta: Normal caliber.    Heart: Normal size.    Coronary arteries: Severe three-vessel coronary artery calcifications.    Pericardium:  Normal. No effusion, thickening, or calcification.    Central pulmonary arteries: Normal caliber.    Base of neck/thyroid: Unremarkable.    Lymph nodes: There is left axillary lymphadenopathy that has worsened when compared to 05/04/2024    Esophagus: The mid and distal esophageal wall is circumferentially thickened; the esophagus is also markedly dilated.  Fluid is present in the distal esophagus    Pleura: A moderate left pleural effusion is present, increased when compared to 05/04/2024    Upper abdomen: A 1.9 cm left adrenal lesion measuring less than 10 Hounsfield units is unchanged going back as far as 03/21/2024    Body wall: Unremarkable.    Airways: Unremarkable.    Lungs: A 2 mm nodule in the right upper lobe (series 4, image 132) is unchanged when compared to 05/04/2024.  A 5 mm right upper lobe nodule (series 4, image 165) is unchanged going back as far as 03/21/2024.  There is left upper and lower lobe atelectasis.  Patchy perihilar ground-glass opacities are present in the left lung.    Bones: Unremarkable.                                       X-Ray Chest AP Portable (Final result)  Result time 05/21/25 20:03:33      Final result by Sanjeev Dallas MD (05/21/25 20:03:33)                   Impression:      As above      Electronically signed by: Sanjeev Dallas MD  Date:    05/21/2025  Time:    20:03               Narrative:    EXAMINATION:  XR CHEST AP PORTABLE    CLINICAL HISTORY:  Exertional shortness of breath;    TECHNIQUE:  Single frontal view of the chest was performed.    COMPARISON:  05/20/2025    FINDINGS:  Left central venous catheter tip appears stable.  Large left pleural effusion remains noting possibly increased since the previous exam.  Clinical correlation is advised.  No pneumothorax or other significant detrimental change since the previous exam.                                        ED Course     The patient received the following medications:  Medications   torsemide tablet 20  mg (20 mg Oral Not Given 5/21/25 2235)   melatonin tablet 6 mg (has no administration in time range)   ondansetron disintegrating tablet 8 mg (has no administration in time range)   promethazine tablet 25 mg (has no administration in time range)   polyethylene glycol packet 17 g (has no administration in time range)   acetaminophen tablet 650 mg (has no administration in time range)   glucose chewable tablet 16 g (has no administration in time range)   glucose chewable tablet 24 g (has no administration in time range)   dextrose 50% injection 12.5 g (has no administration in time range)   dextrose 50% injection 25 g (has no administration in time range)   glucagon (human recombinant) injection 1 mg (has no administration in time range)   insulin aspart U-100 pen 0-5 Units (0 Units Subcutaneous Hold 5/22/25 1210)   aspirin EC tablet 81 mg (81 mg Oral Given 5/22/25 0824)   atorvastatin tablet 80 mg (80 mg Oral Given 5/22/25 0824)   carvediloL tablet 12.5 mg (12.5 mg Oral Given 5/22/25 0823)   sacubitriL-valsartan  mg per tablet 1 tablet (1 tablet Oral Given 5/22/25 0824)   EScitalopram oxalate tablet 10 mg (10 mg Oral Given 5/22/25 0823)   ezetimibe tablet 10 mg (10 mg Oral Given 5/22/25 0824)   pantoprazole EC tablet 40 mg (40 mg Oral Given 5/22/25 0824)   sevelamer carbonate tablet 1,600 mg (1,600 mg Oral Given 5/22/25 1251)   tamsulosin 24 hr capsule 0.4 mg (0.4 mg Oral Given 5/22/25 0824)   insulin glargine U-100 (Lantus) pen 10 Units (10 Units Subcutaneous Given 5/22/25 0824)   heparin (porcine) injection 5,000 Units (has no administration in time range)   albuterol-ipratropium 2.5 mg-0.5 mg/3 mL nebulizer solution 3 mL (3 mLs Nebulization Given 5/22/25 0934)   carvediloL tablet 12.5 mg (12.5 mg Oral Given 5/21/25 2235)   sacubitriL-valsartan  mg per tablet 1 tablet (1 tablet Oral Given 5/21/25 3784)   perflutren protein-A microsphr 0.22 mg/mL IV susp (0.11 mg Intravenous Given 5/22/25 1214)            ED Course as of 05/22/25 1420   Wed May 21, 2025   2132 Discussed the patient's presentation, exam, and workup findings with ED utilization management at the hospital medicine assignment physician. Patient admitted to . [LP]   2206 EKG 12-lead  Time of study: 05/21/2025 17:31  Independently interpreted by me.  Sinus bradycardia.  Ventricular rate 58 beats per minute.  Normal axis.  QRS duration 102 ms.  Prolonged prolonged QTc (457 ms).  No ST segment elevation or depression.  Normal T-wave morphology.     [LP]      ED Course User Index  [LP] Kam Alcaraz III, MD        Medical Decision Making                 Medical Decision Making  The patient was directed to the ED after an outpatient chest x-ray showed new large left pleural effusion.  He complained of exertional shortness of breath.  He did not appear to be in respiratory distress but had transient hypoxia.  Chest x-ray was repeated and showed slight worsening of the previously identified pleural effusion.  His blood pressure was mildly elevated on arrival and became more elevated over the course of his evaluation.  He was given doses of his home blood pressure/cardiac medications.  He was admitted to Hospital Medicine for further evaluation and management.    Amount and/or Complexity of Data Reviewed  Labs: ordered.  Radiology: ordered.  ECG/medicine tests:  Decision-making details documented in ED Course.    Risk  Prescription drug management.              Diagnoses       ICD-10-CM ICD-9-CM   1. Pleural effusion on left  J90 511.9   2. Exertional shortness of breath  R06.02 786.05         Dispostion      ED Disposition Condition    Observation              Kam Alcaraz III, MD  05/22/25 1420

## 2025-05-21 NOTE — FIRST PROVIDER EVALUATION
" Emergency Department TeleTriage Encounter Note      CHIEF COMPLAINT    Chief Complaint   Patient presents with    Abnormal Chest X-ray     Pt sent from dialysis in [place] for abnormal chest xray yesterday.  Pt states "they saw fluid build up"       VITAL SIGNS   Initial Vitals [05/21/25 1432]   BP Pulse Resp Temp SpO2   (!) 143/86 64 20 97.9 °F (36.6 °C) 95 %      MAP       --            ALLERGIES    Review of patient's allergies indicates:  No Known Allergies    PROVIDER TRIAGE NOTE  Patient had pre-op testing for fistulagram that was supposed to be done tomorrow. He was found to have a large left pleural effusion on xray so they advised him to go to the ED or see his PCP. He missed dialysis today to come to the ED. He is reporting some SOB. He does not want any additional testing today until he is seen in-person because he had labs yesterday. He is also requesting dialysis be done in the ED today.       ORDERS  Labs Reviewed   HEPATITIS C ANTIBODY   HEP C VIRUS HOLD SPECIMEN   HIV 1 / 2 ANTIBODY       ED Orders (720h ago, onward)      Start Ordered     Status Ordering Provider    05/21/25 1434 05/21/25 1434  Hepatitis C Antibody  STAT        Placed in "And" Linked Group    Acknowledged GISSELLE JONAS    05/21/25 1434 05/21/25 1434  HCV Virus Hold Specimen  STAT        Placed in "And" Linked Group    Acknowledged GISSELLE JONAS    05/21/25 1434 05/21/25 1434  HIV 1/2 Ag/Ab (4th Gen)  STAT         Acknowledged GISSELLE JONAS              Virtual Visit Note: The provider triage portion of this emergency department evaluation and documentation was performed via [employer], a HIPAA-compliant telemedicine application, in concert with a tele-presenter in the room. A face to face patient evaluation with one of my colleagues will occur once the patient is placed in an emergency department room.      DISCLAIMER: This note was prepared with M*Modal voice recognition transcription software. Garbled syntax, " mangled pronouns, and other bizarre constructions may be attributed to that software system.

## 2025-05-21 NOTE — TELEPHONE ENCOUNTER
----- Message from Rashad sent at 5/21/2025 10:37 AM CDT -----  Regarding: pt  Type:Patient Returning Call:Pt  Who Called: MA Who Left Message for Patient:  Does the patient know what this is regarding? Xray today Best Call Back Number: [phone] Additional Information:

## 2025-05-21 NOTE — TELEPHONE ENCOUNTER
Pt number is not working when calling him from the [place] location. Because I need to the reach the pt about a urgent matter I give the pt my cell number. He needs to report to the emergency room for evaluation from a recent chest x-ray. Pt has pleural effusion in the left lung. We will need to cancel his surgery because of it,waiting on the pt to call me we so we can discuss and will inform the the surgery desk of the cancellation.

## 2025-05-21 NOTE — TELEPHONE ENCOUNTER
Tried calling pt several times since 9am. Pt phone is defaulting to a prompt message stating no voice mail available . Pt needs to report to the Emergency room because of X-Ray findings of large left pleural effusion that should be addressed prior to him undergoing a fistulagram . Spoke to the pt Brother who is his transportation, but did not disclosed any detail other then we need the pt to report the ED prior to surgery. Per Dr Mendez case will be cancel , we need to address pleural effusion first.

## 2025-05-21 NOTE — ED TRIAGE NOTES
PT arrives to ED with complaints of an abnormal chest xray. PT had a Pre-op chest xray yesterday for a procedure he's supposed to have tomorrow replacing his dialysis fistula in his left arm . He's a HD pt who goes MWF but skipped today because he said the doctors told himn to skip because it was an emergency that he get the fluid off of his lungs. He Endorses shortness of breath , nausea, and vomiting.

## 2025-05-21 NOTE — PROGRESS NOTES
Bacilio Oconnell,    Like we talked about, let's let Mr. Ceballos know he has a large left pleural effusion that should be addressed prior to him undergoing a fistulagram, which is elective. He can either go to the ER or reach out to his PCP, but this should be looked at.

## 2025-05-22 VITALS
SYSTOLIC BLOOD PRESSURE: 177 MMHG | DIASTOLIC BLOOD PRESSURE: 80 MMHG | HEART RATE: 56 BPM | RESPIRATION RATE: 18 BRPM | TEMPERATURE: 99 F | OXYGEN SATURATION: 94 % | WEIGHT: 178.56 LBS | HEIGHT: 68 IN | BODY MASS INDEX: 27.06 KG/M2

## 2025-05-22 PROBLEM — J90 PLEURAL EFFUSION ON LEFT: Status: ACTIVE | Noted: 2025-05-22

## 2025-05-22 LAB
ABSOLUTE EOSINOPHIL (OHS): 0.51 K/UL
ABSOLUTE MONOCYTE (OHS): 0.71 K/UL (ref 0.3–1)
ABSOLUTE NEUTROPHIL COUNT (OHS): 4.64 K/UL (ref 1.8–7.7)
ALBUMIN FLD-MCNC: 1.5 G/DL
ANION GAP (OHS): 7 MMOL/L (ref 8–16)
AORTIC SIZE INDEX (SOV): 1.4 CM/M2
APPEARANCE FLD: CLEAR
AV AREA BY CONTINUOUS VTI: 3.2 CM2
AV INDEX (PROSTH): 0.74
AV LVOT MEAN GRADIENT: 1 MMHG
AV LVOT PEAK GRADIENT: 3 MMHG
AV MEAN GRADIENT: 3 MMHG
AV PEAK GRADIENT: 6 MMHG
AV VALVE AREA BY VELOCITY RATIO: 3.1 CM²
AV VALVE AREA: 3.1 CM2
AV VELOCITY RATIO: 0.75
BASOPHILS # BLD AUTO: 0.06 K/UL
BASOPHILS NFR BLD AUTO: 0.8 %
BSA FOR ECHO PROCEDURE: 1.97 M2
BUN SERPL-MCNC: 32 MG/DL (ref 6–20)
CALCIUM SERPL-MCNC: 7.9 MG/DL (ref 8.7–10.5)
CHLORIDE SERPL-SCNC: 104 MMOL/L (ref 95–110)
CO2 SERPL-SCNC: 25 MMOL/L (ref 23–29)
COLOR FLD: YELLOW
CREAT SERPL-MCNC: 6.4 MG/DL (ref 0.5–1.4)
CV ECHO LV RWT: 0.43 CM
DOP CALC AO PEAK VEL: 1.2 M/S
DOP CALC AO VTI: 22.7 CM
DOP CALC LVOT AREA: 4.2 CM2
DOP CALC LVOT DIAMETER: 2.3 CM
DOP CALC LVOT PEAK VEL: 0.9 M/S
DOP CALC LVOT STROKE VOLUME: 69.3 CM3
DOP CALCLVOT PEAK VEL VTI: 16.7 CM
E WAVE DECELERATION TIME: 276 MS
E/A RATIO: 0.95
E/E' RATIO: 12 M/S
ECHO EF ESTIMATED: 57 %
ECHO LV POSTERIOR WALL: 1 CM (ref 0.6–1.1)
EOSINOPHIL NFR FLD MANUAL: 14 %
ERYTHROCYTE [DISTWIDTH] IN BLOOD BY AUTOMATED COUNT: 12.6 % (ref 11.5–14.5)
FRACTIONAL SHORTENING: 29.8 % (ref 28–44)
GFR SERPLBLD CREATININE-BSD FMLA CKD-EPI: 10 ML/MIN/1.73/M2
GLUCOSE FLD-MCNC: 146 MG/DL
GLUCOSE SERPL-MCNC: 142 MG/DL (ref 70–110)
HCT VFR BLD AUTO: 33.4 % (ref 40–54)
HCV RNA SERPL NAA+PROBE-ACNC: ABNORMAL IU/ML
HCV RNA SERPL NAA+PROBE-LOG IU: 4.68 LOG IU/ML
HCV RNA SERPL NAA+PROBE-LOG IU: DETECTED {LOG_IU}/ML
HGB BLD-MCNC: 10.9 GM/DL (ref 14–18)
IMM GRANULOCYTES # BLD AUTO: 0.03 K/UL (ref 0–0.04)
IMM GRANULOCYTES NFR BLD AUTO: 0.4 % (ref 0–0.5)
INTERVENTRICULAR SEPTUM: 0.9 CM (ref 0.6–1.1)
LA MAJOR: 5 CM
LA MINOR: 4.8 CM
LA WIDTH: 3.5 CM
LDH FLD L TO P-CCNC: 113 U/L
LDH SERPL-CCNC: 209 U/L (ref 110–260)
LEFT ATRIUM SIZE: 4.1 CM
LEFT ATRIUM VOLUME INDEX MOD: 24 ML/M2
LEFT ATRIUM VOLUME INDEX: 31 ML/M2
LEFT ATRIUM VOLUME MOD: 46 ML
LEFT ATRIUM VOLUME: 60 CM3
LEFT INTERNAL DIMENSION IN SYSTOLE: 3.3 CM (ref 2.1–4)
LEFT VENTRICLE DIASTOLIC VOLUME INDEX: 53.33 ML/M2
LEFT VENTRICLE DIASTOLIC VOLUME: 104 ML
LEFT VENTRICLE MASS INDEX: 78.7 G/M2
LEFT VENTRICLE SYSTOLIC VOLUME INDEX: 23.1 ML/M2
LEFT VENTRICLE SYSTOLIC VOLUME: 45 ML
LEFT VENTRICULAR INTERNAL DIMENSION IN DIASTOLE: 4.7 CM (ref 3.5–6)
LEFT VENTRICULAR MASS: 153.4 G
LV LATERAL E/E' RATIO: 11
LV SEPTAL E/E' RATIO: 12.6
LYMPHOCYTES # BLD AUTO: 1.9 K/UL (ref 1–4.8)
LYMPHOCYTES NFR FLD MANUAL: 75 %
MAGNESIUM SERPL-MCNC: 1.9 MG/DL (ref 1.6–2.6)
MCH RBC QN AUTO: 31.4 PG (ref 27–31)
MCHC RBC AUTO-ENTMCNC: 32.6 G/DL (ref 32–36)
MCV RBC AUTO: 96 FL (ref 82–98)
MESOTHL CELL NFR FLD MANUAL: 3 %
MONOS+MACROS NFR FLD MANUAL: 6 %
MV PEAK A VEL: 0.93 M/S
MV PEAK E VEL: 0.88 M/S
NEUTROPHILS NFR FLD MANUAL: 2 %
NUCLEATED RBC (/100WBC) (OHS): 0 /100 WBC
OHS CV RV/LV RATIO: 0.55 CM
OHS QRS DURATION: 102 MS
OHS QTC CALCULATION: 457 MS
PHOSPHATE SERPL-MCNC: 4.7 MG/DL (ref 2.7–4.5)
PISA TR MAX VEL: 2.5 M/S
PLATELET # BLD AUTO: 198 K/UL (ref 150–450)
PMV BLD AUTO: 10.4 FL (ref 9.2–12.9)
POCT GLUCOSE: 108 MG/DL (ref 70–110)
POCT GLUCOSE: 135 MG/DL (ref 70–110)
POCT GLUCOSE: 174 MG/DL (ref 70–110)
POCT GLUCOSE: 80 MG/DL (ref 70–110)
POTASSIUM SERPL-SCNC: 4.3 MMOL/L (ref 3.5–5.1)
PROT FLD-MCNC: 2.9 G/DL
RA MAJOR: 3.6 CM
RA PRESSURE ESTIMATED: 3 MMHG
RA WIDTH: 2.44 CM
RBC # BLD AUTO: 3.47 M/UL (ref 4.6–6.2)
RELATIVE EOSINOPHIL (OHS): 6.5 %
RELATIVE LYMPHOCYTE (OHS): 24.2 % (ref 18–48)
RELATIVE MONOCYTE (OHS): 9 % (ref 4–15)
RELATIVE NEUTROPHIL (OHS): 59.1 % (ref 38–73)
RIGHT VENTRICLE DIASTOLIC BASEL DIMENSION: 2.6 CM
RV TB RVSP: 6 MMHG
SINUS: 2.7 CM
SODIUM SERPL-SCNC: 136 MMOL/L (ref 136–145)
STJ: 2.1 CM
TDI LATERAL: 0.08 M/S
TDI SEPTAL: 0.07 M/S
TDI: 0.08 M/S
TRICUSPID ANNULAR PLANE SYSTOLIC EXCURSION: 1.6 CM
TROPONIN I SERPL HS-MCNC: 16 NG/L
TV PEAK GRADIENT: 24 MMHG
TV REST PULMONARY ARTERY PRESSURE: 28 MMHG
WBC # BLD AUTO: 7.85 K/UL (ref 3.9–12.7)
WBC # FLD: 92 /CU MM
Z-SCORE OF LEFT VENTRICULAR DIMENSION IN END DIASTOLE: -1.68
Z-SCORE OF LEFT VENTRICULAR DIMENSION IN END SYSTOLE: -0.28

## 2025-05-22 PROCEDURE — 83615 LACTATE (LD) (LDH) ENZYME: CPT | Performed by: STUDENT IN AN ORGANIZED HEALTH CARE EDUCATION/TRAINING PROGRAM

## 2025-05-22 PROCEDURE — 82042 OTHER SOURCE ALBUMIN QUAN EA: CPT | Performed by: STUDENT IN AN ORGANIZED HEALTH CARE EDUCATION/TRAINING PROGRAM

## 2025-05-22 PROCEDURE — 94761 N-INVAS EAR/PLS OXIMETRY MLT: CPT

## 2025-05-22 PROCEDURE — 25000242 PHARM REV CODE 250 ALT 637 W/ HCPCS

## 2025-05-22 PROCEDURE — 25000003 PHARM REV CODE 250: Performed by: PHYSICIAN ASSISTANT

## 2025-05-22 PROCEDURE — 32554 ASPIRATE PLEURA W/O IMAGING: CPT

## 2025-05-22 PROCEDURE — 99284 EMERGENCY DEPT VISIT MOD MDM: CPT | Mod: ,,, | Performed by: INTERNAL MEDICINE

## 2025-05-22 PROCEDURE — G0378 HOSPITAL OBSERVATION PER HR: HCPCS

## 2025-05-22 PROCEDURE — 96372 THER/PROPH/DIAG INJ SC/IM: CPT | Performed by: PHYSICIAN ASSISTANT

## 2025-05-22 PROCEDURE — 63600175 PHARM REV CODE 636 W HCPCS: Performed by: PHYSICIAN ASSISTANT

## 2025-05-22 PROCEDURE — 85025 COMPLETE CBC W/AUTO DIFF WBC: CPT | Performed by: PHYSICIAN ASSISTANT

## 2025-05-22 PROCEDURE — G0257 UNSCHED DIALYSIS ESRD PT HOS: HCPCS

## 2025-05-22 PROCEDURE — 89051 BODY FLUID CELL COUNT: CPT | Performed by: STUDENT IN AN ORGANIZED HEALTH CARE EDUCATION/TRAINING PROGRAM

## 2025-05-22 PROCEDURE — 84100 ASSAY OF PHOSPHORUS: CPT | Performed by: PHYSICIAN ASSISTANT

## 2025-05-22 PROCEDURE — 82945 GLUCOSE OTHER FLUID: CPT | Performed by: STUDENT IN AN ORGANIZED HEALTH CARE EDUCATION/TRAINING PROGRAM

## 2025-05-22 PROCEDURE — 99214 OFFICE O/P EST MOD 30 MIN: CPT | Mod: ,,, | Performed by: STUDENT IN AN ORGANIZED HEALTH CARE EDUCATION/TRAINING PROGRAM

## 2025-05-22 PROCEDURE — 80048 BASIC METABOLIC PNL TOTAL CA: CPT | Performed by: PHYSICIAN ASSISTANT

## 2025-05-22 PROCEDURE — 88312 SPECIAL STAINS GROUP 1: CPT | Mod: TC,59 | Performed by: STUDENT IN AN ORGANIZED HEALTH CARE EDUCATION/TRAINING PROGRAM

## 2025-05-22 PROCEDURE — 25500020 PHARM REV CODE 255

## 2025-05-22 PROCEDURE — 63600175 PHARM REV CODE 636 W HCPCS: Performed by: STUDENT IN AN ORGANIZED HEALTH CARE EDUCATION/TRAINING PROGRAM

## 2025-05-22 PROCEDURE — 87205 SMEAR GRAM STAIN: CPT | Performed by: STUDENT IN AN ORGANIZED HEALTH CARE EDUCATION/TRAINING PROGRAM

## 2025-05-22 PROCEDURE — 83735 ASSAY OF MAGNESIUM: CPT | Performed by: PHYSICIAN ASSISTANT

## 2025-05-22 PROCEDURE — 84157 ASSAY OF PROTEIN OTHER: CPT | Performed by: STUDENT IN AN ORGANIZED HEALTH CARE EDUCATION/TRAINING PROGRAM

## 2025-05-22 PROCEDURE — 94640 AIRWAY INHALATION TREATMENT: CPT

## 2025-05-22 PROCEDURE — 84484 ASSAY OF TROPONIN QUANT: CPT | Performed by: PHYSICIAN ASSISTANT

## 2025-05-22 RX ORDER — HEPARIN SODIUM 5000 [USP'U]/ML
5000 INJECTION, SOLUTION INTRAVENOUS; SUBCUTANEOUS EVERY 8 HOURS
Status: DISCONTINUED | OUTPATIENT
Start: 2025-05-22 | End: 2025-05-23 | Stop reason: HOSPADM

## 2025-05-22 RX ORDER — IBUPROFEN 200 MG
16 TABLET ORAL
Status: DISCONTINUED | OUTPATIENT
Start: 2025-05-22 | End: 2025-05-23 | Stop reason: HOSPADM

## 2025-05-22 RX ORDER — ASPIRIN 81 MG/1
81 TABLET ORAL DAILY
Status: DISCONTINUED | OUTPATIENT
Start: 2025-05-22 | End: 2025-05-23 | Stop reason: HOSPADM

## 2025-05-22 RX ORDER — CARVEDILOL 12.5 MG/1
12.5 TABLET ORAL 2 TIMES DAILY
Status: DISCONTINUED | OUTPATIENT
Start: 2025-05-22 | End: 2025-05-23 | Stop reason: HOSPADM

## 2025-05-22 RX ORDER — HEPARIN SODIUM 5000 [USP'U]/ML
5000 INJECTION, SOLUTION INTRAVENOUS; SUBCUTANEOUS EVERY 8 HOURS
Status: DISCONTINUED | OUTPATIENT
Start: 2025-05-22 | End: 2025-05-22

## 2025-05-22 RX ORDER — IBUPROFEN 200 MG
24 TABLET ORAL
Status: DISCONTINUED | OUTPATIENT
Start: 2025-05-22 | End: 2025-05-23 | Stop reason: HOSPADM

## 2025-05-22 RX ORDER — TALC
6 POWDER (GRAM) TOPICAL NIGHTLY PRN
Status: DISCONTINUED | OUTPATIENT
Start: 2025-05-22 | End: 2025-05-23 | Stop reason: HOSPADM

## 2025-05-22 RX ORDER — ATORVASTATIN CALCIUM 40 MG/1
80 TABLET, FILM COATED ORAL DAILY
Status: DISCONTINUED | OUTPATIENT
Start: 2025-05-22 | End: 2025-05-23 | Stop reason: HOSPADM

## 2025-05-22 RX ORDER — TAMSULOSIN HYDROCHLORIDE 0.4 MG/1
0.4 CAPSULE ORAL DAILY
Status: DISCONTINUED | OUTPATIENT
Start: 2025-05-22 | End: 2025-05-23 | Stop reason: HOSPADM

## 2025-05-22 RX ORDER — SODIUM CHLORIDE 9 MG/ML
INJECTION, SOLUTION INTRAVENOUS ONCE
OUTPATIENT
Start: 2025-05-22 | End: 2025-05-22

## 2025-05-22 RX ORDER — EZETIMIBE 10 MG/1
10 TABLET ORAL DAILY
Status: DISCONTINUED | OUTPATIENT
Start: 2025-05-22 | End: 2025-05-23 | Stop reason: HOSPADM

## 2025-05-22 RX ORDER — ESCITALOPRAM OXALATE 10 MG/1
10 TABLET ORAL DAILY
Status: DISCONTINUED | OUTPATIENT
Start: 2025-05-22 | End: 2025-05-23 | Stop reason: HOSPADM

## 2025-05-22 RX ORDER — MUPIROCIN 20 MG/G
OINTMENT TOPICAL 2 TIMES DAILY
OUTPATIENT
Start: 2025-05-22 | End: 2025-05-27

## 2025-05-22 RX ORDER — HEPARIN SODIUM 5000 [USP'U]/ML
5000 INJECTION, SOLUTION INTRAVENOUS; SUBCUTANEOUS
Status: DISCONTINUED | OUTPATIENT
Start: 2025-05-22 | End: 2025-05-23 | Stop reason: HOSPADM

## 2025-05-22 RX ORDER — SEVELAMER CARBONATE 800 MG/1
1600 TABLET, FILM COATED ORAL
Status: DISCONTINUED | OUTPATIENT
Start: 2025-05-22 | End: 2025-05-23 | Stop reason: HOSPADM

## 2025-05-22 RX ORDER — ACETAMINOPHEN 325 MG/1
650 TABLET ORAL EVERY 4 HOURS PRN
Status: DISCONTINUED | OUTPATIENT
Start: 2025-05-22 | End: 2025-05-23 | Stop reason: HOSPADM

## 2025-05-22 RX ORDER — SODIUM CHLORIDE 9 MG/ML
INJECTION, SOLUTION INTRAVENOUS
OUTPATIENT
Start: 2025-05-22

## 2025-05-22 RX ORDER — INSULIN GLARGINE 100 [IU]/ML
10 INJECTION, SOLUTION SUBCUTANEOUS DAILY
Status: DISCONTINUED | OUTPATIENT
Start: 2025-05-22 | End: 2025-05-23 | Stop reason: HOSPADM

## 2025-05-22 RX ORDER — POLYETHYLENE GLYCOL 3350 17 G/17G
17 POWDER, FOR SOLUTION ORAL DAILY PRN
Status: DISCONTINUED | OUTPATIENT
Start: 2025-05-22 | End: 2025-05-23 | Stop reason: HOSPADM

## 2025-05-22 RX ORDER — INSULIN ASPART 100 [IU]/ML
0-5 INJECTION, SOLUTION INTRAVENOUS; SUBCUTANEOUS
Status: DISCONTINUED | OUTPATIENT
Start: 2025-05-22 | End: 2025-05-23 | Stop reason: HOSPADM

## 2025-05-22 RX ORDER — GLUCAGON 1 MG
1 KIT INJECTION
Status: DISCONTINUED | OUTPATIENT
Start: 2025-05-22 | End: 2025-05-23 | Stop reason: HOSPADM

## 2025-05-22 RX ORDER — PROMETHAZINE HYDROCHLORIDE 25 MG/1
25 TABLET ORAL EVERY 6 HOURS PRN
Status: DISCONTINUED | OUTPATIENT
Start: 2025-05-22 | End: 2025-05-23 | Stop reason: HOSPADM

## 2025-05-22 RX ORDER — PANTOPRAZOLE SODIUM 40 MG/1
40 TABLET, DELAYED RELEASE ORAL DAILY
Status: DISCONTINUED | OUTPATIENT
Start: 2025-05-22 | End: 2025-05-23 | Stop reason: HOSPADM

## 2025-05-22 RX ORDER — ONDANSETRON 8 MG/1
8 TABLET, ORALLY DISINTEGRATING ORAL EVERY 8 HOURS PRN
Status: DISCONTINUED | OUTPATIENT
Start: 2025-05-22 | End: 2025-05-23 | Stop reason: HOSPADM

## 2025-05-22 RX ORDER — IPRATROPIUM BROMIDE AND ALBUTEROL SULFATE 2.5; .5 MG/3ML; MG/3ML
3 SOLUTION RESPIRATORY (INHALATION) EVERY 6 HOURS PRN
Status: DISCONTINUED | OUTPATIENT
Start: 2025-05-22 | End: 2025-05-23 | Stop reason: HOSPADM

## 2025-05-22 RX ADMIN — SACUBITRIL AND VALSARTAN 1 TABLET: 97; 103 TABLET, FILM COATED ORAL at 08:05

## 2025-05-22 RX ADMIN — ATORVASTATIN CALCIUM 80 MG: 40 TABLET, FILM COATED ORAL at 08:05

## 2025-05-22 RX ADMIN — CARVEDILOL 12.5 MG: 12.5 TABLET, FILM COATED ORAL at 08:05

## 2025-05-22 RX ADMIN — HUMAN ALBUMIN MICROSPHERES AND PERFLUTREN 0.11 MG: 10; .22 INJECTION, SOLUTION INTRAVENOUS at 07:05

## 2025-05-22 RX ADMIN — SEVELAMER CARBONATE 1600 MG: 800 TABLET, FILM COATED ORAL at 08:05

## 2025-05-22 RX ADMIN — PANTOPRAZOLE SODIUM 40 MG: 40 TABLET, DELAYED RELEASE ORAL at 08:05

## 2025-05-22 RX ADMIN — IPRATROPIUM BROMIDE AND ALBUTEROL SULFATE 3 ML: 2.5; .5 SOLUTION RESPIRATORY (INHALATION) at 09:05

## 2025-05-22 RX ADMIN — INSULIN GLARGINE 10 UNITS: 100 INJECTION, SOLUTION SUBCUTANEOUS at 08:05

## 2025-05-22 RX ADMIN — EZETIMIBE 10 MG: 10 TABLET ORAL at 08:05

## 2025-05-22 RX ADMIN — TAMSULOSIN HYDROCHLORIDE 0.4 MG: 0.4 CAPSULE ORAL at 08:05

## 2025-05-22 RX ADMIN — ASPIRIN 81 MG: 81 TABLET, COATED ORAL at 08:05

## 2025-05-22 RX ADMIN — SEVELAMER CARBONATE 1600 MG: 800 TABLET, FILM COATED ORAL at 12:05

## 2025-05-22 RX ADMIN — HEPARIN SODIUM 5000 UNITS: 5000 INJECTION INTRAVENOUS; SUBCUTANEOUS at 06:05

## 2025-05-22 RX ADMIN — ESCITALOPRAM OXALATE 10 MG: 5 TABLET, FILM COATED ORAL at 08:05

## 2025-05-22 NOTE — HPI
Onofre Ceballos is a 54 y.o. male with a PMHx of HTN, T2DM, ESRD (MWF HD)who presents to [facility] for evaluation of pleural effusion. Patient had a LUE fistula placed on 2/25/25 by vascular surgery which is not functioning, currently getting dialyzed via L chest tunneled cath. He's had swelling of his LUE since then and vascular planned to perfrom fistulogram tomorrow to see if revision was possible. He had a pre op CXR done as an outpatient which showed large left pleural effusion, so he was advised to present to the ED for further eval. He was not able to get dialyzed today due to being sent to the ED. Last HD session on Monday. He reports shortness of breath on exertion and while lying on his back. Currently denies any SOB. Denies any chest pain, LE swelling, HA, vision changes, or syncope.     ED: hypertensive to /98, improved to SBP 170s with home BP meds. No leukocytosis or emergent electrolyte abnormalities. , trop 13. CXR shows slight interval worsening of large left pleural effusion.

## 2025-05-22 NOTE — ASSESSMENT & PLAN NOTE
Creatinine stable for now. BMP reviewed- noted Estimated Creatinine Clearance: 12.6 mL/min (A) (based on SCr of 6.4 mg/dL (H)). according to latest data. Based on current GFR, CKD stage is end stage.  Monitor UOP and serial BMP and adjust therapy as needed. Renally dose meds. Avoid nephrotoxic medications and procedures.    Nephrology consulted for inpatient hemodialysis and volume removal.

## 2025-05-22 NOTE — CONSULTS
[facility] - Medical   Nephrology  Consult Note          Patient Name: Onofre Ceballos   MRN: [id number]   Current Provider: Mansi Mckeon MD  Primary Care Provider: Honey Galaviz NP   Admission Date: 5/21/2025   Hospital Day: 0  Bed: OBS12/EDOU12  Principal Problem: Pleural effusion on left            NEPHROLOGY NOTE    Reason for Consultation:   ESRD    History of Present Illness:      Onofre Ceballos is a 54 y.o. male with PMHx of ESRD MWF 2/2 DM, HTN, admitted to the hospital with pleural effusion.    LUE fistula placed on 2/25/25 by vascular surgery which is not functioning,  swelling of his LUE since then and vascular planned to perfrom fistulogram 5/22 for possible revision.  Pre op CXR done as an outpatient which showed large left pleural effusion, so he was advised to present to the ED for further eval. He was not able to get dialyzed on wednesday due to being sent to the ED. Last HD session on Monday.    Nephrology consulted for dialysis management.      ESRD schedule: MWF  Vintage: Nov 2024  Cause: DM  Last HD before admission: Monday 5/19  Access: [facility]  Unit/Location: [place]  Nephrologist: Dr Magana  EDW: 185lb  Residual renal function: minimal    Today feels well. Denies any chest pain, n/v/d.    Past Medical History:   Diagnosis Date    CHF (congestive heart failure)     Diabetes mellitus     Diabetes mellitus type I     Hepatitis     Hepatitis C     Hypertension     Renal disorder     Shingles 10/2018    Sleep apnea      Past Surgical History:   Procedure Laterality Date    ESOPHAGOGASTRODUODENOSCOPY N/A 10/11/2024    Procedure: EGD (ESOPHAGOGASTRODUODENOSCOPY);  Surgeon: Benjamin Lacy MD;  Location: [facility] ENDO;  Service: Endoscopy;  Laterality: N/A;    INSERTION, CATHETER, TUNNELED N/A 10/29/2024    Procedure: Insertion,catheter,tunneled;  Surgeon: Angel Malcolm MD;  Location: [facility] OR;  Service: General;  Laterality: N/A;    INSERTION, CATHETER, TUNNELED N/A 11/8/2024     "Procedure: Insertion,catheter,tunneled;  Surgeon: Huseyin Romero MD;  Location: [facility] OR;  Service: General;  Laterality: N/A;    INSERTION, GRAFT, ARTERIOVENOUS, UPPER EXTREMITY Left 2/25/2025    Procedure: INSERTION, GRAFT, ARTERIOVENOUS, UPPER EXTREMITY POSSIBLE AVF CREATION;  Surgeon: Luis F Berry MD;  Location: [facility] OR [address] [place] [address];  Service: Vascular;  Laterality: Left;    NO PAST SURGERIES       Medications:   aspirin  81 mg Oral Daily    atorvastatin  80 mg Oral Daily    carvediloL  12.5 mg Oral BID    EScitalopram oxalate  10 mg Oral Daily    ezetimibe  10 mg Oral Daily    heparin (porcine)  5,000 Units Subcutaneous Q8H    insulin glargine U-100  10 Units Subcutaneous Daily    pantoprazole  40 mg Oral Daily    sacubitriL-valsartan  1 tablet Oral BID    sevelamer carbonate  1,600 mg Oral TID WM    tamsulosin  0.4 mg Oral Daily    torsemide  20 mg Oral ED 1 Time      Review of patient's allergies indicates:  No Known Allergies  Family History   Problem Relation Name Age of Onset    Diabetes Mother      Lung cancer Father        Social History[1]     Review of Systems:      ROS is negative except for what is mentioned in the HPI    Physical Examination:      Vital Signs   BP (!) 147/90 (BP Location: Right arm, Patient Position: Sitting)   Pulse 63   Temp 97.6 °F (36.4 °C) (Oral)   Resp 18   Ht 5' 8" (1.727 m)   Wt 81 kg (178 lb 9.2 oz)   SpO2 95%   BMI 27.15 kg/m²    No intake/output data recorded.    BP Readings from Last 3 Encounters:   05/22/25 (!) 147/90   05/16/25 129/74   03/04/25 (!) 175/78        Gen: NAD  HEENT: NC. alert  Neck: atrumatic  Heart: audible heart sounds, no edema  Lungs: normal breathing, no w/r/r  Access: TD LI    LABS:    Lab Results   Component Value Date    HGB 10.9 (L) 05/22/2025    HGB 11.8 (L) 05/21/2025    HGB 11.8 (L) 05/20/2025    HGB 11.1 (L) 03/03/2025    HGB 11.7 (L) 01/15/2025    HGB 8.4 (L) 11/12/2024    FERRITIN 139.0 10/29/2024        Lab Results   Component " "Value Date    CALCIUM 7.9 (L) 05/22/2025    CALCIUM 8.1 (L) 05/21/2025    CALCIUM 7.6 (L) 03/03/2025    CALCIUM 7.7 (L) 01/15/2025    .6 (H) 10/27/2024    .3 (H) 08/08/2024        Assessment/Plan:    Onofre Ceballos 54 y.o. male, with Phx of ESRD MWF 2/2 DM, HTN, Presented with pleural effusion      Diagnosis: ESRD on dialysis   ESRD -  HD MWF    ESRD schedule: [facility]  Vintage: Nov 2024  Cause: DM  Last HD before admission: Monday 5/19  Access: [facility]  Unit/Location: [place]  Nephrologist: Dr Magana  EDW: 185lb  Residual renal function: minimal    Recommendation:  HD today:  -1L fluid restrictions  -pre/post HD weights on dialysis days.   -continue strict I/O's      Secondary hyperparathyroidism - Phos 4.7  (Phos goal 3.5-5.5)    Blood pressure management in a patient with ESRD -   Resume home antihypertensives; we will continue to ultrafiltrate with goal dry weight 185lb.    Access - [facility]    Nutrition   -Renal diet (low phos, low K).   -Goal Albumin is 4mg/dL.    -If patient has poor oral intake, recommend nephro nutritional shakes    Ish Andrade MD.  Clinical Nephrology Fellow, PGY-4  [facility] [address]            [1]   Social History  Socioeconomic History    Marital status: Single   Tobacco Use    Smoking status: Former     Current packs/day: 1.00     Types: Cigarettes    Smokeless tobacco: Former   Substance and Sexual Activity    Alcohol use: No    Drug use: Not Currently     Types: Methamphetamines     Comment: Patient stated "I'm use IV drug user with meth in the last 2 weeks."    Sexual activity: Not Currently     Social Drivers of Health     Financial Resource Strain: Low Risk  (3/6/2025)    Overall Financial Resource Strain (CARDIA)     Difficulty of Paying Living Expenses: Not hard at all   Food Insecurity: No Food Insecurity (3/6/2025)    Hunger Vital Sign     Worried About Running Out of Food in the Last Year: Never true     Ran Out of Food in the Last Year: " Never true   Transportation Needs: No Transportation Needs (3/6/2025)    PRAPARE - Transportation     Lack of Transportation (Medical): No     Lack of Transportation (Non-Medical): No   Physical Activity: Inactive (3/28/2024)    Exercise Vital Sign     Days of Exercise per Week: 0 days     Minutes of Exercise per Session: 0 min   Stress: No Stress Concern Present (3/6/2025)    [employer] - Occupational Stress Questionnaire     Feeling of Stress : Not at all   Housing Stability: Low Risk  (3/6/2025)    Housing Stability Vital Sign     Unable to Pay for Housing in the Last Year: No     Homeless in the Last Year: No

## 2025-05-22 NOTE — SUBJECTIVE & OBJECTIVE
Past Medical History:   Diagnosis Date    CHF (congestive heart failure)     Diabetes mellitus     Diabetes mellitus type I     Hepatitis     Hepatitis C     Hypertension     Renal disorder     Shingles 10/2018    Sleep apnea        Past Surgical History:   Procedure Laterality Date    ESOPHAGOGASTRODUODENOSCOPY N/A 10/11/2024    Procedure: EGD (ESOPHAGOGASTRODUODENOSCOPY);  Surgeon: Benjamin Lacy MD;  Location: [facility] ENDO;  Service: Endoscopy;  Laterality: N/A;    INSERTION, CATHETER, TUNNELED N/A 10/29/2024    Procedure: Insertion,catheter,tunneled;  Surgeon: Angel Malcolm MD;  Location: [facility] OR;  Service: General;  Laterality: N/A;    INSERTION, CATHETER, TUNNELED N/A 11/8/2024    Procedure: Insertion,catheter,tunneled;  Surgeon: Huseyin Roemro MD;  Location: [facility] OR;  Service: General;  Laterality: N/A;    INSERTION, GRAFT, ARTERIOVENOUS, UPPER EXTREMITY Left 2/25/2025    Procedure: INSERTION, GRAFT, ARTERIOVENOUS, UPPER EXTREMITY POSSIBLE AVF CREATION;  Surgeon: Luis F Berry MD;  Location: [address];  Service: Vascular;  Laterality: Left;    NO PAST SURGERIES         Review of patient's allergies indicates:  No Known Allergies    No current facility-administered medications on file prior to encounter.     Current Outpatient Medications on File Prior to Encounter   Medication Sig    acetaminophen (TYLENOL) 650 MG TbSR Take 1 tablet (650 mg total) by mouth every 8 (eight) hours as needed (pain).    alcohol swabs (ALCOHOL WIPES) PadM Use topical 4 x daily for insulin    aspirin (ECOTRIN) 81 MG EC tablet Take 81 mg by mouth.    atorvastatin (LIPITOR) 80 MG tablet Take 1 tablet by mouth every evening.    blood sugar diagnostic Strp 1 each by Misc.(Non-Drug; Combo Route) route 3 (three) times daily before meals.    blood-glucose meter (TRUE METRIX GLUCOSE METER) Misc 1 each by Misc.(Non-Drug; Combo Route) route once daily.    brimonidine 0.2% (ALPHAGAN) 0.2 % Drop Place into both eyes.     "carvediloL (COREG) 12.5 MG tablet Take 12.5 mg by mouth 2 (two) times daily.    ENTRESTO  mg per tablet Take 1 tablet by mouth 2 (two) times daily.    ergocalciferol (ERGOCALCIFEROL) 50,000 unit Cap Take 50,000 Units by mouth every 7 days.    EScitalopram oxalate (LEXAPRO) 10 MG tablet Take 10 mg by mouth.    ezetimibe (ZETIA) 10 mg tablet Take 1 tablet (10 mg total) by mouth every evening.    glucose 4 GM chewable tablet Take 4 tablets (16 g total) by mouth as needed for Low blood sugar.    insulin aspart U-100 (NOVOLOG) 100 unit/mL (3 mL) InPn pen Inject 10 Units into the skin 3 (three) times daily with meals.    insulin glargine U-100, Lantus, (BASAGLAR KWIKPEN U-100 INSULIN) 100 unit/mL (3 mL) InPn pen Inject 10 Units into the skin every evening. Within 15 minutes of eating (Patient taking differently: Inject 30 Units into the skin every evening. Within 15 minutes of eating)    insulin syringe-needle U-100 (INSULIN SYRINGE) 1/2 mL 28 gauge x 1/2" Syrg To use with meal time insulin tid    lancets 32 gauge Misc Inject 1 lancet into the skin 3 (three) times daily before meals.    oxyCODONE (ROXICODONE) 5 MG immediate release tablet Take 1 tablet (5 mg total) by mouth every 4 (four) hours as needed. (Patient not taking: Reported on 5/19/2025)    oxyCODONE-acetaminophen (PERCOCET) 5-325 mg per tablet Take 1 tablet by mouth every 6 (six) hours as needed for Pain.    OZEMPIC 0.25 mg or 0.5 mg (2 mg/3 mL) pen injector Inject 0.5 mg into the skin every 7 days.    pantoprazole (PROTONIX) 40 MG tablet Take 1 tablet (40 mg total) by mouth once daily.    pen needle, diabetic (NOVOTWIST) 32 gauge x 1/5" Ndle To use nightly with levemir    polyethylene glycol (GLYCOLAX) 17 gram/dose powder Take 17 g by mouth once daily.    sevelamer carbonate (RENVELA) 800 mg Tab Take 2 tablets (1,600 mg total) by mouth 3 (three) times daily with meals.    SURE COMFORT INSULIN SYRINGE 0.3 mL 31 gauge x 5/16" Syrg Inject 1 each into the " "skin 4 (four) times daily before meals and nightly.    tamsulosin (FLOMAX) 0.4 mg Cap Take by mouth.    torsemide (DEMADEX) 20 MG Tab Take by mouth.     Family History       Problem Relation (Age of Onset)    Diabetes Mother    Lung cancer Father          Tobacco Use    Smoking status: Former     Current packs/day: 1.00     Types: Cigarettes    Smokeless tobacco: Former   Substance and Sexual Activity    Alcohol use: No    Drug use: Not Currently     Types: Methamphetamines     Comment: Patient stated "I'm use IV drug user with meth in the last 2 weeks."    Sexual activity: Not Currently     Review of Systems   Constitutional:  Negative for activity change, chills and fever.   HENT:  Negative for trouble swallowing.    Eyes:  Negative for photophobia and visual disturbance.   Respiratory:  Positive for shortness of breath. Negative for chest tightness and wheezing.    Cardiovascular:  Negative for chest pain, palpitations and leg swelling.   Gastrointestinal:  Negative for abdominal pain, constipation, diarrhea, nausea and vomiting.   Genitourinary:  Negative for dysuria, frequency, hematuria and urgency.   Musculoskeletal:  Negative for arthralgias, back pain and gait problem.   Skin:  Negative for color change and rash.   Neurological:  Negative for dizziness, syncope, weakness, light-headedness, numbness and headaches.   Psychiatric/Behavioral:  Negative for agitation and confusion. The patient is not nervous/anxious.      Objective:     Vital Signs (Most Recent):  Temp: 97.1 °F (36.2 °C) (05/22/25 0016)  Pulse: 63 (05/22/25 0016)  Resp: 16 (05/22/25 0016)  BP: (!) 175/101 (05/22/25 0016)  SpO2: (!) 93 % (05/22/25 0016) Vital Signs (24h Range):  Temp:  [97.1 °F (36.2 °C)-97.9 °F (36.6 °C)] 97.1 °F (36.2 °C)  Pulse:  [63-66] 63  Resp:  [16-20] 16  SpO2:  [93 %-95 %] 93 %  BP: (143-225)/() 175/101     Weight: 81.6 kg (180 lb)  Body mass index is 27.37 kg/m².     Physical Exam  Vitals and nursing note " reviewed.   Constitutional:       General: He is not in acute distress.     Appearance: He is well-developed.   HENT:      Head: Normocephalic and atraumatic.      Mouth/Throat:      Pharynx: No oropharyngeal exudate.   Eyes:      General: No scleral icterus.     Conjunctiva/sclera: Conjunctivae normal.   Cardiovascular:      Rate and Rhythm: Normal rate and regular rhythm.      Heart sounds: Normal heart sounds.   Pulmonary:      Effort: Pulmonary effort is normal. No respiratory distress.      Breath sounds: No wheezing.      Comments: Breathing comfortably on RA. BS significantly diminished to LLL  Abdominal:      General: Bowel sounds are normal. There is no distension.      Palpations: Abdomen is soft.      Tenderness: There is no abdominal tenderness.   Musculoskeletal:         General: No tenderness. Normal range of motion.      Cervical back: Normal range of motion and neck supple.   Lymphadenopathy:      Cervical: No cervical adenopathy.   Skin:     General: Skin is warm and dry.      Capillary Refill: Capillary refill takes less than 2 seconds.      Findings: No rash.   Neurological:      Mental Status: He is alert and oriented to person, place, and time.      Cranial Nerves: No cranial nerve deficit.      Sensory: No sensory deficit.      Coordination: Coordination normal.   Psychiatric:         Mood and Affect: Affect is angry.         Behavior: Behavior normal.         Thought Content: Thought content normal.         Judgment: Judgment normal.                Significant Labs: All pertinent labs within the past 24 hours have been reviewed.  CBC:   Recent Labs   Lab 05/20/25  1310 05/21/25  1837   WBC 8.43 8.24   HGB 11.8* 11.8*   HCT 33.9* 35.5*    221     CMP:   Recent Labs   Lab 05/20/25  1310 05/21/25  1837    138   K 4.3 4.9    100   CO2 23 25    97   BUN 22* 29*   CREATININE 4.8* 6.3*   CALCIUM 8.1* 8.1*   PROT 5.9* 6.0   ALBUMIN 2.4* 2.4*   BILITOT 0.4 0.4   ALKPHOS 72 74    AST 17 18   ALT 18 16   ANIONGAP 13 13       Significant Imaging: I have reviewed all pertinent imaging results/findings within the past 24 hours.  X-Ray Chest AP Portable  Narrative: EXAMINATION:  XR CHEST AP PORTABLE    CLINICAL HISTORY:  Exertional shortness of breath;    TECHNIQUE:  Single frontal view of the chest was performed.    COMPARISON:  05/20/2025    FINDINGS:  Left central venous catheter tip appears stable.  Large left pleural effusion remains noting possibly increased since the previous exam.  Clinical correlation is advised.  No pneumothorax or other significant detrimental change since the previous exam.  Impression: As above    Electronically signed by: Sanjeev Dallas MD  Date:    05/21/2025  Time:    20:03

## 2025-05-22 NOTE — PROGRESS NOTES
Arrived to the [facility] from [facility] 12 by wheelchair. Observation dialysis started via left IJ tunneled catheter.

## 2025-05-22 NOTE — ASSESSMENT & PLAN NOTE
Patient's blood pressure range in the last 24 hours was: BP  Min: 143/86  Max: 225/98.The patient's inpatient anti-hypertensive regimen is listed below:  Current Antihypertensives  torsemide tablet 20 mg, ED 1 Time, Oral  carvediloL tablet 12.5 mg, 2 times daily, Oral  Entresto 97/103mg BID    Plan  - BP is uncontrolled due to missed HD  - resume home meds

## 2025-05-22 NOTE — SUBJECTIVE & OBJECTIVE
"Past Medical History:   Diagnosis Date    CHF (congestive heart failure)     Diabetes mellitus     Diabetes mellitus type I     Hepatitis     Hepatitis C     Hypertension     Renal disorder     Shingles 10/2018    Sleep apnea        Past Surgical History:   Procedure Laterality Date    ESOPHAGOGASTRODUODENOSCOPY N/A 10/11/2024    Procedure: EGD (ESOPHAGOGASTRODUODENOSCOPY);  Surgeon: Benjamin Lacy MD;  Location: [facility] ENDO;  Service: Endoscopy;  Laterality: N/A;    INSERTION, CATHETER, TUNNELED N/A 10/29/2024    Procedure: Insertion,catheter,tunneled;  Surgeon: Angel Malcolm MD;  Location: [facility] OR;  Service: General;  Laterality: N/A;    INSERTION, CATHETER, TUNNELED N/A 11/8/2024    Procedure: Insertion,catheter,tunneled;  Surgeon: Huseyin Romero MD;  Location: [facility] OR;  Service: General;  Laterality: N/A;    INSERTION, GRAFT, ARTERIOVENOUS, UPPER EXTREMITY Left 2/25/2025    Procedure: INSERTION, GRAFT, ARTERIOVENOUS, UPPER EXTREMITY POSSIBLE AVF CREATION;  Surgeon: Luis F Berry MD;  Location: [address];  Service: Vascular;  Laterality: Left;    NO PAST SURGERIES         Review of patient's allergies indicates:  No Known Allergies    Family History       Problem Relation (Age of Onset)    Diabetes Mother    Lung cancer Father          Tobacco Use    Smoking status: Former     Current packs/day: 1.00     Types: Cigarettes    Smokeless tobacco: Former   Substance and Sexual Activity    Alcohol use: No    Drug use: Not Currently     Types: Methamphetamines     Comment: Patient stated "I'm use IV drug user with meth in the last 2 weeks."    Sexual activity: Not Currently         Review of Systems   Constitutional:  Negative for chills, fatigue and fever.   HENT:  Negative for congestion, rhinorrhea and sore throat.    Eyes:  Negative for visual disturbance.   Respiratory:  Negative for cough, shortness of breath and wheezing.    Cardiovascular:  Negative for chest pain and leg swelling. "   Gastrointestinal:  Negative for abdominal distention, abdominal pain, constipation, diarrhea, nausea and vomiting.   Genitourinary:  Negative for difficulty urinating, dysuria, frequency and urgency.   Neurological:  Negative for dizziness, weakness and numbness.     Objective:     Vital Signs (Most Recent):  Temp: 97.4 °F (36.3 °C) (05/22/25 0803)  Pulse: 60 (05/22/25 0934)  Resp: 20 (05/22/25 0934)  BP: (!) 179/122 (05/22/25 0824)  SpO2: 98 % (05/22/25 0934) Vital Signs (24h Range):  Temp:  [97.1 °F (36.2 °C)-97.9 °F (36.6 °C)] 97.4 °F (36.3 °C)  Pulse:  [60-66] 60  Resp:  [16-20] 20  SpO2:  [93 %-98 %] 98 %  BP: (143-225)/() 179/122     Weight: 81 kg (178 lb 9.2 oz)  Body mass index is 27.15 kg/m².    No intake or output data in the 24 hours ending 05/22/25 0950     Physical Exam  Vitals and nursing note reviewed.   Constitutional:       General: He is not in acute distress.     Appearance: He is not ill-appearing, toxic-appearing or diaphoretic.      Comments: Unkempt appearance   HENT:      Head: Normocephalic and atraumatic.   Eyes:      General: No scleral icterus.     Extraocular Movements: Extraocular movements intact.      Conjunctiva/sclera: Conjunctivae normal.   Cardiovascular:      Rate and Rhythm: Normal rate and regular rhythm.      Pulses: Normal pulses.      Heart sounds: Normal heart sounds. No murmur heard.  Pulmonary:      Effort: Pulmonary effort is normal. No respiratory distress.      Breath sounds: Decreased breath sounds (left lung fields) present. No wheezing, rhonchi or rales.   Abdominal:      General: Abdomen is flat. There is no distension.   Musculoskeletal:         General: No swelling or tenderness.      Right lower leg: No edema.      Left lower leg: No edema.   Skin:     General: Skin is warm and dry.      Coloration: Skin is not jaundiced.      Findings: No bruising.   Neurological:      Mental Status: He is alert and oriented to person, place, and time.      Cranial  Nerves: No dysarthria or facial asymmetry.      Motor: No weakness.        Vents:       Lines/Drains/Airways       Central Venous Catheter Line  Duration                  Hemodialysis Catheter 11/08/24 left subclavian 195 days         Hemodialysis Catheter 02/25/25 86 days              Peripheral Intravenous Line  Duration                  Peripheral IV - Single Lumen 05/21/25 1841 20 G Anterior;Right Forearm <1 day                    Significant Labs:    CBC/Anemia Profile:  Recent Labs   Lab 05/20/25  1310 05/21/25  1837 05/22/25  0353   WBC 8.43 8.24 7.85   HGB 11.8* 11.8* 10.9*   HCT 33.9* 35.5* 33.4*    221 198   MCV 94 95 96   RDW 12.7 12.6 12.6        Chemistries:  Recent Labs   Lab 05/20/25  1310 05/21/25  1837 05/22/25  0353    138 136   K 4.3 4.9 4.3    100 104   CO2 23 25 25   BUN 22* 29* 32*   CREATININE 4.8* 6.3* 6.4*   CALCIUM 8.1* 8.1* 7.9*   ALBUMIN 2.4* 2.4*  --    PROT 5.9* 6.0  --    BILITOT 0.4 0.4  --    ALKPHOS 72 74  --    ALT 18 16  --    AST 17 18  --    MG  --   --  1.9   PHOS  --   --  4.7*       All pertinent labs within the past 24 hours have been reviewed.    Significant Imaging:   I have reviewed all pertinent imaging results/findings within the past 24 hours.    Echo    Left Ventricle: The left ventricle is normal in size. Normal wall   thickness. Normal wall motion. There is normal systolic function with a   visually estimated ejection fraction of 55 - 60%. There is normal   diastolic function.    Right Ventricle: The right ventricle is normal in size Wall thickness   is normal. Systolic function is borderline low.    Pulmonary Artery: The estimated pulmonary artery systolic pressure is   28 mmHg.    IVC/SVC: Normal venous pressure at 3 mmHg.    Pericardium: There is a small effusion. Large left pleural effusion.

## 2025-05-22 NOTE — PROCEDURES
"Onofre Ceballos is a 54 y.o. male patient.    Temp: 97.4 °F (36.3 °C) (25)  Pulse: 60 (25)  Resp: 20 (25)  BP: (!) 179/122 (25 0824)  SpO2: 98 % (25)  Weight: 81 kg (178 lb 9.2 oz) (25)  Height: 5' 8" (172.7 cm) (25)       Thoracentesis    Date/Time: 2025 10:54 AM  Location procedure was performed: [facility] PULMONARY MEDICINE    Performed by: Paloma Chicas MD  Authorized by: Paloma Chicas MD  Consent Done: Yes  Consent: Verbal consent obtained. Written consent obtained  Risks and benefits: risks, benefits and alternatives were discussed  Consent given by: patient  Patient understanding: patient states understanding of the procedure being performed  Patient consent: the patient's understanding of the procedure matches consent given  Procedure consent: procedure consent matches procedure scheduled  Relevant documents: relevant documents present and verified  Test results: test results available and properly labeled  Site marked: the operative site was marked  Required items: required blood products, implants, devices, and special equipment available  Patient identity confirmed: , name and verbally with patient  Time out: Immediately prior to procedure a "time out" was called to verify the correct patient, procedure, equipment, support staff and site/side marked as required.  Procedure purpose: diagnostic and therapeutic  Indications: pleural effusion  Preparation: Patient was prepped and draped in the usual sterile fashion.  Local anesthesia used: yes  Anesthesia: local infiltration    Anesthesia:  Local anesthesia used: yes  Local Anesthetic: lidocaine 1% without epinephrine  Preparation: skin prepped with ChloraPrep  Patient position: sitting  Ultrasound guidance: yes  Location: left posterior  Number of attempts: 1  Drainage characteristics: clear and serous  Patient tolerance: Patient tolerated the procedure well with no " immediate complications  Complications: No  Specimens: Yes        2100cc clear yellow fluid removed without issue. CT chest for fu of prior noted JUANCHO nodule.     Paloma Chicas MD  [employer] Fellow   05/22/2025 10:55 AM

## 2025-05-22 NOTE — ED NOTES
Pt states he does not believe that he will want to stay the whole night but will call his brother and talk to him first before deciding to leave AMA or not.

## 2025-05-22 NOTE — ASSESSMENT & PLAN NOTE
- decompensated in the setting of missed HD  - HD for volume management  - strict I/Os, daily weights

## 2025-05-22 NOTE — PHARMACY MED REC
"        Admission Medication History     The home medication history was taken by Nuzhat Tsang.    You may go to "Admission" then "Reconcile Home Medications" tabs to review and/or act upon these items.     The home medication list has been updated by the Pharmacy department.   Please read ALL comments highlighted in yellow.   Please address this information as you see fit.    Feel free to contact us if you have any questions or require assistance.      The medications listed below were removed from the home medication list. Please reorder if appropriate:  Patient reports no longer taking the following medication(s):  Ergocalciferol   Glucose   Roxicodone   Percocet   Glycolax     Medications listed below were obtained from: Patient/family and Analytic software- [employer]  Current Outpatient Medications on File Prior to Encounter   Medication Sig    acetaminophen (TYLENOL) 650 MG TbSR Take 1 tablet (650 mg total) by mouth every 8 (eight) hours as needed (pain).      aspirin (ECOTRIN) 81 MG EC tablet Take 81 mg by mouth daily       atorvastatin (LIPITOR) 80 MG tablet Take 1 tablet by mouth every evening.      brimonidine 0.2% (ALPHAGAN) 0.2 % Drop Place into both eyes.      carvediloL (COREG) 12.5 MG tablet Take 12.5 mg by mouth 2 (two) times daily.      empagliflozin (JARDIANCE) 10 mg tablet Take 10 mg by mouth once daily.      ENTRESTO  mg per tablet Take 1 tablet by mouth 2 (two) times daily.      EScitalopram oxalate (LEXAPRO) 10 MG tablet Take 10 mg by mouth daily       ezetimibe (ZETIA) 10 mg tablet Take 1 tablet (10 mg total) by mouth every evening.      insulin aspart U-100 (NOVOLOG) 100 unit/mL (3 mL) InPn pen Inject 10 Units into the skin 3 (three) times daily with meals.      insulin glargine U-100, Lantus, (BASAGLAR KWIKPEN U-100 INSULIN) 100 unit/mL (3 mL) InPn pen Inject 30 Units into the skin every evening. Within 15 minutes of eating      OZEMPIC 0.25 mg or 0.5 mg (2 mg/3 mL) pen injector Inject " 0.5 mg into the skin every 7 days.      pantoprazole (PROTONIX) 40 MG tablet Take 1 tablet (40 mg total) by mouth once daily.      sevelamer carbonate (RENVELA) 800 mg Tab Take 2 tablets (1,600 mg total) by mouth 3 (three) times daily with meals.      tamsulosin (FLOMAX) 0.4 mg Cap Take 1  capsule  by mouth daily        torsemide (DEMADEX) 20 MG Tab Take 1 tablet by mouth daily          Nuzhat Tsang  [id number]          .

## 2025-05-22 NOTE — ED NOTES
Assumed care of the patient. Report received from Barbara RITTER. Pt on continuous cardiac monitoring. Pt in hospital gown, side rails up X2, bed low and locked, and call light is placed within reach. No family/visitors at bedside at this time. Pt denies any complaints or needs.

## 2025-05-22 NOTE — CONSULTS
[facility] - Emergency Dept  Pulmonology  Consult Note    Patient Name: Onofre Ceballos  MRN: [id number]  Admission Date: 5/21/2025  Hospital Length of Stay: 0 days  Code Status: Full Code  Attending Physician: Mansi Mckeon MD  Primary Care Provider: Honey Galaviz NP   Principal Problem: Pleural effusion on left    Inpatient consult to Pulmonology  Consult performed by: Sanjana Toledo MD  Consult ordered by: Mercy Barton PA-C        Subjective:     HPI:  Onofre Ceballos is a 54 year old male with hx of HTN, type 2 IDDM, HFpEF, EVELYN, ESRD on hemodialysis MWF, hepatitis C, who presented to [facility] ED 5/21 for evaluation of left-sided pleural effusion noted on CXR. He completed outpatient chest x ray on 5/20 as part of pre-op evaluation for fistulogram given recent issues of LUE fistula malfunction with need of possible revision. He was planned for this procedure 5/22 prior to admission. Reports shortness of breath worsened with exertion, sometimes orthopnea when laying down for longer periods of time. Denies chest pain, fever/chills, lower extremity edema, abdominal pain, cough, recent illness. He has never had similar issues in the past and never required procedures such as thoracentesis or paracentesis for fluid removal before. Last hemodialysis session reportedly Monday, 5/19 prior to presentation.    On admission patient significantly hypertensive with /98 before taking home meds. No leukocytosis or emergent electrolyte abnormalities. , trop 13. Repeat CXR on admission noted large left-sided pleural effusion with interval worsening. Pulmonology was consulted for large left-sided pleural effusion and consideration for potential diagnostic thoracentesis.     Of note, patient has had reports of opacification at the left lung base consistent with pleural effusion and associated atelectasis on previous CXR as early as 1/15/2025. He has had prior CT studies with findings of multiple  "diffuse irregular ground-glass and part solid nodules involving the lungs, more recently noted new irregular 1.5 cm mass in the left upper lobe, 7/2024, c/f spiculated nodule which seemed to be lost to follow up. Positive smoking history.     Past Medical History:   Diagnosis Date    CHF (congestive heart failure)     Diabetes mellitus     Diabetes mellitus type I     Hepatitis     Hepatitis C     Hypertension     Renal disorder     Shingles 10/2018    Sleep apnea        Past Surgical History:   Procedure Laterality Date    ESOPHAGOGASTRODUODENOSCOPY N/A 10/11/2024    Procedure: EGD (ESOPHAGOGASTRODUODENOSCOPY);  Surgeon: Benjamin Lacy MD;  Location: [facility];  Service: Endoscopy;  Laterality: N/A;    INSERTION, CATHETER, TUNNELED N/A 10/29/2024    Procedure: Insertion,catheter,tunneled;  Surgeon: Angel Malcolm MD;  Location: [facility];  Service: General;  Laterality: N/A;    INSERTION, CATHETER, TUNNELED N/A 11/8/2024    Procedure: Insertion,catheter,tunneled;  Surgeon: Huseyin Romero MD;  Location: [facility];  Service: General;  Laterality: N/A;    INSERTION, GRAFT, ARTERIOVENOUS, UPPER EXTREMITY Left 2/25/2025    Procedure: INSERTION, GRAFT, ARTERIOVENOUS, UPPER EXTREMITY POSSIBLE AVF CREATION;  Surgeon: Luis F Berry MD;  Location: [address];  Service: Vascular;  Laterality: Left;    NO PAST SURGERIES         Review of patient's allergies indicates:  No Known Allergies    Family History       Problem Relation (Age of Onset)    Diabetes Mother    Lung cancer Father          Tobacco Use    Smoking status: Former     Current packs/day: 1.00     Types: Cigarettes    Smokeless tobacco: Former   Substance and Sexual Activity    Alcohol use: No    Drug use: Not Currently     Types: Methamphetamines     Comment: Patient stated "I'm use IV drug user with meth in the last 2 weeks."    Sexual activity: Not Currently         Review of Systems   Constitutional:  Negative for chills, fatigue and fever. "   HENT:  Negative for congestion, rhinorrhea and sore throat.    Eyes:  Negative for visual disturbance.   Respiratory:  Negative for cough, shortness of breath and wheezing.    Cardiovascular:  Negative for chest pain and leg swelling.   Gastrointestinal:  Negative for abdominal distention, abdominal pain, constipation, diarrhea, nausea and vomiting.   Genitourinary:  Negative for difficulty urinating, dysuria, frequency and urgency.   Neurological:  Negative for dizziness, weakness and numbness.     Objective:     Vital Signs (Most Recent):  Temp: 97.4 °F (36.3 °C) (05/22/25 0803)  Pulse: 60 (05/22/25 0934)  Resp: 20 (05/22/25 0934)  BP: (!) 179/122 (05/22/25 0824)  SpO2: 98 % (05/22/25 0934) Vital Signs (24h Range):  Temp:  [97.1 °F (36.2 °C)-97.9 °F (36.6 °C)] 97.4 °F (36.3 °C)  Pulse:  [60-66] 60  Resp:  [16-20] 20  SpO2:  [93 %-98 %] 98 %  BP: (143-225)/() 179/122     Weight: 81 kg (178 lb 9.2 oz)  Body mass index is 27.15 kg/m².    No intake or output data in the 24 hours ending 05/22/25 0950     Physical Exam  Vitals and nursing note reviewed.   Constitutional:       General: He is not in acute distress.     Appearance: He is not ill-appearing, toxic-appearing or diaphoretic.      Comments: Unkempt appearance   HENT:      Head: Normocephalic and atraumatic.   Eyes:      General: No scleral icterus.     Extraocular Movements: Extraocular movements intact.      Conjunctiva/sclera: Conjunctivae normal.   Cardiovascular:      Rate and Rhythm: Normal rate and regular rhythm.      Pulses: Normal pulses.      Heart sounds: Normal heart sounds. No murmur heard.  Pulmonary:      Effort: Pulmonary effort is normal. No respiratory distress.      Breath sounds: Decreased breath sounds (left lung fields) present. No wheezing, rhonchi or rales.   Abdominal:      General: Abdomen is flat. There is no distension.   Musculoskeletal:         General: No swelling or tenderness.      Right lower leg: No edema.      Left  lower leg: No edema.   Skin:     General: Skin is warm and dry.      Coloration: Skin is not jaundiced.      Findings: No bruising.   Neurological:      Mental Status: He is alert and oriented to person, place, and time.      Cranial Nerves: No dysarthria or facial asymmetry.      Motor: No weakness.        Vents:       Lines/Drains/Airways       Central Venous Catheter Line  Duration                  Hemodialysis Catheter 11/08/24 left subclavian 195 days         Hemodialysis Catheter 02/25/25 86 days              Peripheral Intravenous Line  Duration                  Peripheral IV - Single Lumen 05/21/25 1841 20 G Anterior;Right Forearm <1 day                    Significant Labs:    CBC/Anemia Profile:  Recent Labs   Lab 05/20/25  1310 05/21/25  1837 05/22/25  0353   WBC 8.43 8.24 7.85   HGB 11.8* 11.8* 10.9*   HCT 33.9* 35.5* 33.4*    221 198   MCV 94 95 96   RDW 12.7 12.6 12.6        Chemistries:  Recent Labs   Lab 05/20/25  1310 05/21/25  1837 05/22/25  0353    138 136   K 4.3 4.9 4.3    100 104   CO2 23 25 25   BUN 22* 29* 32*   CREATININE 4.8* 6.3* 6.4*   CALCIUM 8.1* 8.1* 7.9*   ALBUMIN 2.4* 2.4*  --    PROT 5.9* 6.0  --    BILITOT 0.4 0.4  --    ALKPHOS 72 74  --    ALT 18 16  --    AST 17 18  --    MG  --   --  1.9   PHOS  --   --  4.7*       All pertinent labs within the past 24 hours have been reviewed.    Significant Imaging:   I have reviewed all pertinent imaging results/findings within the past 24 hours.    Echo    Left Ventricle: The left ventricle is normal in size. Normal wall   thickness. Normal wall motion. There is normal systolic function with a   visually estimated ejection fraction of 55 - 60%. There is normal   diastolic function.    Right Ventricle: The right ventricle is normal in size Wall thickness   is normal. Systolic function is borderline low.    Pulmonary Artery: The estimated pulmonary artery systolic pressure is   28 mmHg.    IVC/SVC: Normal venous pressure at  3 mmHg.    Pericardium: There is a small effusion. Large left pleural effusion.    Assessment/Plan:     Pulmonary  * Pleural effusion on left  54M with hx of HTN, type 2 IDDM, HFpEF, EVELYN, ESRD on hemodialysis MWF, hepatitis C, who presented to [facility] ED 5/21 for evaluation of large left-sided pleural effusion. Patient found to have large pleural effusion on imaging. I have personally reviewed and interpreted the following imaging: Xray. Most likely etiology includes end stage renal disease, volume overload not due to congestive heart failure, vs other unknown etiology. Management to include thoracentesis and dialysis for volume removal.     PLAN:  -plan for diagnostic thoracentesis today, patient has been consented  -fluid analysis studies ordered, will follow results  -agree with nephrology management and hemodialysis for volume management  -CT chest without contrast following thoracentesis for re-evaluation of JUANCHO nodule  -ok for discharge from pulmonary perspective following CT chest  -please arrange for outpatient pulmonology follow up in [place]      Renal/  ESRD on dialysis  Creatinine stable for now. BMP reviewed- noted Estimated Creatinine Clearance: 12.6 mL/min (A) (based on SCr of 6.4 mg/dL (H)). according to latest data. Based on current GFR, CKD stage is end stage.  Monitor UOP and serial BMP and adjust therapy as needed. Renally dose meds. Avoid nephrotoxic medications and procedures.    Nephrology consulted for inpatient hemodialysis and volume removal.           Thank you for your consult. Pulmonology will sign off. Please contact us if you have any additional questions.     Sanjana Toledo MD  Pulmonology  [facility] Dept

## 2025-05-22 NOTE — PLAN OF CARE
MD's at bedside for bedside procedure. SW unable to complete assessment.     Ines Cantrell MSW, CSW.   Case Management  [facility]  Email: [email or web address]

## 2025-05-22 NOTE — ASSESSMENT & PLAN NOTE
- home regimen: lantus 30U daily + aspart 10U TIDWM  - IP regimen: lantus 10U daily + LDSSI  - achs accuchecks    Lab Results   Component Value Date    HGBA1C 7.2 (H) 11/06/2024

## 2025-05-22 NOTE — ED NOTES
Patient denies any blurred vision from his baseline, headache, CP, SOB or any other symptoms at this time. Provider MD Kieran made aware. Awaiting orders.

## 2025-05-22 NOTE — ASSESSMENT & PLAN NOTE
- large pleural effusion found on CXR  - satting 95% on RA  - dialysis for volume removal  - echo ordered  - pulm consulted for thoracentesis consideration

## 2025-05-22 NOTE — ASSESSMENT & PLAN NOTE
54M with hx of HTN, type 2 IDDM, HFpEF, EVELYN, ESRD on hemodialysis MWF, hepatitis C, who [place] [facility] [place]21 for evaluation of large left-sided pleural effusion. Patient found to have large pleural effusion on imaging. I have personally reviewed and interpreted the following imaging: Xray. Most likely etiology includes end stage renal disease, volume overload not due to congestive heart failure, vs other unknown etiology. Management to include thoracentesis and dialysis for volume removal.     PLAN:  -plan for diagnostic thoracentesis today, patient has been consented  -fluid analysis studies ordered, will follow results  -agree with nephrology management and hemodialysis for volume management  -CT chest without contrast following thoracentesis for re-evaluation of JUANCHO nodule  -ok for discharge from pulmonary perspective following CT chest  -please arrange for outpatient pulmonology follow up in [place]

## 2025-05-22 NOTE — ASSESSMENT & PLAN NOTE
- nephro consulted for iHD  - needs to reschedule fistulogram with vascular surgery for LUE fistula   - continue phos binder  - strict I/Os, daily weights

## 2025-05-22 NOTE — HPI
Onofre Ceballos is a 54 year old male with hx of HTN, type 2 IDDM, HFpEF, EVELYN, ESRD on hemodialysis MWF, hepatitis C, who presented to [facility] ED 5/21 for evaluation of left-sided pleural effusion noted on CXR. He completed outpatient chest x ray on 5/20 as part of pre-op evaluation for fistulogram given recent issues of LUE fistula malfunction with need of possible revision. He was planned for this procedure 5/22 prior to admission. Reports shortness of breath worsened with exertion, sometimes orthopnea when laying down for longer periods of time. Denies chest pain, fever/chills, lower extremity edema, abdominal pain, cough, recent illness. He has never had similar issues in the past and never required procedures such as thoracentesis or paracentesis for fluid removal before. Last hemodialysis session reportedly Monday, 5/19 prior to presentation.    On admission patient significantly hypertensive with /98 before taking home meds. No leukocytosis or emergent electrolyte abnormalities. , trop 13. Repeat CXR on admission noted large left-sided pleural effusion with interval worsening. Pulmonology was consulted for large left-sided pleural effusion and consideration for potential diagnostic thoracentesis.     Of note, patient has had reports of opacification at the left lung base consistent with pleural effusion and associated atelectasis on previous CXR as early as 1/15/2025. He has had prior CT studies with findings of multiple diffuse irregular ground-glass and part solid nodules involving the lungs, more recently noted new irregular 1.5 cm mass in the left upper lobe, 7/2024, c/f spiculated nodule which seemed to be lost to follow up. Positive smoking history.

## 2025-05-22 NOTE — PLAN OF CARE
[facility] - [facility] Dept  Initial Discharge Assessment       Primary Care Provider: Honey Galaviz NP    Admission Diagnosis: Pleural effusion on left [J90]    Admission Date: 5/21/2025    Pt is independent with their ADL's and ambulation, does not require assistance. Post acute was discussed with pt. Pt brother Slava [phone] to provide transportation home. Pt d/c home with no needs at the moment.     Pt is on Dialysis:  [facility]  MWF  12:40 PM    Expected Discharge Date:     Transition of Care Barriers: (P) None    Payor: MEDICAID / Plan: HEALTHY BLUE ([employer] [place]) / Product Type: Managed Medicaid /     Extended Emergency Contact Information  Primary Emergency Contact: Slava Jung   [place]  Mobile Phone: [phone]  Relation: Brother    Discharge Plan A: (P) Home  Discharge Plan B: (P) Home      [facility] [place] [address]  [address]  [facility] [address]  Phone: [phone] Fax: [phone]    [facility] [place], [place] - [address]  [address]  [facility] [address]  Phone: [phone] Fax: [phone]      Initial Assessment (most recent)       Adult Discharge Assessment - 05/22/25 1208          Discharge Assessment    Assessment Type Discharge Planning Assessment (P)      Confirmed/corrected address, phone number and insurance Yes (P)      Confirmed Demographics Correct on Facesheet (P)      Source of Information patient (P)      People in Home alone (P)      Do you expect to return to your current living situation? Yes (P)      Do you have help at home or someone to help you manage your care at home? No (P)      Prior to hospitilization cognitive status: Alert/Oriented (P)      Current cognitive status: Alert/Oriented (P)      Walking or Climbing Stairs Difficulty no (P)      Dressing/Bathing Difficulty no (P)      Home Accessibility stairs to enter home (P)       Number of Stairs, Main Entrance none (P)      Home Layout Able to live on 1st floor (P)      Equipment Currently Used at Home none (P)      Readmission within 30 days? No (P)      Patient currently being followed by outpatient case management? No (P)      Do you currently have service(s) that help you manage your care at home? No (P)      Do you take prescription medications? Yes (P)      Do you have prescription coverage? Yes (P)      Do you have any problems affording any of your prescribed medications? No (P)      Is the patient taking medications as prescribed? yes (P)      Who is going to help you get home at discharge? brother (P)      How do you get to doctors appointments? health plan transportation (P)      Are you on dialysis? Yes (P)      Dialysis Name and Scheduled days [facility], 12:40 PM (P)      Do you take coumadin? No (P)      Discharge Plan A Home (P)      Discharge Plan B Home (P)      DME Needed Upon Discharge  none (P)      Discharge Plan discussed with: Patient (P)      Transition of Care Barriers None (P)         Physical Activity    On average, how many days per week do you engage in moderate to strenuous exercise (like a brisk walk)? 0 days (P)      On average, how many minutes do you engage in exercise at this level? 0 min (P)         Financial Resource Strain    How hard is it for you to pay for the very basics like food, housing, medical care, and heating? Not very hard (P)         Housing Stability    In the last 12 months, was there a time when you were not able to pay the mortgage or rent on time? No (P)      At any time in the past 12 months, were you homeless or living in a shelter (including now)? No (P)         Transportation Needs    In the past 12 months, has lack of transportation kept you from medical appointments or from getting medications? No (P)      In the past 12 months, has lack of transportation kept you from meetings, work, or from getting things needed  for daily living? No (P)         Food Insecurity    Within the past 12 months, you worried that your food would run out before you got the money to buy more. Never true (P)      Within the past 12 months, the food you bought just didn't last and you didn't have money to get more. Never true (P)         Stress    Do you feel stress - tense, restless, nervous, or anxious, or unable to sleep at night because your mind is troubled all the time - these days? Only a little (P)         Social Isolation    How often do you feel lonely or isolated from those around you?  Never (P)         Alcohol Use    Q1: How often do you have a drink containing alcohol? Never (P)      Q2: How many drinks containing alcohol do you have on a typical day when you are drinking? Patient does not drink (P)      Q3: How often do you have six or more drinks on one occasion? Never (P)         Utilities    In the past 12 months has the electric, gas, oil, or water company threatened to shut off services in your home? No (P)         Health Literacy    How often do you need to have someone help you when you read instructions, pamphlets, or other written material from your doctor or pharmacy? Never (P)                    GITA Varela, ANGE.   Case Management  [facility]  Email: [email or web address]

## 2025-05-22 NOTE — H&P
"[facility] - Emergency Dept  [facility] Medicine  History & Physical    Patient Name: Onofre Ceballos  MRN: [id number]  Patient Class: OP- Observation  Admission Date: 5/21/2025  Attending Physician: Michael Guevara MD   Primary Care Provider: Honey Galaviz NP         Patient information was obtained from patient, past medical records, and ER records.     Subjective:     Principal Problem:Pleural effusion on left    Chief Complaint:   Chief Complaint   Patient presents with    Abnormal Chest X-ray     Pt sent from dialysis in [place] for abnormal chest xray yesterday.  Pt states "they saw fluid build up"        HPI: Onofre Ceballos is a 54 y.o. male with a PMHx of HTN, T2DM, ESRD (MWF HD)who presents to [facility] for evaluation of pleural effusion. Patient had a LUE fistula placed on 2/25/25 by vascular surgery which is not functioning, currently getting dialyzed via L chest tunneled cath. He's had swelling of his LUE since then and vascular planned to perfrom fistulogram tomorrow to see if revision was possible. He had a pre op CXR done as an outpatient which showed large left pleural effusion, so he was advised to present to the ED for further eval. He was not able to get dialyzed today due to being sent to the ED. Last HD session on Monday. He reports shortness of breath on exertion and while lying on his back. Currently denies any SOB. Denies any chest pain, LE swelling, HA, vision changes, or syncope.     ED: hypertensive to /98, improved to SBP 170s with home BP meds. No leukocytosis or emergent electrolyte abnormalities. , trop 13. CXR shows slight interval worsening of large left pleural effusion.     Past Medical History:   Diagnosis Date    CHF (congestive heart failure)     Diabetes mellitus     Diabetes mellitus type I     Hepatitis     Hepatitis C     Hypertension     Renal disorder     Shingles 10/2018    Sleep apnea        Past Surgical History:   Procedure Laterality Date    " ESOPHAGOGASTRODUODENOSCOPY N/A 10/11/2024    Procedure: EGD (ESOPHAGOGASTRODUODENOSCOPY);  Surgeon: Benjamin Lacy MD;  Location: [facility] ENDO;  Service: Endoscopy;  Laterality: N/A;    INSERTION, CATHETER, TUNNELED N/A 10/29/2024    Procedure: Insertion,catheter,tunneled;  Surgeon: Angel Malcolm MD;  Location: [facility] OR;  Service: General;  Laterality: N/A;    INSERTION, CATHETER, TUNNELED N/A 11/8/2024    Procedure: Insertion,catheter,tunneled;  Surgeon: Huseyin Romero MD;  Location: [facility] OR;  Service: General;  Laterality: N/A;    INSERTION, GRAFT, ARTERIOVENOUS, UPPER EXTREMITY Left 2/25/2025    Procedure: INSERTION, GRAFT, ARTERIOVENOUS, UPPER EXTREMITY POSSIBLE AVF CREATION;  Surgeon: Luis F Berry MD;  Location: [address];  Service: Vascular;  Laterality: Left;    NO PAST SURGERIES         Review of patient's allergies indicates:  No Known Allergies    No current facility-administered medications on file prior to encounter.     Current Outpatient Medications on File Prior to Encounter   Medication Sig    acetaminophen (TYLENOL) 650 MG TbSR Take 1 tablet (650 mg total) by mouth every 8 (eight) hours as needed (pain).    alcohol swabs (ALCOHOL WIPES) PadM Use topical 4 x daily for insulin    aspirin (ECOTRIN) 81 MG EC tablet Take 81 mg by mouth.    atorvastatin (LIPITOR) 80 MG tablet Take 1 tablet by mouth every evening.    blood sugar diagnostic Strp 1 each by Misc.(Non-Drug; Combo Route) route 3 (three) times daily before meals.    blood-glucose meter (TRUE METRIX GLUCOSE METER) Misc 1 each by Misc.(Non-Drug; Combo Route) route once daily.    brimonidine 0.2% (ALPHAGAN) 0.2 % Drop Place into both eyes.    carvediloL (COREG) 12.5 MG tablet Take 12.5 mg by mouth 2 (two) times daily.    ENTRESTO  mg per tablet Take 1 tablet by mouth 2 (two) times daily.    ergocalciferol (ERGOCALCIFEROL) 50,000 unit Cap Take 50,000 Units by mouth every 7 days.    EScitalopram oxalate (LEXAPRO) 10 MG  "tablet Take 10 mg by mouth.    ezetimibe (ZETIA) 10 mg tablet Take 1 tablet (10 mg total) by mouth every evening.    glucose 4 GM chewable tablet Take 4 tablets (16 g total) by mouth as needed for Low blood sugar.    insulin aspart U-100 (NOVOLOG) 100 unit/mL (3 mL) InPn pen Inject 10 Units into the skin 3 (three) times daily with meals.    insulin glargine U-100, Lantus, (BASAGLAR KWIKPEN U-100 INSULIN) 100 unit/mL (3 mL) InPn pen Inject 10 Units into the skin every evening. Within 15 minutes of eating (Patient taking differently: Inject 30 Units into the skin every evening. Within 15 minutes of eating)    insulin syringe-needle U-100 (INSULIN SYRINGE) 1/2 mL 28 gauge x 1/2" Syrg To use with meal time insulin tid    lancets 32 gauge Misc Inject 1 lancet into the skin 3 (three) times daily before meals.    oxyCODONE (ROXICODONE) 5 MG immediate release tablet Take 1 tablet (5 mg total) by mouth every 4 (four) hours as needed. (Patient not taking: Reported on 5/19/2025)    oxyCODONE-acetaminophen (PERCOCET) 5-325 mg per tablet Take 1 tablet by mouth every 6 (six) hours as needed for Pain.    OZEMPIC 0.25 mg or 0.5 mg (2 mg/3 mL) pen injector Inject 0.5 mg into the skin every 7 days.    pantoprazole (PROTONIX) 40 MG tablet Take 1 tablet (40 mg total) by mouth once daily.    pen needle, diabetic (NOVOTWIST) 32 gauge x 1/5" Ndle To use nightly with levemir    polyethylene glycol (GLYCOLAX) 17 gram/dose powder Take 17 g by mouth once daily.    sevelamer carbonate (RENVELA) 800 mg Tab Take 2 tablets (1,600 mg total) by mouth 3 (three) times daily with meals.    SURE COMFORT INSULIN SYRINGE 0.3 mL 31 gauge x 5/16" Syrg Inject 1 each into the skin 4 (four) times daily before meals and nightly.    tamsulosin (FLOMAX) 0.4 mg Cap Take by mouth.    torsemide (DEMADEX) 20 MG Tab Take by mouth.     Family History       Problem Relation (Age of Onset)    Diabetes Mother    Lung cancer Father          Tobacco Use    Smoking status: " "Former     Current packs/day: 1.00     Types: Cigarettes    Smokeless tobacco: Former   Substance and Sexual Activity    Alcohol use: No    Drug use: Not Currently     Types: Methamphetamines     Comment: Patient stated "I'm use IV drug user with meth in the last 2 weeks."    Sexual activity: Not Currently     Review of Systems   Constitutional:  Negative for activity change, chills and fever.   HENT:  Negative for trouble swallowing.    Eyes:  Negative for photophobia and visual disturbance.   Respiratory:  Positive for shortness of breath. Negative for chest tightness and wheezing.    Cardiovascular:  Negative for chest pain, palpitations and leg swelling.   Gastrointestinal:  Negative for abdominal pain, constipation, diarrhea, nausea and vomiting.   Genitourinary:  Negative for dysuria, frequency, hematuria and urgency.   Musculoskeletal:  Negative for arthralgias, back pain and gait problem.   Skin:  Negative for color change and rash.   Neurological:  Negative for dizziness, syncope, weakness, light-headedness, numbness and headaches.   Psychiatric/Behavioral:  Negative for agitation and confusion. The patient is not nervous/anxious.      Objective:     Vital Signs (Most Recent):  Temp: 97.1 °F (36.2 °C) (05/22/25 0016)  Pulse: 63 (05/22/25 0016)  Resp: 16 (05/22/25 0016)  BP: (!) 175/101 (05/22/25 0016)  SpO2: (!) 93 % (05/22/25 0016) Vital Signs (24h Range):  Temp:  [97.1 °F (36.2 °C)-97.9 °F (36.6 °C)] 97.1 °F (36.2 °C)  Pulse:  [63-66] 63  Resp:  [16-20] 16  SpO2:  [93 %-95 %] 93 %  BP: (143-225)/() 175/101     Weight: 81.6 kg (180 lb)  Body mass index is 27.37 kg/m².     Physical Exam  Vitals and nursing note reviewed.   Constitutional:       General: He is not in acute distress.     Appearance: He is well-developed.   HENT:      Head: Normocephalic and atraumatic.      Mouth/Throat:      Pharynx: No oropharyngeal exudate.   Eyes:      General: No scleral icterus.     Conjunctiva/sclera: " Conjunctivae normal.   Cardiovascular:      Rate and Rhythm: Normal rate and regular rhythm.      Heart sounds: Normal heart sounds.   Pulmonary:      Effort: Pulmonary effort is normal. No respiratory distress.      Breath sounds: No wheezing.      Comments: Breathing comfortably on RA. BS significantly diminished to LLL  Abdominal:      General: Bowel sounds are normal. There is no distension.      Palpations: Abdomen is soft.      Tenderness: There is no abdominal tenderness.   Musculoskeletal:         General: No tenderness. Normal range of motion.      Cervical back: Normal range of motion and neck supple.   Lymphadenopathy:      Cervical: No cervical adenopathy.   Skin:     General: Skin is warm and dry.      Capillary Refill: Capillary refill takes less than 2 seconds.      Findings: No rash.   Neurological:      Mental Status: He is alert and oriented to person, place, and time.      Cranial Nerves: No cranial nerve deficit.      Sensory: No sensory deficit.      Coordination: Coordination normal.   Psychiatric:         Mood and Affect: Affect is angry.         Behavior: Behavior normal.         Thought Content: Thought content normal.         Judgment: Judgment normal.                Significant Labs: All pertinent labs within the past 24 hours have been reviewed.  CBC:   Recent Labs   Lab 05/20/25  1310 05/21/25  1837   WBC 8.43 8.24   HGB 11.8* 11.8*   HCT 33.9* 35.5*    221     CMP:   Recent Labs   Lab 05/20/25  1310 05/21/25  1837    138   K 4.3 4.9    100   CO2 23 25    97   BUN 22* 29*   CREATININE 4.8* 6.3*   CALCIUM 8.1* 8.1*   PROT 5.9* 6.0   ALBUMIN 2.4* 2.4*   BILITOT 0.4 0.4   ALKPHOS 72 74   AST 17 18   ALT 18 16   ANIONGAP 13 13       Significant Imaging: I have reviewed all pertinent imaging results/findings within the past 24 hours.  X-Ray Chest AP Portable  Narrative: EXAMINATION:  XR CHEST AP PORTABLE    CLINICAL HISTORY:  Exertional shortness of  breath;    TECHNIQUE:  Single frontal view of the chest was performed.    COMPARISON:  05/20/2025    FINDINGS:  Left central venous catheter tip appears stable.  Large left pleural effusion remains noting possibly increased since the previous exam.  Clinical correlation is advised.  No pneumothorax or other significant detrimental change since the previous exam.  Impression: As above    Electronically signed by: Sanjeev Dallas MD  Date:    05/21/2025  Time:    20:03      Assessment/Plan:     Assessment & Plan  Pleural effusion on left  - large pleural effusion found on CXR  - satting 95% on RA  - dialysis for volume removal  - echo ordered  - pulm consulted for thoracentesis consideration  ESRD on dialysis  - nephro consulted for iHD  - needs to reschedule fistulogram with vascular surgery for LUE fistula   - continue phos binder  - strict I/Os, daily weights   HTN (hypertension)  Patient's blood pressure range in the last 24 hours was: BP  Min: 143/86  Max: 225/98.The patient's inpatient anti-hypertensive regimen is listed below:  Current Antihypertensives  torsemide tablet 20 mg, ED 1 Time, Oral  carvediloL tablet 12.5 mg, 2 times daily, Oral  Entresto 97/103mg BID    Plan  - BP is uncontrolled due to missed HD  - resume home meds  Chronic hepatitis C without hepatic coma  - unclear if treated per chart review  - check hep C PCR  - needs close follow up   Acute on chronic heart failure with preserved ejection fraction (HFpEF) ICM NYHA2  - decompensated in the setting of missed HD  - HD for volume management  - strict I/Os, daily weights  CAD (coronary artery disease)  Patient with known CAD which is controlled Will continue ASA and Statin and monitor for S/Sx of angina/ACS. Continue to monitor on telemetry.   Type 2 diabetes mellitus  - home regimen: lantus 30U daily + aspart 10U TIDWM  - IP regimen: lantus 10U daily + LDSSI  - achs accuchecks    Lab Results   Component Value Date    HGBA1C 7.2 (H) 11/06/2024      S/P arteriovenous (AV) fistula creation  - needs vascular follow up as above  VTE Risk Mitigation (From admission, onward)           Ordered     heparin (porcine) injection 5,000 Units  Every 8 hours         05/22/25 0002     IP VTE HIGH RISK PATIENT  Once         05/22/25 0002                         On 05/22/2025, patient should be placed in hospital observation services under my care in collaboration with Dr. Ricardo.           Mercy Barton PA-C  Department of Hospital Medicine  [facility] - Emergency Dept

## 2025-05-23 NOTE — PLAN OF CARE
[facility] - Observation 11H  Discharge Final Note    Primary Care Provider: Honey Galaviz NP    Expected Discharge Date: 5/22/2025    Pt d/c home with no needs.     Final Discharge Note (most recent)       Final Note - 05/23/25 1228          Final Note    Assessment Type Final Discharge Note (P)      Anticipated Discharge Disposition Home or Self Care (P)         Post-Acute Status    Post-Acute Authorization Other (P)      Other Status No Post-Acute Service Needs (P)      Discharge Delays None known at this time (P)                      Important Message from Medicare           GITA Varela, CSW.   Case Management  [facility]  Email: [email or web address]

## 2025-05-23 NOTE — PROGRESS NOTES
Discharge instructions reviewed with patient. Questions encouraged. All questions answered. VSS. IV removed. Telemetry monitor removed. Patient transported off unit via wheelchair with [employer] transporter.

## 2025-05-23 NOTE — PROGRESS NOTES
Dialysis completed. Left IJ tunneled catheter flushed, heparinized, capped and ends wrapped with sterile gauze. Dialyzed for 3.5 hours with fluid removal of 2 liters.Tolerated well. Transported to room 724 by wheelchair.

## 2025-05-23 NOTE — PLAN OF CARE
Received call from NICKI Pierre ( [phone]) to request assistance arranging transportation for pt. ADT 30 generated. Awaiting ETA.        Response received : Transport Vendor- [employer];                                      ETA: 9:00PM    Secure chat sent to NICKI Pierre with  the above update.        Hernan Salas LMSW  Case Management  Emergency Department  [phone]

## 2025-05-26 ENCOUNTER — DOCUMENTATION ONLY (OUTPATIENT)
Dept: CARDIOLOGY | Facility: CLINIC | Age: 55
End: 2025-05-26
Payer: MEDICAID

## 2025-05-26 LAB
BACTERIA FLD AEROBE CULT: NORMAL
ESTROGEN SERPL-MCNC: NORMAL PG/ML
GRAM STN SPEC: NORMAL
GRAM STN SPEC: NORMAL
INSULIN SERPL-ACNC: NORMAL U[IU]/ML
LAB AP CLINICAL INFORMATION: NORMAL
LAB AP GROSS DESCRIPTION: NORMAL
LAB AP NON-GYN INTERPRETATION SPECIMEN 1: NORMAL
LAB AP PERFORMING LOCATION(S): NORMAL

## 2025-05-26 NOTE — PROGRESS NOTES
[facility] ([facility]) Team notified of pt referral via Ambulatory Referral to Heart Failure Transitional Care ([id number]).    Patient screened today 5/26/2025 by provider and LPN for enrollment to program.      Pt was deemed not a candidate for enrollment at this time related to patient is currently on hemo-dialysis. ESRD.    Pt will require additional follow up planning per primary team.     If pt status, diagnosis, or treatment plan changes , please place AMB referral to [facility] ([id number]) for HFTCC enrollment re-evalution.

## 2025-05-28 ENCOUNTER — TELEPHONE (OUTPATIENT)
Dept: VASCULAR SURGERY | Facility: CLINIC | Age: 55
End: 2025-05-28
Payer: MEDICAID

## 2025-05-28 ENCOUNTER — PATIENT MESSAGE (OUTPATIENT)
Dept: VASCULAR SURGERY | Facility: CLINIC | Age: 55
End: 2025-05-28
Payer: MEDICAID

## 2025-05-28 NOTE — TELEPHONE ENCOUNTER
----- Message from Arlene sent at 5/28/2025  1:23 PM CDT -----  Regarding: Scheduling Request ([facility] )  Contact: Lyndsay  ([facility] )  SCHEDULING/REQUESTAppt Type: ESTDate/Time Preference: Next availTreating Provider: Andrea Caller Name: Lyndsay ([facility] )Contact Preference: [phone]/Notes: Pt had a graph and was supposed to get a f/u appt. Would like a call back to further discuss scheduling option s

## 2025-05-28 NOTE — TELEPHONE ENCOUNTER
Spoke to the pt, confirmed to stop ozempic and do not take his dose today , surgery is 6/5 arrival time is 9am

## 2025-05-30 ENCOUNTER — RESULTS FOLLOW-UP (OUTPATIENT)
Dept: EMERGENCY MEDICINE | Facility: HOSPITAL | Age: 55
End: 2025-05-30

## 2025-05-30 DIAGNOSIS — B19.20 HEPATITIS C TEST POSITIVE: Primary | ICD-10-CM

## 2025-06-05 ENCOUNTER — ANESTHESIA (OUTPATIENT)
Dept: SURGERY | Facility: HOSPITAL | Age: 55
End: 2025-06-05

## 2025-06-05 ENCOUNTER — HOSPITAL ENCOUNTER (OUTPATIENT)
Facility: HOSPITAL | Age: 55
Discharge: HOSPICE | End: 2025-06-05
Attending: STUDENT IN AN ORGANIZED HEALTH CARE EDUCATION/TRAINING PROGRAM | Admitting: STUDENT IN AN ORGANIZED HEALTH CARE EDUCATION/TRAINING PROGRAM
Payer: MEDICAID

## 2025-06-05 ENCOUNTER — ANESTHESIA EVENT (OUTPATIENT)
Dept: SURGERY | Facility: HOSPITAL | Age: 55
End: 2025-06-05

## 2025-06-05 ENCOUNTER — HOSPITAL ENCOUNTER (OUTPATIENT)
Facility: HOSPITAL | Age: 55
Discharge: HOME | End: 2025-06-06
Attending: EMERGENCY MEDICINE | Admitting: INTERNAL MEDICINE
Payer: MEDICAID

## 2025-06-05 VITALS
RESPIRATION RATE: 18 BRPM | SYSTOLIC BLOOD PRESSURE: 198 MMHG | HEART RATE: 62 BPM | DIASTOLIC BLOOD PRESSURE: 102 MMHG | TEMPERATURE: 98 F | OXYGEN SATURATION: 97 %

## 2025-06-05 DIAGNOSIS — R06.02 SOB (SHORTNESS OF BREATH): Primary | ICD-10-CM

## 2025-06-05 DIAGNOSIS — R06.02 SHORTNESS OF BREATH: ICD-10-CM

## 2025-06-05 DIAGNOSIS — R07.9 CHEST PAIN: ICD-10-CM

## 2025-06-05 DIAGNOSIS — N18.6 ESRD ON HEMODIALYSIS: ICD-10-CM

## 2025-06-05 DIAGNOSIS — I50.32 CHRONIC HEART FAILURE WITH PRESERVED EJECTION FRACTION (HFPEF): ICD-10-CM

## 2025-06-05 DIAGNOSIS — N18.6 END STAGE RENAL DISEASE: ICD-10-CM

## 2025-06-05 DIAGNOSIS — J90 PLEURAL EFFUSION ON LEFT: ICD-10-CM

## 2025-06-05 DIAGNOSIS — Z99.2 ESRD ON HEMODIALYSIS: ICD-10-CM

## 2025-06-05 LAB
ABSOLUTE EOSINOPHIL (OHS): 0.38 K/UL
ABSOLUTE MONOCYTE (OHS): 0.67 K/UL (ref 0.3–1)
ABSOLUTE NEUTROPHIL COUNT (OHS): 5.14 K/UL (ref 1.8–7.7)
ALBUMIN SERPL BCP-MCNC: 2.7 G/DL (ref 3.5–5.2)
ALP SERPL-CCNC: 66 UNIT/L (ref 40–150)
ALT SERPL W/O P-5'-P-CCNC: 19 UNIT/L (ref 10–44)
ANION GAP (OHS): 9 MMOL/L (ref 8–16)
AST SERPL-CCNC: 23 UNIT/L (ref 11–45)
BASOPHILS # BLD AUTO: 0.05 K/UL
BASOPHILS NFR BLD AUTO: 0.6 %
BILIRUB SERPL-MCNC: 0.3 MG/DL (ref 0.1–1)
BNP SERPL-MCNC: 296 PG/ML (ref 0–99)
BUN SERPL-MCNC: 23 MG/DL (ref 6–20)
CALCIUM SERPL-MCNC: 8.3 MG/DL (ref 8.7–10.5)
CHLORIDE SERPL-SCNC: 104 MMOL/L (ref 95–110)
CO2 SERPL-SCNC: 26 MMOL/L (ref 23–29)
CREAT SERPL-MCNC: 4.5 MG/DL (ref 0.5–1.4)
EAG (OHS): 137 MG/DL (ref 68–131)
ERYTHROCYTE [DISTWIDTH] IN BLOOD BY AUTOMATED COUNT: 13.2 % (ref 11.5–14.5)
GFR SERPLBLD CREATININE-BSD FMLA CKD-EPI: 15 ML/MIN/1.73/M2
GLUCOSE SERPL-MCNC: 120 MG/DL (ref 70–110)
HBA1C MFR BLD: 6.4 % (ref 4–5.6)
HCT VFR BLD AUTO: 35 % (ref 40–54)
HGB BLD-MCNC: 11.4 GM/DL (ref 14–18)
IMM GRANULOCYTES # BLD AUTO: 0.02 K/UL (ref 0–0.04)
IMM GRANULOCYTES NFR BLD AUTO: 0.2 % (ref 0–0.5)
LYMPHOCYTES # BLD AUTO: 1.81 K/UL (ref 1–4.8)
MAGNESIUM SERPL-MCNC: 2 MG/DL (ref 1.6–2.6)
MCH RBC QN AUTO: 31.8 PG (ref 27–31)
MCHC RBC AUTO-ENTMCNC: 32.6 G/DL (ref 32–36)
MCV RBC AUTO: 98 FL (ref 82–98)
NUCLEATED RBC (/100WBC) (OHS): 0 /100 WBC
OHS QRS DURATION: 88 MS
OHS QTC CALCULATION: 438 MS
PHOSPHATE SERPL-MCNC: 3.5 MG/DL (ref 2.7–4.5)
PLATELET # BLD AUTO: 234 K/UL (ref 150–450)
PMV BLD AUTO: 10.4 FL (ref 9.2–12.9)
POCT GLUCOSE: 128 MG/DL (ref 70–110)
POCT GLUCOSE: 130 MG/DL (ref 70–110)
POCT GLUCOSE: 138 MG/DL (ref 70–110)
POCT GLUCOSE: 96 MG/DL (ref 70–110)
POTASSIUM SERPL-SCNC: 5.1 MMOL/L (ref 3.5–5.1)
PROT SERPL-MCNC: 6.3 GM/DL (ref 6–8.4)
RBC # BLD AUTO: 3.58 M/UL (ref 4.6–6.2)
RELATIVE EOSINOPHIL (OHS): 4.7 %
RELATIVE LYMPHOCYTE (OHS): 22.4 % (ref 18–48)
RELATIVE MONOCYTE (OHS): 8.3 % (ref 4–15)
RELATIVE NEUTROPHIL (OHS): 63.8 % (ref 38–73)
SARS-COV-2 RDRP RESP QL NAA+PROBE: NEGATIVE
SODIUM SERPL-SCNC: 139 MMOL/L (ref 136–145)
TROPONIN I SERPL HS-MCNC: 28 NG/L
WBC # BLD AUTO: 8.07 K/UL (ref 3.9–12.7)

## 2025-06-05 PROCEDURE — 85025 COMPLETE CBC W/AUTO DIFF WBC: CPT

## 2025-06-05 PROCEDURE — 25000003 PHARM REV CODE 250

## 2025-06-05 PROCEDURE — G0378 HOSPITAL OBSERVATION PER HR: HCPCS

## 2025-06-05 PROCEDURE — 83036 HEMOGLOBIN GLYCOSYLATED A1C: CPT

## 2025-06-05 PROCEDURE — 83735 ASSAY OF MAGNESIUM: CPT

## 2025-06-05 PROCEDURE — 96372 THER/PROPH/DIAG INJ SC/IM: CPT

## 2025-06-05 PROCEDURE — 84155 ASSAY OF PROTEIN SERUM: CPT

## 2025-06-05 PROCEDURE — 84484 ASSAY OF TROPONIN QUANT: CPT

## 2025-06-05 PROCEDURE — 99214 OFFICE O/P EST MOD 30 MIN: CPT | Mod: ,,,

## 2025-06-05 PROCEDURE — 83880 ASSAY OF NATRIURETIC PEPTIDE: CPT

## 2025-06-05 PROCEDURE — 63600175 PHARM REV CODE 636 W HCPCS

## 2025-06-05 PROCEDURE — 82962 GLUCOSE BLOOD TEST: CPT | Performed by: STUDENT IN AN ORGANIZED HEALTH CARE EDUCATION/TRAINING PROGRAM

## 2025-06-05 PROCEDURE — U0002 COVID-19 LAB TEST NON-CDC: HCPCS | Performed by: INTERNAL MEDICINE

## 2025-06-05 PROCEDURE — 84100 ASSAY OF PHOSPHORUS: CPT

## 2025-06-05 PROCEDURE — 93005 ELECTROCARDIOGRAM TRACING: CPT

## 2025-06-05 PROCEDURE — 93010 ELECTROCARDIOGRAM REPORT: CPT | Mod: ,,, | Performed by: INTERNAL MEDICINE

## 2025-06-05 PROCEDURE — 99285 EMERGENCY DEPT VISIT HI MDM: CPT | Mod: 25

## 2025-06-05 PROCEDURE — 82962 GLUCOSE BLOOD TEST: CPT

## 2025-06-05 PROCEDURE — 96374 THER/PROPH/DIAG INJ IV PUSH: CPT

## 2025-06-05 RX ORDER — MUPIROCIN 20 MG/G
OINTMENT TOPICAL
Status: DISCONTINUED | OUTPATIENT
Start: 2025-06-05 | End: 2025-06-05 | Stop reason: HOSPADM

## 2025-06-05 RX ORDER — NALOXONE HCL 0.4 MG/ML
0.02 VIAL (ML) INJECTION
Status: DISCONTINUED | OUTPATIENT
Start: 2025-06-05 | End: 2025-06-06 | Stop reason: HOSPADM

## 2025-06-05 RX ORDER — IBUPROFEN 200 MG
16 TABLET ORAL
Status: DISCONTINUED | OUTPATIENT
Start: 2025-06-05 | End: 2025-06-06 | Stop reason: HOSPADM

## 2025-06-05 RX ORDER — PANTOPRAZOLE SODIUM 40 MG/1
40 TABLET, DELAYED RELEASE ORAL DAILY
Status: DISCONTINUED | OUTPATIENT
Start: 2025-06-05 | End: 2025-06-06 | Stop reason: HOSPADM

## 2025-06-05 RX ORDER — HYDRALAZINE HYDROCHLORIDE 25 MG/1
25 TABLET, FILM COATED ORAL
Status: COMPLETED | OUTPATIENT
Start: 2025-06-05 | End: 2025-06-05

## 2025-06-05 RX ORDER — INSULIN ASPART 100 [IU]/ML
0-5 INJECTION, SOLUTION INTRAVENOUS; SUBCUTANEOUS
Status: DISCONTINUED | OUTPATIENT
Start: 2025-06-05 | End: 2025-06-06 | Stop reason: HOSPADM

## 2025-06-05 RX ORDER — SODIUM CHLORIDE 0.9 % (FLUSH) 0.9 %
10 SYRINGE (ML) INJECTION EVERY 12 HOURS PRN
Status: DISCONTINUED | OUTPATIENT
Start: 2025-06-05 | End: 2025-06-06 | Stop reason: HOSPADM

## 2025-06-05 RX ORDER — SODIUM CHLORIDE 0.9 % (FLUSH) 0.9 %
10 SYRINGE (ML) INJECTION
Status: DISCONTINUED | OUTPATIENT
Start: 2025-06-05 | End: 2025-06-05 | Stop reason: HOSPADM

## 2025-06-05 RX ORDER — CARVEDILOL 12.5 MG/1
12.5 TABLET ORAL 2 TIMES DAILY
Status: DISCONTINUED | OUTPATIENT
Start: 2025-06-05 | End: 2025-06-06 | Stop reason: HOSPADM

## 2025-06-05 RX ORDER — TORSEMIDE 20 MG/1
20 TABLET ORAL DAILY
Status: DISCONTINUED | OUTPATIENT
Start: 2025-06-05 | End: 2025-06-05

## 2025-06-05 RX ORDER — DIPHENHYDRAMINE HCL 25 MG
25 CAPSULE ORAL EVERY 4 HOURS PRN
Status: DISCONTINUED | OUTPATIENT
Start: 2025-06-05 | End: 2025-06-06 | Stop reason: HOSPADM

## 2025-06-05 RX ORDER — INSULIN ASPART 100 [IU]/ML
4 INJECTION, SOLUTION INTRAVENOUS; SUBCUTANEOUS
Status: DISCONTINUED | OUTPATIENT
Start: 2025-06-05 | End: 2025-06-06

## 2025-06-05 RX ORDER — METOLAZONE 2.5 MG/1
1 TABLET ORAL
COMMUNITY
Start: 2025-06-04

## 2025-06-05 RX ORDER — NIFEDIPINE 60 MG/1
60 TABLET, EXTENDED RELEASE ORAL 2 TIMES DAILY
COMMUNITY

## 2025-06-05 RX ORDER — SEVELAMER CARBONATE 800 MG/1
1600 TABLET, FILM COATED ORAL
Status: DISCONTINUED | OUTPATIENT
Start: 2025-06-05 | End: 2025-06-06 | Stop reason: HOSPADM

## 2025-06-05 RX ORDER — LIDOCAINE HYDROCHLORIDE 10 MG/ML
1 INJECTION, SOLUTION EPIDURAL; INFILTRATION; INTRACAUDAL; PERINEURAL ONCE
Status: DISCONTINUED | OUTPATIENT
Start: 2025-06-05 | End: 2025-06-05 | Stop reason: HOSPADM

## 2025-06-05 RX ORDER — SODIUM CHLORIDE 9 MG/ML
INJECTION, SOLUTION INTRAVENOUS ONCE
OUTPATIENT
Start: 2025-06-05 | End: 2025-06-05

## 2025-06-05 RX ORDER — IBUPROFEN 200 MG
24 TABLET ORAL
Status: DISCONTINUED | OUTPATIENT
Start: 2025-06-05 | End: 2025-06-06 | Stop reason: HOSPADM

## 2025-06-05 RX ORDER — CEFAZOLIN 2 G/1
2 INJECTION, POWDER, FOR SOLUTION INTRAMUSCULAR; INTRAVENOUS
Status: DISCONTINUED | OUTPATIENT
Start: 2025-06-05 | End: 2025-06-05 | Stop reason: HOSPADM

## 2025-06-05 RX ORDER — TAMSULOSIN HYDROCHLORIDE 0.4 MG/1
0.4 CAPSULE ORAL DAILY
Status: DISCONTINUED | OUTPATIENT
Start: 2025-06-05 | End: 2025-06-06 | Stop reason: HOSPADM

## 2025-06-05 RX ORDER — FUROSEMIDE 10 MG/ML
80 INJECTION INTRAMUSCULAR; INTRAVENOUS
Status: COMPLETED | OUTPATIENT
Start: 2025-06-05 | End: 2025-06-05

## 2025-06-05 RX ORDER — TORSEMIDE 20 MG/1
20 TABLET ORAL DAILY
Status: DISCONTINUED | OUTPATIENT
Start: 2025-06-06 | End: 2025-06-06 | Stop reason: HOSPADM

## 2025-06-05 RX ORDER — ESCITALOPRAM OXALATE 10 MG/1
10 TABLET ORAL DAILY
Status: DISCONTINUED | OUTPATIENT
Start: 2025-06-05 | End: 2025-06-06 | Stop reason: HOSPADM

## 2025-06-05 RX ORDER — GLUCAGON 1 MG
1 KIT INJECTION
Status: DISCONTINUED | OUTPATIENT
Start: 2025-06-05 | End: 2025-06-06 | Stop reason: HOSPADM

## 2025-06-05 RX ORDER — NIFEDIPINE 30 MG/1
60 TABLET, EXTENDED RELEASE ORAL DAILY
Status: DISCONTINUED | OUTPATIENT
Start: 2025-06-05 | End: 2025-06-06 | Stop reason: HOSPADM

## 2025-06-05 RX ORDER — HEPARIN SODIUM 5000 [USP'U]/ML
5000 INJECTION, SOLUTION INTRAVENOUS; SUBCUTANEOUS EVERY 8 HOURS
Status: DISCONTINUED | OUTPATIENT
Start: 2025-06-05 | End: 2025-06-06 | Stop reason: HOSPADM

## 2025-06-05 RX ORDER — INSULIN GLARGINE 100 [IU]/ML
10 INJECTION, SOLUTION SUBCUTANEOUS NIGHTLY
Status: DISCONTINUED | OUTPATIENT
Start: 2025-06-05 | End: 2025-06-06 | Stop reason: HOSPADM

## 2025-06-05 RX ADMIN — INSULIN GLARGINE 10 UNITS: 100 INJECTION, SOLUTION SUBCUTANEOUS at 09:06

## 2025-06-05 RX ADMIN — INSULIN ASPART 4 UNITS: 100 INJECTION, SOLUTION INTRAVENOUS; SUBCUTANEOUS at 06:06

## 2025-06-05 RX ADMIN — PANTOPRAZOLE SODIUM 40 MG: 40 TABLET, DELAYED RELEASE ORAL at 02:06

## 2025-06-05 RX ADMIN — NIFEDIPINE 60 MG: 60 TABLET, FILM COATED, EXTENDED RELEASE ORAL at 06:06

## 2025-06-05 RX ADMIN — SACUBITRIL AND VALSARTAN 1 TABLET: 97; 103 TABLET, FILM COATED ORAL at 08:06

## 2025-06-05 RX ADMIN — HYDRALAZINE HYDROCHLORIDE 25 MG: 25 TABLET ORAL at 12:06

## 2025-06-05 RX ADMIN — TAMSULOSIN HYDROCHLORIDE 0.4 MG: 0.4 CAPSULE ORAL at 02:06

## 2025-06-05 RX ADMIN — HEPARIN SODIUM 5000 UNITS: 5000 INJECTION INTRAVENOUS; SUBCUTANEOUS at 09:06

## 2025-06-05 RX ADMIN — DIPHENHYDRAMINE HYDROCHLORIDE 25 MG: 25 CAPSULE ORAL at 08:06

## 2025-06-05 RX ADMIN — ESCITALOPRAM OXALATE 10 MG: 5 TABLET, FILM COATED ORAL at 02:06

## 2025-06-05 RX ADMIN — FUROSEMIDE 80 MG: 10 INJECTION, SOLUTION INTRAVENOUS at 10:06

## 2025-06-05 RX ADMIN — CARVEDILOL 12.5 MG: 12.5 TABLET, FILM COATED ORAL at 08:06

## 2025-06-05 RX ADMIN — SEVELAMER CARBONATE 1600 MG: 800 TABLET, FILM COATED ORAL at 06:06

## 2025-06-05 RX ADMIN — SACUBITRIL AND VALSARTAN 1 TABLET: 97; 103 TABLET, FILM COATED ORAL at 11:06

## 2025-06-05 NOTE — SUBJECTIVE & OBJECTIVE
Past Medical History:   Diagnosis Date    CHF (congestive heart failure)     Diabetes mellitus     Diabetes mellitus type I     Hepatitis     Hepatitis C     Hypertension     Renal disorder     Shingles 10/2018    Sleep apnea        Past Surgical History:   Procedure Laterality Date    ESOPHAGOGASTRODUODENOSCOPY N/A 10/11/2024    Procedure: EGD (ESOPHAGOGASTRODUODENOSCOPY);  Surgeon: Benjamin Lacy MD;  Location: [facility] ENDO;  Service: Endoscopy;  Laterality: N/A;    INSERTION, CATHETER, TUNNELED N/A 10/29/2024    Procedure: Insertion,catheter,tunneled;  Surgeon: Angel Malcolm MD;  [facility];  Service: General;  Laterality: N/A;    INSERTION, CATHETER, TUNNELED N/A 11/8/2024    Procedure: Insertion,catheter,tunneled;  Surgeon: Huseyin Romero MD;  [facility];  Service: General;  Laterality: N/A;    INSERTION, GRAFT, ARTERIOVENOUS, UPPER EXTREMITY Left 2/25/2025    Procedure: INSERTION, GRAFT, ARTERIOVENOUS, UPPER EXTREMITY POSSIBLE AVF CREATION;  Surgeon: Luis F Berry MD;  Location: [address];  Service: Vascular;  Laterality: Left;    NO PAST SURGERIES         Review of patient's allergies indicates:  No Known Allergies  Current Facility-Administered Medications   Medication Frequency    carvediloL tablet 12.5 mg BID    dextrose 50% injection 12.5 g PRN    dextrose 50% injection 25 g PRN    EScitalopram oxalate tablet 10 mg Daily    glucagon (human recombinant) injection 1 mg PRN    glucose chewable tablet 16 g PRN    glucose chewable tablet 24 g PRN    insulin aspart U-100 pen 0-5 Units QID (AC + HS) PRN    insulin aspart U-100 pen 4 Units TIDWM    insulin glargine U-100 (Lantus) pen 10 Units QHS    naloxone 0.4 mg/mL injection 0.02 mg PRN    NIFEdipine 24 hr tablet 60 mg Daily    pantoprazole EC tablet 40 mg Daily    sacubitriL-valsartan  mg per tablet 1 tablet BID    sevelamer carbonate tablet 1,600 mg TID WM    sodium chloride 0.9% flush 10 mL Q12H PRN    tamsulosin  "24 hr capsule 0.4 mg Daily    [START ON 6/6/2025] torsemide tablet 20 mg Daily     Current Outpatient Medications   Medication    aspirin (ECOTRIN) 81 MG EC tablet    atorvastatin (LIPITOR) 80 MG tablet    brimonidine 0.2% (ALPHAGAN) 0.2 % Drop    carvediloL (COREG) 12.5 MG tablet    empagliflozin (JARDIANCE) 10 mg tablet    ENTRESTO  mg per tablet    EScitalopram oxalate (LEXAPRO) 10 MG tablet    ezetimibe (ZETIA) 10 mg tablet    insulin aspart U-100 (NOVOLOG) 100 unit/mL (3 mL) InPn pen    metOLazone (ZAROXOLYN) 2.5 MG tablet    NIFEdipine (PROCARDIA-XL) 60 MG (OSM) 24 hr tablet    OZEMPIC 0.25 mg or 0.5 mg (2 mg/3 mL) pen injector    pantoprazole (PROTONIX) 40 MG tablet    sevelamer carbonate (RENVELA) 800 mg Tab    tamsulosin (FLOMAX) 0.4 mg Cap    torsemide (DEMADEX) 20 MG Tab    acetaminophen (TYLENOL) 650 MG TbSR    alcohol swabs (ALCOHOL WIPES) PadM    blood sugar diagnostic Strp    blood-glucose meter (TRUE METRIX GLUCOSE METER) Misc    glucose 4 GM chewable tablet    insulin glargine U-100, Lantus, (BASAGLAR KWIKPEN U-100 INSULIN) 100 unit/mL (3 mL) InPn pen    insulin syringe-needle U-100 (INSULIN SYRINGE) 1/2 mL 28 gauge x 1/2" Syrg    lancets 32 gauge Misc    pen needle, diabetic (NOVOTWIST) 32 gauge x 1/5" Ndle    SURE COMFORT INSULIN SYRINGE 0.3 mL 31 gauge x 5/16" Syrg     Family History       Problem Relation (Age of Onset)    Diabetes Mother    Lung cancer Father          Tobacco Use    Smoking status: Former     Current packs/day: 1.00     Types: Cigarettes    Smokeless tobacco: Former   Substance and Sexual Activity    Alcohol use: No    Drug use: Not Currently     Types: Methamphetamines     Comment: Patient stated "I'm use IV drug user with meth in the last 2 weeks."    Sexual activity: Not Currently     Review of Systems   Constitutional:  Negative for fever.   Respiratory:  Positive for cough and shortness of breath.    Cardiovascular:  Positive for chest pain (with breathing). "   Gastrointestinal:  Negative for diarrhea, nausea and vomiting.     Objective:     Vital Signs (Most Recent):  Temp: 97.5 °F (36.4 °C) (06/05/25 0929)  Pulse: 97 (06/05/25 1500)  Resp: 18 (06/05/25 1334)  BP: (!) 207/86 (06/05/25 1500)  SpO2: (!) 92 % (06/05/25 1500) Vital Signs (24h Range):  Temp:  [97.5 °F (36.4 °C)-98.1 °F (36.7 °C)] 97.5 °F (36.4 °C)  Pulse:  [60-97] 97  Resp:  [15-18] 18  SpO2:  [91 %-97 %] 92 %  BP: (174-223)/() 207/86     Weight: 83.9 kg (185 lb) (06/05/25 0929)  Body mass index is 28.13 kg/m².  Body surface area is 2.01 meters squared.    No intake/output data recorded.     Physical Exam  Vitals and nursing note reviewed.   Constitutional:       General: He is not in acute distress.     Appearance: He is not ill-appearing.   HENT:      Head: Normocephalic.   Eyes:      Conjunctiva/sclera: Conjunctivae normal.   Cardiovascular:      Rate and Rhythm: Normal rate.   Pulmonary:      Effort: Pulmonary effort is normal. No respiratory distress.      Comments: L lung decreased breath sounds, R lung clear  Abdominal:      General: There is no distension.      Palpations: Abdomen is soft.   Musculoskeletal:      Right lower leg: No edema.      Left lower leg: No edema.   Skin:     General: Skin is warm and dry.      Coloration: Skin is not jaundiced.   Neurological:      General: No focal deficit present.      Mental Status: He is alert.   Psychiatric:         Mood and Affect: Mood normal.          Significant Labs:  CBC:   Recent Labs   Lab 06/05/25  1015   WBC 8.07   RBC 3.58*   HGB 11.4*   HCT 35.0*      MCV 98   MCH 31.8*   MCHC 32.6     CMP:   Recent Labs   Lab 06/05/25  1015   *   CALCIUM 8.3*   ALBUMIN 2.7*   PROT 6.3      K 5.1   CO2 26      BUN 23*   CREATININE 4.5*   ALKPHOS 66   ALT 19   AST 23   BILITOT 0.3     All labs within the past 24 hours have been reviewed.

## 2025-06-05 NOTE — HPI
Patient is a 54-year-old male with past medical history of diabetes, ESRD on hemodialysis, chronic pleural effusion and sleep apnea that presents to emergency department with shortness of breath. Patient was scheduled to get his left upper extremity fistula electively repaired with vascular surgery. When he arrived to the preoperative staging he had admitted shortness of breath and was hypertensive. He was instructed to go to the ER for evaluation. In the ED, denied any chest pain, nausea, vomiting, fevers or chills. He does admit coughing with increased sputum production over the past week. Reports SOB has progressively worsened over the past few days. Last HD yesterday, denies missing any HD sessions.     In the ED, hypertensive, otherwise HDS. Breathing comfortably on room air. CBC with hgb 11, no leukocytosis. CMP with Na 139, K 5.1, CO2 26, BUN 23, Cr 4.8. . Trop 28. CXR with L pleural effusion, R lung clear. Nephrology consulted for volume overload in the setting of ESRD.

## 2025-06-05 NOTE — ED TRIAGE NOTES
Onofre Mayo Ceballos, a 54 y.o. male presents to the ED w/ complaint of came from [facility], pt was scheduled for dialysis fistula, was SOB sent down for possible fluid overload    Triage note:  Chief Complaint   Patient presents with    Shortness of Breath     Sent down from pre op for fluid overload/SOB. Pt was supposed to be getting dialysis fistula in left arm.      Review of patient's allergies indicates:  No Known Allergies        APPEARANCE: awake and alert in NAD. PAIN  0/10  SKIN: warm, dry and intact. No breakdown or bruising.  MUSCULOSKELETAL: Patient moving all extremities spontaneously, no obvious swelling or deformities noted. Ambulates independently.  RESPIRATORY: endorses shortness of breath.Respirations unlabored.   CARDIAC: Denies CP, 2+ distal pulses; no peripheral edema  ABDOMEN: S/ND/NT, Denies nausea  : voids spontaneously, denies difficulty  Neurologic: AAO x 4; follows commands equal strength in all extremities; denies numbness/tingling. Denies dizziness

## 2025-06-05 NOTE — ASSESSMENT & PLAN NOTE
Creatine stable for now. BMP reviewed- noted Estimated Creatinine Clearance: 19.6 mL/min (A) (based on SCr of 4.5 mg/dL (H)). according to latest data. Based on current GFR, CKD stage is end stage.  Monitor UOP and serial BMP and adjust therapy as needed. Renally dose meds. Avoid nephrotoxic medications and procedures.

## 2025-06-05 NOTE — SUBJECTIVE & OBJECTIVE
Past Medical History:   Diagnosis Date    CHF (congestive heart failure)     Diabetes mellitus     Diabetes mellitus type I     Hepatitis     Hepatitis C     Hypertension     Renal disorder     Shingles 10/2018    Sleep apnea        Past Surgical History:   Procedure Laterality Date    ESOPHAGOGASTRODUODENOSCOPY N/A 10/11/2024    Procedure: EGD (ESOPHAGOGASTRODUODENOSCOPY);  Surgeon: Benjamin Lacy MD;  Location: [facility] ENDO;  Service: Endoscopy;  Laterality: N/A;    INSERTION, CATHETER, TUNNELED N/A 10/29/2024    Procedure: Insertion,catheter,tunneled;  Surgeon: Angel Malcolm MD;  Location: [facility] OR;  Service: General;  Laterality: N/A;    INSERTION, CATHETER, TUNNELED N/A 11/8/2024    Procedure: Insertion,catheter,tunneled;  Surgeon: Huseyin Romero MD;  Location: [facility] OR;  Service: General;  Laterality: N/A;    INSERTION, GRAFT, ARTERIOVENOUS, UPPER EXTREMITY Left 2/25/2025    Procedure: INSERTION, GRAFT, ARTERIOVENOUS, UPPER EXTREMITY POSSIBLE AVF CREATION;  Surgeon: Luis F Berry MD;  Location: [address];  Service: Vascular;  Laterality: Left;    NO PAST SURGERIES         Review of patient's allergies indicates:  No Known Allergies    Current Facility-Administered Medications on File Prior to Encounter   Medication    [DISCONTINUED] ceFAZolin 2 g    [DISCONTINUED] LIDOcaine (PF) 10 mg/ml (1%) injection 10 mg    [DISCONTINUED] mupirocin 2 % ointment    [DISCONTINUED] sodium chloride 0.9% flush 10 mL     Current Outpatient Medications on File Prior to Encounter   Medication Sig    aspirin (ECOTRIN) 81 MG EC tablet Take 81 mg by mouth.    atorvastatin (LIPITOR) 80 MG tablet Take 1 tablet by mouth every evening.    brimonidine 0.2% (ALPHAGAN) 0.2 % Drop Place into both eyes.    carvediloL (COREG) 12.5 MG tablet Take 12.5 mg by mouth 2 (two) times daily.    empagliflozin (JARDIANCE) 10 mg tablet Take 10 mg by mouth once daily.    ENTRESTO  mg per tablet Take 1 tablet by mouth 2 (two)  "times daily.    insulin aspart U-100 (NOVOLOG) 100 unit/mL (3 mL) InPn pen Inject 10 Units into the skin 3 (three) times daily with meals.    OZEMPIC 0.25 mg or 0.5 mg (2 mg/3 mL) pen injector Inject 0.5 mg into the skin every 7 days.    acetaminophen (TYLENOL) 650 MG TbSR Take 1 tablet (650 mg total) by mouth every 8 (eight) hours as needed (pain).    alcohol swabs (ALCOHOL WIPES) PadM Use topical 4 x daily for insulin    blood sugar diagnostic Strp 1 each by Misc.(Non-Drug; Combo Route) route 3 (three) times daily before meals.    blood-glucose meter (TRUE METRIX GLUCOSE METER) Misc 1 each by Misc.(Non-Drug; Combo Route) route once daily.    EScitalopram oxalate (LEXAPRO) 10 MG tablet Take 10 mg by mouth.    ezetimibe (ZETIA) 10 mg tablet Take 1 tablet (10 mg total) by mouth every evening.    glucose 4 GM chewable tablet Take 4 tablets (16 g total) by mouth as needed for Low blood sugar.    insulin glargine U-100, Lantus, (BASAGLAR KWIKPEN U-100 INSULIN) 100 unit/mL (3 mL) InPn pen Inject 10 Units into the skin every evening. Within 15 minutes of eating (Patient taking differently: Inject 30 Units into the skin every evening. Within 15 minutes of eating)    insulin syringe-needle U-100 (INSULIN SYRINGE) 1/2 mL 28 gauge x 1/2" Syrg To use with meal time insulin tid    lancets 32 gauge Misc Inject 1 lancet into the skin 3 (three) times daily before meals.    pantoprazole (PROTONIX) 40 MG tablet Take 1 tablet (40 mg total) by mouth once daily.    pen needle, diabetic (NOVOTWIST) 32 gauge x 1/5" Ndle To use nightly with levemir    sevelamer carbonate (RENVELA) 800 mg Tab Take 2 tablets (1,600 mg total) by mouth 3 (three) times daily with meals.    SURE COMFORT INSULIN SYRINGE 0.3 mL 31 gauge x 5/16" Syrg Inject 1 each into the skin 4 (four) times daily before meals and nightly.    tamsulosin (FLOMAX) 0.4 mg Cap Take 0.4 mg by mouth once daily.    torsemide (DEMADEX) 20 MG Tab Take 20 mg by mouth once daily.     Family " "History       Problem Relation (Age of Onset)    Diabetes Mother    Lung cancer Father          Tobacco Use    Smoking status: Former     Current packs/day: 1.00     Types: Cigarettes    Smokeless tobacco: Former   Substance and Sexual Activity    Alcohol use: No    Drug use: Not Currently     Types: Methamphetamines     Comment: Patient stated "I'm use IV drug user with meth in the last 2 weeks."    Sexual activity: Not Currently     Review of Systems   Constitutional:  Negative for chills and fever.   HENT:  Negative for rhinorrhea.    Respiratory:  Positive for shortness of breath.    Cardiovascular:  Positive for chest pain. Negative for palpitations.   Gastrointestinal:  Negative for abdominal pain, diarrhea, nausea and vomiting.   Genitourinary:  Negative for dysuria and hematuria.   Musculoskeletal:  Negative for back pain and neck pain.   Neurological:  Negative for syncope, light-headedness and headaches.     Objective:     Vital Signs (Most Recent):  Temp: 97.5 °F (36.4 °C) (06/05/25 0929)  Pulse: 64 (06/05/25 1400)  Resp: 18 (06/05/25 1334)  BP: (!) 213/100 (06/05/25 1400)  SpO2: 95 % (06/05/25 1400) Vital Signs (24h Range):  Temp:  [97.5 °F (36.4 °C)-98.1 °F (36.7 °C)] 97.5 °F (36.4 °C)  Pulse:  [60-66] 64  Resp:  [15-18] 18  SpO2:  [91 %-97 %] 95 %  BP: (174-223)/() 213/100     Weight: 83.9 kg (185 lb)  Body mass index is 28.13 kg/m².     Physical Exam  Constitutional:       General: He is not in acute distress.     Appearance: He is not toxic-appearing.   HENT:      Head: Normocephalic and atraumatic.      Right Ear: External ear normal.      Left Ear: External ear normal.   Eyes:      General:         Right eye: No discharge.         Left eye: No discharge.   Cardiovascular:      Rate and Rhythm: Normal rate and regular rhythm.      Comments: Left chest port   Pulmonary:      Effort: No respiratory distress.      Breath sounds: Wheezing (audible) present.      Comments: Decreased lung sounds " throughout left hemithorax   Abdominal:      General: There is no distension.      Tenderness: There is no abdominal tenderness.   Musculoskeletal:         General: Normal range of motion.   Skin:     General: Skin is warm and dry.   Neurological:      General: No focal deficit present.      Mental Status: He is alert and oriented to person, place, and time.                Significant Labs: All pertinent labs within the past 24 hours have been reviewed.  Recent Lab Results         06/05/25  1015   06/05/25  0935   06/05/25  0855        Albumin 2.7           ALP 66           ALT 19           Anion Gap 9           AST 23  Comment: *Result may be interfered by visible hemolysis           Baso # 0.05           Basophil % 0.6           BILIRUBIN TOTAL 0.3  Comment: For infants and newborns, interpretation of results should be based   on gestational age, weight and in agreement with clinical   observations.    Premature Infant recommended reference ranges:   0-24 hours:  <8.0 mg/dL   24-48 hours: <12.0 mg/dL   3-5 days:    <15.0 mg/dL   6-29 days:   <15.0 mg/dL             Comment: Values of less than 100 pg/ml are consistent with non-CHF populations.            BUN 23           Calcium 8.3           Chloride 104           CO2 26           Creatinine 4.5           eGFR 15  Comment: Estimated GFR calculated using the CKD-EPI creatinine (2021) equation.           Eos # 0.38           Eos % 4.7           Glucose 120           Gran # (ANC) 5.14           Hematocrit 35.0           Hemoglobin 11.4           Immature Grans (Abs) 0.02  Comment: Mild elevation in immature granulocytes is non specific and can be seen in a variety of conditions including stress response, acute inflammation, trauma and pregnancy. Correlation with other laboratory and clinical findings is essential.           Immature Granulocytes 0.2           Lymph # 1.81           Lymph % 22.4           Magnesium  2.0           MCH 31.8           MCHC 32.6            MCV 98           Mono # 0.67           Mono % 8.3           MPV 10.4           Neut % 63.8           nRBC 0           QRS Duration   88         OHS QTC Calculation   438         Platelet Count 234           POCT Glucose     130       Potassium 5.1           PROTEIN TOTAL 6.3           RBC 3.58           RDW 13.2           Sodium 139           Troponin I High Sensitivity 28           WBC 8.07                   Significant Imaging: I have reviewed all pertinent imaging results/findings within the past 24 hours.

## 2025-06-05 NOTE — NURSING
3 hours  dialysis treatment completed . 1.5L UF removed . Both Lumens of HD catheter were flushed , Normal saline  locked and wrapped  with tape and gauze .   Central line dressing  changed  .     Report  given to primary .

## 2025-06-05 NOTE — ASSESSMENT & PLAN NOTE
-suspect more 2/2 pleural effusion than pulmonary edema based on CXR findings, agree with pulm consult for thoracentesis as HD poor modality for removing third spaced fluid

## 2025-06-05 NOTE — ASSESSMENT & PLAN NOTE
Patient's FSGs are controlled on current medication regimen.  Last A1c reviewed-   Lab Results   Component Value Date    HGBA1C 7.2 (H) 11/06/2024     Most recent fingerstick glucose reviewed-   Recent Labs   Lab 06/05/25  0855   POCTGLUCOSE 130*     Current correctional scale  Low  Maintain anti-hyperglycemic dose as follows-   Antihyperglycemics (From admission, onward)      Start     Stop Route Frequency Ordered    06/05/25 2100  insulin glargine U-100 (Lantus) pen 10 Units         -- SubQ Nightly 06/05/25 1456    06/05/25 1645  insulin aspart U-100 pen 4 Units         -- SubQ 3 times daily with meals 06/05/25 1456    06/05/25 1453  insulin aspart U-100 pen 0-5 Units         -- SubQ Before meals & nightly PRN 06/05/25 1355          Hold Oral hypoglycemics while patient is in the hospital.

## 2025-06-05 NOTE — ASSESSMENT & PLAN NOTE
Nephrology History  -Outpatient HD unit: [facility]  -Nephrologist: Junior  -HD tx days: MWF  -HD tx time: 3:45  -Last HD tx: 6/4/25  -HD access: TDC  -HD modality: iHD  -Residual urine: +  -EDW:  85.5 kg    Plan/Recommendations  ESRD on HD:  -Electrolytes stable, breathing comfortably on room air. Will plan for HD this afternoon for volume management in the setting of hypertension, SOB.   -Agree with pulmonology consult for thoracentesis, HD is poor modality for removing third space fluid   -Will continue iHD thrice weekly unless emergent indication arises  -Strict I&Os  -Pre & post HD weight  Anemia in ESRD  -Hgb goal 10-11, hgb at goal  Mineral Bone Disease in ESRD  -Check phos, may continue OP binders  -Renal diet if not NPO

## 2025-06-05 NOTE — ASSESSMENT & PLAN NOTE
Patient's blood pressure range in the last 24 hours was: BP  Min: 174/83  Max: 223/98.The patient's inpatient anti-hypertensive regimen is listed below:  Current Antihypertensives  carvediloL tablet 12.5 mg, 2 times daily, Oral  torsemide tablet 20 mg, Daily, Oral  NIFEdipine 24 hr tablet 60 mg, Daily, Oral  , Every Mon, Thurs, Oral  , 2 times daily, Oral    Plan  - will continue to titrate the medications listed above

## 2025-06-05 NOTE — ASSESSMENT & PLAN NOTE
Nephrology History  -Outpatient HD unit: [facility]  -Nephrologist: Junior  -HD tx days: MWF  -HD tx time: 3:45  -Last HD tx: 6/4/25  -HD access: TDC  -HD modality: iHD  -Residual urine: +  -EDW:  82 kg    Plan/Recommendations  ESRD on HD:  -Electrolytes stable, breathing comfortably on room air. Will plan for HD this afternoon for volume management in the setting of hypertension, SOB.   -Agree with pulmonology consult for thoracentesis, HD is poor modality for removing third space fluid   -Will continue iHD thrice weekly unless emergent indication arises  -Strict I&Os  -Pre & post HD weight  Anemia in ESRD  -Hgb goal 10-11, hgb at goal  Mineral Bone Disease in ESRD  -Check phos, may continue OP binders  -Renal diet if not NPO

## 2025-06-05 NOTE — CONSULTS
[facility] - Emergency Dept  Nephrology  Consult Note    Patient Name: Onofre Ceballos  MRN: [id number]  Admission Date: 6/5/2025  Hospital Length of Stay: 0 days  Attending Provider: Terri Martinez MD   Primary Care Physician: Honey Galaviz NP  Principal Problem:Shortness of breath    Inpatient consult to Nephrology  Consult performed by: Zee Calles PA-C  Consult ordered by: Carter Pham DO  Reason for consult: ESRD        Subjective:     HPI: Patient is a 54-year-old male with past medical history of diabetes, ESRD on hemodialysis, chronic pleural effusion and sleep apnea that presents to emergency department with shortness of breath. Patient was scheduled to get his left upper extremity fistula electively repaired with vascular surgery. When he arrived to the preoperative staging he had admitted shortness of breath and was hypertensive. He was instructed to go to the ER for evaluation. In the ED, denied any chest pain, nausea, vomiting, fevers or chills. He does admit coughing with increased sputum production over the past week. Reports SOB has progressively worsened over the past few days. Last HD yesterday, denies missing any HD sessions.     In the ED, hypertensive, otherwise HDS. Breathing comfortably on room air. CBC with hgb 11, no leukocytosis. CMP with Na 139, K 5.1, CO2 26, BUN 23, Cr 4.8. . Trop 28. CXR with L pleural effusion, R lung clear. Nephrology consulted for volume overload in the setting of ESRD.     Past Medical History:   Diagnosis Date    CHF (congestive heart failure)     Diabetes mellitus     Diabetes mellitus type I     Hepatitis     Hepatitis C     Hypertension     Renal disorder     Shingles 10/2018    Sleep apnea        Past Surgical History:   Procedure Laterality Date    ESOPHAGOGASTRODUODENOSCOPY N/A 10/11/2024    Procedure: EGD (ESOPHAGOGASTRODUODENOSCOPY);  Surgeon: Benjamin Lacy MD;  Location: [facility];  Service: Endoscopy;  Laterality: N/A;     INSERTION, CATHETER, TUNNELED N/A 10/29/2024    Procedure: Insertion,catheter,tunneled;  Surgeon: Angel Malcolm MD;  Location: [facility] OR;  Service: General;  Laterality: N/A;    INSERTION, CATHETER, TUNNELED N/A 11/8/2024    Procedure: Insertion,catheter,tunneled;  Surgeon: Huseyin Romero MD;  Location: [facility] OR;  Service: General;  Laterality: N/A;    INSERTION, GRAFT, ARTERIOVENOUS, UPPER EXTREMITY Left 2/25/2025    Procedure: INSERTION, GRAFT, ARTERIOVENOUS, UPPER EXTREMITY POSSIBLE AVF CREATION;  Surgeon: Luis F Berry MD;  Location: [facility] OR [address] [place] [address];  Service: Vascular;  Laterality: Left;    NO PAST SURGERIES         Review of patient's allergies indicates:  No Known Allergies  Current Facility-Administered Medications   Medication Frequency    carvediloL tablet 12.5 mg BID    dextrose 50% injection 12.5 g PRN    dextrose 50% injection 25 g PRN    EScitalopram oxalate tablet 10 mg Daily    glucagon (human recombinant) injection 1 mg PRN    glucose chewable tablet 16 g PRN    glucose chewable tablet 24 g PRN    insulin aspart U-100 pen 0-5 Units QID (AC + HS) PRN    insulin aspart U-100 pen 4 Units TIDWM    insulin glargine U-100 (Lantus) pen 10 Units QHS    naloxone 0.4 mg/mL injection 0.02 mg PRN    NIFEdipine 24 hr tablet 60 mg Daily    pantoprazole EC tablet 40 mg Daily    sacubitriL-valsartan  mg per tablet 1 tablet BID    sevelamer carbonate tablet 1,600 mg TID WM    sodium chloride 0.9% flush 10 mL Q12H PRN    tamsulosin 24 hr capsule 0.4 mg Daily    [START ON 6/6/2025] torsemide tablet 20 mg Daily     Current Outpatient Medications   Medication    aspirin (ECOTRIN) 81 MG EC tablet    atorvastatin (LIPITOR) 80 MG tablet    brimonidine 0.2% (ALPHAGAN) 0.2 % Drop    carvediloL (COREG) 12.5 MG tablet    empagliflozin (JARDIANCE) 10 mg tablet    ENTRESTO  mg per tablet    EScitalopram oxalate (LEXAPRO) 10 MG tablet    ezetimibe (ZETIA) 10 mg tablet    insulin aspart U-100 (NOVOLOG) 100  "unit/mL (3 mL) InPn pen    metOLazone (ZAROXOLYN) 2.5 MG tablet    NIFEdipine (PROCARDIA-XL) 60 MG (OSM) 24 hr tablet    OZEMPIC 0.25 mg or 0.5 mg (2 mg/3 mL) pen injector    pantoprazole (PROTONIX) 40 MG tablet    sevelamer carbonate (RENVELA) 800 mg Tab    tamsulosin (FLOMAX) 0.4 mg Cap    torsemide (DEMADEX) 20 MG Tab    acetaminophen (TYLENOL) 650 MG TbSR    alcohol swabs (ALCOHOL WIPES) PadM    blood sugar diagnostic Strp    blood-glucose meter (TRUE METRIX GLUCOSE METER) Misc    glucose 4 GM chewable tablet    insulin glargine U-100, Lantus, (BASAGLAR KWIKPEN U-100 INSULIN) 100 unit/mL (3 mL) InPn pen    insulin syringe-needle U-100 (INSULIN SYRINGE) 1/2 mL 28 gauge x 1/2" Syrg    lancets 32 gauge Misc    pen needle, diabetic (NOVOTWIST) 32 gauge x 1/5" Ndle    SURE COMFORT INSULIN SYRINGE 0.3 mL 31 gauge x 5/16" Syrg     Family History       Problem Relation (Age of Onset)    Diabetes Mother    Lung cancer Father          Tobacco Use    Smoking status: Former     Current packs/day: 1.00     Types: Cigarettes    Smokeless tobacco: Former   Substance and Sexual Activity    Alcohol use: No    Drug use: Not Currently     Types: Methamphetamines     Comment: Patient stated "I'm use IV drug user with meth in the last 2 weeks."    Sexual activity: Not Currently     Review of Systems   Constitutional:  Negative for fever.   Respiratory:  Positive for cough and shortness of breath.    Cardiovascular:  Positive for chest pain (with breathing).   Gastrointestinal:  Negative for diarrhea, nausea and vomiting.     Objective:     Vital Signs (Most Recent):  Temp: 97.5 °F (36.4 °C) (06/05/25 0929)  Pulse: 97 (06/05/25 1500)  Resp: 18 (06/05/25 1334)  BP: (!) 207/86 (06/05/25 1500)  SpO2: (!) 92 % (06/05/25 1500) Vital Signs (24h Range):  Temp:  [97.5 °F (36.4 °C)-98.1 °F (36.7 °C)] 97.5 °F (36.4 °C)  Pulse:  [60-97] 97  Resp:  [15-18] 18  SpO2:  [91 %-97 %] 92 %  BP: (174-223)/() 207/86     Weight: 83.9 kg (185 lb) " (06/05/25 0929)  Body mass index is 28.13 kg/m².  Body surface area is 2.01 meters squared.    No intake/output data recorded.     Physical Exam  Vitals and nursing note reviewed.   Constitutional:       General: He is not in acute distress.     Appearance: He is not ill-appearing.   HENT:      Head: Normocephalic.   Eyes:      Conjunctiva/sclera: Conjunctivae normal.   Cardiovascular:      Rate and Rhythm: Normal rate.   Pulmonary:      Effort: Pulmonary effort is normal. No respiratory distress.      Comments: L lung decreased breath sounds, R lung clear  Abdominal:      General: There is no distension.      Palpations: Abdomen is soft.   Musculoskeletal:      Right lower leg: No edema.      Left lower leg: No edema.   Skin:     General: Skin is warm and dry.      Coloration: Skin is not jaundiced.   Neurological:      General: No focal deficit present.      Mental Status: He is alert.   Psychiatric:         Mood and Affect: Mood normal.          Significant Labs:  CBC:   Recent Labs   Lab 06/05/25  1015   WBC 8.07   RBC 3.58*   HGB 11.4*   HCT 35.0*      MCV 98   MCH 31.8*   MCHC 32.6     CMP:   Recent Labs   Lab 06/05/25  1015   *   CALCIUM 8.3*   ALBUMIN 2.7*   PROT 6.3      K 5.1   CO2 26      BUN 23*   CREATININE 4.5*   ALKPHOS 66   ALT 19   AST 23   BILITOT 0.3     All labs within the past 24 hours have been reviewed.    Assessment/Plan:     Pulmonary  * Shortness of breath  -suspect more 2/2 pleural effusion than pulmonary edema based on CXR findings, agree with pulm consult for thoracentesis as HD poor modality for removing third spaced fluid    Pleural effusion on left  -see ESRD    Renal/  ESRD on dialysis  Nephrology History  -Outpatient HD unit: [facility]  -Nephrologist: Junior ShenHD tx days: MWF  -HD tx time: 3:45  -Last HD tx: 6/4/25  -HD access: TDC  -HD modality: iHD  -Residual urine: +  -EDW:  82.0 kg    Plan/Recommendations  ESRD on HD:  -Electrolytes stable, breathing  comfortably on room air. Will plan for HD this afternoon for volume management in the setting of hypertension, SOB, and to optimize for scheduling   -Agree with pulmonology consult for thoracentesis, HD is poor modality for removing third space fluid   -1 L daily fluid restrictions  -Will continue iHD thrice weekly unless emergent indication arises  -Strict I&Os  -Pre & post HD weight  Anemia in ESRD  -Hgb goal 10-11, hgb at goal  Mineral Bone Disease in ESRD  -Check phos, may continue OP binders  -Renal diet if not NPO          Thank you for your consult. I will follow-up with patient. Please contact us if you have any additional questions.    Zee Calles PA-C  Nephrology  [facility] - Emergency Dept

## 2025-06-05 NOTE — ASSESSMENT & PLAN NOTE
Patient presents here SOB due to fluid overload. Prescribed torsemide at home however he has not been taking it. His persistent shortness of breath over the past week is most likely due to a transudative pleural effusion for which he underwent a thoracentesis on 5/22/25 during his last admission. Uncontrolled HTN and/or medication noncompliance (torsemide and metolazone) and/or poor health literacy may be further contributing to the development of his recurrent fluid overload.     Plan  - consulting pulmonology for consideration of repeat thoracentesis, appreciate recs   - consulting nephrology for dialysis/fluid removal   - monitor I's and O's and daily weights  - restart torsemide 20mg daily  - restart entresto 97-103mg BID  - restart coreg 12.5mg BID  - starting nifedipine 60mg daily

## 2025-06-05 NOTE — NURSING
Patient arrived in wheelchair. Both Lumens of HD catheter flushes good. Dialysis treatment started .   Report received  from primary Nurse.

## 2025-06-05 NOTE — HPI
54 year old male with hx of HTN, type 2 IDDM, HFpEF, EVELYN, ESRD on hemodialysis MWF, hepatitis C, who [place] [facility] [place] 6[place]5/25 for evaluation of SOB. Patient was scheduled to undergo a vascular procedure for his LUE AV fistula malfunction on the morning of 6/5, however during the preoperative period he was noted to be SOB and audibly struggling to catch his breath (though O2 sat > 94%) with a BP ~206/139. He was advised to visit the ED for workup. He confirms that he has been SOB for the past week associated w/ intermittent chest pain. He further clarifies that he has no idea if he is to be taking torsemide at home and has not done so since his last admission but does note he has not missed any dialysis sessions. He currently denies any fever, chills, palpitations, leg swelling, or any other sxs at this time.     VS in the ED: afebrile, -223/83-98 w/ HR 60's, 95% O2 sat on RA. WBC wnl, hgb 11.4, CMP without significant abnormalities, troponin wnl, , Mg wnl. EKG with NSR. Chest XR with significant pulmonary/pleural abnormalities in the left hemithorax. Patient was given furosemide 80mg IV, entresto 97-103mg, and hydralazine 25mg in the ED.      Of note, during his last admission around 5/21 he underwent a thoracentesis with pulmonology which revealed a transudative pleural effusion. Patient has had reports of opacification at the left lung base consistent with pleural effusion and associated atelectasis on previous CXR as early as 1/15/2025. He has had prior CT studies with findings of multiple diffuse irregular ground-glass and part solid nodules involving the lungs, more recently noted new irregular 1.5 cm mass in the left upper lobe, 7/2024, c/f spiculated nodule which seemed to be lost to follow up. Positive smoking history.

## 2025-06-05 NOTE — H&P
[facility] - Emergency Dept  [facility]  History & Physical    Patient Name: Onofre Ceballos  MRN: [id number]  Patient Class: OP- Observation  Admission Date: 6/5/2025  Attending Physician: Terri Martinez MD   Primary Care Provider: Honey Galaviz NP         Patient information was obtained from patient, past medical records, and ER records.     Subjective:     Principal Problem:Shortness of breath    Chief Complaint:   Chief Complaint   Patient presents with    Shortness of Breath     Sent down from pre op for fluid overload/SOB. Pt was supposed to be getting dialysis fistula in left arm.         HPI: 54 year old male with hx of HTN, type 2 IDDM, HFpEF, EVELYN, ESRD on hemodialysis MWF, hepatitis C, who presented to [facility] ED on 6/5/25 for evaluation of SOB. Patient was scheduled to undergo a vascular procedure for his LUE AV fistula malfunction on the morning of 6/5, however during the preoperative period he was noted to be SOB and audibly struggling to catch his breath (though O2 sat > 94%) with a BP ~206/139. He was advised to visit the ED for workup. He confirms that he has been SOB for the past week associated w/ intermittent chest pain. He further clarifies that he has no idea if he is to be taking torsemide at home and has not done so since his last admission but does note he has not missed any dialysis sessions. He currently denies any fever, chills, palpitations, leg swelling, or any other sxs at this time.     VS in the ED: afebrile, -223/83-98 w/ HR 60's, 95% O2 sat on RA. WBC wnl, hgb 11.4, CMP without significant abnormalities, troponin wnl, , Mg wnl. EKG with NSR. Chest XR with significant pulmonary/pleural abnormalities in the left hemithorax. Patient was given furosemide 80mg IV, entresto 97-103mg, and hydralazine 25mg in the ED.      Of note, during his last admission around 5/21 he underwent a thoracentesis with pulmonology which revealed a transudative pleural effusion. Patient  has had reports of opacification at the left lung base consistent with pleural effusion and associated atelectasis on previous CXR as early as 1/15/2025. He has had prior CT studies with findings of multiple diffuse irregular ground-glass and part solid nodules involving the lungs, more recently noted new irregular 1.5 cm mass in the left upper lobe, 7/2024, c/f spiculated nodule which seemed to be lost to follow up. Positive smoking history.     Past Medical History:   Diagnosis Date    CHF (congestive heart failure)     Diabetes mellitus     Diabetes mellitus type I     Hepatitis     Hepatitis C     Hypertension     Renal disorder     Shingles 10/2018    Sleep apnea        Past Surgical History:   Procedure Laterality Date    ESOPHAGOGASTRODUODENOSCOPY N/A 10/11/2024    Procedure: EGD (ESOPHAGOGASTRODUODENOSCOPY);  Surgeon: Benjamin Lacy MD;  Location: [facility];  Service: Endoscopy;  Laterality: N/A;    INSERTION, CATHETER, TUNNELED N/A 10/29/2024    Procedure: Insertion,catheter,tunneled;  Surgeon: Angel Malcolm MD;  Location: [facility] OR;  Service: General;  Laterality: N/A;    INSERTION, CATHETER, TUNNELED N/A 11/8/2024    Procedure: Insertion,catheter,tunneled;  Surgeon: Huseyin Romero MD;  Location: [facility] OR;  Service: General;  Laterality: N/A;    INSERTION, GRAFT, ARTERIOVENOUS, UPPER EXTREMITY Left 2/25/2025    Procedure: INSERTION, GRAFT, ARTERIOVENOUS, UPPER EXTREMITY POSSIBLE AVF CREATION;  Surgeon: Luis F Berry MD;  Location: [address];  Service: Vascular;  Laterality: Left;    NO PAST SURGERIES         Review of patient's allergies indicates:  No Known Allergies    Current Facility-Administered Medications on File Prior to Encounter   Medication    [DISCONTINUED] ceFAZolin 2 g    [DISCONTINUED] LIDOcaine (PF) 10 mg/ml (1%) injection 10 mg    [DISCONTINUED] mupirocin 2 % ointment    [DISCONTINUED] sodium chloride 0.9% flush 10 mL     Current Outpatient Medications on File Prior  "to Encounter   Medication Sig    aspirin (ECOTRIN) 81 MG EC tablet Take 81 mg by mouth.    atorvastatin (LIPITOR) 80 MG tablet Take 1 tablet by mouth every evening.    brimonidine 0.2% (ALPHAGAN) 0.2 % Drop Place into both eyes.    carvediloL (COREG) 12.5 MG tablet Take 12.5 mg by mouth 2 (two) times daily.    empagliflozin (JARDIANCE) 10 mg tablet Take 10 mg by mouth once daily.    ENTRESTO  mg per tablet Take 1 tablet by mouth 2 (two) times daily.    insulin aspart U-100 (NOVOLOG) 100 unit/mL (3 mL) InPn pen Inject 10 Units into the skin 3 (three) times daily with meals.    OZEMPIC 0.25 mg or 0.5 mg (2 mg/3 mL) pen injector Inject 0.5 mg into the skin every 7 days.    acetaminophen (TYLENOL) 650 MG TbSR Take 1 tablet (650 mg total) by mouth every 8 (eight) hours as needed (pain).    alcohol swabs (ALCOHOL WIPES) PadM Use topical 4 x daily for insulin    blood sugar diagnostic Strp 1 each by Misc.(Non-Drug; Combo Route) route 3 (three) times daily before meals.    blood-glucose meter (TRUE METRIX GLUCOSE METER) Misc 1 each by Misc.(Non-Drug; Combo Route) route once daily.    EScitalopram oxalate (LEXAPRO) 10 MG tablet Take 10 mg by mouth.    ezetimibe (ZETIA) 10 mg tablet Take 1 tablet (10 mg total) by mouth every evening.    glucose 4 GM chewable tablet Take 4 tablets (16 g total) by mouth as needed for Low blood sugar.    insulin glargine U-100, Lantus, (BASAGLAR KWIKPEN U-100 INSULIN) 100 unit/mL (3 mL) InPn pen Inject 10 Units into the skin every evening. Within 15 minutes of eating (Patient taking differently: Inject 30 Units into the skin every evening. Within 15 minutes of eating)    insulin syringe-needle U-100 (INSULIN SYRINGE) 1/2 mL 28 gauge x 1/2" Syrg To use with meal time insulin tid    lancets 32 gauge Misc Inject 1 lancet into the skin 3 (three) times daily before meals.    pantoprazole (PROTONIX) 40 MG tablet Take 1 tablet (40 mg total) by mouth once daily.    pen needle, diabetic (NOVOTWIST) " "32 gauge x 1/5" Ndle To use nightly with levemir    sevelamer carbonate (RENVELA) 800 mg Tab Take 2 tablets (1,600 mg total) by mouth 3 (three) times daily with meals.    SURE COMFORT INSULIN SYRINGE 0.3 mL 31 gauge x 5/16" Syrg Inject 1 each into the skin 4 (four) times daily before meals and nightly.    tamsulosin (FLOMAX) 0.4 mg Cap Take 0.4 mg by mouth once daily.    torsemide (DEMADEX) 20 MG Tab Take 20 mg by mouth once daily.     Family History       Problem Relation (Age of Onset)    Diabetes Mother    Lung cancer Father          Tobacco Use    Smoking status: Former     Current packs/day: 1.00     Types: Cigarettes    Smokeless tobacco: Former   Substance and Sexual Activity    Alcohol use: No    Drug use: Not Currently     Types: Methamphetamines     Comment: Patient stated "I'm use IV drug user with meth in the last 2 weeks."    Sexual activity: Not Currently     Review of Systems   Constitutional:  Negative for chills and fever.   HENT:  Negative for rhinorrhea.    Respiratory:  Positive for shortness of breath.    Cardiovascular:  Positive for chest pain. Negative for palpitations.   Gastrointestinal:  Negative for abdominal pain, diarrhea, nausea and vomiting.   Genitourinary:  Negative for dysuria and hematuria.   Musculoskeletal:  Negative for back pain and neck pain.   Neurological:  Negative for syncope, light-headedness and headaches.     Objective:     Vital Signs (Most Recent):  Temp: 97.5 °F (36.4 °C) (06/05/25 0929)  Pulse: 64 (06/05/25 1400)  Resp: 18 (06/05/25 1334)  BP: (!) 213/100 (06/05/25 1400)  SpO2: 95 % (06/05/25 1400) Vital Signs (24h Range):  Temp:  [97.5 °F (36.4 °C)-98.1 °F (36.7 °C)] 97.5 °F (36.4 °C)  Pulse:  [60-66] 64  Resp:  [15-18] 18  SpO2:  [91 %-97 %] 95 %  BP: (174-223)/() 213/100     Weight: 83.9 kg (185 lb)  Body mass index is 28.13 kg/m².     Physical Exam  Constitutional:       General: He is not in acute distress.     Appearance: He is not toxic-appearing. "   HENT:      Head: Normocephalic and atraumatic.      Right Ear: External ear normal.      Left Ear: External ear normal.   Eyes:      General:         Right eye: No discharge.         Left eye: No discharge.   Cardiovascular:      Rate and Rhythm: Normal rate and regular rhythm.      Comments: Left chest port   Pulmonary:      Effort: No respiratory distress.      Breath sounds: Wheezing (audible) present.      Comments: Decreased lung sounds throughout left hemithorax   Abdominal:      General: There is no distension.      Tenderness: There is no abdominal tenderness.   Musculoskeletal:         General: Normal range of motion.   Skin:     General: Skin is warm and dry.   Neurological:      General: No focal deficit present.      Mental Status: He is alert and oriented to person, place, and time.                Significant Labs: All pertinent labs within the past 24 hours have been reviewed.  Recent Lab Results         06/05/25  1015   06/05/25  0935   06/05/25  0855        Albumin 2.7           ALP 66           ALT 19           Anion Gap 9           AST 23  Comment: *Result may be interfered by visible hemolysis           Baso # 0.05           Basophil % 0.6           BILIRUBIN TOTAL 0.3  Comment: For infants and newborns, interpretation of results should be based   on gestational age, weight and in agreement with clinical   observations.    Premature Infant recommended reference ranges:   0-24 hours:  <8.0 mg/dL   24-48 hours: <12.0 mg/dL   3-5 days:    <15.0 mg/dL   6-29 days:   <15.0 mg/dL             Comment: Values of less than 100 pg/ml are consistent with non-CHF populations.            BUN 23           Calcium 8.3           Chloride 104           CO2 26           Creatinine 4.5           eGFR 15  Comment: Estimated GFR calculated using the CKD-EPI creatinine (2021) equation.           Eos # 0.38           Eos % 4.7           Glucose 120           Gran # (ANC) 5.14           Hematocrit 35.0            Hemoglobin 11.4           Immature Grans (Abs) 0.02  Comment: Mild elevation in immature granulocytes is non specific and can be seen in a variety of conditions including stress response, acute inflammation, trauma and pregnancy. Correlation with other laboratory and clinical findings is essential.           Immature Granulocytes 0.2           Lymph # 1.81           Lymph % 22.4           Magnesium  2.0           MCH 31.8           MCHC 32.6           MCV 98           Mono # 0.67           Mono % 8.3           MPV 10.4           Neut % 63.8           nRBC 0           QRS Duration   88         [facility]HS QTC Calculation   438         Platelet Count 234           POCT Glucose     130       Potassium 5.1           PROTEIN TOTAL 6.3           RBC 3.58           RDW 13.2           Sodium 139           Troponin I High Sensitivity 28           WBC 8.07                   Significant Imaging: I have reviewed all pertinent imaging results/findings within the past 24 hours.  Assessment/Plan:     Assessment & Plan  Shortness of breath  Noncompliance with medication regimen  Pleural effusion on left  Patient presents here SOB due to fluid overload. Prescribed torsemide at home however he has not been taking it. His persistent shortness of breath over the past week is most likely due to a transudative pleural effusion for which he underwent a thoracentesis on 5/22/25 during his last admission. Uncontrolled HTN and/or medication noncompliance (torsemide and metolazone) and/or poor health literacy may be further contributing to the development of his recurrent fluid overload.     Plan  - consulting pulmonology for consideration of repeat thoracentesis, appreciate recs   - consulting nephrology for dialysis/fluid removal   - monitor I's and O's and daily weights  - restart torsemide 20mg daily  - restart entresto 97-103mg BID  - restart coreg 12.5mg BID  - starting nifedipine 60mg daily     HTN (hypertension)  Patient's blood pressure  range in the last 24 hours was: BP  Min: 174/83  Max: 223/98.The patient's inpatient anti-hypertensive regimen is listed below:  Current Antihypertensives  carvediloL tablet 12.5 mg, 2 times daily, Oral  torsemide tablet 20 mg, Daily, Oral  NIFEdipine 24 hr tablet 60 mg, Daily, Oral  , Every Mon, Thurs, Oral  , 2 times daily, Oral    Plan  - will continue to titrate the medications listed above   Benign prostatic hyperplasia with urinary frequency  - continue tamsulosin   Type 2 diabetes mellitus  Patient's FSGs are controlled on current medication regimen.  Last A1c reviewed-   Lab Results   Component Value Date    HGBA1C 7.2 (H) 11/06/2024     Most recent fingerstick glucose reviewed-   Recent Labs   Lab 06/05/25  0855   POCTGLUCOSE 130*     Current correctional scale  Low  Maintain anti-hyperglycemic dose as follows-   Antihyperglycemics (From admission, onward)      Start     Stop Route Frequency Ordered    06/05/25 2100  insulin glargine U-100 (Lantus) pen 10 Units         -- SubQ Nightly 06/05/25 1456    06/05/25 1645  insulin aspart U-100 pen 4 Units         -- SubQ 3 times daily with meals 06/05/25 1456    06/05/25 1453  insulin aspart U-100 pen 0-5 Units         -- SubQ Before meals & nightly PRN 06/05/25 1355          Hold Oral hypoglycemics while patient is in the hospital.  ESRD on dialysis  Creatine stable for now. BMP reviewed- noted Estimated Creatinine Clearance: 19.6 mL/min (A) (based on SCr of 4.5 mg/dL (H)). according to latest data. Based on current GFR, CKD stage is end stage.  Monitor UOP and serial BMP and adjust therapy as needed. Renally dose meds. Avoid nephrotoxic medications and procedures.  VTE Risk Mitigation (From admission, onward)           Ordered     IP VTE HIGH RISK PATIENT  Once         06/05/25 1355     Place sequential compression device  Until discontinued         06/05/25 1355                           On 06/05/2025, patient should be placed in hospital observation services  under my care in collaboration with hospital medicine team 1.         Carter Pham DO  Department of Hospital Medicine  [facility] - Emergency Dept

## 2025-06-05 NOTE — ED PROVIDER NOTES
Encounter Date: 6/5/2025       History     Chief Complaint   Patient presents with    Shortness of Breath     Sent down from pre op for fluid overload/SOB. Pt was supposed to be getting dialysis fistula in left arm.      Patient is a 54-year-old male with past medical history of diabetes, ESRD on hemodialysis, chronic pleural effusion and sleep apnea that presents to emergency department with shortness of breath.  Patient was scheduled to get his left upper extremity fistula electively repaired with vascular surgery.  When he arrived to the preoperative staging he had admitted shortness of breath and was hypertensive.  He was instructed to go to the ER for evaluation.  Currently denies chest pain, shortness of breath, fevers or chills.  He does admit coughing with increased sputum production over the past week.    He admits to having a procedure with significant amount of fluid removed from around his lungs a couple of weeks ago.  He felt better after that procedure however has feeling like he felt before the procedure.    The history is provided by the patient and medical records.     Review of patient's allergies indicates:  No Known Allergies  Past Medical History:   Diagnosis Date    CHF (congestive heart failure)     Diabetes mellitus     Diabetes mellitus type I     Hepatitis     Hepatitis C     Hypertension     Renal disorder     Shingles 10/2018    Sleep apnea      Past Surgical History:   Procedure Laterality Date    ESOPHAGOGASTRODUODENOSCOPY N/A 10/11/2024    Procedure: EGD (ESOPHAGOGASTRODUODENOSCOPY);  Surgeon: Benjamin Lacy MD;  Location: [facility];  Service: Endoscopy;  Laterality: N/A;    INSERTION, CATHETER, TUNNELED N/A 10/29/2024    Procedure: Insertion,catheter,tunneled;  Surgeon: Angel Malcolm MD;  Location: [facility] OR;  Service: General;  Laterality: N/A;    INSERTION, CATHETER, TUNNELED N/A 11/8/2024    Procedure: Insertion,catheter,tunneled;  Surgeon: Huseyin Romero MD;   Location: [facility] OR;  Service: General;  Laterality: N/A;    INSERTION, GRAFT, ARTERIOVENOUS, UPPER EXTREMITY Left 2/25/2025    Procedure: INSERTION, GRAFT, ARTERIOVENOUS, UPPER EXTREMITY POSSIBLE AVF CREATION;  Surgeon: Luis F Berry MD;  Location: [facility] OR [address] [place] [address];  Service: Vascular;  Laterality: Left;    NO PAST SURGERIES       Family History   Problem Relation Name Age of Onset    Diabetes Mother      Lung cancer Father       Social History[1]  Review of Systems  ROS negative except as noted in HPI     Physical Exam     Initial Vitals [06/05/25 0929]   BP Pulse Resp Temp SpO2   (!) 174/83 64 16 97.5 °F (36.4 °C) 96 %      MAP       --         Physical Exam    Nursing note and vitals reviewed.  Constitutional: No distress.   HENT:   Head: Normocephalic.   Right Ear: External ear normal.   Left Ear: External ear normal.   Nose: Nose normal. Mouth/Throat: Oropharynx is clear and moist.   Eyes: Conjunctivae are normal. Pupils are equal, round, and reactive to light.   Cardiovascular:  Normal rate, regular rhythm, normal heart sounds and intact distal pulses.           Pulmonary/Chest: No respiratory distress.   Diminished lung sounds on left   Abdominal: Abdomen is soft. He exhibits no distension.   Musculoskeletal:         General: No edema. Normal range of motion.     Neurological: He is alert and oriented to person, place, and time. He has normal strength.   Skin: Skin is warm. Capillary refill takes less than 2 seconds.   Psychiatric: He has a normal mood and affect.         ED Course   Procedures  Labs Reviewed   COMPREHENSIVE METABOLIC PANEL - Abnormal       Result Value    Sodium 139      Potassium 5.1      Chloride 104      CO2 26      Glucose 120 (*)     BUN 23 (*)     Creatinine 4.5 (*)     Calcium 8.3 (*)     Protein Total 6.3      Albumin 2.7 (*)     Bilirubin Total 0.3      ALP 66      AST 23      ALT 19      Anion Gap 9      eGFR 15 (*)    B-TYPE NATRIURETIC PEPTIDE - Abnormal     (*)    CBC  WITH DIFFERENTIAL - Abnormal    WBC 8.07      RBC 3.58 (*)     HGB 11.4 (*)     HCT 35.0 (*)     MCV 98      MCH 31.8 (*)     MCHC 32.6      RDW 13.2      Platelet Count 234      MPV 10.4      Nucleated RBC 0      Neut % 63.8      Lymph % 22.4      Mono % 8.3      Eos % 4.7      Basophil % 0.6      Imm Grans % 0.2      Neut # 5.14      Lymph # 1.81      Mono # 0.67      Eos # 0.38      Baso # 0.05      Imm Grans # 0.02     TROPONIN I HIGH SENSITIVITY - Normal    Troponin High Sensitive 28     MAGNESIUM - Normal    Magnesium  2.0     CBC W/ AUTO DIFFERENTIAL    Narrative:     The following orders were created for panel order CBC auto differential.  Procedure                               Abnormality         Status                     ---------                               -----------         ------                     CBC with Differential[[id number]]       Abnormal            Final result                 Please view results for these tests on the individual orders.        ECG Results              EKG 12-lead (Final result)        Collection Time Result Time QRS Duration OHS QTC Calculation    06/05/25 09:35:09 06/05/25 10:53:31 88 438                     Final result by Interface, Lab In [facility] (06/05/25 10:53:34)                   Narrative:    Test Reason : R06.02,    Vent. Rate :  62 BPM     Atrial Rate :  62 BPM     P-R Int : 128 ms          QRS Dur :  88 ms      QT Int : 432 ms       P-R-T Axes :  36  12  22 degrees    QTcB Int : 438 ms    Normal sinus rhythm  Normal ECG  When compared with ECG of 21-May-2025 17:31,  Nonspecific T wave abnormality, improved in Inferior leads  Confirmed by Suman Roth ([id number]) on 6/5/2025 10:53:30 AM    Referred By:            Confirmed By: Suman Roth                                  Imaging Results              X-Ray Chest AP Portable (Final result)  Result time 06/05/25 10:53:35      Final result by Huseyin Avilez MD (06/05/25 10:53:35)                   Impression:      See  above      Electronically signed by: Huseyin Avilez  Date:    06/05/2025  Time:    10:53               Narrative:    EXAMINATION:  XR CHEST AP PORTABLE    CLINICAL HISTORY:  Chest Pain;    TECHNIQUE:  Single frontal view of the chest was performed.    COMPARISON:  05/21/2025    FINDINGS:  Persistent severe opacification of the left hemithorax representing a likely combination of pulmonary parenchymal and pleural abnormalities.  Right lung clear.  Stable central venous catheter.  No pneumothorax.                                       Medications   carvediloL tablet 12.5 mg (has no administration in time range)   sacubitriL-valsartan  mg per tablet 1 tablet (has no administration in time range)   EScitalopram oxalate tablet 10 mg (10 mg Oral Given 6/5/25 1423)   pantoprazole EC tablet 40 mg (40 mg Oral Given 6/5/25 1423)   sevelamer carbonate tablet 1,600 mg (has no administration in time range)   tamsulosin 24 hr capsule 0.4 mg (0.4 mg Oral Given 6/5/25 1423)   sodium chloride 0.9% flush 10 mL (has no administration in time range)   naloxone 0.4 mg/mL injection 0.02 mg (has no administration in time range)   glucose chewable tablet 16 g (has no administration in time range)   glucose chewable tablet 24 g (has no administration in time range)   dextrose 50% injection 12.5 g (has no administration in time range)   dextrose 50% injection 25 g (has no administration in time range)   glucagon (human recombinant) injection 1 mg (has no administration in time range)   insulin aspart U-100 pen 0-5 Units (has no administration in time range)   torsemide tablet 20 mg (has no administration in time range)   NIFEdipine 24 hr tablet 60 mg (has no administration in time range)   sacubitriL-valsartan  mg per tablet 1 tablet (1 tablet Oral Given 6/5/25 1102)   furosemide injection 80 mg (80 mg Intravenous Given 6/5/25 1040)   hydrALAZINE tablet 25 mg (25 mg Oral Given 6/5/25 1213)     Medical Decision Making  Patient is a  54-year-old male with past medical history of diabetes, ESRD on hemodialysis, chronic pleural effusion and sleep apnea that presents to emergency department with shortness of breath.     On initial evaluation patient's has tachypnea and reported shortness breath however still able to cooperate and speaking full sentences.    Differential includes fluid overload secondary to pleural effusion, ESRD, pulmonary edema, pneumonia or pneumothorax.    See ED course below.  Ultimately patient does have evidence of persistent large left-sided pleural effusion likely contributing to patient's decompensation after not taking medications in preparation for his procedure today.  Given large pleural effusion and uncontrolled symptoms we discussed admission with Hospital Medicine for further management of his shortness of breath.  Initiated patient on IV diuresis in the emergency department with initial improvement in his blood pressure.         Amount and/or Complexity of Data Reviewed  Labs: ordered.  Radiology: ordered. Decision-making details documented in ED Course.  ECG/medicine tests:  Decision-making details documented in ED Course.    Risk  Prescription drug management.               ED Course as of 06/05/25 1430   Thu Jun 05, 2025   0938 No STEMI on EKG [NN]   1041 EKG 12-lead  EKG independently interpreted by me demonstrates normal sinus rhythm, no ST elevations.  There are ST flattening in inferior lead that are similar to previous EKGs [TK]   1041 X-Ray Chest AP Portable  Chest x-ray independently interpreted by me demonstrates persistent large left-sided pleural effusion slightly improved compared to most recent x-ray [TK]      ED Course User Index  [NN] Farrah Go MD  [TK] Bobbi Amaya DO                           Clinical Impression:  Final diagnoses:  [R06.02] Shortness of breath  [R06.02] SOB (shortness of breath) (Primary)  [I50.32] Acute on chronic heart failure with preserved ejection fraction  "(HFpEF) ICM NYHA2  [J90] Pleural effusion on left  [N18.6, Z99.2] ESRD on hemodialysis          ED Disposition Condition    Observation                       [1]   Social History  Tobacco Use    Smoking status: Former     Current packs/day: 1.00     Types: Cigarettes    Smokeless tobacco: Former   Substance Use Topics    Alcohol use: No    Drug use: Not Currently     Types: Methamphetamines     Comment: Patient stated "I'm use IV drug user with meth in the last 2 weeks."        Bobbi Amaya, DO  Resident  06/05/25 1431    "

## 2025-06-06 VITALS
OXYGEN SATURATION: 97 % | DIASTOLIC BLOOD PRESSURE: 73 MMHG | SYSTOLIC BLOOD PRESSURE: 136 MMHG | HEIGHT: 68 IN | RESPIRATION RATE: 18 BRPM | WEIGHT: 178.88 LBS | HEART RATE: 59 BPM | TEMPERATURE: 98 F | BODY MASS INDEX: 27.11 KG/M2

## 2025-06-06 PROBLEM — J94.8: Status: ACTIVE | Noted: 2025-05-22

## 2025-06-06 LAB
ABSOLUTE EOSINOPHIL (OHS): 0.44 K/UL
ABSOLUTE MONOCYTE (OHS): 0.73 K/UL (ref 0.3–1)
ABSOLUTE NEUTROPHIL COUNT (OHS): 4.37 K/UL (ref 1.8–7.7)
ALBUMIN SERPL BCP-MCNC: 2.6 G/DL (ref 3.5–5.2)
ALP SERPL-CCNC: 67 UNIT/L (ref 40–150)
ALT SERPL W/O P-5'-P-CCNC: 16 UNIT/L (ref 10–44)
ANION GAP (OHS): 7 MMOL/L (ref 8–16)
AST SERPL-CCNC: 14 UNIT/L (ref 11–45)
BASOPHILS # BLD AUTO: 0.06 K/UL
BASOPHILS NFR BLD AUTO: 0.8 %
BILIRUB SERPL-MCNC: 0.3 MG/DL (ref 0.1–1)
BUN SERPL-MCNC: 23 MG/DL (ref 6–20)
CALCIUM SERPL-MCNC: 8.2 MG/DL (ref 8.7–10.5)
CHLORIDE SERPL-SCNC: 104 MMOL/L (ref 95–110)
CO2 SERPL-SCNC: 27 MMOL/L (ref 23–29)
CREAT SERPL-MCNC: 4.2 MG/DL (ref 0.5–1.4)
ERYTHROCYTE [DISTWIDTH] IN BLOOD BY AUTOMATED COUNT: 13.5 % (ref 11.5–14.5)
GFR SERPLBLD CREATININE-BSD FMLA CKD-EPI: 16 ML/MIN/1.73/M2
GLUCOSE SERPL-MCNC: 136 MG/DL (ref 70–110)
HCT VFR BLD AUTO: 34.6 % (ref 40–54)
HGB BLD-MCNC: 11.4 GM/DL (ref 14–18)
IMM GRANULOCYTES # BLD AUTO: 0.03 K/UL (ref 0–0.04)
IMM GRANULOCYTES NFR BLD AUTO: 0.4 % (ref 0–0.5)
LYMPHOCYTES # BLD AUTO: 2.05 K/UL (ref 1–4.8)
MAGNESIUM SERPL-MCNC: 1.9 MG/DL (ref 1.6–2.6)
MCH RBC QN AUTO: 32.2 PG (ref 27–31)
MCHC RBC AUTO-ENTMCNC: 32.9 G/DL (ref 32–36)
MCV RBC AUTO: 98 FL (ref 82–98)
NUCLEATED RBC (/100WBC) (OHS): 0 /100 WBC
PHOSPHATE SERPL-MCNC: 3.3 MG/DL (ref 2.7–4.5)
PLATELET # BLD AUTO: 182 K/UL (ref 150–450)
PMV BLD AUTO: 10.3 FL (ref 9.2–12.9)
POCT GLUCOSE: 141 MG/DL (ref 70–110)
POCT GLUCOSE: 162 MG/DL (ref 70–110)
POTASSIUM SERPL-SCNC: 4.5 MMOL/L (ref 3.5–5.1)
PROT SERPL-MCNC: 5.6 GM/DL (ref 6–8.4)
RBC # BLD AUTO: 3.54 M/UL (ref 4.6–6.2)
RELATIVE EOSINOPHIL (OHS): 5.7 %
RELATIVE LYMPHOCYTE (OHS): 26.7 % (ref 18–48)
RELATIVE MONOCYTE (OHS): 9.5 % (ref 4–15)
RELATIVE NEUTROPHIL (OHS): 56.9 % (ref 38–73)
SODIUM SERPL-SCNC: 138 MMOL/L (ref 136–145)
WBC # BLD AUTO: 7.68 K/UL (ref 3.9–12.7)

## 2025-06-06 PROCEDURE — 25000003 PHARM REV CODE 250

## 2025-06-06 PROCEDURE — 82040 ASSAY OF SERUM ALBUMIN: CPT

## 2025-06-06 PROCEDURE — 85025 COMPLETE CBC W/AUTO DIFF WBC: CPT

## 2025-06-06 PROCEDURE — 36415 COLL VENOUS BLD VENIPUNCTURE: CPT

## 2025-06-06 PROCEDURE — 99214 OFFICE O/P EST MOD 30 MIN: CPT | Mod: ,,, | Performed by: INTERNAL MEDICINE

## 2025-06-06 PROCEDURE — 83735 ASSAY OF MAGNESIUM: CPT

## 2025-06-06 PROCEDURE — G0378 HOSPITAL OBSERVATION PER HR: HCPCS

## 2025-06-06 PROCEDURE — 84100 ASSAY OF PHOSPHORUS: CPT

## 2025-06-06 PROCEDURE — G0257 UNSCHED DIALYSIS ESRD PT HOS: HCPCS

## 2025-06-06 RX ORDER — SODIUM CHLORIDE 9 MG/ML
INJECTION, SOLUTION INTRAVENOUS ONCE
Status: CANCELLED | OUTPATIENT
Start: 2025-06-06 | End: 2025-06-06

## 2025-06-06 RX ORDER — ATORVASTATIN CALCIUM 20 MG/1
80 TABLET, FILM COATED ORAL NIGHTLY
Status: DISCONTINUED | OUTPATIENT
Start: 2025-06-06 | End: 2025-06-06 | Stop reason: HOSPADM

## 2025-06-06 RX ORDER — ASPIRIN 81 MG/1
81 TABLET ORAL DAILY
Status: DISCONTINUED | OUTPATIENT
Start: 2025-06-06 | End: 2025-06-06 | Stop reason: HOSPADM

## 2025-06-06 RX ORDER — BRIMONIDINE TARTRATE 2 MG/ML
1 SOLUTION/ DROPS OPHTHALMIC 2 TIMES DAILY
Status: DISCONTINUED | OUTPATIENT
Start: 2025-06-06 | End: 2025-06-06 | Stop reason: HOSPADM

## 2025-06-06 RX ORDER — EZETIMIBE 10 MG/1
10 TABLET ORAL NIGHTLY
Status: DISCONTINUED | OUTPATIENT
Start: 2025-06-06 | End: 2025-06-06 | Stop reason: HOSPADM

## 2025-06-06 RX ADMIN — SEVELAMER CARBONATE 1600 MG: 800 TABLET, FILM COATED ORAL at 12:06

## 2025-06-06 RX ADMIN — BRIMONIDINE TARTRATE 1 DROP: 2 SOLUTION OPHTHALMIC at 11:06

## 2025-06-06 RX ADMIN — ASPIRIN 81 MG: 81 TABLET, COATED ORAL at 11:06

## 2025-06-06 RX ADMIN — CARVEDILOL 12.5 MG: 12.5 TABLET, FILM COATED ORAL at 08:06

## 2025-06-06 RX ADMIN — TORSEMIDE 20 MG: 20 TABLET ORAL at 08:06

## 2025-06-06 RX ADMIN — INSULIN ASPART 4 UNITS: 100 INJECTION, SOLUTION INTRAVENOUS; SUBCUTANEOUS at 08:06

## 2025-06-06 RX ADMIN — TAMSULOSIN HYDROCHLORIDE 0.4 MG: 0.4 CAPSULE ORAL at 08:06

## 2025-06-06 RX ADMIN — ESCITALOPRAM OXALATE 10 MG: 5 TABLET, FILM COATED ORAL at 08:06

## 2025-06-06 RX ADMIN — PANTOPRAZOLE SODIUM 40 MG: 40 TABLET, DELAYED RELEASE ORAL at 08:06

## 2025-06-06 RX ADMIN — SACUBITRIL AND VALSARTAN 1 TABLET: 97; 103 TABLET, FILM COATED ORAL at 08:06

## 2025-06-06 RX ADMIN — SEVELAMER CARBONATE 1600 MG: 800 TABLET, FILM COATED ORAL at 08:06

## 2025-06-06 RX ADMIN — NIFEDIPINE 60 MG: 60 TABLET, FILM COATED, EXTENDED RELEASE ORAL at 08:06

## 2025-06-06 RX ADMIN — DIPHENHYDRAMINE HYDROCHLORIDE 25 MG: 25 CAPSULE ORAL at 02:06

## 2025-06-06 NOTE — NURSING
Pt discharged via wheelchair by transport. Pt left with personal belongings & discharge instructions. Discharge instructions were reviewed with pt, pt verbalized understanding to all.  Pt denies pain or other need at time of discharge.

## 2025-06-06 NOTE — CONSULTS
Food & Nutrition  Education     Diet Education: Renal Diet Education  Time Spent: 10 minutes   Learners: Patient      Handouts provided: Fluid Restriction Nutrition Therapy, Low Sodium Nutrition Therapy, Phosphorus Foods List, Potassium Foods List.      Comments: Discussed importance of a low sodium, phosphorus, and potassium diet. Reviewed high sodium foods that should be avoided. Food labels, salt free seasonings, and recommended sodium intake reviewed. Reviewed foods that contain potassium and limiting those items. Dairy consumption should be no more than once per day, to help limit phosphorus. Cured meats should be avoided. Fluid intake and conversions discussed. Foods considered fluids were reviewed and encouraged to monitor.   All questions/concerns were addressed.     Follow-Up: Yes     Please Re-consult as needed.   Thanks!    Paola Locke, MS, RD, LDN

## 2025-06-06 NOTE — DISCHARGE SUMMARY
[facility] Medicine  Discharge Summary      Patient Name: Onofre Ceballos  MRN: [id number]  FARTUN: [id number]  Patient Class: OP- Observation  Admission Date: 6/5/2025  Hospital Length of Stay: 0 days  Discharge Date and Time: 06/06/2025 2:22 PM  Attending Physician: Terri Martinez MD   Discharging Provider: Carter Pham DO  Primary Care Provider: Honey Galaviz NP  Hospital Medicine Team: [facility] MED 1 Carter Pham DO  Primary Care Team: [facility] 1    HPI:   54 year old male with hx of HTN, type 2 IDDM, HFpEF, EVELYN, ESRD on hemodialysis MWF, hepatitis C, who presented to [facility] ED on 6/5/25 for evaluation of SOB. Patient was scheduled to undergo a vascular procedure for his LUE AV fistula malfunction on the morning of 6/5, however during the preoperative period he was noted to be SOB and audibly struggling to catch his breath (though O2 sat > 94%) with a BP ~206/139. He was advised to visit the ED for workup. He confirms that he has been SOB for the past week associated w/ intermittent chest pain. He further clarifies that he has no idea if he is to be taking torsemide at home and has not done so since his last admission but does note he has not missed any dialysis sessions. He currently denies any fever, chills, palpitations, leg swelling, or any other sxs at this time.     VS in the ED: afebrile, -223/83-98 w/ HR 60's, 95% O2 sat on RA. WBC wnl, hgb 11.4, CMP without significant abnormalities, troponin wnl, , Mg wnl. EKG with NSR. Chest XR with significant pulmonary/pleural abnormalities in the left hemithorax. Patient was given furosemide 80mg IV, entresto 97-103mg, and hydralazine 25mg in the ED.      Of note, during his last admission around 5/21 he underwent a thoracentesis with pulmonology which revealed a transudative pleural effusion. Patient has had reports of opacification at the left lung base consistent with pleural effusion and associated  "atelectasis on previous CXR as early as 1/15/2025. He has had prior CT studies with findings of multiple diffuse irregular ground-glass and part solid nodules involving the lungs, more recently noted new irregular 1.5 cm mass in the left upper lobe, 7/2024, c/f spiculated nodule which seemed to be lost to follow up. Positive smoking history.     * No surgery found *      Hospital Course:   54 year old male with hx of HTN, type 2 IDDM, HFpEF, EVELYN, ESRD on hemodialysis MWF, hepatitis C, who presented to [facility] ED on 6/5/25 for evaluation of SOB. Patient was scheduled to undergo a vascular procedure for his LUE AV fistula malfunction on the morning of 6/5, however during the preoperative period he was noted to be SOB and audibly struggling to catch his breath (though O2 sat > 94%) with a BP ~206/139. He was advised to visit the ED for workup. WBC wnl, hgb 11.4, CMP without significant abnormalities, troponin wnl, , Mg wnl. EKG with NSR. Chest XR with significant pulmonary/pleural abnormalities in the left hemithorax. Patient was given furosemide 80mg IV, entresto 97-103mg, and hydralazine 25mg in the ED. The night of admission he underwent dialysis for volume removal. Pulmonology evaluated him the morning after and wrote that "transudative effuions in a patient with dialysis on the left with AV fistula most likely from third spacing, BEST treated with volume removal via hemodialysis/Ultrafitration as most fluid removed is extracellular per several studies study. Left-sided unilateral pleural transudative effusion may also be caused by an arteriovenous fistula in a patient on hemodialysis who has an obstruction of the brachiocephalic vein due to catheter-related thrombosis resulting in increased pleural hydrostatic pressure." He was scheduled for another dialysis session the day of discharge and counseled on being compliant with his blood pressure and diuretic regimen. He will need revision of his AV fistula to help " "rule out/prevent further third spacing. On day of discharge he was supine without respiratory distress or audible wheezes.     Goals of Care Treatment Preferences:  Code Status: Full Code  /71 (BP Location: Right arm, Patient Position: Lying)   Pulse (!) 56   Temp 97.9 °F (36.6 °C) (Oral)   Resp 18   Ht 5' 8" (1.727 m)   Wt 81.1 kg (178 lb 14.4 oz)   SpO2 (!) 91%   BMI 27.20 kg/m²   Physical Exam  Constitutional:       General: He is not in acute distress.     Appearance: He is not toxic-appearing.   HENT:      Head: Normocephalic and atraumatic.      Right Ear: External ear normal.      Left Ear: External ear normal.   Eyes:      General:         Right eye: No discharge.         Left eye: No discharge.   Cardiovascular:      Rate and Rhythm: Normal rate and regular rhythm.      Comments: Left chest port   Pulmonary:      Effort: No respiratory distress.      Breath sounds: Decreased lung sounds throughout left hemithorax   Abdominal:      General: There is no distension.      Tenderness: There is no abdominal tenderness.   Musculoskeletal:         General: Normal range of motion.   Skin:     General: Skin is warm and dry.   Neurological:      General: No focal deficit present.      Mental Status: He is alert and oriented to person, place, and time.        Consults:   Consults (From admission, onward)          Status Ordering Provider     Inpatient consult to Registered Dietitian/Nutritionist  Once        Provider:  (Not yet assigned)    Completed LEWIS CASTRO     Inpatient consult to Pulmonology  Once        Provider:  (Not yet assigned)    Completed LEWIS CASTRO     Inpatient consult to Nephrology  Once        Provider:  (Not yet assigned)    LEWIS Gomez            Final Active Diagnoses:    Diagnosis Date Noted POA    PRINCIPAL PROBLEM:  Shortness of breath [R06.02] 06/05/2025 Yes    Pleural effusion with transudate [J94.8] 05/22/2025 Yes    ESRD on dialysis [N18.6, Z99.2] " 05/16/2025 Not Applicable    Type 2 diabetes mellitus [E11.9] 11/02/2024 Yes    Benign prostatic hyperplasia with urinary frequency [N40.1, R35.0] 05/29/2018 Yes    Noncompliance with medication regimen [Z91.148] 07/02/2016 Not Applicable    HTN (hypertension) [I10] 07/02/2016 Yes      Problems Resolved During this Admission:       Discharged Condition: fair    Disposition:     Follow Up:    Patient Instructions:   No discharge procedures on file.      Pending Diagnostic Studies:       None           Medications:  Reconciled Home Medications:      Medication List        CONTINUE taking these medications      acetaminophen 650 MG Tbsr  Commonly known as: TYLENOL  Take 1 tablet (650 mg total) by mouth every 8 (eight) hours as needed (pain).     alcohol swabs Padm  Commonly known as: ALCOHOL WIPES  Use topical 4 x daily for insulin     aspirin 81 MG EC tablet  Commonly known as: ECOTRIN  Take 81 mg by mouth.     atorvastatin 80 MG tablet  Commonly known as: LIPITOR  Take 1 tablet by mouth every evening.     BASAGLAR KWIKPEN U-100 INSULIN 100 unit/mL (3 mL) Inpn pen  Generic drug: insulin glargine U-100 (Lantus)  Inject 10 Units into the skin every evening. Within 15 minutes of eating     blood sugar diagnostic Strp  1 each by Misc.(Non-Drug; Combo Route) route 3 (three) times daily before meals.     blood-glucose meter Misc  Commonly known as: TRUE METRIX GLUCOSE METER  1 each by Misc.(Non-Drug; Combo Route) route once daily.     brimonidine 0.2% 0.2 % Drop  Commonly known as: ALPHAGAN  Place 1 drop into the left eye 2 (two) times a day.     carvediloL 12.5 MG tablet  Commonly known as: COREG  Take 12.5 mg by mouth 2 (two) times daily.     ENTRESTO  mg per tablet  Generic drug: sacubitriL-valsartan  Take 1 tablet by mouth 2 (two) times daily.     EScitalopram oxalate 10 MG tablet  Commonly known as: LEXAPRO  Take 10 mg by mouth once daily.     ezetimibe 10 mg tablet  Commonly known as: ZETIA  Take 1 tablet (10 mg  "total) by mouth every evening.     glucose 4 GM chewable tablet  Take 4 tablets (16 g total) by mouth as needed for Low blood sugar.     insulin aspart U-100 100 unit/mL (3 mL) Inpn pen  Commonly known as: NovoLOG  Inject 10 Units into the skin 3 (three) times daily with meals.     insulin syringe-needle U-100 1/2 mL 28 gauge x 1/2" Syrg  Commonly known as: INSULIN SYRINGE  To use with meal time insulin tid     JARDIANCE 10 mg tablet  Generic drug: empagliflozin  Take 10 mg by mouth once daily.     lancets 32 gauge Misc  Inject 1 lancet into the skin 3 (three) times daily before meals.     metOLazone 2.5 MG tablet  Commonly known as: ZAROXOLYN  Take 1 tablet by mouth every Monday and Thursday.     NIFEdipine 60 MG (OSM) 24 hr tablet  Commonly known as: PROCARDIA-XL  Take 60 mg by mouth 2 (two) times daily.     OZEMPIC 0.25 mg or 0.5 mg (2 mg/3 mL) pen injector  Generic drug: semaglutide  Inject 0.5 mg into the skin every 7 days.     pantoprazole 40 MG tablet  Commonly known as: PROTONIX  Take 1 tablet (40 mg total) by mouth once daily.     pen needle, diabetic 32 gauge x 1/5" Ndle  Commonly known as: NOVOTWIST  To use nightly with levemir     sevelamer carbonate 800 mg Tab  Commonly known as: RENVELA  Take 2 tablets (1,600 mg total) by mouth 3 (three) times daily with meals.     SURE COMFORT INSULIN SYRINGE 0.3 mL 31 gauge x 5/16" Syrg  Generic drug: insulin syringe-needle U-100  Inject 1 each into the skin 4 (four) times daily before meals and nightly.     tamsulosin 0.4 mg Cap  Commonly known as: FLOMAX  Take 0.4 mg by mouth once daily.     torsemide 20 MG Tab  Commonly known as: DEMADEX  Take 80 mg by mouth 2 (two) times a day.              Indwelling Lines/Drains at time of discharge:   Lines/Drains/Airways       Central Venous Catheter Line  Duration                  Hemodialysis Catheter external jugular;right internal jugular -- days         Hemodialysis Catheter 11/08/24 left subclavian 210 days         " Hemodialysis Catheter 02/25/25 101 days                    Time spent on the discharge of patient: 40 minutes         Carter Pham DO  Department of Hospital Medicine  [facility]

## 2025-06-06 NOTE — PROGRESS NOTES
06/06/25 1747 06/06/25 1750        Hemodialysis Catheter 11/08/24 left subclavian   Placement Date: 11/08/24   Present Prior to Hospital Arrival?: Yes  Location: left subclavian   Line Necessity Review  --  CRRT/HD   Site Assessment  --  No drainage;No redness;No swelling;No warmth   Line Securement Device  --  Secured with sutureless device   Dressing Type  --  CHG impregnated dressing/sponge   Dressing Status  --  Clean;Dry;Intact   Dressing Intervention  --  Integrity maintained   Date on Dressing  --  06/05/25   Dressing Due to be Changed  --  06/12/25   Venous Patency/Care  --  flushed w/o difficulty;normal saline locked   Arterial Patency/Care  --  flushed w/o difficulty;normal saline locked   During Hemodialysis Assessment   Blood Flow Rate (mL/min) 350 mL/min  --    Dialysate Flow Rate (mL/min) 700 ml/min  --    Ultrafiltration Rate (mL/Hr) 550 mL/Hr  --    Arteriovenous Lines Secure Yes  --    Arterial Pressure (mmHg) -130 mmHg  --    Venous Pressure (mmHg) 100  --    Blood Volume Processed (Liters) 52 L  --    UF Removed (mL) 1650 mL  --    TMP 50  --    Venous Line in Air Detector Yes  --    Intake (mL) 250 mL  --    Intra-Hemodialysis Comments HD completed  --    Post-Hemodialysis Assessment   Rinseback Volume (mL) 250 mL  --    Blood Volume Processed (Liters) 52 L  --    Dialyzer Clearance Moderately streaked  --    Duration of Treatment 180 minutes  --    Total UF (mL) 1650 mL  --    Net Fluid Removal 1000  --    Patient Response to Treatment Kiara. well  --    Post-Treatment Weight 81.6 kg (179 lb 14.3 oz)  --    Treatment Weight Change -0.9  --      HD completed. Patient left ROBERT by wheelchair in NAD/VSS.

## 2025-06-06 NOTE — ED NOTES
Telemetry Verification   Patient placed on Telemetry Box  Verified with War Room  Box # [id number]   Monitor Tech    Rate 69   Rhythm NSR

## 2025-06-06 NOTE — SUBJECTIVE & OBJECTIVE
Interval History:   Interval History:   I have seen Pauline Waller today for his ESRD. He is comfortable laying flat in the bed. No shortness of breath or dyspnea. He has no edema. His wtr is 81 kg at his dry wt, During his dialysis session he had drop in his BP documented during the session. We will plan his HD session tomorrow with adjustment of his orders according to his hemodynamic and metabolic parameters. He can go next week to [facility] to be back on his schedule.     Please do not hesitate to call for any question regarding his management.      Thanks for asking the nephrology team to participate in his care.     Review of patient's allergies indicates:  No Known Allergies  Current Facility-Administered Medications   Medication Frequency    aspirin EC tablet 81 mg Daily    atorvastatin tablet 80 mg QHS    brimonidine 0.2% ophthalmic solution 1 drop BID    carvediloL tablet 12.5 mg BID    dextrose 50% injection 12.5 g PRN    dextrose 50% injection 25 g PRN    diphenhydrAMINE capsule 25 mg Q4H PRN    EScitalopram oxalate tablet 10 mg Daily    ezetimibe tablet 10 mg QHS    glucagon (human recombinant) injection 1 mg PRN    glucose chewable tablet 16 g PRN    glucose chewable tablet 24 g PRN    heparin (porcine) injection 5,000 Units Q8H    insulin aspart U-100 pen 0-5 Units QID (AC + HS) PRN    insulin glargine U-100 (Lantus) pen 10 Units QHS    naloxone 0.4 mg/mL injection 0.02 mg PRN    NIFEdipine 24 hr tablet 60 mg Daily    pantoprazole EC tablet 40 mg Daily    sacubitriL-valsartan  mg per tablet 1 tablet BID    sevelamer carbonate tablet 1,600 mg TID WM    sodium chloride 0.9% flush 10 mL Q12H PRN    tamsulosin 24 hr capsule 0.4 mg Daily    torsemide tablet 20 mg Daily       Objective:     Vital Signs (Most Recent):  Temp: 97.9 °F (36.6 °C) (06/06/25 1128)  Pulse: (!) 56 (06/06/25 1128)  Resp: 18 (06/06/25 1128)  BP: 132/71 (06/06/25 1128)  SpO2: (!) 91 % (06/06/25 1128) Vital Signs (24h Range):  Temp:   "[97.5 °F (36.4 °C)-98.2 °F (36.8 °C)] 97.9 °F (36.6 °C)  Pulse:  [56-97] 56  Resp:  [17-18] 18  SpO2:  [90 %-100 %] 91 %  BP: (129-222)/() 132/71     Weight: 81.1 kg (178 lb 14.4 oz) (06/06/25 0400)  Body mass index is 27.2 kg/m².  Body surface area is 1.97 meters squared.    I/O last 3 completed shifts:  In: 660 [P.O.:360; Other:300]  Out: 2050 [Other:2050]        Physical Exam  HENT:   Head: Normocephalic.   Cardiovascular:   Rate and Rhythm: Normal rate.   Pulses: Normal pulses.   Pulmonary:   Effort: Pulmonary effort is normal.   Musculoskeletal:   Cervical back: Normal range of motion.   Comments: No edema   Neurological:   Mental Status: He is alert.   Significant Labs:  ABGs: No results for input(s): "PH", "PCO2", "HCO3", "POCSATURATED", "BE" in the last 168 hours.  BMP:   Recent Labs   Lab 06/06/25 0626   *      K 4.5      CO2 27   BUN 23*   CREATININE 4.2*   CALCIUM 8.2*   MG 1.9     CBC:   Recent Labs   Lab 06/06/25 0626   WBC 7.68   RBC 3.54*   HGB 11.4*   HCT 34.6*      MCV 98   MCH 32.2*   MCHC 32.9     CMP:   Recent Labs   Lab 06/06/25 0626   *   CALCIUM 8.2*   ALBUMIN 2.6*   PROT 5.6*      K 4.5   CO2 27      BUN 23*   CREATININE 4.2*   ALKPHOS 67   ALT 16   AST 14   BILITOT 0.3     LFTs:   Recent Labs   Lab 06/06/25 0626   ALT 16   AST 14   ALKPHOS 67   BILITOT 0.3   PROT 5.6*   ALBUMIN 2.6*     All labs within the past 24 hours have been reviewed    Impression and plan:     ESRD We will plan his HD session tomorrow with adjustment of his orders according to his hemodynamic and metabolic parameters. He can go next week to [facility] to be back on his schedule.     Lt pleural effusion  HTN with uncontrolled BP.  DM II  CHF  CAD        JAFAR ALSAID, M.B.Honorio. MD. SERA. FACP.  , [employer] / [employer]  Nephrology Consultant. [facility].   [address],  [facility]. 5th [address]. "   [place] [address].     email: [email or web address].  Tel: Office: [phone]

## 2025-06-06 NOTE — MEDICAL/APP STUDENT
[facility] Medicine Student   Progress Note  [facility]    Admit Date: 6/5/2025  Hospital Day: 0  06/06/2025  8:28 AM    SUBJECTIVE:   Principal Problem:   Shortness of breath.    HPI:  Mr. Onofre Ceballos is a 54 y.o. male who has a relevant medical history of ESRD on hemodialysis MWF, HTN, HFpEF, T2DM, and hepatitis C who is being followed up for Shortness of breath.    Overview/Hospital Course: Patient was scheduled to undergo a vascular procedure for his LUE AV fistula malfunction on the morning of 6/5, however during the preoperative period he was noted to be SOB and audibly struggling to catch his breath (though O2 sat > 94%) with a BP ~206/139. He was advised to visit the ED for workup. He confirms that he has been SOB for the past week associated w/ intermittent chest pain. He further clarifies that he has no idea if he is to be taking torsemide at home and has not done so since his last admission but does note he has not missed any dialysis sessions. He denied any fever, chills, palpitations, leg swelling, or any other sxs at this time.      VS in the ED: afebrile, -223/83-98 w/ HR 60's, 95% O2 sat on RA. WBC wnl, hgb 11.4, CMP without significant abnormalities, troponin wnl, , Mg wnl. EKG with NSR. Chest XR with significant pulmonary/pleural abnormalities in the left hemithorax. Patient was given furosemide 80mg IV, entresto 97-103mg, and hydralazine 25mg in the ED.      Of note, during his last admission around 5/21 he underwent a thoracentesis with pulmonology which revealed a transudative pleural effusion. Patient has had reports of opacification at the left lung base consistent with pleural effusion and associated atelectasis on previous CXR as early as 1/15/2025. He has had prior CT studies with findings of multiple diffuse irregular ground-glass and part solid nodules involving the lungs, more recently noted new irregular 1.5 cm mass in the left upper lobe, 7/2024, c/f spiculated  nodule which seemed to be lost to follow up. Positive smoking history.    Per pulm, will not be going forward with thoracentesis due to potential third space worsening and no mortality benefit.      Interval history: Overnight, patient reports improved SOB, though still present. Neg for CP, abdominal pain, headache. No N/V, F/C. Had BM last night. Reports desire to leave.      Review of Systems   Constitutional: Negative.    Respiratory:  Positive for shortness of breath.    Cardiovascular: Negative.    Gastrointestinal: Negative.        Please refer to the H&P for past medical, family, and social history.    OBJECTIVE:     Vital Signs Recent:  Temp: 97.7 °F (36.5 °C) (06/06/25 0809)  Pulse: 64 (06/06/25 0809)  Resp: 18 (06/06/25 0809)  BP: 133/79 (06/06/25 0809)  SpO2: (!) 90 % (06/06/25 0809)  Oxygen Documentation:                Device (Oxygen Therapy): room air         Vital Signs Range (Last 24H):  Temp:  [97.5 °F (36.4 °C)-98.2 °F (36.8 °C)]   Pulse:  [59-97]   Resp:  [15-18]   BP: (129-223)/()   SpO2:  [90 %-100 %]           Weight:   Wt Readings from Last 1 Encounters:   06/06/25 81.1 kg (178 lb 14.4 oz)     BMI:   BMI Readings from Last 1 Encounters:   06/06/25 27.20 kg/m²       I & O (Last 24H):  Intake/Output Summary (Last 24 hours) at 6/6/2025 0828  Last data filed at 6/6/2025 0400  Gross per 24 hour   Intake 660 ml   Output 2050 ml   Net -1390 ml       Physical Exam:  Physical Exam  Constitutional:       Appearance: Normal appearance.   Cardiovascular:      Rate and Rhythm: Regular rhythm.      Heart sounds: Normal heart sounds.   Pulmonary:      Breath sounds: Wheezing present.      Comments: Diffuse decreased breath sounds left lung  Abdominal:      General: Abdomen is flat.      Palpations: Abdomen is soft.   Neurological:      Mental Status: He is alert.         Labs:   Recent Labs   Lab 06/05/25  1015 06/06/25  0626    138   K 5.1 4.5    104   CO2 26 27   BUN 23* 23*   CREATININE  4.5* 4.2*   * 136*   CALCIUM 8.3* 8.2*   MG 2.0 1.9   PHOS 3.5 3.3     Recent Labs   Lab 06/05/25  1015 06/06/25  0626   ALKPHOS 66 67   ALT 19 16   AST 23 14   ALBUMIN 2.7* 2.6*   PROT 6.3 5.6*   BILITOT 0.3 0.3     Recent Labs   Lab 06/05/25  1015 06/06/25  0626   WBC 8.07 7.68   HGB 11.4* 11.4*   HCT 35.0* 34.6*    182       Diagnostic Results:  Morning CXR ordered, not yet released.    Scheduled Meds:   carvediloL  12.5 mg Oral BID    EScitalopram oxalate  10 mg Oral Daily    heparin (porcine)  5,000 Units Subcutaneous Q8H    insulin aspart U-100  4 Units Subcutaneous TIDWM    insulin glargine U-100  10 Units Subcutaneous QHS    NIFEdipine  60 mg Oral Daily    pantoprazole  40 mg Oral Daily    sacubitriL-valsartan  1 tablet Oral BID    sevelamer carbonate  1,600 mg Oral TID WM    tamsulosin  0.4 mg Oral Daily    torsemide  20 mg Oral Daily     Continuous Infusions:  PRN Meds:  Current Facility-Administered Medications:     dextrose 50%, 12.5 g, Intravenous, PRN    dextrose 50%, 25 g, Intravenous, PRN    diphenhydrAMINE, 25 mg, Oral, Q4H PRN    glucagon (human recombinant), 1 mg, Intramuscular, PRN    glucose, 16 g, Oral, PRN    glucose, 24 g, Oral, PRN    insulin aspart U-100, 0-5 Units, Subcutaneous, QID (AC + HS) PRN    naloxone, 0.02 mg, Intravenous, PRN    sodium chloride 0.9%, 10 mL, Intravenous, Q12H PRN    ASSESSMENT/PLAN:   Mr. Onofre Ceballos is a 54 y.o. male with a relevant medical history of ESRD on hemodialysis, HTN, HFpEF, T2Dm, Hep C, and sleep apnea who is being followed up for Shortness of breath.    Active Hospital Problems    Diagnosis  POA    *Shortness of breath [R06.02]  Yes    Pleural effusion with transudate [J94.8]  Yes     Last thoracentesis 6/22/25.  Grawn, transudate, cytology negative.  No indication to repeat thoracentesis.     Transudative effuions in a patient with dialysis on the left with AV fistula most likely from third spacing, BEST Treated with volume  removal via hemodialysis/Ultrafitration as most fluid removed is extracellular per several studies  study.  Left-sided unilateral pleural transudative effusion may also be caused by an arteriovenous fistula in a patient on hemodialysis who has an obstruction of the brachiocephalic vein due to catheter-related thrombosis resulting in increased pleural hydrostatic pressure.    Another differential may be Left-sided unilateral pleural transudative effusion may also be caused by an arteriovenous fistula in a patient on hemodialysis who has an obstruction of the brachiocephalic vein due to catheter-related thrombosis resulting in increased pleural hydrostatic pressure.  If this is found to be the etiology AV ligation is recommended per study.    Study:  https://pmc.ncbi.nlm.nih.gov/articles/IOK1696022/    Patient is laying completely flat on room air, no indication for thoracentesis for a benign transudate effusion as the most effective way that there is to remove extracellular/third spaced fluid throughout the body with the least amount of risk is, in fact, hemodialysis.  If nephrology believes patients symptoms are related to Volume Overload, then this is an indication for acute dialysis and should be performed.     Repeat thoracentesis will waste protien lowering oncotic pressure leading to worsening of third spacing and is generally not recommended and carries no mortality benefit.       ESRD on dialysis [N18.6, Z99.2]  Not Applicable    Type 2 diabetes mellitus [E11.9]  Yes    Benign prostatic hyperplasia with urinary frequency [N40.1, R35.0]  Yes    Noncompliance with medication regimen [Z91.148]  Not Applicable     Advised patient that in order to prevent further acculumulation of pleural fluid, Maintining a normal blood pressure, limiting sodium and fluid intake and dialysis with fluid removal is recommended, not repeating invasive procedures which come with risk.       HTN (hypertension) [I10]  Yes      Resolved  Hospital Problems   No resolved problems to display.       Shortness of breath with pleural effusion  Pt presented to [facility] due to SOB. Fluid overloaded with 206/139. Reports SOB for the past week. Noncompliant use of at-home torsemide. Previously diagnosed transudative pleural effusion that required thoracentesis on 5/22/25 (2100 cc clear yellow fluid removed) due to similar SOB. Lack of education or understanding of medication and diet may be contributing.    Medications  Entresto  mg per table, BID  Torsemide 20 mg daily  Nifedipine 60 mg daily  Coreg 12.5 mg BID    PLAN:  - consult neph on dialysis plan moving forward  - no thoracentesis, per pulm   - CXR no significant changes  - continue medications (see above)     2. ESRD on Dialysis   Pt presented to [facility] pre-op for AV fistula repair, currently has permcath for dialysis MWF, creatinine stable at 4.2 (baseline 4-6)     PLAN:   - continue to monitor creatinine  - continue MWF dialysis  - continue sevelamer 1600 mg TID   - nutrition consulted for renal diet and fluid restriction    3. Hypertension  Pt's blood pressure range in the last 24 hours was systolic: 129-223, diastolic: . Current BP at 133/79. Worsened with noncompliance of BIPAP for sleep apnea.    Medications  Torsemide 20 mg daily  Nifedipine 60 mg daily  Coreg 12.5 mg BID    PLAN:  - controlled BP  -  on home medication use continuation    4. T2DM  Last HBA1C: 7.2 (11/6/24). Most recent POCT 136.     PLAN:  - continue Lantus 10 units    5. BPH     PLAN:   - continue tamsulosin 0.4 mg      Discharge planning:   Discharge: home    Divine Otoole, M3  Department of Hospital Medicine

## 2025-06-06 NOTE — PLAN OF CARE
[facility] Transplant Stepdown  Initial Discharge Assessment       Primary Care Provider: Honey Galaviz NP    Admission Diagnosis: Shortness of breath [R06.02]  SOB (shortness of breath) [R06.02]  Pleural effusion on left [J90]  Chest pain [R07.9]  ESRD on hemodialysis [N18.6, Z99.2]  Chronic heart failure with preserved ejection fraction (HFpEF) [I50.32]    Admission Date: 6/5/2025  Expected Discharge Date: 6/6/2025    Transition of Care Barriers: None    Payor: MEDICAID / Plan: HE[place]UE [place] / Product Type: Managed Medicaid /     Extended Emergency Contact Information  Primary Emergency Contact: Slava Jung   [place]  Mobile Phone: [phone]  Relation: Brother    Discharge Plan A: Home  Discharge Plan B: Home with family      [facility] [address]  [address]  [facility] [address]  Phone: [phone] Fax: [phone]    [facility] - [place], [place] - [address]  [address]  [place] [address]  Phone: [phone] Fax: [phone]      Initial Assessment (most recent)       Adult Discharge Assessment - 06/06/25 1100          Discharge Assessment    Assessment Type Discharge Planning Assessment     Confirmed/corrected address, phone number and insurance Yes     Confirmed Demographics Correct on Facesheet     Source of Information patient;health record     Communicated LYNDA with patient/caregiver Yes     People in Home alone     Do you expect to return to your current living situation? Yes     Do you have help at home or someone to help you manage your care at home? Yes     Who are your caregiver(s) and their phone number(s)? Slava jung (Brother)  [phone] (Mobile)     Prior to hospitilization cognitive status: Alert/Oriented;No Deficits     Current cognitive status: Alert/Oriented;No Deficits     Walking or Climbing Stairs Difficulty no     Dressing/Bathing  Difficulty no     Home Accessibility not wheelchair accessible     Home Layout Able to live on 1st floor     Readmission within 30 days? Yes     Patient currently being followed by outpatient case management? No     Do you currently have service(s) that help you manage your care at home? No     Do you take prescription medications? Yes     Do you have prescription coverage? Yes     Do you have any problems affording any of your prescribed medications? No     Is the patient taking medications as prescribed? yes     Who is going to help you get home at discharge? Will need a ride     How do you get to doctors appointments? family or friend will provide     Are you on dialysis? Yes     Dialysis Name and Scheduled days [facility] on MWF chair time 345pm     Do you take coumadin? No     Discharge Plan A Home     Discharge Plan B Home with family     DME Needed Upon Discharge  none     Discharge Plan discussed with: Patient     Transition of Care Barriers None        Financial Resource Strain    How hard is it for you to pay for the very basics like food, housing, medical care, and heating? Not hard at all        Housing Stability    In the last 12 months, was there a time when you were not able to pay the mortgage or rent on time? No     At any time in the past 12 months, were you homeless or living in a shelter (including now)? No        Transportation Needs    In the past 12 months, has lack of transportation kept you from medical appointments or from getting medications? Yes     In the past 12 months, has lack of transportation kept you from meetings, work, or from getting things needed for daily living? Yes        Food Insecurity    Within the past 12 months, you worried that your food would run out before you got the money to buy more. Never true     Within the past 12 months, the food you bought just didn't last and you didn't have money to get more. Never true        Stress    Do you feel stress - tense, restless,  nervous, or anxious, or unable to sleep at night because your mind is troubled all the time - these days? Not at all        Social Isolation    How often do you feel lonely or isolated from those around you?  Never        Alcohol Use    Q1: How often do you have a drink containing alcohol? Never     Q2: How many drinks containing alcohol do you have on a typical day when you are drinking? Patient does not drink     Q3: How often do you have six or more drinks on one occasion? Never        Utilities    In the past 12 months has the electric, gas, oil, or water company threatened to shut off services in your home? No        Health Literacy    How often do you need to have someone help you when you read instructions, pamphlets, or other written material from your doctor or pharmacy? Never                   CM spoke with patient in Room at bedside. All information was verified on facesheet. Patient lives alone, 1 step to get inside with no pets. Patient states no assistance is needed. Patient can ambulate, drive, run errands, and complete ADL's independently. Patient does not use any DME or any in-home assistive equipment. Patient has not used Home Health previously. Patient is on dialysis with [facility] at 345pm as chair time. Patient takes medication as prescribed and has resources for all prescriptive needs. Patient will need a ride upon discharge.All Questions and concerns addressed and whiteboard updated with CM contact information. Will continue to follow for course of hospitalization.      Discharge Plan A and Plan B have been determined by review of patient's clinical status, future medical and therapeutic needs, and coverage/benefits for post-acute care in coordination with multidisciplinary team members.     Gianni García RN, BSN  Case Management  [phone]

## 2025-06-06 NOTE — PROGRESS NOTES
[facility] - Transplant Stepdown  Nephrology  Progress Note    Patient Name: Onofre Ceballos  MRN: [id number]  Admission Date: 6/5/2025  Hospital Length of Stay: 0 days  Attending Provider: Terri Martinez MD   Primary Care Physician: Honey Galaviz NP  Principal Problem:Shortness of breath    Subjective:     HPI: Patient is a 54-year-old male with past medical history of diabetes, ESRD on hemodialysis, chronic pleural effusion and sleep apnea that presents to emergency department with shortness of breath. Patient was scheduled to get his left upper extremity fistula electively repaired with vascular surgery. When he arrived to the preoperative staging he had admitted shortness of breath and was hypertensive. He was instructed to go to the ER for evaluation. In the ED, denied any chest pain, nausea, vomiting, fevers or chills. He does admit coughing with increased sputum production over the past week. Reports SOB has progressively worsened over the past few days. Last HD yesterday, denies missing any HD sessions.     In the ED, hypertensive, otherwise HDS. Breathing comfortably on room air. CBC with hgb 11, no leukocytosis. CMP with Na 139, K 5.1, CO2 26, BUN 23, Cr 4.8. . Trop 28. CXR with L pleural effusion, R lung clear. Nephrology consulted for volume overload in the setting of ESRD.     Interval History:   Interval History:   I have seen Pauline Waller today for his ESRD. He is comfortable laying flat in the bed. No shortness of breath or dyspnea. He has no edema. His wtr is 81 kg at his dry wt, During his dialysis session he had drop in his BP documented during the session. We will plan his HD session tomorrow with adjustment of his orders according to his hemodynamic and metabolic parameters. He can go next week to [facility] to be back on his schedule.     Please do not hesitate to call for any question regarding his management.      Thanks for asking the nephrology team to participate in his care.      Review of patient's allergies indicates:  No Known Allergies  Current Facility-Administered Medications   Medication Frequency    aspirin EC tablet 81 mg Daily    atorvastatin tablet 80 mg QHS    brimonidine 0.2% ophthalmic solution 1 drop BID    carvediloL tablet 12.5 mg BID    dextrose 50% injection 12.5 g PRN    dextrose 50% injection 25 g PRN    diphenhydrAMINE capsule 25 mg Q4H PRN    EScitalopram oxalate tablet 10 mg Daily    ezetimibe tablet 10 mg QHS    glucagon (human recombinant) injection 1 mg PRN    glucose chewable tablet 16 g PRN    glucose chewable tablet 24 g PRN    heparin (porcine) injection 5,000 Units Q8H    insulin aspart U-100 pen 0-5 Units QID (AC + HS) PRN    insulin glargine U-100 (Lantus) pen 10 Units QHS    naloxone 0.4 mg/mL injection 0.02 mg PRN    NIFEdipine 24 hr tablet 60 mg Daily    pantoprazole EC tablet 40 mg Daily    sacubitriL-valsartan  mg per tablet 1 tablet BID    sevelamer carbonate tablet 1,600 mg TID WM    sodium chloride 0.9% flush 10 mL Q12H PRN    tamsulosin 24 hr capsule 0.4 mg Daily    torsemide tablet 20 mg Daily       Objective:     Vital Signs (Most Recent):  Temp: 97.9 °F (36.6 °C) (06/06/25 1128)  Pulse: (!) 56 (06/06/25 1128)  Resp: 18 (06/06/25 1128)  BP: 132/71 (06/06/25 1128)  SpO2: (!) 91 % (06/06/25 1128) Vital Signs (24h Range):  Temp:  [97.5 °F (36.4 °C)-98.2 °F (36.8 °C)] 97.9 °F (36.6 °C)  Pulse:  [56-97] 56  Resp:  [17-18] 18  SpO2:  [90 %-100 %] 91 %  BP: (129-222)/() 132/71     Weight: 81.1 kg (178 lb 14.4 oz) (06/06/25 0400)  Body mass index is 27.2 kg/m².  Body surface area is 1.97 meters squared.    I/O last 3 completed shifts:  In: 660 [P.O.:360; Other:300]  Out: 2050 [Other:2050]        Physical Exam  HENT:   Head: Normocephalic.   Cardiovascular:   Rate and Rhythm: Normal rate.   Pulses: Normal pulses.   Pulmonary:   Effort: Pulmonary effort is normal.   Musculoskeletal:   Cervical back: Normal range of motion.   Comments: No  "edema   Neurological:   Mental Status: He is alert.   Significant Labs:  ABGs: No results for input(s): "PH", "PCO2", "HCO3", "POCSATURATED", "BE" in the last 168 hours.  BMP:   Recent Labs   Lab 06/06/25  0626   *      K 4.5      CO2 27   BUN 23*   CREATININE 4.2*   CALCIUM 8.2*   MG 1.9     CBC:   Recent Labs   Lab 06/06/25 0626   WBC 7.68   RBC 3.54*   HGB 11.4*   HCT 34.6*      MCV 98   MCH 32.2*   MCHC 32.9     CMP:   Recent Labs   Lab 06/06/25 0626   *   CALCIUM 8.2*   ALBUMIN 2.6*   PROT 5.6*      K 4.5   CO2 27      BUN 23*   CREATININE 4.2*   ALKPHOS 67   ALT 16   AST 14   BILITOT 0.3     LFTs:   Recent Labs   Lab 06/06/25 0626   ALT 16   AST 14   ALKPHOS 67   BILITOT 0.3   PROT 5.6*   ALBUMIN 2.6*     All labs within the past 24 hours have been reviewed    Impression and plan:     ESRD We will plan his HD session tomorrow with adjustment of his orders according to his hemodynamic and metabolic parameters. He can go next week to [facility] to be back on his schedule.     Lt pleural effusion  HTN with uncontrolled BP.  DM II  CHF  CAD        NETTA COLLADO.Honorio. MD. SERA. FACP.  , [employer] / [employer] ([place]  Nephrology Consultant. [facility].   [facility]. 5th [place] [address].     email: [email or web address].  Tel: Office: [phone]                             "

## 2025-06-06 NOTE — HOSPITAL COURSE
"54 year old male with hx of HTN, type 2 IDDM, HFpEF, EVELYN, ESRD on hemodialysis MWF, hepatitis C, who presented to [facility] ED on 6/5/25 for evaluation of SOB. Patient was scheduled to undergo a vascular procedure for his LUE AV fistula malfunction on the morning of 6/5, however during the preoperative period he was noted to be SOB and audibly struggling to catch his breath (though O2 sat > 94%) with a BP ~206/139. He was advised to visit the ED for workup. WBC wnl, hgb 11.4, CMP without significant abnormalities, troponin wnl, , Mg wnl. EKG with NSR. Chest XR with significant pulmonary/pleural abnormalities in the left hemithorax. Patient was given furosemide 80mg IV, entresto 97-103mg, and hydralazine 25mg in the ED. The night of admission he underwent dialysis for volume removal. Pulmonology evaluated him the morning after and wrote that "transudative effuions in a patient with dialysis on the left with AV fistula most likely from third spacing, BEST treated with volume removal via hemodialysis/Ultrafitration as most fluid removed is extracellular per several studies study. Left-sided unilateral pleural transudative effusion may also be caused by an arteriovenous fistula in a patient on hemodialysis who has an obstruction of the brachiocephalic vein due to catheter-related thrombosis resulting in increased pleural hydrostatic pressure." He was scheduled for another dialysis session the day of discharge and counseled on being compliant with his blood pressure and diuretic regimen. He will need revision of his AV fistula to help rule out/prevent further third spacing.   "

## 2025-06-06 NOTE — SUBJECTIVE & OBJECTIVE
"Past Medical History:   Diagnosis Date    CHF (congestive heart failure)     Diabetes mellitus     Diabetes mellitus type I     Hepatitis     Hepatitis C     Hypertension     Renal disorder     Shingles 10/2018    Sleep apnea        Past Surgical History:   Procedure Laterality Date    ESOPHAGOGASTRODUODENOSCOPY N/A 10/11/2024    Procedure: EGD (ESOPHAGOGASTRODUODENOSCOPY);  Surgeon: Benjamin Lacy MD;  Location: [facility] ENDO;  Service: Endoscopy;  Laterality: N/A;    INSERTION, CATHETER, TUNNELED N/A 10/29/2024    Procedure: Insertion,catheter,tunneled;  Surgeon: Angel Malcolm MD;  Location: [facility] OR;  Service: General;  Laterality: N/A;    INSERTION, CATHETER, TUNNELED N/A 11/8/2024    Procedure: Insertion,catheter,tunneled;  Surgeon: Huseyin Romero MD;  Location: [facility] OR;  Service: General;  Laterality: N/A;    INSERTION, GRAFT, ARTERIOVENOUS, UPPER EXTREMITY Left 2/25/2025    Procedure: INSERTION, GRAFT, ARTERIOVENOUS, UPPER EXTREMITY POSSIBLE AVF CREATION;  Surgeon: Luis F Berry MD;  Location: [address];  Service: Vascular;  Laterality: Left;    NO PAST SURGERIES         Review of patient's allergies indicates:  No Known Allergies    Family History       Problem Relation (Age of Onset)    Diabetes Mother    Lung cancer Father          Tobacco Use    Smoking status: Former     Current packs/day: 1.00     Types: Cigarettes    Smokeless tobacco: Former   Substance and Sexual Activity    Alcohol use: No    Drug use: Not Currently     Types: Methamphetamines     Comment: Patient stated "I'm use IV drug user with meth in the last 2 weeks."    Sexual activity: Not Currently         Review of Systems   Constitutional:  Negative for fever.   HENT:  Negative for trouble swallowing.    Respiratory:  Positive for shortness of breath.    Cardiovascular:  Negative for chest pain and leg swelling.   Genitourinary:  Negative for difficulty urinating.   Hematological:  Negative for adenopathy. "     Objective:     Vital Signs (Most Recent):  Temp: 98.2 °F (36.8 °C) (06/06/25 0501)  Pulse: 62 (06/06/25 0501)  Resp: 18 (06/06/25 0501)  BP: 136/69 (06/06/25 0501)  SpO2: (!) 94 % (06/06/25 0501) Vital Signs (24h Range):  Temp:  [97.5 °F (36.4 °C)-98.2 °F (36.8 °C)] 98.2 °F (36.8 °C)  Pulse:  [59-97] 62  Resp:  [15-18] 18  SpO2:  [91 %-100 %] 94 %  BP: (129-223)/() 136/69     Weight: 81.1 kg (178 lb 14.4 oz)  Body mass index is 27.2 kg/m².      Intake/Output Summary (Last 24 hours) at 6/6/2025 0648  Last data filed at 6/6/2025 0400  Gross per 24 hour   Intake 660 ml   Output 2050 ml   Net -1390 ml        Physical Exam  Constitutional:       Appearance: Normal appearance.   Cardiovascular:      Rate and Rhythm: Normal rate.   Pulmonary:      Effort: Pulmonary effort is normal.   Musculoskeletal:         General: No swelling.      Cervical back: Normal range of motion.      Comments: AV fistula left upper extremity.    Skin:     General: Skin is warm and dry.   Neurological:      General: No focal deficit present.      Mental Status: He is alert and oriented to person, place, and time.   Psychiatric:         Mood and Affect: Mood normal.         Behavior: Behavior normal.          Vents:       Lines/Drains/Airways       Central Venous Catheter Line  Duration                  Hemodialysis Catheter external jugular;right internal jugular -- days         Hemodialysis Catheter 11/08/24 left subclavian 210 days         Hemodialysis Catheter 02/25/25 101 days              Peripheral Intravenous Line  Duration                  Peripheral IV - Single Lumen 06/05/25 1016 20 G Posterior;Proximal;Right Forearm <1 day                    Significant Labs:    CBC/Anemia Profile:  Recent Labs   Lab 06/05/25  1015 06/06/25  0626   WBC 8.07 7.68   HGB 11.4* 11.4*   HCT 35.0* 34.6*    182   MCV 98 98   RDW 13.2 13.5        Chemistries:  Recent Labs   Lab 06/05/25  1015      K 5.1      CO2 26   BUN 23*    CREATININE 4.5*   CALCIUM 8.3*   ALBUMIN 2.7*   PROT 6.3   BILITOT 0.3   ALKPHOS 66   ALT 19   AST 23   MG 2.0   PHOS 3.5       None

## 2025-06-06 NOTE — SUBJECTIVE & OBJECTIVE
Interval History:   I have seen Pauline Waller today for his ESRD. He is comfortable laying flat in the bed. No shortness of breath or dyspnea. He has no edema. His wtr is 81 kg at his dry wt, During his dialysis session he had drop in his BP documented during the session. We will plan his HD session tomorrow with adjustment of his orders according to his hemodynamic and metabolic parameters. He can go next week to [facility] to be back on his schedule.    Please do  not hesitate to call for any question regarding his management.     Thanks for asking the nephrology team to participate in his care.     Review of patient's allergies indicates:  No Known Allergies  Current Facility-Administered Medications   Medication Frequency    aspirin EC tablet 81 mg Daily    atorvastatin tablet 80 mg QHS    brimonidine 0.2% ophthalmic solution 1 drop BID    carvediloL tablet 12.5 mg BID    dextrose 50% injection 12.5 g PRN    dextrose 50% injection 25 g PRN    diphenhydrAMINE capsule 25 mg Q4H PRN    EScitalopram oxalate tablet 10 mg Daily    ezetimibe tablet 10 mg QHS    glucagon (human recombinant) injection 1 mg PRN    glucose chewable tablet 16 g PRN    glucose chewable tablet 24 g PRN    heparin (porcine) injection 5,000 Units Q8H    insulin aspart U-100 pen 0-5 Units QID (AC + HS) PRN    insulin glargine U-100 (Lantus) pen 10 Units QHS    naloxone 0.4 mg/mL injection 0.02 mg PRN    NIFEdipine 24 hr tablet 60 mg Daily    pantoprazole EC tablet 40 mg Daily    sacubitriL-valsartan  mg per tablet 1 tablet BID    sevelamer carbonate tablet 1,600 mg TID WM    sodium chloride 0.9% flush 10 mL Q12H PRN    tamsulosin 24 hr capsule 0.4 mg Daily    torsemide tablet 20 mg Daily       Objective:     Vital Signs (Most Recent):  Temp: 97.9 °F (36.6 °C) (06/06/25 1128)  Pulse: (!) 56 (06/06/25 1128)  Resp: 18 (06/06/25 1128)  BP: 132/71 (06/06/25 1128)  SpO2: (!) 91 % (06/06/25 1128) Vital Signs (24h Range):  Temp:  [97.5 °F (36.4 °C)-98.2  "°F (36.8 °C)] 97.9 °F (36.6 °C)  Pulse:  [56-97] 56  Resp:  [15-18] 18  SpO2:  [90 %-100 %] 91 %  BP: (129-222)/() 132/71     Weight: 81.1 kg (178 lb 14.4 oz) (06/06/25 0400)  Body mass index is 27.2 kg/m².  Body surface area is 1.97 meters squared.    I/O last 3 completed shifts:  In: 660 [P.O.:360; Other:300]  Out: 2050 [Other:2050]     Physical Exam  HENT:      Head: Normocephalic.   Cardiovascular:      Rate and Rhythm: Normal rate.      Pulses: Normal pulses.   Pulmonary:      Effort: Pulmonary effort is normal.   Musculoskeletal:      Cervical back: Normal range of motion.      Comments: No edema   Neurological:      Mental Status: He is alert.          Significant Labs:  ABGs: No results for input(s): "PH", "PCO2", "HCO3", "POCSATURATED", "BE" in the last 168 hours.  CBC:   Recent Labs   Lab 06/06/25 0626   WBC 7.68   RBC 3.54*   HGB 11.4*   HCT 34.6*      MCV 98   MCH 32.2*   MCHC 32.9     CMP:   Recent Labs   Lab 06/06/25 0626   *   CALCIUM 8.2*   ALBUMIN 2.6*   PROT 5.6*      K 4.5   CO2 27      BUN 23*   CREATININE 4.2*   ALKPHOS 67   ALT 16   AST 14   BILITOT 0.3     LFTs:   Recent Labs   Lab 06/06/25 0626   ALT 16   AST 14   ALKPHOS 67   BILITOT 0.3   PROT 5.6*   ALBUMIN 2.6*     All labs within the past 24 hours have been reviewed.     Impression and plan:    ESRD We will plan his HD session tomorrow with adjustment of his orders according to his hemodynamic and metabolic parameters. He can go next week to [facility] to be back on his schedule.    Lt pleural effusion  HTN with uncontrolled BP.  DM II  CHF  CAD      NETTA COLLADO.Honorio. MD. SERA. FACP.  , [employer] / [employer] ([place]  Nephrology Consultant. [facility].   [address] [facility]  [facility]. 5th [place] [address].    email: [email or web address].  Tel: Office: [phone]                    "

## 2025-06-06 NOTE — PROGRESS NOTES
06/06/25 1441 06/06/25 1446        Hemodialysis Catheter 11/08/24 left subclavian   Placement Date: 11/08/24   Present Prior to Hospital Arrival?: Yes  Location: left subclavian   Line Necessity Review CRRT/HD  --    Site Assessment No drainage;No redness;No swelling;No warmth  --    Line Securement Device Secured with sutureless device  --    Dressing Type CHG impregnated dressing/sponge  --    Dressing Status Clean;Dry;Intact  --    Dressing Intervention Integrity maintained  --    Date on Dressing 06/05/25  --    Dressing Due to be Changed 06/12/25  --    Venous Patency/Care blood return present;flushed w/o difficulty  --    Arterial Patency/Care blood return present;flushed w/o difficulty  --    During Hemodialysis Assessment   Blood Flow Rate (mL/min)  --  350 mL/min   Dialysate Flow Rate (mL/min)  --  700 ml/min   Ultrafiltration Rate (mL/Hr)  --  550 mL/Hr   Arteriovenous Lines Secure  --  Yes   Arterial Pressure (mmHg)  --  -130 mmHg   Venous Pressure (mmHg)  --  100   TMP  --  50   Venous Line in Air Detector  --  Yes   Intake (mL)  --  250 mL   Intra-Hemodialysis Comments  --  HD started     Patient arrived ROBERT by wheelchair. Observation HD started.

## 2025-06-06 NOTE — PLAN OF CARE
Problem: Diabetes Comorbidity  Goal: Blood Glucose Level Within Targeted Range  Outcome: Progressing  Intervention: Monitor and Manage Glycemia  Flowsheets (Taken 6/6/2025 0516)  Glycemic Management: blood glucose monitored     Problem: Wound  Goal: Optimal Pain Control and Function  Outcome: Progressing  Intervention: Prevent or Manage Pain  Flowsheets (Taken 6/6/2025 0516)  Pain Management Interventions:   pain management plan reviewed with patient/caregiver   pillow support provided   position adjusted   relaxation techniques promoted

## 2025-06-06 NOTE — PROGRESS NOTES
[facility] - Transplant Stepdown  Nephrology  Progress Note    Patient Name: Onofre Ceballos  MRN: [id number]  Admission Date: 6/5/2025  Hospital Length of Stay: 0 days  Attending Provider: Terri Martinez MD   Primary Care Physician: Honey Galaviz NP  Principal Problem:Shortness of breath    Subjective:     HPI: Patient is a 54-year-old male with past medical history of diabetes, ESRD on hemodialysis, chronic pleural effusion and sleep apnea that presents to emergency department with shortness of breath. Patient was scheduled to get his left upper extremity fistula electively repaired with vascular surgery. When he arrived to the preoperative staging he had admitted shortness of breath and was hypertensive. He was instructed to go to the ER for evaluation. In the ED, denied any chest pain, nausea, vomiting, fevers or chills. He does admit coughing with increased sputum production over the past week. Reports SOB has progressively worsened over the past few days. Last HD yesterday, denies missing any HD sessions.     In the ED, hypertensive, otherwise HDS. Breathing comfortably on room air. CBC with hgb 11, no leukocytosis. CMP with Na 139, K 5.1, CO2 26, BUN 23, Cr 4.8. . Trop 28. CXR with L pleural effusion, R lung clear. Nephrology consulted for volume overload in the setting of ESRD.     Interval History:   I have seen Pauline Waller today for his ESRD. He is comfortable laying flat in the bed. No shortness of breath or dyspnea. He has no edema. His wtr is 81 kg at his dry wt, During his dialysis session he had drop in his BP documented during the session. We will plan his HD session tomorrow with adjustment of his orders according to his hemodynamic and metabolic parameters. He can go next week to [facility] to be back on his schedule.    Please do  not hesitate to call for any question regarding his management.     Thanks for asking the nephrology team to participate in his care.     Review of patient's  allergies indicates:  No Known Allergies  Current Facility-Administered Medications   Medication Frequency    aspirin EC tablet 81 mg Daily    atorvastatin tablet 80 mg QHS    brimonidine 0.2% ophthalmic solution 1 drop BID    carvediloL tablet 12.5 mg BID    dextrose 50% injection 12.5 g PRN    dextrose 50% injection 25 g PRN    diphenhydrAMINE capsule 25 mg Q4H PRN    EScitalopram oxalate tablet 10 mg Daily    ezetimibe tablet 10 mg QHS    glucagon (human recombinant) injection 1 mg PRN    glucose chewable tablet 16 g PRN    glucose chewable tablet 24 g PRN    heparin (porcine) injection 5,000 Units Q8H    insulin aspart U-100 pen 0-5 Units QID (AC + HS) PRN    insulin glargine U-100 (Lantus) pen 10 Units QHS    naloxone 0.4 mg/mL injection 0.02 mg PRN    NIFEdipine 24 hr tablet 60 mg Daily    pantoprazole EC tablet 40 mg Daily    sacubitriL-valsartan  mg per tablet 1 tablet BID    sevelamer carbonate tablet 1,600 mg TID WM    sodium chloride 0.9% flush 10 mL Q12H PRN    tamsulosin 24 hr capsule 0.4 mg Daily    torsemide tablet 20 mg Daily       Objective:     Vital Signs (Most Recent):  Temp: 97.9 °F (36.6 °C) (06/06/25 1128)  Pulse: (!) 56 (06/06/25 1128)  Resp: 18 (06/06/25 1128)  BP: 132/71 (06/06/25 1128)  SpO2: (!) 91 % (06/06/25 1128) Vital Signs (24h Range):  Temp:  [97.5 °F (36.4 °C)-98.2 °F (36.8 °C)] 97.9 °F (36.6 °C)  Pulse:  [56-97] 56  Resp:  [15-18] 18  SpO2:  [90 %-100 %] 91 %  BP: (129-222)/() 132/71     Weight: 81.1 kg (178 lb 14.4 oz) (06/06/25 0400)  Body mass index is 27.2 kg/m².  Body surface area is 1.97 meters squared.    I/O last 3 completed shifts:  In: 660 [P.O.:360; Other:300]  Out: 2050 [Other:2050]     Physical Exam  HENT:      Head: Normocephalic.   Cardiovascular:      Rate and Rhythm: Normal rate.      Pulses: Normal pulses.   Pulmonary:      Effort: Pulmonary effort is normal.   Musculoskeletal:      Cervical back: Normal range of motion.      Comments: No edema  "  Neurological:      Mental Status: He is alert.          Significant Labs:  ABGs: No results for input(s): "PH", "PCO2", "HCO3", "POCSATURATED", "BE" in the last 168 hours.  CBC:   Recent Labs   Lab 06/06/25  0626   WBC 7.68   RBC 3.54*   HGB 11.4*   HCT 34.6*      MCV 98   MCH 32.2*   MCHC 32.9     CMP:   Recent Labs   Lab 06/06/25  0626   *   CALCIUM 8.2*   ALBUMIN 2.6*   PROT 5.6*      K 4.5   CO2 27      BUN 23*   CREATININE 4.2*   ALKPHOS 67   ALT 16   AST 14   BILITOT 0.3     LFTs:   Recent Labs   Lab 06/06/25  0626   ALT 16   AST 14   ALKPHOS 67   BILITOT 0.3   PROT 5.6*   ALBUMIN 2.6*     All labs within the past 24 hours have been reviewed.     Impression and plan:    ESRD We will plan his HD session tomorrow with adjustment of his orders according to his hemodynamic and metabolic parameters. He can go next week to [facility] to be back on his schedule.    Lt pleural effusion  HTN with uncontrolled BP.  DM II  CHF  CAD      NETTA COLLADO.Honorio. MD. SERA. FACP.  , [employer] / [employer] [place]  Nephrology Consultant. [facility].   [address] [place] [address]. 5th [place] [address].    email: [email or web address]  Tel: Office: [phone]                    Assessment/Plan:     Renal/  ESRD on dialysis  Nephrology History  -Outpatient HD unit: [facility]  -Nephrologist: Junior  -HD tx days: MWF  -HD tx time: 3:45  -Last HD tx: 6/4/25  -HD access: TDC  -HD modality: iHD  -Residual urine: +  -EDW:  82 kg    Plan/Recommendations  ESRD on HD:  -Electrolytes stable, breathing comfortably on room air. Will plan for HD this afternoon for volume management in the setting of hypertension, SOB.   -Agree with pulmonology consult for thoracentesis, HD is poor modality for removing third space fluid   -Will continue iHD thrice weekly unless emergent indication arises  -Strict I&Os  -Pre & post HD " weight  Anemia in ESRD  -Hgb goal 10-11, hgb at goal  Mineral Bone Disease in ESRD  -Check phos, may continue OP binders  -Renal diet if not NPO

## 2025-06-07 LAB — POCT GLUCOSE: 111 MG/DL (ref 70–110)

## 2025-06-09 ENCOUNTER — PATIENT MESSAGE (OUTPATIENT)
Dept: VASCULAR SURGERY | Facility: CLINIC | Age: 55
End: 2025-06-09
Payer: MEDICAID

## 2025-06-09 NOTE — PLAN OF CARE
[facility] - Transplant Stepdown  Discharge Final Note    Primary Care Provider: Honey Galaviz NP    Expected Discharge Date: 6/6/2025    Patient discharged to home via uber transportation.     Patient's bedside nurse provided discharge instructions.    Discharge Plan A and Plan B have been determined by review of patient's clinical status, future medical and therapeutic needs, and coverage/benefits for post-acute care in coordination with multidisciplinary team members.        Final Discharge Note (most recent)       Final Note - 06/06/25 1100          Final Note    Assessment Type Discharge Planning Assessment                     Important Message from Medicare             Contact Info       Honey Galaviz NP   Specialty: Family Medicine   Relationship: PCP - General    [address] [facility] [place] [address]   Phone: [phone]       Next Steps: Follow up on 6/13/2025    Instructions: Hospital follow up established patient @ 9:40 am.  Please bring discharge summary, ID, insurance card, and medication list.            Future Appointments   Date Time Provider Department Center   6/19/2025 10:30 AM Angelita Valenzuela FNP [facility]        scheduled post-discharge follow-up appointment and information added to AVS.     Gianni García, RN, BSN  Case Management  [phone]

## 2025-06-11 DIAGNOSIS — Z99.2 CHRONIC KIDNEY DISEASE REQUIRING CHRONIC DIALYSIS: Primary | ICD-10-CM

## 2025-06-11 DIAGNOSIS — N18.6 CHRONIC KIDNEY DISEASE REQUIRING CHRONIC DIALYSIS: Primary | ICD-10-CM

## 2025-06-13 NOTE — PRE-PROCEDURE INSTRUCTIONS
PreOp Instructions given:   - Verbal medication information (what to hold and what to take)   - NPO guidelines 2400  NO SOLID FOOD, MILK OR MILK PRODUCTS 8 HOURS PRIOR TO SX START TIME.  YOU MAY ONLY HAVE SIPS OF WATER WITH MORNING MEDICATIONS (1) HOUR PRIOR TO ARRIVAL TO HOSPITAL.   - Arrival place directions given; time to be given the day before procedure by the   Surgeon's Office DOSC  - Bathing with antibacterial soap   - Don't wear any jewelry or bring any valuables AM of surgery   - No makeup or moisturizer to face   - No perfume/cologne, powder, lotions or aftershave   Pt. verbalized understanding.   Pt denies any h/o Anesthesia/Sedation complications or side effects.  Patient does not know arrival time.  Explained that this information comes from the surgeon's office and if they haven't heard from them by 2 or 3 pm to call the office.  Patient stated an understanding.     Reviewed Hypoglycemia protocol w/pt - Pt V/U

## 2025-06-16 ENCOUNTER — TELEPHONE (OUTPATIENT)
Dept: VASCULAR SURGERY | Facility: CLINIC | Age: 55
End: 2025-06-16
Payer: MEDICAID

## 2025-06-16 ENCOUNTER — ANESTHESIA EVENT (OUTPATIENT)
Dept: SURGERY | Facility: HOSPITAL | Age: 55
End: 2025-06-16
Payer: MEDICAID

## 2025-06-16 NOTE — TELEPHONE ENCOUNTER
Spoke to the pt, he is currently taking ozempic , but has not taken it since Wed. June 4th. ,NPO after midnight

## 2025-06-16 NOTE — ANESTHESIA PREPROCEDURE EVALUATION
[facility]  Anesthesia Pre-Operative Evaluation         Patient Name: Onofre Ceballos  YOB: 1970  MRN: [id number]    SUBJECTIVE:     Pre-operative evaluation for Procedure(s) (LRB):  PTA, AV FISTULA (Left)     06/16/2025    Onofre Ceballos is a 54 y.o. male w/ a significant PMHx of HFpEF, DM2, hepatitis C, HTN, ESRD on HD (MWF), former smoker, EVELYN, chronic left transudative pleural effusion (s/p thoracentesis 5/22 2100mL removed) and shortness of breath pending further pulmonary testing. Per pulmonology, effusion was likely 2/2 volume overload related to ESRD and recommended increased fluid removal during HD.    Patient now presents for the above procedure(s).      TTE (5/22/25):    Left Ventricle: The left ventricle is normal in size. Normal wall thickness. Normal wall motion. There is normal systolic function with a visually estimated ejection fraction of 55 - 60%. There is normal diastolic function.    Right Ventricle: The right ventricle is normal in size Wall thickness is normal. Systolic function is borderline low.    Pulmonary Artery: The estimated pulmonary artery systolic pressure is 28 mmHg.    IVC/SVC: Normal venous pressure at 3 mmHg.    Pericardium: There is a small effusion. Large left pleural effusion.      LDA:        Hemodialysis Catheter 11/08/24 left subclavian (Active)   Line Necessity Review CRRT/HD 06/06/25 1750   Site Assessment No drainage;No redness;No swelling;No warmth 06/06/25 1750   Line Securement Device Secured with sutureless device 06/06/25 1750   Dressing Type CHG impregnated dressing/sponge 06/06/25 1750   Dressing Status Clean;Dry;Intact 06/06/25 1750   Dressing Intervention Integrity maintained 06/06/25 1750   Date on Dressing 06/05/25 06/06/25 1750   Dressing Due to be Changed 06/12/25 06/06/25 1750   Venous Patency/Care flushed w/o difficulty;normal saline locked 06/06/25 1750   Arterial Patency/Care flushed w/o difficulty;normal  saline locked 06/06/25 1750   Number of days: 220            Hemodialysis Catheter 02/25/25 (Active)   Number of days: 111       Prev airway: None documented.      Problem List[1]    Review of patient's allergies indicates:  No Known Allergies    Current Inpatient Medications:      Medications Ordered Prior to Encounter[2]    Past Surgical History:   Procedure Laterality Date    ESOPHAGOGASTRODUODENOSCOPY N/A 10/11/2024    Procedure: EGD (ESOPHAGOGASTRODUODENOSCOPY);  Surgeon: Benjamin Lacy MD;  Location: [facility] ENDO;  Service: Endoscopy;  Laterality: N/A;    INSERTION, CATHETER, TUNNELED N/A 10/29/2024    Procedure: Insertion,catheter,tunneled;  Surgeon: Angel Malcolm MD;  Location: [facility] OR;  Service: General;  Laterality: N/A;    INSERTION, CATHETER, TUNNELED N/A 11/8/2024    Procedure: Insertion,catheter,tunneled;  Surgeon: Huseyin Romero MD;  Location: [facility] OR;  Service: General;  Laterality: N/A;    INSERTION, GRAFT, ARTERIOVENOUS, UPPER EXTREMITY Left 2/25/2025    Procedure: INSERTION, GRAFT, ARTERIOVENOUS, UPPER EXTREMITY POSSIBLE AVF CREATION;  Surgeon: Luis F Berry MD;  Location: [facility] OR [address] [place] [address];  Service: Vascular;  Laterality: Left;    NO PAST SURGERIES         Social History[3]    OBJECTIVE:     Vital Signs Range (Last 24H):  Pulse:  [54]   Resp:  [18]   BP: (160)/(87)   SpO2:  [96 %]       Significant Labs:  Lab Results   Component Value Date    WBC 7.80 06/09/2025    HGB 12.5 (L) 06/09/2025    HCT 36.3 (L) 06/09/2025     06/09/2025    CHOL 208 (H) 05/03/2024    TRIG 63 05/03/2024    HDL 60 05/03/2024    ALT 19 06/09/2025    AST 26 06/09/2025     06/09/2025    K 4.4 06/09/2025     06/09/2025    CREATININE 3.26 (H) 06/09/2025    BUN 21 (H) 06/09/2025    CO2 28 06/09/2025    TSH 1.05 03/29/2024    PSA 0.56 09/22/2020    INR 1.2 (H) 11/08/2024    HGBA1C 6.4 (H) 06/05/2025       Diagnostic Studies: No relevant studies.    EKG:   Results for orders placed or performed  during the hospital encounter of 06/09/25   EKG 12-lead    Collection Time: 06/09/25  5:00 PM   Result Value Ref Range    QRS Duration 92 ms    OHS QTC Calculation 472 ms    Narrative    Test Reason : R06.02,    Vent. Rate :  68 BPM     Atrial Rate :  68 BPM     P-R Int : 128 ms          QRS Dur :  92 ms      QT Int : 444 ms       P-R-T Axes :  47  19 254 degrees    QTcB Int : 472 ms    Normal sinus rhythm  Marked ST abnormality, possible inferior subendocardial injury  Abnormal ECG  Baseline wander  Confirmed by Ramona Guerrero ([id number]) on 6/10/2025 5:37:57 PM    Referred By: ALEKS ORTEGA           Confirmed By: Ramona Guerrero       2D ECHO:  TTE:  Results for orders placed or performed during the hospital encounter of 05/21/25   Echo   Result Value Ref Range    LV Diastolic Volume 104 mL    Echo EF Estimated 57 %    LV Systolic Volume 45 mL    IVS 0.9 0.6 - 1.1 cm    LVIDd 4.7 3.5 - 6.0 cm    LVIDs 3.3 2.1 - 4.0 cm    LVOT diameter 2.3 cm    PW 1.0 0.6 - 1.1 cm    AV LVOT peak gradient 3 mmHg    LVOT mn grad 1 mmHg    LVOT peak celestina 0.9 m/s    LVOT peak VTI 16.7 cm    RV- de la o basal diam 2.6 cm    LA size 4.1 cm    Left Atrium Major Axis 5.0 cm    Left Atrium Minor Axis 4.8 cm    LA Vol (MOD) 46 mL    RA Major Reinholds 3.60 cm    AV valve area 3.1 cm2    AV area by cont VTI 3.2 cm2    AV peak gradient 6 mmHg    AV mean gradient 3 mmHg    Ao peak celestina 1.2 m/s    Ao VTI 22.7 cm    MV Peak A Celestina 0.93 m/s    E wave deceleration time 276 ms    MV Peak E Celestina 0.88 m/s    E/A ratio 0.95     LV LATERAL E/E' RATIO 11.0     LV SEPTAL E/E' RATIO 12.6     TDI LATERAL 0.08 m/s    TDI SEPTAL 0.07 m/s    TV peak gradient 24 mmHg    TR Max Celestina 2.5 m/s    STJ 2.1 cm    Sinus 2.70 cm    LA WIDTH 3.5 cm    RA Width 2.44 cm    TAPSE 1.6 cm    BSA 1.97 m2    LVOT stroke volume 69.3 cm3    LV Systolic Volume Index 23.1 mL/m2    LV Diastolic Volume Index 53.33 mL/m2    LVOT area 4.2 cm2    FS 29.8 28 - 44 %    Left Ventricle Relative Wall  Thickness 0.43 cm    LV mass 153.4 g    LV Mass Index 78.7 g/m2    E/E' ratio 12 m/s    CANDELARIO 31 mL/m2    LA Vol 60 cm3    RV/LV Ratio 0.55 cm    CANDELARIO (MOD) 24 mL/m2    AV Velocity Ratio 0.75     AV index (prosthetic) 0.74     AISHA by Velocity Ratio 3.1 cm²    ASI 1.4 cm/m2    Mean e' 0.08 m/s    ZLVIDS -0.28     ZLVIDD -1.68     TV resting pulmonary artery pressure 28 mmHg    RV TB RVSP 6 mmHg    Est. RA pres 3 mmHg    Narrative      Left Ventricle: The left ventricle is normal in size. Normal wall   thickness. Normal wall motion. There is normal systolic function with a   visually estimated ejection fraction of 55 - 60%. There is normal   diastolic function.    Right Ventricle: The right ventricle is normal in size Wall thickness   is normal. Systolic function is borderline low.    Pulmonary Artery: The estimated pulmonary artery systolic pressure is   28 mmHg.    IVC/SVC: Normal venous pressure at 3 mmHg.    Pericardium: There is a small effusion. Large left pleural effusion.         JAMESON:  No results found for this or any previous visit.    ASSESSMENT/PLAN:           Pre-op Assessment    I have reviewed the Patient Summary Reports.     I have reviewed the Nursing Notes. I have reviewed the NPO Status.   I have reviewed the Medications.     Review of Systems  Anesthesia Hx:  No problems with previous Anesthesia   History of prior surgery of interest to airway management or planning:          Denies Family Hx of Anesthesia complications.    Denies Personal Hx of Anesthesia complications.                    Social:  Former Smoker       Hematology/Oncology:       -- Denies Anemia:                                  Cardiovascular:     Denies Pacemaker. Hypertension   Denies MI.  Denies CAD.    Denies CABG/stent.    CHF    hyperlipidemia     Patient on beta blockers                          Pulmonary:    Denies COPD.  Denies Asthma.  Shortness of breath  Sleep Apnea                Renal/:  Chronic Renal Disease, ESRD,  Dialysis  BPH              Hepatic/GI:      Denies GERD. Liver Disease, Hepatitis, C  Not Taking GLP-1 Agonists            Neurological:    Denies CVA.    Denies Seizures.                                Endocrine:  Diabetes, type 2, using insulin         Denies Obesity / BMI > 30, Denies Morbid Obesity / BMI > 40      Physical Exam  General: Well nourished    Airway:  Mallampati: II   Mouth Opening: Normal  TM Distance: Normal  Tongue: Normal  Neck ROM: Normal ROM    Dental:  Periodontal disease    Chest/Lungs:  Clear to auscultation, Normal Respiratory Rate    Heart:  Rate: Normal  Rhythm: Regular Rhythm  Sounds: Normal        Anesthesia Plan  Type of Anesthesia, risks & benefits discussed:    Anesthesia Type: Gen Natural Airway, MAC  Intra-op Monitoring Plan: Standard ASA Monitors  Post Op Pain Control Plan: multimodal analgesia and IV/PO Opioids PRN  Induction:  IV  Informed Consent: Informed consent signed with the Patient and all parties understand the risks and agree with anesthesia plan.  All questions answered.   ASA Score: 4  Day of Surgery Review of History & Physical: H&P Update referred to the surgeon/provider.  Anesthesia Plan Notes: Discussed anesthetic plan for light sedation, MAC    Ready For Surgery From Anesthesia Perspective.     .           [1]   Patient Active Problem List  Diagnosis    Noncompliance with medication regimen    Sepsis    Elevated liver enzymes    HTN (hypertension)    Tobacco abuse    Chronic hepatitis C without hepatic coma    Nuclear sclerosis of both eyes    Benign essential HTN    Uncontrolled diabetes mellitus with hyperglycemia    Pure hypercholesterolemia    On statin therapy    Tachycardia    Noncompliance with diet and medication regimen    Benign prostatic hyperplasia with urinary frequency    Hypocalcemia    Hypokalemia    YAMIL (acute kidney injury)    Hyperosmolar hyperglycemic state (HHS)    Severe protein-calorie malnutrition    Low serum albumin    Acute on chronic  heart failure with preserved ejection fraction (HFpEF) ICM NYHA2    CAD (coronary artery disease)    Macrocytic anemia    Transaminasemia    Anasarca    Acute respiratory failure with hypoxemia    Type 2 diabetes mellitus    QT prolongation    S/P arteriovenous (AV) fistula creation    Acute blood loss anemia    Pre-op evaluation    Left arm swelling    ESRD on dialysis    Pleural effusion with transudate    Shortness of breath   [2]   No current facility-administered medications on file prior to encounter.     Current Outpatient Medications on File Prior to Encounter   Medication Sig Dispense Refill    empagliflozin (JARDIANCE) 10 mg tablet Take 10 mg by mouth once daily.      acetaminophen (TYLENOL) 650 MG TbSR Take 1 tablet (650 mg total) by mouth every 8 (eight) hours as needed (pain).      alcohol swabs (ALCOHOL WIPES) PadM Use topical 4 x daily for insulin 200 each 11    aspirin (ECOTRIN) 81 MG EC tablet Take 81 mg by mouth.      atorvastatin (LIPITOR) 80 MG tablet Take 1 tablet by mouth every evening.      blood sugar diagnostic Strp 1 each by Misc.(Non-Drug; Combo Route) route 3 (three) times daily before meals. 200 each 11    blood-glucose meter (TRUE METRIX GLUCOSE METER) Misc 1 each by Misc.(Non-Drug; Combo Route) route once daily. 1 each 0    brimonidine 0.2% (ALPHAGAN) 0.2 % Drop Place 1 drop into the left eye 2 (two) times a day.      carvediloL (COREG) 12.5 MG tablet Take 12.5 mg by mouth 2 (two) times daily.      ENTRESTO  mg per tablet Take 1 tablet by mouth 2 (two) times daily.      EScitalopram oxalate (LEXAPRO) 10 MG tablet Take 10 mg by mouth once daily.      ezetimibe (ZETIA) 10 mg tablet Take 1 tablet (10 mg total) by mouth every evening. 90 tablet 3    glucose 4 GM chewable tablet Take 4 tablets (16 g total) by mouth as needed for Low blood sugar. 4 tablet 12    insulin aspart U-100 (NOVOLOG) 100 unit/mL (3 mL) InPn pen Inject 10 Units into the skin 3 (three) times daily with meals.    "   insulin glargine U-100, Lantus, (BASAGLAR KWIKPEN U-100 INSULIN) 100 unit/mL (3 mL) InPn pen Inject 10 Units into the skin every evening. Within 15 minutes of eating      insulin syringe-needle U-100 (INSULIN SYRINGE) 1/2 mL 28 gauge x 1/2" Syrg To use with meal time insulin tid 200 each 9    lancets 32 gauge Misc Inject 1 lancet into the skin 3 (three) times daily before meals. 200 each 11    metOLazone (ZAROXOLYN) 2.5 MG tablet Take 1 tablet by mouth every Monday and Thursday.      NIFEdipine (PROCARDIA-XL) 60 MG (OSM) 24 hr tablet Take 60 mg by mouth 2 (two) times daily.      OZEMPIC 0.25 mg or 0.5 mg (2 mg/3 mL) pen injector Inject 0.5 mg into the skin every 7 days.      pantoprazole (PROTONIX) 40 MG tablet Take 1 tablet (40 mg total) by mouth once daily. 30 tablet 0    pen needle, diabetic (NOVOTWIST) 32 gauge x 1/5" Ndle To use nightly with levemir 100 each 3    sevelamer carbonate (RENVELA) 800 mg Tab Take 2 tablets (1,600 mg total) by mouth 3 (three) times daily with meals. 180 tablet 0    SURE COMFORT INSULIN SYRINGE 0.3 mL 31 gauge x 5/16" Syrg Inject 1 each into the skin 4 (four) times daily before meals and nightly. 200 each 11    tamsulosin (FLOMAX) 0.4 mg Cap Take 0.4 mg by mouth once daily.      torsemide (DEMADEX) 20 MG Tab Take 80 mg by mouth 2 (two) times a day.     [3]   Social History  Socioeconomic History    Marital status: Single   Tobacco Use    Smoking status: Former     Current packs/day: 1.00     Types: Cigarettes    Smokeless tobacco: Former   Substance and Sexual Activity    Alcohol use: No    Drug use: Not Currently     Types: Methamphetamines     Comment: Patient stated "I'm use IV drug user with meth in the last 2 weeks."    Sexual activity: Not Currently     Social Drivers of Health     Financial Resource Strain: Low Risk  (6/6/2025)    Overall Financial Resource Strain (CARDIA)     Difficulty of Paying Living Expenses: Not hard at all   Food Insecurity: No Food Insecurity " (6/6/2025)    Hunger Vital Sign     Worried About Running Out of Food in the Last Year: Never true     Ran Out of Food in the Last Year: Never true   Transportation Needs: Unmet Transportation Needs (6/6/2025)    PRAPARE - Transportation     Lack of Transportation (Medical): Yes     Lack of Transportation (Non-Medical): Yes   Physical Activity: Inactive (5/22/2025)    Exercise Vital Sign     Days of Exercise per Week: 0 days     Minutes of Exercise per Session: 0 min   Stress: No Stress Concern Present (6/6/2025)    [employer] - Occupational Stress Questionnaire     Feeling of Stress : Not at all   Housing Stability: Low Risk  (6/6/2025)    Housing Stability Vital Sign     Unable to Pay for Housing in the Last Year: No     Homeless in the Last Year: No

## 2025-06-17 ENCOUNTER — HOSPITAL ENCOUNTER (OUTPATIENT)
Facility: HOSPITAL | Age: 55
Discharge: HOME | End: 2025-06-17
Attending: STUDENT IN AN ORGANIZED HEALTH CARE EDUCATION/TRAINING PROGRAM | Admitting: STUDENT IN AN ORGANIZED HEALTH CARE EDUCATION/TRAINING PROGRAM
Payer: MEDICAID

## 2025-06-17 ENCOUNTER — ANESTHESIA (OUTPATIENT)
Dept: SURGERY | Facility: HOSPITAL | Age: 55
End: 2025-06-17
Payer: MEDICAID

## 2025-06-17 VITALS
RESPIRATION RATE: 17 BRPM | HEART RATE: 64 BPM | DIASTOLIC BLOOD PRESSURE: 100 MMHG | WEIGHT: 184 LBS | OXYGEN SATURATION: 95 % | BODY MASS INDEX: 27.89 KG/M2 | HEIGHT: 68 IN | SYSTOLIC BLOOD PRESSURE: 200 MMHG | TEMPERATURE: 99 F

## 2025-06-17 DIAGNOSIS — N18.6 ESRD ON DIALYSIS: Primary | ICD-10-CM

## 2025-06-17 DIAGNOSIS — N18.6 ESRD (END STAGE RENAL DISEASE): ICD-10-CM

## 2025-06-17 DIAGNOSIS — Z99.2 ESRD ON DIALYSIS: Primary | ICD-10-CM

## 2025-06-17 LAB
ANION GAP (OHS): 8 MMOL/L (ref 8–16)
BUN SERPL-MCNC: 31 MG/DL (ref 6–20)
CALCIUM SERPL-MCNC: 8.7 MG/DL (ref 8.7–10.5)
CHLORIDE SERPL-SCNC: 101 MMOL/L (ref 95–110)
CO2 SERPL-SCNC: 29 MMOL/L (ref 23–29)
CREAT SERPL-MCNC: 5 MG/DL (ref 0.5–1.4)
GFR SERPLBLD CREATININE-BSD FMLA CKD-EPI: 13 ML/MIN/1.73/M2
GLUCOSE SERPL-MCNC: 130 MG/DL (ref 70–110)
POCT GLUCOSE: 114 MG/DL (ref 70–110)
POCT GLUCOSE: 147 MG/DL (ref 70–110)
POTASSIUM SERPL-SCNC: 4.9 MMOL/L (ref 3.5–5.1)
SODIUM SERPL-SCNC: 138 MMOL/L (ref 136–145)

## 2025-06-17 PROCEDURE — 25500020 PHARM REV CODE 255: Performed by: STUDENT IN AN ORGANIZED HEALTH CARE EDUCATION/TRAINING PROGRAM

## 2025-06-17 PROCEDURE — 71000015 HC POSTOP RECOV 1ST HR: Performed by: STUDENT IN AN ORGANIZED HEALTH CARE EDUCATION/TRAINING PROGRAM

## 2025-06-17 PROCEDURE — 36000706: Performed by: STUDENT IN AN ORGANIZED HEALTH CARE EDUCATION/TRAINING PROGRAM

## 2025-06-17 PROCEDURE — 63600175 PHARM REV CODE 636 W HCPCS: Performed by: STUDENT IN AN ORGANIZED HEALTH CARE EDUCATION/TRAINING PROGRAM

## 2025-06-17 PROCEDURE — 27201423 OPTIME MED/SURG SUP & DEVICES STERILE SUPPLY: Performed by: STUDENT IN AN ORGANIZED HEALTH CARE EDUCATION/TRAINING PROGRAM

## 2025-06-17 PROCEDURE — 37000008 HC ANESTHESIA 1ST 15 MINUTES: Performed by: STUDENT IN AN ORGANIZED HEALTH CARE EDUCATION/TRAINING PROGRAM

## 2025-06-17 PROCEDURE — 93990 DOPPLER FLOW TESTING: CPT | Performed by: SURGERY

## 2025-06-17 PROCEDURE — 37000009 HC ANESTHESIA EA ADD 15 MINS: Performed by: STUDENT IN AN ORGANIZED HEALTH CARE EDUCATION/TRAINING PROGRAM

## 2025-06-17 PROCEDURE — C1769 GUIDE WIRE: HCPCS | Performed by: STUDENT IN AN ORGANIZED HEALTH CARE EDUCATION/TRAINING PROGRAM

## 2025-06-17 PROCEDURE — 25000003 PHARM REV CODE 250

## 2025-06-17 PROCEDURE — 63600175 PHARM REV CODE 636 W HCPCS

## 2025-06-17 PROCEDURE — 71000044 HC DOSC ROUTINE RECOVERY FIRST HOUR: Performed by: STUDENT IN AN ORGANIZED HEALTH CARE EDUCATION/TRAINING PROGRAM

## 2025-06-17 PROCEDURE — 82962 GLUCOSE BLOOD TEST: CPT | Performed by: STUDENT IN AN ORGANIZED HEALTH CARE EDUCATION/TRAINING PROGRAM

## 2025-06-17 PROCEDURE — 36000707: Performed by: STUDENT IN AN ORGANIZED HEALTH CARE EDUCATION/TRAINING PROGRAM

## 2025-06-17 PROCEDURE — 71000016 HC POSTOP RECOV ADDL HR: Performed by: STUDENT IN AN ORGANIZED HEALTH CARE EDUCATION/TRAINING PROGRAM

## 2025-06-17 PROCEDURE — 25000003 PHARM REV CODE 250: Performed by: STUDENT IN AN ORGANIZED HEALTH CARE EDUCATION/TRAINING PROGRAM

## 2025-06-17 PROCEDURE — C1894 INTRO/SHEATH, NON-LASER: HCPCS | Performed by: STUDENT IN AN ORGANIZED HEALTH CARE EDUCATION/TRAINING PROGRAM

## 2025-06-17 PROCEDURE — C1725 CATH, TRANSLUMIN NON-LASER: HCPCS | Performed by: STUDENT IN AN ORGANIZED HEALTH CARE EDUCATION/TRAINING PROGRAM

## 2025-06-17 PROCEDURE — 82310 ASSAY OF CALCIUM: CPT

## 2025-06-17 RX ORDER — MUPIROCIN 20 MG/G
OINTMENT TOPICAL
Status: DISCONTINUED | OUTPATIENT
Start: 2025-06-17 | End: 2025-06-17 | Stop reason: HOSPADM

## 2025-06-17 RX ORDER — MIDAZOLAM HYDROCHLORIDE 1 MG/ML
INJECTION INTRAMUSCULAR; INTRAVENOUS
Status: DISCONTINUED | OUTPATIENT
Start: 2025-06-17 | End: 2025-06-17

## 2025-06-17 RX ORDER — HYDRALAZINE HYDROCHLORIDE 20 MG/ML
INJECTION INTRAMUSCULAR; INTRAVENOUS
Status: DISCONTINUED | OUTPATIENT
Start: 2025-06-17 | End: 2025-06-17

## 2025-06-17 RX ORDER — SODIUM CHLORIDE 0.9 % (FLUSH) 0.9 %
10 SYRINGE (ML) INJECTION
Status: DISCONTINUED | OUTPATIENT
Start: 2025-06-17 | End: 2025-06-17 | Stop reason: HOSPADM

## 2025-06-17 RX ORDER — HEPARIN SOD,PORCINE/0.9 % NACL 1000/500ML
INTRAVENOUS SOLUTION INTRAVENOUS
Status: DISCONTINUED | OUTPATIENT
Start: 2025-06-17 | End: 2025-06-17 | Stop reason: HOSPADM

## 2025-06-17 RX ORDER — ACETAMINOPHEN 500 MG
1000 TABLET ORAL
Status: DISCONTINUED | OUTPATIENT
Start: 2025-06-17 | End: 2025-06-17 | Stop reason: HOSPADM

## 2025-06-17 RX ORDER — OXYCODONE HYDROCHLORIDE 10 MG/1
10 TABLET ORAL EVERY 4 HOURS PRN
Status: DISCONTINUED | OUTPATIENT
Start: 2025-06-17 | End: 2025-06-17 | Stop reason: HOSPADM

## 2025-06-17 RX ORDER — HEPARIN SODIUM 1000 [USP'U]/ML
INJECTION, SOLUTION INTRAVENOUS; SUBCUTANEOUS
Status: DISCONTINUED | OUTPATIENT
Start: 2025-06-17 | End: 2025-06-17 | Stop reason: HOSPADM

## 2025-06-17 RX ORDER — LIDOCAINE HYDROCHLORIDE 10 MG/ML
INJECTION, SOLUTION INFILTRATION; PERINEURAL
Status: DISCONTINUED | OUTPATIENT
Start: 2025-06-17 | End: 2025-06-17 | Stop reason: HOSPADM

## 2025-06-17 RX ORDER — CEFAZOLIN 2 G/1
2 INJECTION, POWDER, FOR SOLUTION INTRAMUSCULAR; INTRAVENOUS
Status: DISCONTINUED | OUTPATIENT
Start: 2025-06-17 | End: 2025-06-17 | Stop reason: HOSPADM

## 2025-06-17 RX ORDER — HEPARIN SODIUM 1000 [USP'U]/ML
INJECTION, SOLUTION INTRAVENOUS; SUBCUTANEOUS
Status: DISCONTINUED | OUTPATIENT
Start: 2025-06-17 | End: 2025-06-17

## 2025-06-17 RX ORDER — GLUCAGON 1 MG
1 KIT INJECTION
Status: DISCONTINUED | OUTPATIENT
Start: 2025-06-17 | End: 2025-06-17 | Stop reason: HOSPADM

## 2025-06-17 RX ORDER — VERAPAMIL HYDROCHLORIDE 2.5 MG/ML
INJECTION INTRAVENOUS
Status: DISCONTINUED | OUTPATIENT
Start: 2025-06-17 | End: 2025-06-17 | Stop reason: HOSPADM

## 2025-06-17 RX ORDER — IODIXANOL 320 MG/ML
INJECTION, SOLUTION INTRAVASCULAR
Status: DISCONTINUED | OUTPATIENT
Start: 2025-06-17 | End: 2025-06-17 | Stop reason: HOSPADM

## 2025-06-17 RX ORDER — OXYCODONE HYDROCHLORIDE 5 MG/1
5 TABLET ORAL
Status: DISCONTINUED | OUTPATIENT
Start: 2025-06-17 | End: 2025-06-17 | Stop reason: HOSPADM

## 2025-06-17 RX ORDER — LIDOCAINE HYDROCHLORIDE 10 MG/ML
1 INJECTION, SOLUTION EPIDURAL; INFILTRATION; INTRACAUDAL; PERINEURAL ONCE
Status: DISCONTINUED | OUTPATIENT
Start: 2025-06-17 | End: 2025-06-17 | Stop reason: HOSPADM

## 2025-06-17 RX ORDER — MIDAZOLAM HYDROCHLORIDE 5 MG/ML
INJECTION INTRAMUSCULAR; INTRAVENOUS
Status: DISCONTINUED | OUTPATIENT
Start: 2025-06-17 | End: 2025-06-17

## 2025-06-17 RX ORDER — HALOPERIDOL LACTATE 5 MG/ML
0.5 INJECTION, SOLUTION INTRAMUSCULAR EVERY 10 MIN PRN
Status: DISCONTINUED | OUTPATIENT
Start: 2025-06-17 | End: 2025-06-17 | Stop reason: HOSPADM

## 2025-06-17 RX ADMIN — HYDRALAZINE HYDROCHLORIDE 2 MG: 20 INJECTION, SOLUTION INTRAMUSCULAR; INTRAVENOUS at 10:06

## 2025-06-17 RX ADMIN — HEPARIN SODIUM 3000 UNITS: 1000 INJECTION INTRAVENOUS; SUBCUTANEOUS at 10:06

## 2025-06-17 RX ADMIN — HYDRALAZINE HYDROCHLORIDE 2 MG: 20 INJECTION, SOLUTION INTRAMUSCULAR; INTRAVENOUS at 11:06

## 2025-06-17 RX ADMIN — SODIUM CHLORIDE: 0.9 INJECTION, SOLUTION INTRAVENOUS at 09:06

## 2025-06-17 RX ADMIN — MIDAZOLAM HYDROCHLORIDE 2 MG: 2 INJECTION, SOLUTION INTRAMUSCULAR; INTRAVENOUS at 09:06

## 2025-06-17 NOTE — TRANSFER OF CARE
"Anesthesia Transfer of Care Note    Patient: Onofre Ceballos    Procedure(s) Performed: Procedure(s) (LRB):  FISTULOGRAM, ARTERIOVENOUS (Left)    Patient location: [facility]    Anesthesia Type: MAC    Transport from OR: Transported from OR on room air with adequate spontaneous ventilation    Post pain: adequate analgesia    Post assessment: no apparent anesthetic complications and tolerated procedure well    Post vital signs: stable    Level of consciousness: awake and alert    Nausea/Vomiting: no nausea/vomiting    Complications: none    Transfer of care protocol was followed      Last vitals: Visit Vitals  BP (!) 201/100 (BP Location: Right arm, Patient Position: Sitting)   Pulse 60   Temp 36.5 °C (97.7 °F)   Resp (!) 22   Ht 5' 8" (1.727 m)   Wt 83.5 kg (184 lb)   SpO2 95%   BMI 27.98 kg/m²     "

## 2025-06-17 NOTE — OP NOTE
[facility] - Surgery ([facility])  Operative Note     Surgery Date: 6/17/2025      Surgeons and Role:     * Lima Mendez MD - Primary     * Simon Meraz MD - Fellow     Pre-op Diagnosis:  Chronic kidney disease requiring chronic dialysis [N18.6, Z99.2]     Post-op Diagnosis:  Post-Op Diagnosis Codes:     * Chronic kidney disease requiring chronic dialysis [N18.6, Z99.2]     Procedure:  US guided left transradial access  LUE Fistulagram  Central venogram     Anesthesia: Local MAC     Operative Findings:   No discrete inflow or central lesions identified     Flow through AVF was diminutive and an attempt was made at PTA with small (3mm balloon) which appeared to worsen flow and caliber of fistula.      Will bring back for attempt at new access creation, ipsilateral.         Estimated Blood Loss: 1 cc         Specimens: None    Procedure in Detail:   The patient was brought to the operating room and placed supine on the operating room table.  Left upper extremity was prepped and draped in sterile fashion.  Appropriate hemodynamic monitors were applied.  A time-out was called.      Under ultrasound guidance, the left radial artery was accessed in standard micropuncture technique.  A 5 Telugu transradial sheath was placed and the patient was systemically heparinized.  Transradial cocktail was utilized throughout the case.  A fistulogram was performed which demonstrated appropriate inflow and a very diminutive fistula.  Using an 014 wire, the fistula was accessed and the catheter was advanced into the outflow where a central venogram was performed which did not demonstrate any significant central lesions.  We did attempt to perform a percutaneous transluminal angioplasty of the proximal portion of the arteriovenous fistula with a coronary balloon, 2 mm.  However, there appeared to be significant recoil with no proximal flow through the arteriovenous fistula.  All things considered, in the fistula that had dismal flow,  we will plan to bring back for new access creation in the ipsilateral extremity.  A transradial band was placed for hemostasis after all catheters, wires, sheaths were removed.  The patient was liberated from anesthesia and brought to the PACU in stable condition.  All counts were reported correct.  No complications were identified.      Dr. Mendez was present and scrubbed for the entirety of the case.

## 2025-06-17 NOTE — PROGRESS NOTES
Notified MD Pedroza of pt's elevated blood pressure. Initial /101. Retake 201/100. Pt stated that he felt fine, denies vision change or headache. No new orders at this time. MD Higginbotham stated he would come see pt at bedside.   Safety maintained.

## 2025-06-17 NOTE — H&P
Expand All Collapse All    Onofre Ceballos  05/16/2025     HPI:  Patient is a 54 y.o. male with a h/o CKD, T2D, HTN, 60 pck year smoking hx, States had h/o IVDU 2023, CHF who is here today for evaluation s/p creation L BC AVF with transposition L cephalic vein 2/25/25. He has been getting dialysis MWF through his catheter. He came to the ER on 3/4/25 due to LUE swelling and drainage through his bandages. Decision was made to hold off on fistulogram due to marked improvement in swelling of the LUE. He denies any issues with dialysis. He denies chest pain, SOB, or claudication symptoms in the legs when walking.  He quit smoking in March 2024.   Right-hand dominant      INTERVAL HISTORY:   He returns to the hospital today for a fistulagram. Unfortunately his flows are only 8 on US. I explained that his fistula has failed and he will need new access.     S/p  2/25/25  Creation of L BC AVF     Findings:  Creation L BC AVF with transposition L cephalic vein 8cm segment - L cephalic dilated to 2.5mm distally after block  Good thrill  2+ L radial a. 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, NP   Employment: Unemployed       Current Medications      Current Outpatient Medications:     acetaminophen (TYLENOL) 650 MG TbSR, Take 1 tablet (650 mg total) by mouth every 8 (eight) hours as needed (pain)., Disp: , Rfl:     alcohol swabs (ALCOHOL WIPES) PadM, Use topical 4 x daily for insulin, Disp: 200 each, Rfl: 11    aspirin (ECOTRIN) 81 MG EC tablet, Take 81 mg by mouth., Disp: , Rfl:     atorvastatin (LIPITOR) 80 MG tablet, Take 1 tablet by mouth every evening., Disp: , Rfl:     blood sugar diagnostic Strp, 1 each by Misc.(Non-Drug; Combo Route) route 3 (three) times daily before meals., Disp: 200 each, Rfl: 11    blood-glucose meter (TRUE METRIX GLUCOSE METER) Misc, 1 each by  "Misc.(Non-Drug; Combo Route) route once daily., Disp: 1 each, Rfl: 0    brimonidine 0.2% (ALPHAGAN) 0.2 % Drop, Place into both eyes., Disp: , Rfl:     carvediloL (COREG) 12.5 MG tablet, Take 12.5 mg by mouth 2 (two) times daily., Disp: , Rfl:     ENTRESTO  mg per tablet, Take 1 tablet by mouth 2 (two) times daily., Disp: , Rfl:     ergocalciferol (ERGOCALCIFEROL) 50,000 unit Cap, Take 50,000 Units by mouth every 7 days. (Patient not taking: Reported on 3/12/2025), Disp: , Rfl:     EScitalopram oxalate (LEXAPRO) 10 MG tablet, Take 10 mg by mouth., Disp: , Rfl:     ezetimibe (ZETIA) 10 mg tablet, Take 1 tablet (10 mg total) by mouth every evening., Disp: 90 tablet, Rfl: 3    glucose 4 GM chewable tablet, Take 4 tablets (16 g total) by mouth as needed for Low blood sugar., Disp: 4 tablet, Rfl: 12    insulin aspart U-100 (NOVOLOG) 100 unit/mL (3 mL) InPn pen, Inject 10 Units into the skin 3 (three) times daily with meals., Disp: , Rfl:     insulin glargine U-100, Lantus, (BASAGLAR KWIKPEN U-100 INSULIN) 100 unit/mL (3 mL) InPn pen, Inject 10 Units into the skin every evening. Within 15 minutes of eating (Patient taking differently: Inject 30 Units into the skin every evening. Within 15 minutes of eating), Disp: , Rfl:     insulin syringe-needle U-100 (INSULIN SYRINGE) 1/2 mL 28 gauge x 1/2" Syrg, To use with meal time insulin tid, Disp: 200 each, Rfl: 9    lancets 32 gauge Misc, Inject 1 lancet into the skin 3 (three) times daily before meals., Disp: 200 each, Rfl: 11    oxyCODONE (ROXICODONE) 5 MG immediate release tablet, Take 1 tablet (5 mg total) by mouth every 4 (four) hours as needed., Disp: 15 tablet, Rfl: 0    oxyCODONE-acetaminophen (PERCOCET) 5-325 mg per tablet, Take 1 tablet by mouth every 6 (six) hours as needed for Pain., Disp: 12 tablet, Rfl: 0    OZEMPIC 0.25 mg or 0.5 mg (2 mg/3 mL) pen injector, Inject 0.5 mg into the skin every 7 days., Disp: , Rfl:     pantoprazole (PROTONIX) 40 MG tablet, " "Take 1 tablet (40 mg total) by mouth once daily. (Patient not taking: Reported on 3/12/2025), Disp: 30 tablet, Rfl: 0    pen needle, diabetic (NOVOTWIST) 32 gauge x 1/5" Ndle, To use nightly with levemir, Disp: 100 each, Rfl: 3    polyethylene glycol (GLYCOLAX) 17 gram/dose powder, Take 17 g by mouth once daily., Disp: 1 Bottle, Rfl: 0    sevelamer carbonate (RENVELA) 800 mg Tab, Take 2 tablets (1,600 mg total) by mouth 3 (three) times daily with meals., Disp: 180 tablet, Rfl: 0    SURE COMFORT INSULIN SYRINGE 0.3 mL 31 gauge x 5/16" Syrg, Inject 1 each into the skin 4 (four) times daily before meals and nightly., Disp: 200 each, Rfl: 11    tamsulosin (FLOMAX) 0.4 mg Cap, Take by mouth., Disp: , Rfl:     torsemide (DEMADEX) 20 MG Tab, Take by mouth. (Patient not taking: Reported on 3/12/2025), Disp: , Rfl:         PHYSICAL EXAM:                        Extremities:           L BC AVF - transposed; + audible bruit                                                        L mid-arm edema; no drainage     LAB RESULTS:        Lab Results   Component Value Date     K 4.2 03/03/2025     K 3.5 01/15/2025     K 4.2 11/12/2024     CREATININE 3.1 (H) 03/03/2025     CREATININE 4.5 (H) 01/15/2025     CREATININE 5.10 (H) 11/12/2024            Lab Results   Component Value Date     WBC 7.17 03/03/2025     WBC 8.93 01/15/2025     WBC 10.70 11/12/2024     HCT 34.0 (L) 03/03/2025     HCT 35.2 (L) 01/15/2025     HCT 25.8 (L) 11/12/2024      03/03/2025      01/15/2025      11/12/2024            Lab Results   Component Value Date     HGBA1C 7.2 (H) 11/06/2024     HGBA1C 7.1 (H) 08/06/2024     HGBA1C 9.0 (H) 05/03/2024      IMAGING (I have independently reviewed and interpreted the following tests):  AVF u/s: L BC AVF (transposed) flow flow is 164 mL/minute.  The vein itself is measuring 2.3, 3.6 and 4.1 mm in diameter.     IMP/PLAN:  Plan  54 y.o. male with a h/o CKD, T2D, HTN, 60 pck year smoking hx - quit, States had " h/o IVDU 2023, CHF, s/p creation L BC AVF with transposition L cephalic vein 2/25/25.  Unfortunately, it looks as though his fistula has failed. We will perform a central venogram today to assess for any central venous stenosis, as he may be better served by right arm access in the future. Risks and benefits were discussed with him this morning including but not limited to death, pain, bleeding, embolization, vessel perforation and need for further surgery.

## 2025-06-17 NOTE — PROGRESS NOTES
"Went to administer pre-op medications to pt. Pt stated that he did not want the tylenol or mupirocin bc "he didn't need that".  "

## 2025-06-17 NOTE — PLAN OF CARE
Patient arrived from OR.  Patient stable.  Report received at this time from OR Nurse, Surgical Fellow and CRNA.  Assumed care of patient at this time.

## 2025-06-17 NOTE — BRIEF OP NOTE
[facility] - Surgery ([facility])  Brief Operative Note    Surgery Date: 6/17/2025     Surgeons and Role:     * Lima Mendez MD - Primary     * Simon Meraz MD - Fellow    Pre-op Diagnosis:  Chronic kidney disease requiring chronic dialysis [N18.6, Z99.2]    Post-op Diagnosis:  Post-Op Diagnosis Codes:     * Chronic kidney disease requiring chronic dialysis [N18.6, Z99.2]    Procedure:  US guided left transradial access  LUE Fistulagram  Central venogram    Anesthesia: Local MAC    Operative Findings:   No discrete inflow or central lesions identified    Flow through AVF was diminutive and an attempt was made at PTA with small (3mm balloon) which appeared to worsen flow and caliber of fistula.     Will bring back for attempt at new access creation, ipsilateral.       Estimated Blood Loss: 1 cc         Specimens: None        Discharge Note    OUTCOME: Patient tolerated treatment/procedure well without complication and is now ready for discharge.    DISPOSITION: Home or Self Care    FINAL DIAGNOSIS:  <principal problem not specified>    FOLLOWUP: In clinic    DISCHARGE INSTRUCTIONS:    Discharge Procedure Orders   Remove dressing in 24 hours     Notify your health care provider if you experience any of the following:  redness, tenderness, or signs of infection (pain, swelling, redness, odor or green/yellow discharge around incision site)

## 2025-06-17 NOTE — DISCHARGE INSTRUCTIONS
Lifting and activity restrictions depend on the insertion site for the procedure:  - Wrist or hand:   - Do not lift more than 5 pounds for the first 24 hours and do not lift more than 10 pounds with the affected arm for 1 week.   - Do not use affected arm for pulling up in bed, adjusting position, or weight bearing exercise.   - Avoid flexing the wrist, such as hammering, playing tennis, or swinging objects.   - Avoid moisture or submerging hand for 7 days (tub, swimming, dishes). Showering is OK.    Fistulogram Discharge instructions:  1. Keep your dressing dry for 24 hours.  2. After 24 hours, you may remove the bandage and clean the area with soapy water.  3. The sutures at the site may be removed by the dialysis unit or return to the surgery clinic in 3-4 days to have them removed.  4. Avoid heavy lifting (over 10 lbs) and strenuous activity with the affected arm for 2 days.  5. Inspect the puncture site daily for any signs of redness, swelling or drainage. Return to ER for any signs of infection, numbess, loss of feeling or pale skin to arm or hand below the procedure site        Post Sedation Discharge Instructions     1. On the day of your procedure, remain in bed and only get up to go to the bathroom until the following day.  2. The day after your procedure, you may gradually resume your normal activities.   3. Do not drive, operate machinery, sign legal documents, cook or care for young children for 24 hours after your procedure.  4. Return to the ER for any shortness of breath, difficulty breathing, temperature of 101 or greater or any severe pain unrelieved by pain medicine.          Recovery After Procedural Sedation (Adult)  You have been given medicine by vein to make you sleep during your surgery. This may have included both a pain medicine and sleeping medicine. Most of the effects have worn off. But you may still have some drowsiness for the next 6 to 8 hours.  Home care  Follow these guidelines  when you get home:  For the next 8 hours, you should be watched by a responsible adult. This person should make sure your condition is not getting worse.  Don't drink any alcohol for the next 24 hours.  Don't drive, operate dangerous machinery, or make important business or personal decisions during the next 24 hours.  Do not make any important decisions for 24 hours  Note: Your healthcare provider may tell you not to take any medicine by mouth for pain or sleep in the next 4 hours. These medicines may react with the medicines you were given in the hospital. This could cause a much stronger response than usual.  Follow-up care  Follow up with your healthcare provider if you are not alert and back to your usual level of activity within 12 hours.  When to seek medical advice  Call your healthcare provider right away if any of these occur:  Drowsiness gets worse  Weakness or dizziness gets worse  Repeated vomiting  You can't be awakened

## 2025-06-18 NOTE — ANESTHESIA POSTPROCEDURE EVALUATION
Anesthesia Post Evaluation    Patient: Onofre Ceballos    Procedure(s) Performed: Procedure(s) (LRB):  FISTULOGRAM, ARTERIOVENOUS (Left)    Final Anesthesia Type: MAC      Patient location during evaluation: PACU  Patient participation: Yes- Able to Participate  Level of consciousness: awake and alert  Post-procedure vital signs: reviewed and stable  Pain management: adequate  Airway patency: patent    PONV status at discharge: No PONV  Anesthetic complications: no      Cardiovascular status: blood pressure returned to baseline  Respiratory status: unassisted  Hydration status: euvolemic  Follow-up not needed.              Vitals Value Taken Time   /98 06/17/25 13:33   Temp 37.2 °C (99 °F) 06/17/25 11:00   Pulse 64 06/17/25 13:34   Resp 19 06/17/25 13:34   SpO2 95 % 06/17/25 13:34   Vitals shown include unfiled device data.      No case tracking events are documented in the log.      Pain/Nick Score: Pain Rating Prior to Med Admin: 0 (6/17/2025  9:03 AM)  Pain Rating Post Med Admin: 0 (6/17/2025 11:00 AM)  Nick Score: 10 (6/17/2025  1:30 PM)

## 2025-06-19 ENCOUNTER — OFFICE VISIT (OUTPATIENT)
Dept: GASTROENTEROLOGY | Facility: CLINIC | Age: 55
End: 2025-06-19
Payer: MEDICAID

## 2025-06-19 VITALS
HEIGHT: 68 IN | BODY MASS INDEX: 27.36 KG/M2 | WEIGHT: 180.56 LBS | SYSTOLIC BLOOD PRESSURE: 115 MMHG | HEART RATE: 58 BPM | DIASTOLIC BLOOD PRESSURE: 71 MMHG

## 2025-06-19 DIAGNOSIS — K20.90 ESOPHAGITIS: Primary | ICD-10-CM

## 2025-06-19 DIAGNOSIS — K22.4 ESOPHAGEAL DYSMOTILITY: ICD-10-CM

## 2025-06-19 PROCEDURE — 3008F BODY MASS INDEX DOCD: CPT | Mod: CPTII,,, | Performed by: NURSE PRACTITIONER

## 2025-06-19 PROCEDURE — 3044F HG A1C LEVEL LT 7.0%: CPT | Mod: CPTII,,, | Performed by: NURSE PRACTITIONER

## 2025-06-19 PROCEDURE — 3074F SYST BP LT 130 MM HG: CPT | Mod: CPTII,,, | Performed by: NURSE PRACTITIONER

## 2025-06-19 PROCEDURE — 4010F ACE/ARB THERAPY RXD/TAKEN: CPT | Mod: CPTII,,, | Performed by: NURSE PRACTITIONER

## 2025-06-19 PROCEDURE — 99999 PR PBB SHADOW E&M-EST. PATIENT-LVL V: CPT | Mod: PBBFAC,,, | Performed by: NURSE PRACTITIONER

## 2025-06-19 PROCEDURE — 3078F DIAST BP <80 MM HG: CPT | Mod: CPTII,,, | Performed by: NURSE PRACTITIONER

## 2025-06-19 PROCEDURE — 99214 OFFICE O/P EST MOD 30 MIN: CPT | Mod: S$PBB,,, | Performed by: NURSE PRACTITIONER

## 2025-06-19 PROCEDURE — 99215 OFFICE O/P EST HI 40 MIN: CPT | Mod: PBBFAC,PN | Performed by: NURSE PRACTITIONER

## 2025-06-19 PROCEDURE — 1160F RVW MEDS BY RX/DR IN RCRD: CPT | Mod: CPTII,,, | Performed by: NURSE PRACTITIONER

## 2025-06-19 PROCEDURE — 1159F MED LIST DOCD IN RCRD: CPT | Mod: CPTII,,, | Performed by: NURSE PRACTITIONER

## 2025-06-19 NOTE — PROGRESS NOTES
Subjective:       Patient ID: Onofre Ceballos is a 54 y.o. male.    Chief Complaint: Follow-up    55 y/o male with HTN, T2DM, CHF, and hep C referred for esophagitis CT chest on 5/22/2025. Patient was seen 9/2024 for similar issues. CT neck 7/19/2024 revealed no adenopathy or abscess but the esophagus was abnormal within the mediastinum being dilated with an air-fluid level and a thickened wall consistent with a distal esophageal pathology. UGI showed dilated thoracic esophagus with esophageal dysmotility. EGD 10/2024 revealed fluid in the lower third of the esophagus; examination was suspicious for achalasia; medium amount of food (residue) in the stomach; normal examined duodenum; no specimens collected. Patient denies dysphagia, shortness of breath, cough, chest pain, fever, nausea, or vomiting. Patient has history of reflux controlled with daily PPI prescribed by PCP.          Past Medical History:   Diagnosis Date    CHF (congestive heart failure)     Diabetes mellitus     Diabetes mellitus type I     Hepatitis     Hepatitis C     Hypertension     Renal disorder     Shingles 10/2018    Sleep apnea        Past Surgical History:   Procedure Laterality Date    ARTERIOVENOUS FISTULOGRAPHY Left 6/17/2025    Procedure: FISTULOGRAM, ARTERIOVENOUS;  Surgeon: Lima Mendez MD;  Location: [address];  Service: Vascular;  Laterality: Left;  mGy 144.96  Gycm2 32.0330  Fluro time 4.7 min  Contrast 27 mL    ESOPHAGOGASTRODUODENOSCOPY N/A 10/11/2024    Procedure: EGD (ESOPHAGOGASTRODUODENOSCOPY);  Surgeon: Benjamin Lacy MD;  Location: [facility];  Service: Endoscopy;  Laterality: N/A;    INSERTION, CATHETER, TUNNELED N/A 10/29/2024    Procedure: Insertion,catheter,tunneled;  Surgeon: Angel Malcolm MD;  Location: [facility] OR;  Service: General;  Laterality: N/A;    INSERTION, CATHETER, TUNNELED N/A 11/8/2024    Procedure: Insertion,catheter,tunneled;  Surgeon: Huseyin Romero MD;  Location: [facility] OR;  " Service: General;  Laterality: N/A;    INSERTION, GRAFT, ARTERIOVENOUS, UPPER EXTREMITY Left 2/25/2025    Procedure: INSERTION, GRAFT, ARTERIOVENOUS, UPPER EXTREMITY POSSIBLE AVF CREATION;  Surgeon: Luis F Berry MD;  Location: [facility] OR [address] [place] [address];  Service: Vascular;  Laterality: Left;    NO PAST SURGERIES         Family History   Problem Relation Name Age of Onset    Diabetes Mother      Lung cancer Father         Social History     Socioeconomic History    Marital status: Single   Tobacco Use    Smoking status: Former     Current packs/day: 1.00     Types: Cigarettes    Smokeless tobacco: Former   Substance and Sexual Activity    Alcohol use: No    Drug use: Not Currently     Types: Methamphetamines     Comment: Patient stated "I'm use IV drug user with meth in the last 2 weeks."    Sexual activity: Not Currently     Social Drivers of Health     Financial Resource Strain: Low Risk  (6/6/2025)    Overall Financial Resource Strain (CARDIA)     Difficulty of Paying Living Expenses: Not hard at all   Food Insecurity: No Food Insecurity (6/6/2025)    Hunger Vital Sign     Worried About Running Out of Food in the Last Year: Never true     Ran Out of Food in the Last Year: Never true   Transportation Needs: Unmet Transportation Needs (6/6/2025)    PRAPARE - Transportation     Lack of Transportation (Medical): Yes     Lack of Transportation (Non-Medical): Yes   Physical Activity: Inactive (5/22/2025)    Exercise Vital Sign     Days of Exercise per Week: 0 days     Minutes of Exercise per Session: 0 min   Stress: No Stress Concern Present (6/6/2025)    [employer] [place] [employer] - Occupational Stress Questionnaire     Feeling of Stress : Not at all   Housing Stability: Low Risk  (6/6/2025)    Housing Stability Vital Sign     Unable to Pay for Housing in the Last Year: No     Homeless in the Last Year: No       Review of Systems   Constitutional:  Negative for appetite change and unexpected weight change. " "  HENT:  Negative for trouble swallowing.    Respiratory:  Positive for shortness of breath (on exertion).    Cardiovascular:  Positive for leg swelling. Negative for chest pain.   Gastrointestinal:  Negative for abdominal pain, blood in stool, diarrhea and nausea.   Hematological:  Negative for adenopathy. Does not bruise/bleed easily.   Psychiatric/Behavioral:  Negative for dysphoric mood.          Objective:     Vitals:    25 0913   BP: 115/71   BP Location: Right arm   Patient Position: Sitting   Pulse: (!) 58   Weight: 81.9 kg (180 lb 8.9 oz)   Height: 5' 8" (1.727 m)          Physical Exam  Constitutional:       General: He is not in acute distress.     Appearance: Normal appearance.   HENT:      Head: Normocephalic.   Eyes:      Conjunctiva/sclera: Conjunctivae normal.   Cardiovascular:      Rate and Rhythm: Normal rate and regular rhythm.   Pulmonary:      Effort: Pulmonary effort is normal. No respiratory distress.   Musculoskeletal:         General: Swelling (BLE edema) present.      Cervical back: Normal range of motion.      Right lower le+ Pitting Edema present.      Left lower le+ Pitting Edema present.   Skin:     General: Skin is warm and dry.   Neurological:      Mental Status: He is alert and oriented to person, place, and time.   Psychiatric:         Mood and Affect: Mood normal.         Behavior: Behavior normal.               Assessment:         ICD-10-CM ICD-9-CM   1. Esophagitis  K20.90 530.10   2. Esophageal dysmotility  K22.4 530.5       Plan:       Esophagitis; Esophageal dysmotility  -     EGD concerning for achalasia. Patient declines further workup including esophageal manometry as he is asymptomatic  -     Continue PPI daily    Follow up if symptoms worsen or fail to improve.     Patient's Medications   New Prescriptions    No medications on file   Previous Medications    ACETAMINOPHEN (TYLENOL) 650 MG TBSR    Take 1 tablet (650 mg total) by mouth every 8 (eight) hours as " "needed (pain).    ALCOHOL SWABS (ALCOHOL WIPES) PADM    Use topical 4 x daily for insulin    ASPIRIN (ECOTRIN) 81 MG EC TABLET    Take 81 mg by mouth.    ATORVASTATIN (LIPITOR) 80 MG TABLET    Take 1 tablet by mouth every evening.    BLOOD SUGAR DIAGNOSTIC STRP    1 each by Misc.(Non-Drug; Combo Route) route 3 (three) times daily before meals.    BLOOD-GLUCOSE METER (TRUE METRIX GLUCOSE METER) MISC    1 each by Misc.(Non-Drug; Combo Route) route once daily.    BRIMONIDINE 0.2% (ALPHAGAN) 0.2 % DROP    Place 1 drop into the left eye 2 (two) times a day.    CARVEDILOL (COREG) 12.5 MG TABLET    Take 12.5 mg by mouth 2 (two) times daily.    EMPAGLIFLOZIN (JARDIANCE) 10 MG TABLET    Take 10 mg by mouth once daily.    ENTRESTO  MG PER TABLET    Take 1 tablet by mouth 2 (two) times daily.    ESCITALOPRAM OXALATE (LEXAPRO) 10 MG TABLET    Take 10 mg by mouth once daily.    EZETIMIBE (ZETIA) 10 MG TABLET    Take 1 tablet (10 mg total) by mouth every evening.    GLUCOSE 4 GM CHEWABLE TABLET    Take 4 tablets (16 g total) by mouth as needed for Low blood sugar.    INSULIN ASPART U-100 (NOVOLOG) 100 UNIT/ML (3 ML) INPN PEN    Inject 10 Units into the skin 3 (three) times daily with meals.    INSULIN GLARGINE U-100, LANTUS, (BASAGLAR KWIKPEN U-100 INSULIN) 100 UNIT/ML (3 ML) INPN PEN    Inject 10 Units into the skin every evening. Within 15 minutes of eating    INSULIN SYRINGE-NEEDLE U-100 (INSULIN SYRINGE) 1/2 ML 28 GAUGE X 1/2" SYRG    To use with meal time insulin tid    LANCETS 32 GAUGE MISC    Inject 1 lancet into the skin 3 (three) times daily before meals.    METOLAZONE (ZAROXOLYN) 2.5 MG TABLET    Take 1 tablet by mouth every Monday and Thursday.    NIFEDIPINE (PROCARDIA-XL) 60 MG (OSM) 24 HR TABLET    Take 60 mg by mouth 2 (two) times daily.    OZEMPIC 0.25 MG OR 0.5 MG (2 MG/3 ML) PEN INJECTOR    Inject 0.5 mg into the skin every 7 days.    PANTOPRAZOLE (PROTONIX) 40 MG TABLET    Take 1 tablet (40 mg total) by " "mouth once daily.    PEN NEEDLE, DIABETIC (NOVOTWIST) 32 GAUGE X 1/5" NDLE    To use nightly with levemir    SEVELAMER CARBONATE (RENVELA) 800 MG TAB    Take 2 tablets (1,600 mg total) by mouth 3 (three) times daily with meals.    SURE COMFORT INSULIN SYRINGE 0.3 ML 31 GAUGE X 5/16" SYRG    Inject 1 each into the skin 4 (four) times daily before meals and nightly.    TAMSULOSIN (FLOMAX) 0.4 MG CAP    Take 0.4 mg by mouth once daily.    TORSEMIDE (DEMADEX) 20 MG TAB    Take 80 mg by mouth 2 (two) times a day.   Modified Medications    No medications on file   Discontinued Medications    No medications on file             "

## 2025-06-23 ENCOUNTER — ANESTHESIA EVENT (OUTPATIENT)
Dept: SURGERY | Facility: HOSPITAL | Age: 55
End: 2025-06-23
Payer: MEDICAID

## 2025-06-24 ENCOUNTER — ANESTHESIA (OUTPATIENT)
Dept: SURGERY | Facility: HOSPITAL | Age: 55
End: 2025-06-24
Payer: MEDICAID

## 2025-06-24 ENCOUNTER — HOSPITAL ENCOUNTER (OUTPATIENT)
Facility: HOSPITAL | Age: 55
Discharge: HOME | End: 2025-06-24
Attending: STUDENT IN AN ORGANIZED HEALTH CARE EDUCATION/TRAINING PROGRAM | Admitting: STUDENT IN AN ORGANIZED HEALTH CARE EDUCATION/TRAINING PROGRAM
Payer: MEDICAID

## 2025-06-24 VITALS
RESPIRATION RATE: 20 BRPM | HEART RATE: 74 BPM | TEMPERATURE: 98 F | DIASTOLIC BLOOD PRESSURE: 83 MMHG | HEIGHT: 68 IN | OXYGEN SATURATION: 95 % | WEIGHT: 184 LBS | BODY MASS INDEX: 27.89 KG/M2 | SYSTOLIC BLOOD PRESSURE: 177 MMHG

## 2025-06-24 DIAGNOSIS — N18.6 ESRD (END STAGE RENAL DISEASE): Primary | ICD-10-CM

## 2025-06-24 LAB
POCT GLUCOSE: 131 MG/DL (ref 70–110)
POCT GLUCOSE: 169 MG/DL (ref 70–110)

## 2025-06-24 PROCEDURE — 36000707: Performed by: STUDENT IN AN ORGANIZED HEALTH CARE EDUCATION/TRAINING PROGRAM

## 2025-06-24 PROCEDURE — 63600175 PHARM REV CODE 636 W HCPCS

## 2025-06-24 PROCEDURE — 25000003 PHARM REV CODE 250: Performed by: ANESTHESIOLOGY

## 2025-06-24 PROCEDURE — 36000706: Performed by: STUDENT IN AN ORGANIZED HEALTH CARE EDUCATION/TRAINING PROGRAM

## 2025-06-24 PROCEDURE — 63600175 PHARM REV CODE 636 W HCPCS: Performed by: NURSE ANESTHETIST, CERTIFIED REGISTERED

## 2025-06-24 PROCEDURE — 25000003 PHARM REV CODE 250: Performed by: NURSE ANESTHETIST, CERTIFIED REGISTERED

## 2025-06-24 PROCEDURE — 37000009 HC ANESTHESIA EA ADD 15 MINS: Performed by: STUDENT IN AN ORGANIZED HEALTH CARE EDUCATION/TRAINING PROGRAM

## 2025-06-24 PROCEDURE — 63600175 PHARM REV CODE 636 W HCPCS: Performed by: STUDENT IN AN ORGANIZED HEALTH CARE EDUCATION/TRAINING PROGRAM

## 2025-06-24 PROCEDURE — 37000008 HC ANESTHESIA 1ST 15 MINUTES: Performed by: STUDENT IN AN ORGANIZED HEALTH CARE EDUCATION/TRAINING PROGRAM

## 2025-06-24 PROCEDURE — 71000015 HC POSTOP RECOV 1ST HR: Performed by: STUDENT IN AN ORGANIZED HEALTH CARE EDUCATION/TRAINING PROGRAM

## 2025-06-24 PROCEDURE — 25000003 PHARM REV CODE 250

## 2025-06-24 PROCEDURE — 36821 AV FUSION DIRECT ANY SITE: CPT | Mod: RT,,, | Performed by: STUDENT IN AN ORGANIZED HEALTH CARE EDUCATION/TRAINING PROGRAM

## 2025-06-24 PROCEDURE — 71000044 HC DOSC ROUTINE RECOVERY FIRST HOUR: Performed by: STUDENT IN AN ORGANIZED HEALTH CARE EDUCATION/TRAINING PROGRAM

## 2025-06-24 PROCEDURE — 82962 GLUCOSE BLOOD TEST: CPT | Performed by: STUDENT IN AN ORGANIZED HEALTH CARE EDUCATION/TRAINING PROGRAM

## 2025-06-24 PROCEDURE — 63600175 PHARM REV CODE 636 W HCPCS: Performed by: ANESTHESIOLOGY

## 2025-06-24 PROCEDURE — 71000045 HC DOSC ROUTINE RECOVERY EA ADD'L HR: Performed by: STUDENT IN AN ORGANIZED HEALTH CARE EDUCATION/TRAINING PROGRAM

## 2025-06-24 RX ORDER — EPHEDRINE SULFATE 50 MG/ML
INJECTION, SOLUTION INTRAVENOUS
Status: DISCONTINUED | OUTPATIENT
Start: 2025-06-24 | End: 2025-06-24

## 2025-06-24 RX ORDER — HEPARIN SODIUM 1000 [USP'U]/ML
INJECTION, SOLUTION INTRAVENOUS; SUBCUTANEOUS
Status: DISCONTINUED | OUTPATIENT
Start: 2025-06-24 | End: 2025-06-24

## 2025-06-24 RX ORDER — SODIUM CHLORIDE 0.9 % (FLUSH) 0.9 %
10 SYRINGE (ML) INJECTION
Status: DISCONTINUED | OUTPATIENT
Start: 2025-06-24 | End: 2025-06-24 | Stop reason: HOSPADM

## 2025-06-24 RX ORDER — HYDROMORPHONE HYDROCHLORIDE 1 MG/ML
0.2 INJECTION, SOLUTION INTRAMUSCULAR; INTRAVENOUS; SUBCUTANEOUS EVERY 10 MIN PRN
Status: DISCONTINUED | OUTPATIENT
Start: 2025-06-24 | End: 2025-06-24 | Stop reason: HOSPADM

## 2025-06-24 RX ORDER — OXYCODONE AND ACETAMINOPHEN 5; 325 MG/1; MG/1
1 TABLET ORAL
Status: DISCONTINUED | OUTPATIENT
Start: 2025-06-24 | End: 2025-06-24 | Stop reason: HOSPADM

## 2025-06-24 RX ORDER — HALOPERIDOL LACTATE 5 MG/ML
0.5 INJECTION, SOLUTION INTRAMUSCULAR EVERY 10 MIN PRN
Status: DISCONTINUED | OUTPATIENT
Start: 2025-06-24 | End: 2025-06-24 | Stop reason: HOSPADM

## 2025-06-24 RX ORDER — LIDOCAINE HYDROCHLORIDE 10 MG/ML
1 INJECTION, SOLUTION EPIDURAL; INFILTRATION; INTRACAUDAL; PERINEURAL ONCE
Status: DISCONTINUED | OUTPATIENT
Start: 2025-06-24 | End: 2025-06-24 | Stop reason: HOSPADM

## 2025-06-24 RX ORDER — PHENYLEPHRINE HYDROCHLORIDE 10 MG/ML
INJECTION INTRAVENOUS
Status: DISCONTINUED | OUTPATIENT
Start: 2025-06-24 | End: 2025-06-24

## 2025-06-24 RX ORDER — KETAMINE HCL IN 0.9 % NACL 50 MG/5 ML
SYRINGE (ML) INTRAVENOUS
Status: DISCONTINUED | OUTPATIENT
Start: 2025-06-24 | End: 2025-06-24

## 2025-06-24 RX ORDER — OXYCODONE HYDROCHLORIDE 5 MG/1
5 TABLET ORAL EVERY 4 HOURS PRN
Qty: 12 TABLET | Refills: 0 | Status: ON HOLD | OUTPATIENT
Start: 2025-06-24

## 2025-06-24 RX ORDER — OXYCODONE HYDROCHLORIDE 5 MG/1
5 TABLET ORAL ONCE AS NEEDED
Refills: 0 | Status: DISCONTINUED | OUTPATIENT
Start: 2025-06-24 | End: 2025-06-24 | Stop reason: HOSPADM

## 2025-06-24 RX ORDER — HEPARIN SOD,PORCINE/0.9 % NACL 1000/500ML
INTRAVENOUS SOLUTION INTRAVENOUS
Status: DISCONTINUED | OUTPATIENT
Start: 2025-06-24 | End: 2025-06-24 | Stop reason: HOSPADM

## 2025-06-24 RX ORDER — GLUCAGON 1 MG
1 KIT INJECTION
Status: DISCONTINUED | OUTPATIENT
Start: 2025-06-24 | End: 2025-06-24 | Stop reason: HOSPADM

## 2025-06-24 RX ORDER — VASOPRESSIN 20 [USP'U]/ML
INJECTION, SOLUTION INTRAMUSCULAR; SUBCUTANEOUS
Status: DISCONTINUED | OUTPATIENT
Start: 2025-06-24 | End: 2025-06-24

## 2025-06-24 RX ORDER — PROPOFOL 10 MG/ML
VIAL (ML) INTRAVENOUS
Status: DISCONTINUED | OUTPATIENT
Start: 2025-06-24 | End: 2025-06-24

## 2025-06-24 RX ORDER — LIDOCAINE HYDROCHLORIDE 20 MG/ML
INJECTION INTRAVENOUS
Status: DISCONTINUED | OUTPATIENT
Start: 2025-06-24 | End: 2025-06-24

## 2025-06-24 RX ORDER — CEFAZOLIN 2 G/1
2 INJECTION, POWDER, FOR SOLUTION INTRAMUSCULAR; INTRAVENOUS
Status: COMPLETED | OUTPATIENT
Start: 2025-06-24 | End: 2025-06-24

## 2025-06-24 RX ORDER — FENTANYL CITRATE 50 UG/ML
INJECTION, SOLUTION INTRAMUSCULAR; INTRAVENOUS
Status: DISCONTINUED | OUTPATIENT
Start: 2025-06-24 | End: 2025-06-24

## 2025-06-24 RX ORDER — MIDAZOLAM HYDROCHLORIDE 1 MG/ML
INJECTION INTRAMUSCULAR; INTRAVENOUS
Status: DISCONTINUED | OUTPATIENT
Start: 2025-06-24 | End: 2025-06-24

## 2025-06-24 RX ORDER — MUPIROCIN 20 MG/G
OINTMENT TOPICAL
Status: DISCONTINUED | OUTPATIENT
Start: 2025-06-24 | End: 2025-06-24 | Stop reason: HOSPADM

## 2025-06-24 RX ADMIN — HYDROMORPHONE HYDROCHLORIDE 0.2 MG: 1 INJECTION, SOLUTION INTRAMUSCULAR; INTRAVENOUS; SUBCUTANEOUS at 11:06

## 2025-06-24 RX ADMIN — MIDAZOLAM HYDROCHLORIDE 1 MG: 2 INJECTION, SOLUTION INTRAMUSCULAR; INTRAVENOUS at 07:06

## 2025-06-24 RX ADMIN — PHENYLEPHRINE HYDROCHLORIDE 100 MCG: 10 INJECTION INTRAVENOUS at 08:06

## 2025-06-24 RX ADMIN — SODIUM CHLORIDE: 9 INJECTION, SOLUTION INTRAVENOUS at 09:06

## 2025-06-24 RX ADMIN — Medication 5 MG: at 07:06

## 2025-06-24 RX ADMIN — EPHEDRINE SULFATE 10 MG: 50 INJECTION INTRAVENOUS at 09:06

## 2025-06-24 RX ADMIN — SODIUM CHLORIDE: 9 INJECTION, SOLUTION INTRAVENOUS at 07:06

## 2025-06-24 RX ADMIN — PHENYLEPHRINE HYDROCHLORIDE 100 MCG: 10 INJECTION INTRAVENOUS at 09:06

## 2025-06-24 RX ADMIN — GLYCOPYRROLATE 0.2 MG: 0.2 INJECTION, SOLUTION INTRAMUSCULAR; INTRAVENOUS at 08:06

## 2025-06-24 RX ADMIN — HEPARIN SODIUM 3000 UNITS: 1000 INJECTION, SOLUTION INTRAVENOUS; SUBCUTANEOUS at 08:06

## 2025-06-24 RX ADMIN — GLYCOPYRROLATE 0.2 MG: 0.2 INJECTION, SOLUTION INTRAMUSCULAR; INTRAVENOUS at 07:06

## 2025-06-24 RX ADMIN — MEPIVACAINE HYDROCHLORIDE 30 ML: 15 INJECTION, SOLUTION EPIDURAL; INFILTRATION at 07:06

## 2025-06-24 RX ADMIN — MUPIROCIN: 20 OINTMENT TOPICAL at 06:06

## 2025-06-24 RX ADMIN — Medication 10 MG: at 08:06

## 2025-06-24 RX ADMIN — LIDOCAINE HYDROCHLORIDE 50 MG: 20 INJECTION INTRAVENOUS at 07:06

## 2025-06-24 RX ADMIN — FENTANYL CITRATE 50 MCG: 50 INJECTION, SOLUTION INTRAMUSCULAR; INTRAVENOUS at 07:06

## 2025-06-24 RX ADMIN — VASOPRESSIN 1 UNITS: 20 INJECTION INTRAVENOUS at 09:06

## 2025-06-24 RX ADMIN — PROPOFOL 30 MG: 10 INJECTION, EMULSION INTRAVENOUS at 07:06

## 2025-06-24 RX ADMIN — PROPOFOL 50 MCG/KG/MIN: 10 INJECTION, EMULSION INTRAVENOUS at 07:06

## 2025-06-24 RX ADMIN — OXYCODONE HYDROCHLORIDE AND ACETAMINOPHEN 1 TABLET: 5; 325 TABLET ORAL at 12:06

## 2025-06-24 RX ADMIN — PHENYLEPHRINE HYDROCHLORIDE 200 MCG: 10 INJECTION INTRAVENOUS at 08:06

## 2025-06-24 RX ADMIN — CEFAZOLIN 2 G: 2 INJECTION, POWDER, FOR SOLUTION INTRAMUSCULAR; INTRAVENOUS at 07:06

## 2025-06-24 NOTE — ANESTHESIA PROCEDURE NOTES
R supraclavicular SS    Patient location during procedure: OR    Reason for block: primary anesthetic    Diagnosis: R AVG   Start time: 6/24/2025 7:17 AM  Timeout: 6/24/2025 7:16 AM   End time: 6/24/2025 7:28 AM    Staffing  Authorizing Provider: Betsey Gerber MD  Performing Provider: Rco Cheung DO    Staffing  Performed by: Roc Cheung DO  Authorized by: Betsey Gerber MD    Preanesthetic Checklist  Completed: patient identified, IV checked, site marked, risks and benefits discussed, surgical consent, monitors and equipment checked, pre-op evaluation and timeout performed  Peripheral Block  Patient position: supine  Prep: ChloraPrep  Patient monitoring: heart rate, cardiac monitor, continuous pulse ox, continuous capnometry and frequent blood pressure checks  Block type: supraclavicular  Laterality: right  Injection technique: single shot  Needle  Needle type: Stimuplex   Needle gauge: 20 G  Needle length: 4 in  Needle localization: anatomical landmarks and ultrasound guidance   -ultrasound image captured on disc.  Assessment  Injection assessment: negative aspiration, negative parasthesia and local visualized surrounding nerve  Paresthesia pain: none  Heart rate change: no  Slow fractionated injection: yes    Medications:    Medications: mepivacaine (CARBOCAINE) injection 15 mg/mL (1.5%) - Perineural   30 mL - 6/24/2025 7:28:00 AM    Additional Notes  Intercostal brachial field block performed with mepivacaine 1.5% 10ml for additional coverage.    VSS.  See anesthesia record.  Patient tolerated procedure well.

## 2025-06-24 NOTE — DISCHARGE INSTRUCTIONS
WOUND CARE  You have skin glue over your incision(s); this will slowly flake away over the next few weeks.  -- Ok to shower; however, no baths or submerging in water (I.e. swimming, submerging in water) for at least two weeks.  -- Please keep the incision clean with soap and water, pat your incision dry, do not scrub hard over your incisions.    MEDICATIONS AND PAIN CONTROL  -- Please resume all home medications as instructed and take any newly prescribed medications.  -- Most people find that over-the-counter pain medications (Tylenol combined with Ibuprofen) will be sufficient for most pain control.  -- If you're taking prescription narcotics, do not drive or operate heavy machinery. Do not drive if your pain is not controlled enough for you to react quickly safely.  -- Take a stool softener with narcotics medications to prevent constipation.    OTHER INSTRUCTIONS  -- Monitor for temp > 101 F, bleeding, redness, purulent drainage, or any sudden, new extreme pain. If any occur, please call our clinic or go to the emergency department if after normal business hours.  -- You may resume your regular diet as tolerated.   -- No heavy lifting (anything >10 lbs or = to a gallon of milk) or strenuous exercise until cleared by a physician.  -- Follow up with Dr. Mendez in 4 weeks in clinic for a post-op check. If no appointment is made within the week, please call the clinic to schedule.

## 2025-06-24 NOTE — INTERVAL H&P NOTE
The patient has been examined and the H&P has been reviewed:    I concur with the findings and no changes have occurred since H&P was written.    Anesthesia/Surgery risks, benefits and alternative options discussed and understood by patient/family.    Laterality has been marked, consented.    Evens Sprague MD

## 2025-06-24 NOTE — OP NOTE
Yaya Hernandez - Surgery ([facility])  Vascular Surgery  Operative Note    SUMMARY     Date of Procedure: 6/24/2025     Procedure: Procedure(s) (LRB):  INSERTION, GRAFT, ARTERIOVENOUS (Right)     Surgeons and Role:     * Lima Mendez MD - Primary     * Sandra Sandoval MD - Resident - Assisting        Pre-Operative Diagnosis: ESRD on dialysis [N18.6, Z99.2]    Post-Operative Diagnosis: Post-Op Diagnosis Codes:     * ESRD on dialysis [N18.6, Z99.2]    Anesthesia: Regional    Procedures Performed:   1. Ultrasound of the right arm veins   2. Creation of a first-stage right brachiobasilic arteriovenous fistula     Operative Findings (including complications, if any):  Patent right basilic vein greater than 3 mm proximal to the antecubital fossa.  Successful creation of a right first-stage brachiobasilic arteriovenous fistula.  Good thrill in the fistula palpable ipsilateral radial pulse at the completion of the case.    Description of Technical Procedures:   this is a 54-year-old male with a past medical history significant for ESRD on HD.  He had previously undergone creation of a left arm brachiocephalic arteriovenous fistula which failed.  He had also developed significant left arm swelling after the creation of this fistula which resolved with the fistula nearly occluding.  Venogram showed patent central veins, however, he does have a left-sided central venous catheter as well as left arm axillary adenopathy so I recommended placement of a right arm dialysis access.  We discussed preoperatively the placement of a graft versus a fistula.  After discussing the benefits of each in the risks of each, he chose to undergo placement of an AV fistula.  We discussed that this would likely mean he would need a second-stage procedure, which he agreed to.  Informed consent was signed.    The patient was identified in the preoperative holding area.  Risks and benefits of the procedure were once again discussed, and  informed consent was signed.  He was marked.  He was brought back to the operative suite and placed in the supine position.  A preop block was administered by the anesthesia block team.  Mac anesthesia was then administered.  Ultrasound was used for an on-table ultrasound of his upper extremity veins.  It showed a diminutive cephalic vein that was difficult to follow, but a very nice large right basilic vein that was greater than 3 mm along its entire course once it was proximal to the antecubital fossa.  The arm was then prepped and draped in the typical sterile fashion.  A time-out was called prior to the procedure to confirm this was the correct patient and correct procedure to be performed.  Preoperative antibiotics were given.  A transverse incision was made in the right distal bicep, a fingerbreadth above the antecubital fossa.  Dissection was carried down with Bovie electrocautery.  The basilic vein was then identified, and was of excellent caliber.  This was dissected both proximally and distally with sharp dissection.  Any branches were ligated with silk ties.  Once we had enough length dissected out, we turned our attention to dissecting out the brachial artery.  We opened the sheath with cautery, and proceeded to dissect with blunt and sharp dissection.  We had to mobilize the brachial vein off of the artery and ligate several small branches.  The artery was then dissected both proximally and distally and it was encircled with silastic vessel loops.  At this point the patient was given 3000 of heparin.  The vein was then marked for orientation.  We clamped and divided it and oversewed the end with a 2-0 silk tie.  We then dilated the vein with sequential dilators up to 4 mm with great ease.  It was then flushed with heparinized saline and clamped.  The artery was then clamped with silastic vessel loop control.  An arteriotomy was made with a 11. Blade scalpel.  This was carried proximally and distally  with Molina scissors.  A success stay suture was put in the lateral wall.  The vein was then brought to length and trimmed appropriately.  It was spatulated.  It was then sewn in an end-to-side fashion with a running 6 0 Prolene suture.  Everything was flushed appropriately before restoring flow to the hand and the fistula.  There was an immediate excellent thrill in the fistula, and a palpable ipsilateral pulse.  We then spent some time getting excellent hemostasis.  The wound was then irrigated copiously.  It was then closed in layers with a Monocryl at the skin and Dermabond.  The patient tolerated the procedure well and without issue.  He was delivered to PACU in stable condition.    Significant Surgical Tasks Conducted by the Assistant(s), if Applicable: N/A    Estimated Blood Loss (EBL): * No values recorded between 6/24/2025  7:01 AM and 6/24/2025 10:09 AM *           Implants: * No implants in log *    Specimens:   Specimen (24h ago, onward)      None                    Condition: Good    Disposition: PACU - hemodynamically stable.    Attestation: I was present and scrubbed for the entire procedure.

## 2025-06-24 NOTE — ANESTHESIA PREPROCEDURE EVALUATION
[facility]  Anesthesia Pre-Operative Evaluation     Patient Name: Onofre Ceballos  YOB: 1970  MRN: [id number]  [facility]: [id number]       Admit Date: 6/24/2025   Admit Team: Networked reference to record PCT   Hospital Day: 1  Date of Procedure: 6/24/2025  Anesthesia: Regional Procedure: Procedure(s) (LRB):  INSERTION, GRAFT, ARTERIOVENOUS (Right)  Pre-Operative Diagnosis: ESRD on dialysis [N18.6, Z99.2]  Proceduralist:Surgeons and Role:     * Lima Mendez MD - Primary  Code Status: Prior   Advanced Directive: <no information>  Isolation Precautions: No active isolations  Capacity: Full capacity     SUBJECTIVE:   Onofre Ceballos is a 54 y.o. male who  has a past medical history of CHF (congestive heart failure), Diabetes mellitus, Diabetes mellitus type I, Hepatitis, Hepatitis C, Hypertension, Renal disorder, Shingles (10/2018), and Sleep apnea.  No notes on file 54 y.o. male with a h/o CKD, T2D, HTN, 60 pck year smoking hx, States had h/o IVDU 2023, CHF who is here today for evaluation s/p creation L BC AVF with transposition L cephalic vein 2/25/25. He has been getting dialysis MWF through his catheter. He came to the ER on 3/4/25 due to LUE swelling and drainage through his bandages. Decision was made to hold off on fistulogram due to marked improvement in swelling of the LUE. He denies any issues with dialysis. He denies chest pain, SOB, or claudication symptoms in the legs when walking.  He quit smoking in March 2024.   Right-hand dominant     Hospital LOS: 0 days  ICU LOS: Patient does not have an ICU stay during this admission.    he has a current medication list which includes the following long-term medication(s): atorvastatin, carvedilol, insulin aspart u-100, blood-glucose meter, brimonidine 0.2%, escitalopram oxalate, ezetimibe, glucose, basaglar kwikpen u-100 insulin, lancets, pantoprazole, sevelamer carbonate, and torsemide.   Current Outpatient Medications  "  Medication Instructions    acetaminophen (TYLENOL) 650 mg, Oral, Every 8 hours PRN    alcohol swabs (ALCOHOL WIPES) PadM Use topical 4 x daily for insulin    aspirin (ECOTRIN) 81 mg    atorvastatin (LIPITOR) 80 MG tablet 1 tablet, Nightly    BASAGLAR KWIKPEN U-100 INSULIN 10 Units, Subcutaneous, Nightly, Within 15 minutes of eating    blood sugar diagnostic Strp 1 each, Misc.(Non-Drug; Combo Route), 3 times daily before meals    blood-glucose meter (TRUE METRIX GLUCOSE METER) Misc 1 each, Misc.(Non-Drug; Combo Route), Daily    brimonidine 0.2% (ALPHAGAN) 0.2 % Drop 1 drop, 2 times daily    carvediloL (COREG) 12.5 mg, 2 times daily    empagliflozin (JARDIANCE) 10 mg, Daily    ENTRESTO  mg per tablet 1 tablet, 2 times daily    EScitalopram oxalate (LEXAPRO) 10 mg, Daily    ezetimibe (ZETIA) 10 mg, Oral, Nightly    glucose 16 g, Oral, As needed (PRN)    insulin aspart U-100 (NOVOLOG) 10 Units, Subcutaneous, 3 times daily with meals    insulin syringe-needle U-100 (INSULIN SYRINGE) 1/2 mL 28 gauge x 1/2" Syrg To use with meal time insulin tid    lancets 32 gauge Misc 1 lancet , Subcutaneous, 3 times daily before meals    metOLazone (ZAROXOLYN) 2.5 MG tablet 1 tablet, Every Mon, Thurs    NIFEdipine (PROCARDIA-XL) 60 mg, 2 times daily    OZEMPIC 0.5 mg, Every 7 days    pantoprazole (PROTONIX) 40 mg, Oral, Daily    pen needle, diabetic (NOVOTWIST) 32 gauge x 1/5" Ndle To use nightly with levemir    sevelamer carbonate (RENVELA) 1,600 mg, Oral, 3 times daily with meals    SURE COMFORT INSULIN SYRINGE 0.3 mL 31 gauge x 5/16" Syrg 1 each, Subcutaneous, Before meals & nightly    tamsulosin (FLOMAX) 0.4 mg, Daily    torsemide (DEMADEX) 80 mg, 2 times daily     ALLERGIES:   Review of patient's allergies indicates:  No Known Allergies  LDA:   AIRWAY:         * No LDAs found *      Lines/Drains/Airways       Central Venous Catheter Line  Duration                  Hemodialysis Catheter 11/08/24 left subclavian 228 days      "    Hemodialysis Catheter 02/25/25 119 days                   Anesthesia Evaluation      Airway   Mallampati: III  TM distance: Normal  Neck ROM: Normal ROM  Dental    (+) Intact    Pulmonary    (+) shortness of breath, sleep apnea  Cardiovascular   (+) hypertension, CAD, CHF    Neuro/Psych      GI/Hepatic/Renal    (+) hepatitis, liver disease, chronic renal disease    Endo/Other    (+) diabetes mellitus  Abdominal                    MEDICATIONS:     Current Outpatient Medications on File Prior to Encounter   Medication Sig Dispense Refill Last Dose/Taking    acetaminophen (TYLENOL) 650 MG TbSR Take 1 tablet (650 mg total) by mouth every 8 (eight) hours as needed (pain).   Past Month    aspirin (ECOTRIN) 81 MG EC tablet Take 81 mg by mouth.   6/23/2025 Morning    atorvastatin (LIPITOR) 80 MG tablet Take 1 tablet by mouth every evening.   6/23/2025 Morning    carvediloL (COREG) 12.5 MG tablet Take 12.5 mg by mouth 2 (two) times daily.   6/23/2025 Bedtime    insulin aspart U-100 (NOVOLOG) 100 unit/mL (3 mL) InPn pen Inject 10 Units into the skin 3 (three) times daily with meals.   6/23/2025 Morning    alcohol swabs (ALCOHOL WIPES) PadM Use topical 4 x daily for insulin 200 each 11     blood sugar diagnostic Strp 1 each by Misc.(Non-Drug; Combo Route) route 3 (three) times daily before meals. 200 each 11     blood-glucose meter (TRUE METRIX GLUCOSE METER) Misc 1 each by Misc.(Non-Drug; Combo Route) route once daily. 1 each 0     brimonidine 0.2% (ALPHAGAN) 0.2 % Drop Place 1 drop into the left eye 2 (two) times a day.       empagliflozin (JARDIANCE) 10 mg tablet Take 10 mg by mouth once daily.   6/22/2025    ENTRESTO  mg per tablet Take 1 tablet by mouth 2 (two) times daily.   6/21/2025    EScitalopram oxalate (LEXAPRO) 10 MG tablet Take 10 mg by mouth once daily.   6/22/2025    ezetimibe (ZETIA) 10 mg tablet Take 1 tablet (10 mg total) by mouth every evening. 90 tablet 3 6/21/2025    glucose 4 GM chewable tablet  "Take 4 tablets (16 g total) by mouth as needed for Low blood sugar. 4 tablet 12     insulin glargine U-100, Lantus, (BASAGLAR KWIKPEN U-100 INSULIN) 100 unit/mL (3 mL) InPn pen Inject 10 Units into the skin every evening. Within 15 minutes of eating       insulin syringe-needle U-100 (INSULIN SYRINGE) 1/2 mL 28 gauge x 1/2" Syrg To use with meal time insulin tid 200 each 9     lancets 32 gauge Misc Inject 1 lancet into the skin 3 (three) times daily before meals. 200 each 11     metOLazone (ZAROXOLYN) 2.5 MG tablet Take 1 tablet by mouth every Monday and Thursday.       NIFEdipine (PROCARDIA-XL) 60 MG (OSM) 24 hr tablet Take 60 mg by mouth 2 (two) times daily.       OZEMPIC 0.25 mg or 0.5 mg (2 mg/3 mL) pen injector Inject 0.5 mg into the skin every 7 days.   6/3/2025    pantoprazole (PROTONIX) 40 MG tablet Take 1 tablet (40 mg total) by mouth once daily. 30 tablet 0 Unknown    pen needle, diabetic (NOVOTWIST) 32 gauge x 1/5" Ndle To use nightly with levemir 100 each 3     sevelamer carbonate (RENVELA) 800 mg Tab Take 2 tablets (1,600 mg total) by mouth 3 (three) times daily with meals. 180 tablet 0 6/21/2025    SURE COMFORT INSULIN SYRINGE 0.3 mL 31 gauge x 5/16" Syrg Inject 1 each into the skin 4 (four) times daily before meals and nightly. 200 each 11     tamsulosin (FLOMAX) 0.4 mg Cap Take 0.4 mg by mouth once daily.   6/22/2025    torsemide (DEMADEX) 20 MG Tab Take 80 mg by mouth 2 (two) times a day. (Patient not taking: Reported on 6/19/2025)   More than a month      Inpatient Medications:  Antibiotics (From admission, onward)      Start     Stop Route Frequency Ordered    06/24/25 0531  mupirocin 2 % ointment         -- Nasl On Call Procedure 06/24/25 0531    06/24/25 0531  ceFAZolin 2 g         -- IV On Call Procedure 06/24/25 0531          VTE Risk Mitigation (From admission, onward)      None           LIDOcaine (PF) 10 mg/ml (1%)  1 mL Intradermal Once       Current Medications[1]       History:   There " "are no hospital problems to display for this patient.    Surgical History:    has a past surgical history that includes No past surgeries; Esophagogastroduodenoscopy (N/A, 10/11/2024); insertion, catheter, tunneled (N/A, 10/29/2024); insertion, catheter, tunneled (N/A, 11/8/2024); insertion, graft, arteriovenous, upper extremity (Left, 2/25/2025); and Arteriovenous fistulography (Left, 6/17/2025).   Social History:    reports that he is not currently sexually active.  reports that he has quit smoking. His smoking use included cigarettes. He has quit using smokeless tobacco. He reports that he does not currently use drugs after having used the following drugs: Methamphetamines. He reports that he does not drink alcohol.    Vitals:    06/24/25 0600 06/24/25 0603 06/24/25 0623   BP: (!) 180/88     BP Location: Right arm     Patient Position: Lying     Pulse: (!) 56 (!) 57 (!) 57   Resp: 18     Temp: 37 °C (98.6 °F)     TempSrc: Temporal     SpO2: 95%     Weight: 83.5 kg (184 lb)     Height: 5' 8" (1.727 m)       Vital Signs Range (Last 24H):  Temp:  [37 °C (98.6 °F)]   Pulse:  [56-57]   Resp:  [18]   BP: (180)/(88)   SpO2:  [95 %]     Body mass index is 27.98 kg/m².  Wt Readings from Last 4 Encounters:   06/24/25 83.5 kg (184 lb)   06/19/25 81.9 kg (180 lb 8.9 oz)   06/17/25 83.5 kg (184 lb)   06/16/25 83.5 kg (184 lb)        Intake/Output - Last 3 Shifts       None          Lab Results   Component Value Date    WBC 7.80 06/09/2025    HGB 12.5 (L) 06/09/2025    HCT 36.3 (L) 06/09/2025     06/09/2025     06/17/2025    K 4.9 06/17/2025     06/17/2025    CREATININE 5.0 (H) 06/17/2025    BUN 31 (H) 06/17/2025    CO2 29 06/17/2025     (H) 06/17/2025    CALCIUM 8.7 06/17/2025    MG 1.9 06/06/2025    PHOS 3.3 06/06/2025    ALKPHOS 69 06/09/2025    ALT 19 06/09/2025    AST 26 06/09/2025    ALBUMIN 3.3 (L) 06/09/2025    INR 1.2 (H) 11/08/2024    APTT 30.8 11/08/2024    HGBA1C 6.4 (H) 06/05/2025    " LACTATE 1.0 11/09/2024     (H) 01/14/2024    CPKMB 1.4 10/11/2019    TROPONINI 0.014 06/09/2025    MB 1.8 10/11/2019     (H) 06/05/2025    NTPROBNP 1970.0 (H) 06/09/2025     Recent Results (from the past 12 hours)   POCT glucose    Collection Time: 06/24/25  5:59 AM   Result Value Ref Range    POCT Glucose 131 (H) 70 - 110 mg/dL     Recent Labs   Lab 06/17/25  0843      K 4.9   CREATININE 5.0*   *     No LMP for male patient.    EKG:   Results for orders placed or performed during the hospital encounter of 06/09/25   EKG 12-lead    Collection Time: 06/09/25  5:00 PM   Result Value Ref Range    QRS Duration 92 ms    [facility] QTC Calculation 472 ms    Narrative    Test Reason : R06.02,    Vent. Rate :  68 BPM     Atrial Rate :  68 BPM     P-R Int : 128 ms          QRS Dur :  92 ms      QT Int : 444 ms       P-R-T Axes :  47  19 254 degrees    QTcB Int : 472 ms    Normal sinus rhythm  Marked ST abnormality, possible inferior subendocardial injury  Abnormal ECG  Baseline wander  Confirmed by Ramona Guerrero ([id number]) on 6/10/2025 5:37:57 PM    Referred By: ALEKS ORTEGA           Confirmed By: Ramona Guerrero     TTE:  Results for orders placed during the hospital encounter of 05/21/25    Echo    Interpretation Summary    Left Ventricle: The left ventricle is normal in size. Normal wall thickness. Normal wall motion. There is normal systolic function with a visually estimated ejection fraction of 55 - 60%. There is normal diastolic function.    Right Ventricle: The right ventricle is normal in size Wall thickness is normal. Systolic function is borderline low.    Pulmonary Artery: The estimated pulmonary artery systolic pressure is 28 mmHg.    IVC/SVC: Normal venous pressure at 3 mmHg.    Pericardium: There is a small effusion. Large left pleural effusion.    JAMESON:  No results found for this or any previous visit.    Stress Test:  No results found for this or any previous visit.    No results found for  this or any previous visit.    LHC:  No results found for this or any previous visit.    Cardiac Device Check   No results found for this or any previous visit.    No results found for this or any previous visit.        Pre-op Assessment          Review of Systems  Cardiovascular:     Hypertension   CAD       CHF                Shortness of Breath    Coronary Artery Disease:               Congestive Heart Failure (CHF)                Hypertension         Pulmonary:      Shortness of breath  Sleep Apnea     Obstructive Sleep Apnea (EVELYN).           Renal/:  Chronic Renal Disease        Kidney Function/Disease             Hepatic/GI:      Liver Disease, Hepatitis           Liver Disease, Hepatitis        Endocrine:  Diabetes    Diabetes                          Physical Exam  General: Well nourished    Airway:  Mallampati: III / II  Mouth Opening: Normal  TM Distance: Normal  Tongue: Normal  Neck ROM: Normal ROM    Dental:  Intact        Anesthesia Plan  Type of Anesthesia, risks & benefits discussed:    Anesthesia Type: Gen ETT, Gen Natural Airway, MAC  Intra-op Monitoring Plan: Standard ASA Monitors  Post Op Pain Control Plan: multimodal analgesia and IV/PO Opioids PRN  Induction:  IV  Airway Plan: Direct and Video, Post-Induction  Informed Consent: Informed consent signed with the Patient and all parties understand the risks and agree with anesthesia plan.  All questions answered.   ASA Score: 3  Day of Surgery Review of History & Physical: H&P completed by Anesthesiologist.  Anesthesia Plan Notes: Chart reviewed, patient interviewed and examined.  The anesthetic plan was explained.  Risks, benefits, and alternatives were discussed. Questions were answered and the consent was signed.        MARIE Montoya M.D.         Ready For Surgery From Anesthesia Perspective.     .           [1]   Current Facility-Administered Medications   Medication Dose Route Frequency Provider Last Rate Last Admin    ceFAZolin 2 g  2 g  Intravenous On Call Procedure Evens Sprague MD        LIDOcaine (PF) 10 mg/ml (1%) injection 10 mg  1 mL Intradermal Once Evens Sprague MD        mupirocin 2 % ointment   Nasal On Call Procedure Evens Sprague MD   Given at 06/24/25 0611    sodium chloride 0.9% flush 10 mL  10 mL Intravenous PRN Evens Sprague MD

## 2025-06-24 NOTE — BRIEF OP NOTE
Yaya Hernandez - Surgery ([facility])  Brief Operative Note    Surgery Date: 6/24/2025     Surgeons and Role:     * Lima Mendez MD - Primary     * Sandra Sandoval MD - Resident - Assisting        Pre-op Diagnosis:  ESRD on dialysis [N18.6, Z99.2]    Post-op Diagnosis:  Post-Op Diagnosis Codes:     * ESRD on dialysis [N18.6, Z99.2]    Procedure(s) (LRB):  INSERTION, GRAFT, ARTERIOVENOUS (Right)    Anesthesia: Regional    Operative Findings: RUE brachiobasilic AV fistula created. Strong palpable 2+ radial pulse at conclusion of case.    Estimated Blood Loss: 20 cc         Specimens:   Specimen (24h ago, onward)      None            * No specimens in log *        Discharge Note    OUTCOME: Patient tolerated treatment/procedure well without complication and is now ready for discharge.    DISPOSITION: Home or Self Care    FINAL DIAGNOSIS:  ESRD (end stage renal disease)    FOLLOWUP: In clinic    DISCHARGE INSTRUCTIONS:    Discharge Procedure Orders   Diet general     Other restrictions (specify):   Order Comments: No heavy lifting over 10lbs for 6 weeks     Wound care routine (specify)   Order Comments: WOUND CARE  You have skin glue over your incision(s); this will slowly flake away over the next few weeks.  -- Ok to shower; however, no baths or submerging in water (I.e. swimming, submerging in water) for at least two weeks.  -- Please keep the incision clean with soap and water, pat your incision dry, do not scrub hard over your incisions.    MEDICATIONS AND PAIN CONTROL  -- Please resume all home medications as instructed and take any newly prescribed medications.  -- Most people find that over-the-counter pain medications (Tylenol combined with Ibuprofen) will be sufficient for most pain control.  -- If you're taking prescription narcotics, do not drive or operate heavy machinery. Do not drive if your pain is not controlled enough for you to react quickly safely.  -- Take a stool softener with narcotics medications to  prevent constipation.    OTHER INSTRUCTIONS  -- Monitor for temp > 101 F, bleeding, redness, purulent drainage, or any sudden, new extreme pain. If any occur, please call our clinic or go to the emergency department if after normal business hours.  -- You may resume your regular diet as tolerated.   -- No heavy lifting (anything >10 lbs or = to a gallon of milk) or strenuous exercise until cleared by a physician.  -- Follow up with Dr. Mendez in 4 weeks in clinic for a post-op check. If no appointment is made within the week, please call the clinic to schedule.     Call MD for:  temperature >100.4     Call MD for:  persistent nausea and vomiting     Call MD for:  severe uncontrolled pain     Call MD for:  difficulty breathing, headache or visual disturbances     Call MD for:  redness, tenderness, or signs of infection (pain, swelling, redness, odor or green/yellow discharge around incision site)     Call MD for:  hives     Call MD for:  persistent dizziness or light-headedness     Call MD for:  extreme fatigue

## 2025-06-24 NOTE — ANESTHESIA PROCEDURE NOTES
LMA Placement    Date/Time: 6/24/2025 8:44 AM    Performed by: Neftali Cota CRNA  Authorized by: Mila Montoya MD    Intubation:     Induction:  Intravenous    Intubated:  Postinduction    Mask Ventilation:  Not attempted    Attempts:  1    Attempted By:  CRNA    Difficult Airway Encountered?: No      Complications:  None    Airway Device:  Supraglottic airway/LMA    Airway Device Size:  4.0    Style/Cuff Inflation:  Cuffed    Secured at:  The lips    Placement Verified By:  Capnometry    Complicating Factors:  None    Findings Post-Intubation:  BS equal bilateral and atraumatic/condition of teeth unchanged

## 2025-06-24 NOTE — ANESTHESIA POSTPROCEDURE EVALUATION
Anesthesia Post Evaluation    Patient: Onofre Ceballos    Procedure(s) Performed: Procedure(s) (LRB):  INSERTION, GRAFT, ARTERIOVENOUS (Right)    Final Anesthesia Type: general      Patient location during evaluation: PACU  Patient participation: Yes- Able to Participate  Level of consciousness: awake and alert  Post-procedure vital signs: reviewed and stable  Pain management: adequate  Airway patency: patent    PONV status at discharge: No PONV  Anesthetic complications: no      Cardiovascular status: blood pressure returned to baseline  Respiratory status: unassisted  Hydration status: euvolemic  Follow-up not needed.              Vitals Value Taken Time   /83 06/24/25 13:01   Temp 36.7 °C (98 °F) 06/24/25 13:00   Pulse 74 06/24/25 13:05   Resp 20 06/24/25 13:05   SpO2 95 % 06/24/25 13:05         No case tracking events are documented in the log.      Pain/Nick Score: Pain Rating Prior to Med Admin: 5 (6/24/2025 12:36 PM)  Nick Score: 10 (6/24/2025  1:00 PM)

## 2025-06-24 NOTE — TRANSFER OF CARE
"Anesthesia Transfer of Care Note    Patient: Onofre Ceballos    Procedure(s) Performed: Procedure(s) (LRB):  INSERTION, GRAFT, ARTERIOVENOUS (Right)    Patient location: PACU    Anesthesia Type: general    Transport from OR: Transported from OR on 2-3 L/min O2 by NC with adequate spontaneous ventilation. Transported from OR on 100% O2 by closed face mask with adequate spontaneous ventilation    Post pain: adequate analgesia    Post assessment: no apparent anesthetic complications and tolerated procedure well    Post vital signs: stable    Level of consciousness: responds to stimulation and sedated    Nausea/Vomiting: no nausea/vomiting    Complications: none    Transfer of care protocol was followed      Last vitals: Visit Vitals  BP (!) 180/88 (BP Location: Right arm, Patient Position: Lying)   Pulse (!) 57   Temp 37 °C (98.6 °F) (Temporal)   Resp 18   Ht 5' 8" (1.727 m)   Wt 83.5 kg (184 lb)   SpO2 95%   BMI 27.98 kg/m²     "

## 2025-06-26 DIAGNOSIS — N18.6 ESRD (END STAGE RENAL DISEASE): ICD-10-CM

## 2025-06-26 DIAGNOSIS — Z98.890 S/P ARTERIOVENOUS (AV) FISTULA CREATION: Primary | ICD-10-CM

## 2025-07-09 ENCOUNTER — PATIENT OUTREACH (OUTPATIENT)
Facility: OTHER | Age: 55
End: 2025-07-09
Payer: MEDICAID

## 2025-07-19 PROBLEM — J90 PLEURAL EFFUSION ON LEFT: Status: ACTIVE | Noted: 2025-07-19

## 2025-07-26 ENCOUNTER — HOSPITAL ENCOUNTER (INPATIENT)
Facility: HOSPITAL | Age: 55
LOS: 13 days | Discharge: HOME | DRG: 871 | End: 2025-08-08
Attending: HOSPITALIST | Admitting: HOSPITALIST
Payer: MEDICAID

## 2025-07-26 ENCOUNTER — HOSPITAL ENCOUNTER (EMERGENCY)
Facility: HOSPITAL | Age: 55
Discharge: ANOTHER HOSPITAL | End: 2025-07-26
Attending: FAMILY MEDICINE
Payer: MEDICAID

## 2025-07-26 VITALS
HEART RATE: 76 BPM | DIASTOLIC BLOOD PRESSURE: 74 MMHG | HEIGHT: 68 IN | WEIGHT: 176.81 LBS | RESPIRATION RATE: 20 BRPM | SYSTOLIC BLOOD PRESSURE: 161 MMHG | BODY MASS INDEX: 26.8 KG/M2 | OXYGEN SATURATION: 98 % | TEMPERATURE: 99 F

## 2025-07-26 DIAGNOSIS — Z13.6 SCREENING FOR CARDIOVASCULAR CONDITION: ICD-10-CM

## 2025-07-26 DIAGNOSIS — A41.01 STAPHYLOCOCCUS AUREUS BACTEREMIA WITH SEPSIS: ICD-10-CM

## 2025-07-26 DIAGNOSIS — B95.61 STAPHYLOCOCCUS AUREUS BACTEREMIA: Primary | ICD-10-CM

## 2025-07-26 DIAGNOSIS — J90 LOCULATED PLEURAL EFFUSION: ICD-10-CM

## 2025-07-26 DIAGNOSIS — R00.1 BRADYCARDIA: ICD-10-CM

## 2025-07-26 DIAGNOSIS — N18.6 ESRF (END STAGE RENAL FAILURE): ICD-10-CM

## 2025-07-26 DIAGNOSIS — R07.9 CHEST PAIN: ICD-10-CM

## 2025-07-26 DIAGNOSIS — D63.8 ANEMIA OF CHRONIC DISORDER: Chronic | ICD-10-CM

## 2025-07-26 DIAGNOSIS — B18.2 CHRONIC HEPATITIS C WITHOUT HEPATIC COMA: ICD-10-CM

## 2025-07-26 DIAGNOSIS — N18.6 ESRD (END STAGE RENAL DISEASE): ICD-10-CM

## 2025-07-26 DIAGNOSIS — Z99.2 END STAGE RENAL DISEASE ON DIALYSIS: ICD-10-CM

## 2025-07-26 DIAGNOSIS — N18.6 END STAGE RENAL DISEASE ON DIALYSIS: ICD-10-CM

## 2025-07-26 DIAGNOSIS — R78.81 STAPHYLOCOCCUS AUREUS BACTEREMIA: Primary | ICD-10-CM

## 2025-07-26 DIAGNOSIS — J90 LOCULATED PLEURAL EFFUSION: Primary | ICD-10-CM

## 2025-07-26 DIAGNOSIS — A41.9 SEPSIS: ICD-10-CM

## 2025-07-26 PROBLEM — E11.9 TYPE 2 DIABETES MELLITUS: Status: RESOLVED | Noted: 2024-11-02 | Resolved: 2025-07-26

## 2025-07-26 PROBLEM — R74.01 TRANSAMINASEMIA: Status: RESOLVED | Noted: 2024-03-28 | Resolved: 2025-07-26

## 2025-07-26 PROBLEM — R06.02 SHORTNESS OF BREATH: Status: RESOLVED | Noted: 2025-06-05 | Resolved: 2025-07-26

## 2025-07-26 PROBLEM — Z01.818 PRE-OP EVALUATION: Status: RESOLVED | Noted: 2025-01-15 | Resolved: 2025-07-26

## 2025-07-26 PROBLEM — E87.6 HYPOKALEMIA: Status: RESOLVED | Noted: 2024-03-21 | Resolved: 2025-07-26

## 2025-07-26 PROBLEM — M79.89 LEFT ARM SWELLING: Status: RESOLVED | Noted: 2025-03-04 | Resolved: 2025-07-26

## 2025-07-26 PROBLEM — E83.51 HYPOCALCEMIA: Status: RESOLVED | Noted: 2024-03-21 | Resolved: 2025-07-26

## 2025-07-26 PROBLEM — J96.01 ACUTE RESPIRATORY FAILURE WITH HYPOXEMIA: Status: RESOLVED | Noted: 2024-10-29 | Resolved: 2025-07-26

## 2025-07-26 PROBLEM — R60.1 ANASARCA: Status: RESOLVED | Noted: 2024-08-06 | Resolved: 2025-07-26

## 2025-07-26 PROBLEM — I10 BENIGN ESSENTIAL HTN: Status: RESOLVED | Noted: 2017-03-02 | Resolved: 2025-07-26

## 2025-07-26 PROBLEM — N17.9 AKI (ACUTE KIDNEY INJURY): Status: RESOLVED | Noted: 2024-03-21 | Resolved: 2025-07-26

## 2025-07-26 PROBLEM — D62 ACUTE BLOOD LOSS ANEMIA: Status: RESOLVED | Noted: 2024-11-08 | Resolved: 2025-07-26

## 2025-07-26 PROBLEM — R77.0 LOW SERUM ALBUMIN: Status: RESOLVED | Noted: 2024-03-21 | Resolved: 2025-07-26

## 2025-07-26 PROBLEM — R00.0 TACHYCARDIA: Status: RESOLVED | Noted: 2017-06-05 | Resolved: 2025-07-26

## 2025-07-26 PROBLEM — E11.00 HYPEROSMOLAR HYPERGLYCEMIC STATE (HHS): Status: RESOLVED | Noted: 2024-03-21 | Resolved: 2025-07-26

## 2025-07-26 LAB
ABSOLUTE EOSINOPHIL (OHS): 0.04 K/UL
ABSOLUTE EOSINOPHIL (OHS): 0.11 K/UL
ABSOLUTE MONOCYTE (OHS): 1.05 K/UL (ref 0.3–1)
ABSOLUTE MONOCYTE (OHS): 1.07 K/UL (ref 0.3–1)
ABSOLUTE NEUTROPHIL COUNT (OHS): 16.86 K/UL (ref 1.8–7.7)
ABSOLUTE NEUTROPHIL COUNT (OHS): 18.69 K/UL (ref 1.8–7.7)
ALBUMIN SERPL BCP-MCNC: 2.5 G/DL (ref 3.5–5.2)
ALP SERPL-CCNC: 55 UNIT/L (ref 40–150)
ALT SERPL W/O P-5'-P-CCNC: 10 UNIT/L (ref 10–44)
ANION GAP (OHS): 11 MMOL/L (ref 8–16)
AST SERPL-CCNC: 13 UNIT/L (ref 11–45)
BACTERIA #/AREA URNS HPF: NORMAL /HPF
BASOPHILS # BLD AUTO: 0.05 K/UL
BASOPHILS # BLD AUTO: 0.06 K/UL
BASOPHILS NFR BLD AUTO: 0.2 %
BASOPHILS NFR BLD AUTO: 0.3 %
BILIRUB SERPL-MCNC: 0.5 MG/DL (ref 0.1–1)
BILIRUB UR QL STRIP.AUTO: NEGATIVE
BUN SERPL-MCNC: 34 MG/DL (ref 6–20)
CALCIUM SERPL-MCNC: 7.8 MG/DL (ref 8.7–10.5)
CHLORIDE SERPL-SCNC: 94 MMOL/L (ref 95–110)
CLARITY UR: CLEAR
CO2 SERPL-SCNC: 27 MMOL/L (ref 23–29)
COLOR UR AUTO: YELLOW
CREAT SERPL-MCNC: 5.4 MG/DL (ref 0.5–1.4)
ERYTHROCYTE [DISTWIDTH] IN BLOOD BY AUTOMATED COUNT: 13.3 % (ref 11.5–14.5)
ERYTHROCYTE [DISTWIDTH] IN BLOOD BY AUTOMATED COUNT: 13.3 % (ref 11.5–14.5)
GFR SERPLBLD CREATININE-BSD FMLA CKD-EPI: 12 ML/MIN/1.73/M2
GLUCOSE SERPL-MCNC: 165 MG/DL (ref 70–110)
GLUCOSE UR QL STRIP: ABNORMAL
HCT VFR BLD AUTO: 32.5 % (ref 40–54)
HCT VFR BLD AUTO: 35.2 % (ref 40–54)
HGB BLD-MCNC: 11.4 GM/DL (ref 14–18)
HGB BLD-MCNC: 11.7 GM/DL (ref 14–18)
HGB UR QL STRIP: ABNORMAL
HYALINE CASTS #/AREA URNS LPF: 0 /LPF (ref 0–1)
IMM GRANULOCYTES # BLD AUTO: 0.1 K/UL (ref 0–0.04)
IMM GRANULOCYTES # BLD AUTO: 0.12 K/UL (ref 0–0.04)
IMM GRANULOCYTES NFR BLD AUTO: 0.5 % (ref 0–0.5)
IMM GRANULOCYTES NFR BLD AUTO: 0.6 % (ref 0–0.5)
INFLUENZA A MOLECULAR (OHS): NEGATIVE
INFLUENZA B MOLECULAR (OHS): NEGATIVE
KETONES UR QL STRIP: NEGATIVE
LACTATE SERPL-SCNC: 0.9 MMOL/L (ref 0.5–2.2)
LACTATE SERPL-SCNC: 1.4 MMOL/L (ref 0.5–2.2)
LEUKOCYTE ESTERASE UR QL STRIP: NEGATIVE
LIPASE SERPL-CCNC: 8 U/L (ref 4–60)
LYMPHOCYTES # BLD AUTO: 0.65 K/UL (ref 1–4.8)
LYMPHOCYTES # BLD AUTO: 0.67 K/UL (ref 1–4.8)
MAGNESIUM SERPL-MCNC: 1.8 MG/DL (ref 1.6–2.6)
MCH RBC QN AUTO: 32.6 PG (ref 27–31)
MCH RBC QN AUTO: 32.9 PG (ref 27–31)
MCHC RBC AUTO-ENTMCNC: 33.2 G/DL (ref 32–36)
MCHC RBC AUTO-ENTMCNC: 35.1 G/DL (ref 32–36)
MCV RBC AUTO: 94 FL (ref 82–98)
MCV RBC AUTO: 98 FL (ref 82–98)
MICROSCOPIC COMMENT: NORMAL
NITRITE UR QL STRIP: NEGATIVE
NUCLEATED RBC (/100WBC) (OHS): 0 /100 WBC
NUCLEATED RBC (/100WBC) (OHS): 0 /100 WBC
PH UR STRIP: >8 [PH]
PLATELET # BLD AUTO: 252 K/UL (ref 150–450)
PLATELET # BLD AUTO: 265 K/UL (ref 150–450)
PMV BLD AUTO: 9.1 FL (ref 9.2–12.9)
PMV BLD AUTO: 9.2 FL (ref 9.2–12.9)
POTASSIUM SERPL-SCNC: 4 MMOL/L (ref 3.5–5.1)
PROCALCITONIN SERPL-MCNC: 0.26 NG/ML
PROT SERPL-MCNC: 6.4 GM/DL (ref 6–8.4)
PROT UR QL STRIP: ABNORMAL
RBC # BLD AUTO: 3.46 M/UL (ref 4.6–6.2)
RBC # BLD AUTO: 3.59 M/UL (ref 4.6–6.2)
RBC #/AREA URNS HPF: 1 /HPF (ref 0–4)
RELATIVE EOSINOPHIL (OHS): 0.2 %
RELATIVE EOSINOPHIL (OHS): 0.6 %
RELATIVE LYMPHOCYTE (OHS): 3.2 % (ref 18–48)
RELATIVE LYMPHOCYTE (OHS): 3.5 % (ref 18–48)
RELATIVE MONOCYTE (OHS): 5.2 % (ref 4–15)
RELATIVE MONOCYTE (OHS): 5.6 % (ref 4–15)
RELATIVE NEUTROPHIL (OHS): 89.5 % (ref 38–73)
RELATIVE NEUTROPHIL (OHS): 90.6 % (ref 38–73)
SARS-COV-2 RDRP RESP QL NAA+PROBE: NEGATIVE
SODIUM SERPL-SCNC: 132 MMOL/L (ref 136–145)
SP GR UR STRIP: 1.02
SQUAMOUS #/AREA URNS HPF: 1 /HPF
UROBILINOGEN UR STRIP-ACNC: NEGATIVE EU/DL
WBC # BLD AUTO: 18.83 K/UL (ref 3.9–12.7)
WBC # BLD AUTO: 20.64 K/UL (ref 3.9–12.7)
WBC #/AREA URNS HPF: 2 /HPF (ref 0–5)

## 2025-07-26 PROCEDURE — 85610 PROTHROMBIN TIME: CPT | Performed by: INTERNAL MEDICINE

## 2025-07-26 PROCEDURE — 81003 URINALYSIS AUTO W/O SCOPE: CPT | Performed by: FAMILY MEDICINE

## 2025-07-26 PROCEDURE — 80053 COMPREHEN METABOLIC PANEL: CPT | Performed by: FAMILY MEDICINE

## 2025-07-26 PROCEDURE — 84145 PROCALCITONIN (PCT): CPT | Performed by: INTERNAL MEDICINE

## 2025-07-26 PROCEDURE — 99900035 HC TECH TIME PER 15 MIN (STAT)

## 2025-07-26 PROCEDURE — 87040 BLOOD CULTURE FOR BACTERIA: CPT | Performed by: FAMILY MEDICINE

## 2025-07-26 PROCEDURE — 36415 COLL VENOUS BLD VENIPUNCTURE: CPT | Performed by: INTERNAL MEDICINE

## 2025-07-26 PROCEDURE — 87502 INFLUENZA DNA AMP PROBE: CPT | Performed by: FAMILY MEDICINE

## 2025-07-26 PROCEDURE — 25000003 PHARM REV CODE 250: Performed by: FAMILY MEDICINE

## 2025-07-26 PROCEDURE — 94760 N-INVAS EAR/PLS OXIMETRY 1: CPT

## 2025-07-26 PROCEDURE — 85025 COMPLETE CBC W/AUTO DIFF WBC: CPT | Performed by: FAMILY MEDICINE

## 2025-07-26 PROCEDURE — 11000001 HC ACUTE MED/SURG PRIVATE ROOM

## 2025-07-26 PROCEDURE — 63600175 PHARM REV CODE 636 W HCPCS: Performed by: INTERNAL MEDICINE

## 2025-07-26 PROCEDURE — 83605 ASSAY OF LACTIC ACID: CPT | Performed by: FAMILY MEDICINE

## 2025-07-26 PROCEDURE — 25000003 PHARM REV CODE 250: Performed by: SURGERY

## 2025-07-26 PROCEDURE — 83605 ASSAY OF LACTIC ACID: CPT | Performed by: INTERNAL MEDICINE

## 2025-07-26 PROCEDURE — 93005 ELECTROCARDIOGRAM TRACING: CPT

## 2025-07-26 PROCEDURE — U0002 COVID-19 LAB TEST NON-CDC: HCPCS | Performed by: FAMILY MEDICINE

## 2025-07-26 PROCEDURE — 85025 COMPLETE CBC W/AUTO DIFF WBC: CPT | Performed by: INTERNAL MEDICINE

## 2025-07-26 PROCEDURE — 83690 ASSAY OF LIPASE: CPT | Performed by: FAMILY MEDICINE

## 2025-07-26 PROCEDURE — 99285 EMERGENCY DEPT VISIT HI MDM: CPT | Mod: 25

## 2025-07-26 PROCEDURE — 93010 ELECTROCARDIOGRAM REPORT: CPT | Mod: ,,, | Performed by: INTERNAL MEDICINE

## 2025-07-26 PROCEDURE — 96366 THER/PROPH/DIAG IV INF ADDON: CPT

## 2025-07-26 PROCEDURE — 63600175 PHARM REV CODE 636 W HCPCS: Performed by: FAMILY MEDICINE

## 2025-07-26 PROCEDURE — 84145 PROCALCITONIN (PCT): CPT | Performed by: FAMILY MEDICINE

## 2025-07-26 PROCEDURE — 83735 ASSAY OF MAGNESIUM: CPT | Performed by: FAMILY MEDICINE

## 2025-07-26 PROCEDURE — 80202 ASSAY OF VANCOMYCIN: CPT | Performed by: HOSPITALIST

## 2025-07-26 PROCEDURE — 87154 CUL TYP ID BLD PTHGN 6+ TRGT: CPT | Performed by: FAMILY MEDICINE

## 2025-07-26 PROCEDURE — 96375 TX/PRO/DX INJ NEW DRUG ADDON: CPT

## 2025-07-26 PROCEDURE — 96365 THER/PROPH/DIAG IV INF INIT: CPT

## 2025-07-26 RX ORDER — EZETIMIBE 10 MG/1
10 TABLET ORAL NIGHTLY
Status: DISCONTINUED | OUTPATIENT
Start: 2025-07-26 | End: 2025-08-08 | Stop reason: HOSPADM

## 2025-07-26 RX ORDER — SEVELAMER CARBONATE 800 MG/1
1600 TABLET, FILM COATED ORAL
Status: DISCONTINUED | OUTPATIENT
Start: 2025-07-27 | End: 2025-07-29

## 2025-07-26 RX ORDER — ACETAMINOPHEN 500 MG
1000 TABLET ORAL
Status: COMPLETED | OUTPATIENT
Start: 2025-07-26 | End: 2025-07-26

## 2025-07-26 RX ORDER — NIFEDIPINE 30 MG/1
60 TABLET, EXTENDED RELEASE ORAL 2 TIMES DAILY
Status: DISCONTINUED | OUTPATIENT
Start: 2025-07-26 | End: 2025-07-28

## 2025-07-26 RX ORDER — TALC
6 POWDER (GRAM) TOPICAL NIGHTLY PRN
Status: DISCONTINUED | OUTPATIENT
Start: 2025-07-26 | End: 2025-08-08 | Stop reason: HOSPADM

## 2025-07-26 RX ORDER — CEFTRIAXONE 1 G/1
1 INJECTION, POWDER, FOR SOLUTION INTRAMUSCULAR; INTRAVENOUS
Status: COMPLETED | OUTPATIENT
Start: 2025-07-26 | End: 2025-07-26

## 2025-07-26 RX ORDER — SIMETHICONE 80 MG
1 TABLET,CHEWABLE ORAL 4 TIMES DAILY PRN
Status: DISCONTINUED | OUTPATIENT
Start: 2025-07-26 | End: 2025-08-08 | Stop reason: HOSPADM

## 2025-07-26 RX ORDER — INSULIN ASPART 100 [IU]/ML
0-10 INJECTION, SOLUTION INTRAVENOUS; SUBCUTANEOUS EVERY 6 HOURS PRN
Status: DISCONTINUED | OUTPATIENT
Start: 2025-07-26 | End: 2025-08-02

## 2025-07-26 RX ORDER — ATORVASTATIN CALCIUM 40 MG/1
80 TABLET, FILM COATED ORAL NIGHTLY
Status: DISCONTINUED | OUTPATIENT
Start: 2025-07-26 | End: 2025-08-08 | Stop reason: HOSPADM

## 2025-07-26 RX ORDER — CEFEPIME HYDROCHLORIDE 1 G/1
1 INJECTION, POWDER, FOR SOLUTION INTRAMUSCULAR; INTRAVENOUS EVERY 24 HOURS
Status: DISCONTINUED | OUTPATIENT
Start: 2025-07-26 | End: 2025-07-28

## 2025-07-26 RX ORDER — GLUCAGON 1 MG
1 KIT INJECTION
Status: DISCONTINUED | OUTPATIENT
Start: 2025-07-26 | End: 2025-08-08 | Stop reason: HOSPADM

## 2025-07-26 RX ORDER — HYDRALAZINE HYDROCHLORIDE 20 MG/ML
10 INJECTION INTRAMUSCULAR; INTRAVENOUS EVERY 6 HOURS PRN
Status: DISCONTINUED | OUTPATIENT
Start: 2025-07-26 | End: 2025-08-08 | Stop reason: HOSPADM

## 2025-07-26 RX ORDER — TORSEMIDE 10 MG/1
80 TABLET ORAL 2 TIMES DAILY
Status: DISCONTINUED | OUTPATIENT
Start: 2025-07-26 | End: 2025-07-28

## 2025-07-26 RX ORDER — CARVEDILOL 12.5 MG/1
12.5 TABLET ORAL 2 TIMES DAILY
Status: DISCONTINUED | OUTPATIENT
Start: 2025-07-26 | End: 2025-07-28

## 2025-07-26 RX ORDER — AMOXICILLIN 250 MG
1 CAPSULE ORAL 2 TIMES DAILY
Status: DISCONTINUED | OUTPATIENT
Start: 2025-07-26 | End: 2025-08-08 | Stop reason: HOSPADM

## 2025-07-26 RX ORDER — INSULIN GLARGINE 100 [IU]/ML
10 INJECTION, SOLUTION SUBCUTANEOUS NIGHTLY
Status: DISCONTINUED | OUTPATIENT
Start: 2025-07-26 | End: 2025-08-08 | Stop reason: HOSPADM

## 2025-07-26 RX ORDER — PANTOPRAZOLE SODIUM 40 MG/10ML
40 INJECTION, POWDER, LYOPHILIZED, FOR SOLUTION INTRAVENOUS ONCE
Status: COMPLETED | OUTPATIENT
Start: 2025-07-26 | End: 2025-07-27

## 2025-07-26 RX ORDER — NALOXONE HCL 0.4 MG/ML
0.4 VIAL (ML) INJECTION
Status: DISCONTINUED | OUTPATIENT
Start: 2025-07-26 | End: 2025-08-08 | Stop reason: HOSPADM

## 2025-07-26 RX ORDER — OXYCODONE HYDROCHLORIDE 5 MG/1
5 TABLET ORAL EVERY 4 HOURS PRN
Status: DISPENSED | OUTPATIENT
Start: 2025-07-26 | End: 2025-07-31

## 2025-07-26 RX ORDER — ACETAMINOPHEN 325 MG/1
650 TABLET ORAL EVERY 4 HOURS PRN
Status: DISCONTINUED | OUTPATIENT
Start: 2025-07-26 | End: 2025-08-08 | Stop reason: HOSPADM

## 2025-07-26 RX ORDER — ONDANSETRON HYDROCHLORIDE 2 MG/ML
4 INJECTION, SOLUTION INTRAVENOUS EVERY 6 HOURS PRN
Status: DISCONTINUED | OUTPATIENT
Start: 2025-07-26 | End: 2025-08-08 | Stop reason: HOSPADM

## 2025-07-26 RX ORDER — PANTOPRAZOLE SODIUM 40 MG/1
40 TABLET, DELAYED RELEASE ORAL DAILY
Status: DISCONTINUED | OUTPATIENT
Start: 2025-07-27 | End: 2025-08-08 | Stop reason: HOSPADM

## 2025-07-26 RX ORDER — SODIUM CHLORIDE 0.9 % (FLUSH) 0.9 %
10 SYRINGE (ML) INJECTION EVERY 12 HOURS PRN
Status: DISCONTINUED | OUTPATIENT
Start: 2025-07-26 | End: 2025-08-08 | Stop reason: HOSPADM

## 2025-07-26 RX ORDER — TAMSULOSIN HYDROCHLORIDE 0.4 MG/1
0.4 CAPSULE ORAL DAILY
Status: DISCONTINUED | OUTPATIENT
Start: 2025-07-27 | End: 2025-08-08 | Stop reason: HOSPADM

## 2025-07-26 RX ORDER — ESCITALOPRAM OXALATE 10 MG/1
10 TABLET ORAL DAILY
Status: DISCONTINUED | OUTPATIENT
Start: 2025-07-27 | End: 2025-08-08 | Stop reason: HOSPADM

## 2025-07-26 RX ORDER — METRONIDAZOLE 500 MG/100ML
500 INJECTION, SOLUTION INTRAVENOUS
Status: DISCONTINUED | OUTPATIENT
Start: 2025-07-26 | End: 2025-08-05

## 2025-07-26 RX ORDER — METOLAZONE 2.5 MG/1
2.5 TABLET ORAL
Status: DISCONTINUED | OUTPATIENT
Start: 2025-07-28 | End: 2025-07-28

## 2025-07-26 RX ORDER — POLYETHYLENE GLYCOL 3350 17 G/17G
17 POWDER, FOR SOLUTION ORAL 2 TIMES DAILY PRN
Status: DISCONTINUED | OUTPATIENT
Start: 2025-07-26 | End: 2025-08-08 | Stop reason: HOSPADM

## 2025-07-26 RX ORDER — HYDROCODONE BITARTRATE AND ACETAMINOPHEN 5; 325 MG/1; MG/1
1 TABLET ORAL
Refills: 0 | Status: COMPLETED | OUTPATIENT
Start: 2025-07-26 | End: 2025-07-26

## 2025-07-26 RX ADMIN — CEFTRIAXONE SODIUM 1 G: 1 INJECTION, POWDER, FOR SOLUTION INTRAMUSCULAR; INTRAVENOUS at 03:07

## 2025-07-26 RX ADMIN — HYDROCODONE BITARTRATE AND ACETAMINOPHEN 1 TABLET: 5; 325 TABLET ORAL at 09:07

## 2025-07-26 RX ADMIN — ACETAMINOPHEN 1000 MG: 500 TABLET ORAL at 02:07

## 2025-07-26 RX ADMIN — VANCOMYCIN HYDROCHLORIDE 1500 MG: 1.5 INJECTION, POWDER, LYOPHILIZED, FOR SOLUTION INTRAVENOUS at 03:07

## 2025-07-26 RX ADMIN — ONDANSETRON 4 MG: 2 INJECTION INTRAMUSCULAR; INTRAVENOUS at 11:07

## 2025-07-26 NOTE — ED TRIAGE NOTES
Pt arrived to ED via [facility] c/o right leg pain, nausea, and vomiting clear/yellow liquid that started 2 days ago. Pt is a dialysis pt. Last dialysis treatment was 07/25/25. Shunt to right arm.

## 2025-07-26 NOTE — ED PROVIDER NOTES
Encounter Date: 7/26/2025       History     Chief Complaint   Patient presents with    Leg Pain     Pt arrived to ED via [facility] c/o right leg pain, nausea, and vomiting clear/yellow liquid that started 2 days ago. Pt is a dialysis pt. Last dialysis treatment was 07/25/25. Shunt to right arm.      HPI  54 year old male with a history of dm 1, hepatitis c, chf, esrd, mj with bilateral lower extremity pain, nausea, vomiting and cough. No chest pain, no shortness of breath. PT last HD tx was yesterday. No known fevers or chills. No known sick contacts.   Review of patient's allergies indicates:  No Known Allergies  Past Medical History:   Diagnosis Date    CHF (congestive heart failure)     Diabetes mellitus     Diabetes mellitus type I     Hepatitis     Hepatitis C     Hypertension     Renal disorder     Shingles 10/2018    Sleep apnea      Past Surgical History:   Procedure Laterality Date    ARTERIOVENOUS FISTULOGRAPHY Left 6/17/2025    Procedure: FISTULOGRAM, ARTERIOVENOUS;  Surgeon: Lima Mendez MD;  Location: [facility] OR [address] [place] [address];  Service: Vascular;  Laterality: Left;  mGy 144.96  Gycm2 32.0330  Fluro time 4.7 min  Contrast 27 mL    AV FISTULA PLACEMENT Right 6/24/2025    Procedure: CREATION, AV FISTULA;  Surgeon: Lima Mendez MD;  Location: [facility] OR [address] [place] [address];  Service: Vascular;  Laterality: Right;    ESOPHAGOGASTRODUODENOSCOPY N/A 10/11/2024    Procedure: EGD (ESOPHAGOGASTRODUODENOSCOPY);  Surgeon: Benjamin Lacy MD;  Location: [facility];  Service: Endoscopy;  Laterality: N/A;    INSERTION, CATHETER, TUNNELED N/A 10/29/2024    Procedure: Insertion,catheter,tunneled;  Surgeon: Angel Malcolm MD;  Location: [facility] OR;  Service: General;  Laterality: N/A;    INSERTION, CATHETER, TUNNELED N/A 11/8/2024    Procedure: Insertion,catheter,tunneled;  Surgeon: Huseyin Romero MD;  Location: [facility] OR;  Service: General;  Laterality: N/A;    INSERTION, GRAFT, ARTERIOVENOUS, UPPER EXTREMITY Left  2/25/2025    Procedure: INSERTION, GRAFT, ARTERIOVENOUS, UPPER EXTREMITY POSSIBLE AVF CREATION;  Surgeon: Luis F Berry MD;  Location: [facility] OR [address] [place] [address];  Service: Vascular;  Laterality: Left;    NO PAST SURGERIES       Family History   Problem Relation Name Age of Onset    Diabetes Mother      Lung cancer Father       Social History[1]  Review of Systems   Constitutional:  Negative for chills and fever.   HENT:  Negative for sore throat.    Respiratory:  Positive for cough (productive). Negative for shortness of breath, wheezing and stridor.    Gastrointestinal:  Negative for diarrhea, nausea and vomiting.   Genitourinary:  Negative for dysuria.   Musculoskeletal:  Positive for arthralgias and myalgias.   Skin:  Negative for rash and wound.   Neurological:  Negative for headaches.   Psychiatric/Behavioral:  Negative for behavioral problems.    All other systems reviewed and are negative.      Physical Exam     Initial Vitals [07/26/25 1329]   BP Pulse Resp Temp SpO2   (!) 174/81 83 20 (!) 102.1 °F (38.9 °C) (!) 92 %      MAP       --         Physical Exam    Constitutional: He appears well-developed and well-nourished. He is not diaphoretic. No distress.   HENT:   Head: Normocephalic and atraumatic.   Right Ear: External ear normal.   Left Ear: External ear normal.   Eyes: EOM are normal. Pupils are equal, round, and reactive to light.   Neck:   Normal range of motion.  Cardiovascular:            S1, S2, no murmurs   Pulmonary/Chest:   Decreased breath sounds over left side of chest   Abdominal: Abdomen is soft. He exhibits no distension. There is no abdominal tenderness.   Musculoskeletal:      Cervical back: Normal range of motion.      Comments: Right upper extremity AV fistula     Neurological: He is alert and oriented to person, place, and time. GCS score is 15. GCS eye subscore is 4. GCS verbal subscore is 5. GCS motor subscore is 6.   Skin: Skin is warm and dry.   Psychiatric: He has a normal mood and  affect.         ED Course   Critical Care    Date/Time: 7/26/2025 4:31 PM    Performed by: Lizz Hernandez MD  Authorized by: Lizz Hernandez MD  Direct patient critical care time: 15 minutes  Additional history critical care time: 5 minutes  Ordering / reviewing critical care time: 5 minutes  Documentation critical care time: 5 minutes  Consulting other physicians critical care time: 5 minutes  Total critical care time (exclusive of procedural time) : 35 minutes  Critical care was necessary to treat or prevent imminent or life-threatening deterioration of the following conditions: sepsis.  Critical care was time spent personally by me on the following activities: examination of patient, obtaining history from patient or surrogate, ordering and performing treatments and interventions, ordering and review of laboratory studies, ordering and review of radiographic studies and re-evaluation of patient's condition.        Labs Reviewed   COMPREHENSIVE METABOLIC PANEL - Abnormal       Result Value    Sodium 132 (*)     Potassium 4.0      Chloride 94 (*)     CO2 27      Glucose 165 (*)     BUN 34 (*)     Creatinine 5.4 (*)     Calcium 7.8 (*)     Protein Total 6.4      Albumin 2.5 (*)     Bilirubin Total 0.5      ALP 55      AST 13      ALT 10      Anion Gap 11      eGFR 12 (*)    URINALYSIS, REFLEX TO URINE CULTURE - Abnormal    Color, UA Yellow      Appearance, UA Clear      pH, UA >8.0 (*)     Spec Grav UA 1.020      Protein, UA 3+ (*)     Glucose, UA 2+ (*)     Ketones, UA Negative      Bilirubin, UA Negative      Blood, UA 2+ (*)     Nitrites, UA Negative      Urobilinogen, UA Negative      Leukocyte Esterase, UA Negative     PROCALCITONIN - Abnormal    Procalcitonin 0.26 (*)    CBC WITH DIFFERENTIAL - Abnormal    WBC 18.83 (*)     RBC 3.46 (*)     HGB 11.4 (*)     HCT 32.5 (*)     MCV 94      MCH 32.9 (*)     MCHC 35.1      RDW 13.3      Platelet Count 265      MPV 9.2      Nucleated RBC 0      Neut % 89.5 (*)      Lymph % 3.5 (*)     Mono % 5.6      Eos % 0.6      Basophil % 0.3      Imm Grans % 0.5      Neut # 16.86 (*)     Lymph # 0.65 (*)     Mono # 1.05 (*)     Eos # 0.11      Baso # 0.06      Imm Grans # 0.10 (*)    INFLUENZA A & B BY MOLECULAR - Normal    INFLUENZA A MOLECULAR Negative      INFLUENZA B MOLECULAR  Negative     LACTIC ACID, PLASMA - Normal    Lactic Acid Level 0.9      Narrative:     Falsely low lactic acid results can be found in samples containing >=13.0 mg/dL total bilirubin and/or >=3.5 mg/dL direct bilirubin.    MAGNESIUM - Normal    Magnesium  1.8     LIPASE - Normal    Lipase Level 8     SARS-COV-2 RNA AMPLIFICATION, QUAL - Normal    SARS COV-2 Molecular Negative     CULTURE, BLOOD   CULTURE, BLOOD   CBC W/ AUTO DIFFERENTIAL    Narrative:     The following orders were created for panel order CBC auto differential.  Procedure                               Abnormality         Status                     ---------                               -----------         ------                     CBC with Differential[[id number]]       Abnormal            Final result                 Please view results for these tests on the individual orders.   URINALYSIS MICROSCOPIC    RBC, UA 1      WBC, UA 2      Bacteria, UA Occasional      Squamous Epithelial Cells, UA 1      Hyaline Casts, UA 0      Microscopic Comment       GREY TOP URINE HOLD   LACTIC ACID, PLASMA          Imaging Results              CT Chest Abdomen Pelvis Without Contrast (XPD) (Final result)  Result time 07/26/25 16:11:59   Procedure changed from CT Chest Without Contrast     Final result by Ian Turcios MD (07/26/25 16:11:59)                   Impression:      1. Large left pleural effusion with severe atelectasis of the left lung.  Left pleural fluid is incompletely dependent suggesting loculation.  2. Stable solid pulmonary nodules in the right lung measuring up to 7 mm.  3. Stable circumferential wall thickening of the mid and  distal esophagus, nonspecific however can be seen with esophagitis.  4. Diffuse wall thickening of the stomach, nonspecific however can be seen with gastritis.  5. Mild diffuse bladder wall thickening which could relate to under distension or cystitis.  6. Moderate stool burden throughout the colon.  Recommend correlation for constipation.  7. Additional findings as above.      Electronically signed by: Ian Turcios  Date:    07/26/2025  Time:    16:11               Narrative:    EXAMINATION:  CT CHEST ABDOMEN PELVIS WITHOUT CONTRAST(XPD)    CLINICAL HISTORY:  Respiratory illness, nondiagnostic xray;    TECHNIQUE:  Low dose axial images, sagittal and coronal reformations were obtained from the thoracic inlet to the pubic synthesis without contrast.    COMPARISON:  CT chest 06/27/2025    FINDINGS:  Left central venous line terminates at the brachiocephalic/SVC junction.  Bilateral gynecomastia, left greater than right.    Thoracic aorta is normal caliber contains mild atherosclerosis.  Heart is normal size.  No pericardial effusion.  Multivessel coronary artery calcification.    No pathologically enlarged lymph nodes in the chest.    Central airways are clear.    Large left pleural effusion with severe atelectasis of the left lung.  Left pleural fluid is incompletely dependent suggesting loculation.  Stable solid pulmonary nodules in the right lung measuring up to 7 mm (series 4, image 160).  Right lung is otherwise clear without consolidation or pleural fluid.  No pneumothorax.    Patulous air-containing esophagus.  Stable circumferential wall thickening of the mid and distal esophagus.    Subcentimeter hepatic hypodensities too small to characterize however favor a benign etiology given stability compared to 10/10/2019.  No new liver lesion.  Gallbladder is contracted, limiting assessment.  No biliary ductal dilatation.    Spleen and right adrenal gland are unremarkable.  2.2 cm left adrenal adenoma.  Mild diffuse  fatty atrophy of the pancreas.    Horseshoe kidney configuration.  No renal stone, hydronephrosis, or ureteral dilatation.    Mild diffuse bladder wall thickening.  Prostate gland appears upper limit of normal size.    Mild diffuse wall thickening of the stomach.  No evidence of bowel obstruction or inflammation.  Moderate stool burden throughout the colon.    No ascites, no ascites or pneumoperitoneum.  No pathologically enlarged lymph nodes in the abdomen or pelvis.    Abdominal aorta is normal caliber.  Calcific atherosclerosis of the aortoiliac arteries and branch vessels.    Small fat containing umbilical hernia.    Stable chronic deformity of several bilateral ribs.  Degenerative changes of the spine.  No acute bony abnormality.                                       X-Ray Chest AP Portable (Final result)  Result time 07/26/25 14:42:59      Final result by Ian Turcios MD (07/26/25 14:42:59)                   Impression:      As above.      Electronically signed by: Ian Turcios  Date:    07/26/2025  Time:    14:42               Narrative:    EXAMINATION:  XR CHEST AP PORTABLE    CLINICAL HISTORY:  Sepsis;    TECHNIQUE:  Single frontal portable view of the chest was performed.    COMPARISON:  07/19/2025, 07/18/2025    FINDINGS:  Left central venous line tip terminates at the brachiocephalic/SVC junction.  Left heart border is obscured.  Aortic calcification.  Diffuse opacification of the left hemithorax with small portion of aerated left lung.  Finding likely represents large pleural effusion with atelectasis however cannot exclude airspace consolidation.  Right lung is clear.  No pneumothorax.  No acute bony abnormality.                                       Medications   vancomycin - pharmacy to dose (has no administration in time range)   acetaminophen tablet 1,000 mg (1,000 mg Oral Given 7/26/25 1427)   vancomycin 1,500 mg in 0.9% NaCl 250 mL IVPB (admixture device) (0 mg Intravenous Stopped 7/26/25  "1722)   cefTRIAXone injection 1 g (1 g Intravenous Given 7/26/25 1505)     Medical Decision Making  Differential diagnosis:  UTI, pneumonia, intra-abdominal infection    54-year-old male with a history of end-stage renal disease that presents to the ED with generalized body aches, nausea, vomiting, productive cough.  Temp 100° 2.3.  Blood pressure 120/56, heart rate 75.  EKG reviewed by myself.  Labs demonstrates significant leukocytosis.  Chronic kidney disease noted on CMP.  Patient with CT scans at demonstrate concern for loculated pleural effusion.  Rocephin given while in the ED.  Patient will be transferred to outside facility for possible Nephrology if he should require dialysis on Monday as well as possible pulmonology.  Awaiting transfer center at this time.    Amount and/or Complexity of Data Reviewed  Labs: ordered.  Radiology: ordered.    Risk  OTC drugs.  Prescription drug management.               ED Course as of 07/26/25 1735   Sat Jul 26, 2025   1522 EKG  Normal sinus rhythm  Heart rate 82  QRS normal  No acute ST elevations  EKG reviewed interpreted by myself [FP]      ED Course User Index  [FP] Lizz Hernandez MD                               Clinical Impression:  Final diagnoses:  [Z13.6] Screening for cardiovascular condition  [J90] Loculated pleural effusion (Primary)  [N18.6, Z99.2] End stage renal disease on dialysis          ED Disposition Condition    Transfer to Another Facility Fair                      [1]   Social History  Tobacco Use    Smoking status: Former     Current packs/day: 1.00     Types: Cigarettes    Smokeless tobacco: Former   Substance Use Topics    Alcohol use: No    Drug use: Not Currently     Types: Methamphetamines     Comment: Patient stated "I'm use IV drug user with meth in the last 2 weeks."        Lizz Hernandez MD  07/26/25 [id number]    "

## 2025-07-26 NOTE — PROGRESS NOTES
"Pharmacokinetic Initial Assessment: IV Vancomycin    Assessment/Plan:    Initiate intravenous vancomycin with loading dose of 1500 mg once.   Re dose based on pre-dialysis trough prior to second dialysis session  Desired empiric serum trough concentration is 10 to 20 mcg/mL    Pharmacy will continue to follow and monitor vancomycin.      Please contact pharmacy at extension [phone] with any questions regarding this assessment.     Thank you for the consult,   Carmen Ospina       Patient brief summary:  Onofre Ceballos is a 54 y.o. male initiated on antimicrobial therapy with IV Vancomycin for treatment of suspected sepsis    Drug Allergies:   Review of patient's allergies indicates:  No Known Allergies    Actual Body Weight:   80.2 Kg    Renal Function:   Estimated Creatinine Clearance: 15 mL/min (A) (based on SCr of 5.4 mg/dL (H)).      Dialysis Method (if applicable):  intermittent HD    CBC (last 72 hours):  Recent Labs   Lab Result Units 07/26/25  1402   WBC K/uL 18.83*   HGB gm/dL 11.4*   HCT % 32.5*   Platelet Count K/uL 265   Lymph % % 3.5*   Mono % % 5.6   Eos % % 0.6   Basophil % % 0.3       Metabolic Panel (last 72 hours):  Recent Labs   Lab Result Units 07/26/25  1426   Glucose, UA  2+*       Drug levels (last 3 results):  No results for input(s): "VANCOMYCINRA", "VANCORANDOM", "VANCOMYCINPE", "VANCOPEAK", "VANCOMYCINTR", "VANCOTROUGH" in the last 72 hours.    Microbiologic Results:  Microbiology Results (last 7 days)       Procedure Component Value Units Date/Time    Influenza A & B by Molecular [[id number]]  (Normal) Collected: 07/26/25 1402    Order Status: Completed Specimen: Nasal Swab Updated: 07/26/25 1441     INFLUENZA A MOLECULAR Negative     INFLUENZA B MOLECULAR  Negative    Blood culture x two cultures. Draw prior to antibiotics. [[id number]] Collected: 07/26/25 1415    Order Status: Sent Specimen: Blood from Peripheral, Hand, Left Updated: 07/26/25 1419    Blood culture x two cultures. " Draw prior to antibiotics. [[id number]] Collected: 07/26/25 1406    Order Status: Sent Specimen: Blood from Peripheral, Antecubital, Left Updated: 07/26/25 1409

## 2025-07-27 LAB
ABSOLUTE EOSINOPHIL (OHS): 0.02 K/UL
ABSOLUTE MONOCYTE (OHS): 0.84 K/UL (ref 0.3–1)
ABSOLUTE NEUTROPHIL COUNT (OHS): 16.89 K/UL (ref 1.8–7.7)
ACB COMPLEX DNA BLD POS QL NAA+NON-PROBE: NOT DETECTED
ANION GAP (OHS): 11 MMOL/L (ref 8–16)
ANION GAP (OHS): 11 MMOL/L (ref 8–16)
B FRAGILIS DNA BLD POS QL NAA+PROBE: NOT DETECTED
BASOPHILS # BLD AUTO: 0.08 K/UL
BASOPHILS NFR BLD AUTO: 0.4 %
BUN SERPL-MCNC: 38 MG/DL (ref 6–20)
BUN SERPL-MCNC: 39 MG/DL (ref 6–20)
C ALBICANS DNA BLD POS QL NAA+PROBE: NOT DETECTED
C AURIS DNA BLD POS QL NAA+NON-PROBE: NOT DETECTED
C GATTII+NEOFOR DNA CSF QL NAA+NON-PROBE: NOT DETECTED
C GLABRATA DNA BLD POS QL NAA+PROBE: NOT DETECTED
C KRUSEI DNA BLD POS QL NAA+PROBE: NOT DETECTED
C PARAP DNA BLD POS QL NAA+PROBE: NOT DETECTED
C TROPICLS DNA BLD POS QL NAA+PROBE: NOT DETECTED
CALCIUM SERPL-MCNC: 7.9 MG/DL (ref 8.7–10.5)
CALCIUM SERPL-MCNC: 8.2 MG/DL (ref 8.7–10.5)
CHLORIDE SERPL-SCNC: 95 MMOL/L (ref 95–110)
CHLORIDE SERPL-SCNC: 95 MMOL/L (ref 95–110)
CO2 SERPL-SCNC: 22 MMOL/L (ref 23–29)
CO2 SERPL-SCNC: 26 MMOL/L (ref 23–29)
COLISTIN RES MCR-1 ISLT/SPM QL: ABNORMAL
CREAT SERPL-MCNC: 5.7 MG/DL (ref 0.5–1.4)
CREAT SERPL-MCNC: 5.8 MG/DL (ref 0.5–1.4)
E CLOAC COMP DNA BLD POS QL NAA+PROBE: NOT DETECTED
E COLI DNA BLD POS QL NAA+PROBE: NOT DETECTED
E FAECALIS+OTHR E SP RRNA BLD POS FISH: NOT DETECTED
E FAECIUM HSP60 BLD POS QL PROBE: NOT DETECTED
ENTEROBACTERALES DNA BLD POS NAA+N-PRB: NOT DETECTED
ERYTHROCYTE [DISTWIDTH] IN BLOOD BY AUTOMATED COUNT: 13.4 % (ref 11.5–14.5)
ESBL CFT TO CFT-CLAV IC RTO BD POS IMP: ABNORMAL
GFR SERPLBLD CREATININE-BSD FMLA CKD-EPI: 11 ML/MIN/1.73/M2
GFR SERPLBLD CREATININE-BSD FMLA CKD-EPI: 11 ML/MIN/1.73/M2
GLUCOSE SERPL-MCNC: 148 MG/DL (ref 70–110)
GLUCOSE SERPL-MCNC: 192 MG/DL (ref 70–110)
GP B STREP DNA CSF QL NAA+NON-PROBE: NOT DETECTED
HAEM INFLU DNA CSF QL NAA+NON-PROBE: NOT DETECTED
HCT VFR BLD AUTO: 41.2 % (ref 40–54)
HGB BLD-MCNC: 13 GM/DL (ref 14–18)
HOLD SPECIMEN: NORMAL
IMM GRANULOCYTES # BLD AUTO: 0.14 K/UL (ref 0–0.04)
IMM GRANULOCYTES NFR BLD AUTO: 0.8 % (ref 0–0.5)
IMP CARBAPENEMASE ISLT QL IA.RAPID: ABNORMAL
INR PPP: 1 (ref 0.8–1.2)
K OXYTOCA OMPA BLD POS QL PROBE: NOT DETECTED
KLEBSIELLA SP DNA BLD POS QL NAA+NON-PRB: NOT DETECTED
KLEBSIELLA SP DNA BLD POS QL NAA+NON-PRB: NOT DETECTED
KPC CARBAPENEMASE ISLT QL IA.RAPID: ABNORMAL
L MONOCYTOG DNA CSF QL NAA+NON-PROBE: NOT DETECTED
LACTATE SERPL-SCNC: 1.3 MMOL/L (ref 0.5–2.2)
LYMPHOCYTES # BLD AUTO: 0.59 K/UL (ref 1–4.8)
MCH RBC QN AUTO: 31.8 PG (ref 27–31)
MCHC RBC AUTO-ENTMCNC: 31.6 G/DL (ref 32–36)
MCV RBC AUTO: 101 FL (ref 82–98)
MECA+MECC NOSE QL NAA+PROBE: ABNORMAL
MECA+MECC+MREJ ISLT/SPM QL: DETECTED
N MEN DNA CSF QL NAA+NON-PROBE: NOT DETECTED
NDM CARBAPENEMASE ISLT QL IA.RAPID: ABNORMAL
NUCLEATED RBC (/100WBC) (OHS): 0 /100 WBC
OXA-48-LIKE CRBPNASE ISLT QL IA.RAPID: ABNORMAL
P AERUGINOSA DNA BLD POS QL NAA+PROBE: NOT DETECTED
PLATELET # BLD AUTO: 183 K/UL (ref 150–450)
PMV BLD AUTO: 10 FL (ref 9.2–12.9)
POCT GLUCOSE: 199 MG/DL (ref 70–110)
POCT GLUCOSE: 216 MG/DL (ref 70–110)
POCT GLUCOSE: 218 MG/DL (ref 70–110)
POCT GLUCOSE: 224 MG/DL (ref 70–110)
POTASSIUM SERPL-SCNC: 4 MMOL/L (ref 3.5–5.1)
POTASSIUM SERPL-SCNC: 4.4 MMOL/L (ref 3.5–5.1)
PROCALCITONIN SERPL-MCNC: 0.72 NG/ML
PROTEUS SP DNA BLD POS QL NAA+PROBE: NOT DETECTED
PROTHROMBIN TIME: 11.3 SECONDS (ref 9–12.5)
RBC # BLD AUTO: 4.09 M/UL (ref 4.6–6.2)
RELATIVE EOSINOPHIL (OHS): 0.1 %
RELATIVE LYMPHOCYTE (OHS): 3.2 % (ref 18–48)
RELATIVE MONOCYTE (OHS): 4.5 % (ref 4–15)
RELATIVE NEUTROPHIL (OHS): 91 % (ref 38–73)
S AUREUS DNA BLD POS QL NAA+PROBE: DETECTED
S ENT+BONG DNA STL QL NAA+NON-PROBE: NOT DETECTED
S EPIDERMIDIS HSP60 BLD POS QL PROBE: NOT DETECTED
S LUGDUNENSIS SODA BLD POS QL PROBE: NOT DETECTED
S MALTOPH DNA BLD POS QL NAA+PROBE: NOT DETECTED
S MARCESCENS DNA BLD POS QL NAA+PROBE: NOT DETECTED
S PNEUM DNA CSF QL NAA+NON-PROBE: NOT DETECTED
S PYOGENES HSP60 BLD POS QL PROBE: NOT DETECTED
SODIUM SERPL-SCNC: 128 MMOL/L (ref 136–145)
SODIUM SERPL-SCNC: 132 MMOL/L (ref 136–145)
STAPH SP TUF BLD POS QL PROBE: ABNORMAL
STREPTOCOCCUS SP TUF BLD POS QL PROBE: NOT DETECTED
VAN(A+B+C1+C2) GENES ISLT/SPM: ABNORMAL
VANCOMYCIN SERPL-MCNC: 25.7 UG/ML (ref ?–80)
VIM CARBAPENEMASE ISLT QL IA.RAPID: ABNORMAL
WBC # BLD AUTO: 18.56 K/UL (ref 3.9–12.7)

## 2025-07-27 PROCEDURE — 80048 BASIC METABOLIC PNL TOTAL CA: CPT | Performed by: INTERNAL MEDICINE

## 2025-07-27 PROCEDURE — 25000003 PHARM REV CODE 250: Performed by: INTERNAL MEDICINE

## 2025-07-27 PROCEDURE — 85025 COMPLETE CBC W/AUTO DIFF WBC: CPT | Performed by: INTERNAL MEDICINE

## 2025-07-27 PROCEDURE — 11000001 HC ACUTE MED/SURG PRIVATE ROOM

## 2025-07-27 PROCEDURE — 27000221 HC OXYGEN, UP TO 24 HOURS

## 2025-07-27 PROCEDURE — 94761 N-INVAS EAR/PLS OXIMETRY MLT: CPT

## 2025-07-27 PROCEDURE — 36415 COLL VENOUS BLD VENIPUNCTURE: CPT | Performed by: INTERNAL MEDICINE

## 2025-07-27 PROCEDURE — 25000003 PHARM REV CODE 250: Performed by: HOSPITALIST

## 2025-07-27 PROCEDURE — 99223 1ST HOSP IP/OBS HIGH 75: CPT | Mod: ,,, | Performed by: INTERNAL MEDICINE

## 2025-07-27 PROCEDURE — 63600175 PHARM REV CODE 636 W HCPCS: Performed by: INTERNAL MEDICINE

## 2025-07-27 RX ORDER — VELPATASVIR AND SOFOSBUVIR 100; 400 MG/1; MG/1
1 TABLET, FILM COATED ORAL DAILY
Status: DISCONTINUED | OUTPATIENT
Start: 2025-07-27 | End: 2025-08-08 | Stop reason: HOSPADM

## 2025-07-27 RX ORDER — MUPIROCIN 20 MG/G
OINTMENT TOPICAL 2 TIMES DAILY
Status: DISPENSED | OUTPATIENT
Start: 2025-07-27 | End: 2025-08-01

## 2025-07-27 RX ADMIN — ONDANSETRON 4 MG: 2 INJECTION INTRAMUSCULAR; INTRAVENOUS at 05:07

## 2025-07-27 RX ADMIN — EZETIMIBE 10 MG: 10 TABLET ORAL at 08:07

## 2025-07-27 RX ADMIN — TORSEMIDE 80 MG: 10 TABLET ORAL at 08:07

## 2025-07-27 RX ADMIN — PANTOPRAZOLE SODIUM 40 MG: 40 TABLET, DELAYED RELEASE ORAL at 08:07

## 2025-07-27 RX ADMIN — INSULIN ASPART 4 UNITS: 100 INJECTION, SOLUTION INTRAVENOUS; SUBCUTANEOUS at 05:07

## 2025-07-27 RX ADMIN — OXYCODONE HYDROCHLORIDE 5 MG: 5 TABLET ORAL at 01:07

## 2025-07-27 RX ADMIN — SEVELAMER CARBONATE 1600 MG: 800 TABLET, FILM COATED ORAL at 12:07

## 2025-07-27 RX ADMIN — PANTOPRAZOLE SODIUM 40 MG: 40 INJECTION, POWDER, FOR SOLUTION INTRAVENOUS at 12:07

## 2025-07-27 RX ADMIN — SEVELAMER CARBONATE 1600 MG: 800 TABLET, FILM COATED ORAL at 04:07

## 2025-07-27 RX ADMIN — METRONIDAZOLE 500 MG: 500 INJECTION, SOLUTION INTRAVENOUS at 08:07

## 2025-07-27 RX ADMIN — OXYCODONE HYDROCHLORIDE 5 MG: 5 TABLET ORAL at 07:07

## 2025-07-27 RX ADMIN — CEFEPIME 1 G: 1 INJECTION, POWDER, FOR SOLUTION INTRAMUSCULAR; INTRAVENOUS at 12:07

## 2025-07-27 RX ADMIN — CEFEPIME 1 G: 1 INJECTION, POWDER, FOR SOLUTION INTRAMUSCULAR; INTRAVENOUS at 11:07

## 2025-07-27 RX ADMIN — EZETIMIBE 10 MG: 10 TABLET ORAL at 12:07

## 2025-07-27 RX ADMIN — TAMSULOSIN HYDROCHLORIDE 0.4 MG: 0.4 CAPSULE ORAL at 08:07

## 2025-07-27 RX ADMIN — OXYCODONE HYDROCHLORIDE 5 MG: 5 TABLET ORAL at 02:07

## 2025-07-27 RX ADMIN — METRONIDAZOLE 500 MG: 500 INJECTION, SOLUTION INTRAVENOUS at 03:07

## 2025-07-27 RX ADMIN — OXYCODONE HYDROCHLORIDE 5 MG: 5 TABLET ORAL at 05:07

## 2025-07-27 RX ADMIN — MUPIROCIN: 20 OINTMENT TOPICAL at 08:07

## 2025-07-27 RX ADMIN — CARVEDILOL 12.5 MG: 12.5 TABLET, FILM COATED ORAL at 08:07

## 2025-07-27 RX ADMIN — INSULIN GLARGINE 10 UNITS: 100 INJECTION, SOLUTION SUBCUTANEOUS at 12:07

## 2025-07-27 RX ADMIN — CARVEDILOL 12.5 MG: 12.5 TABLET, FILM COATED ORAL at 12:07

## 2025-07-27 RX ADMIN — SENNOSIDES AND DOCUSATE SODIUM 1 TABLET: 50; 8.6 TABLET ORAL at 08:07

## 2025-07-27 RX ADMIN — ESCITALOPRAM OXALATE 10 MG: 10 TABLET ORAL at 08:07

## 2025-07-27 RX ADMIN — NIFEDIPINE 60 MG: 30 TABLET, FILM COATED, EXTENDED RELEASE ORAL at 08:07

## 2025-07-27 RX ADMIN — ONDANSETRON 4 MG: 2 INJECTION INTRAMUSCULAR; INTRAVENOUS at 09:07

## 2025-07-27 RX ADMIN — VELPATASVIR AND SOFOSBUVIR 1 TABLET: 100; 400 TABLET, FILM COATED ORAL at 04:07

## 2025-07-27 RX ADMIN — TORSEMIDE 80 MG: 10 TABLET ORAL at 12:07

## 2025-07-27 RX ADMIN — INSULIN GLARGINE 10 UNITS: 100 INJECTION, SOLUTION SUBCUTANEOUS at 08:07

## 2025-07-27 RX ADMIN — INSULIN ASPART 2 UNITS: 100 INJECTION, SOLUTION INTRAVENOUS; SUBCUTANEOUS at 08:07

## 2025-07-27 RX ADMIN — OXYCODONE HYDROCHLORIDE 5 MG: 5 TABLET ORAL at 11:07

## 2025-07-27 RX ADMIN — PROBIOTIC PRODUCT - TAB 1 TABLET: TAB at 08:07

## 2025-07-27 RX ADMIN — METRONIDAZOLE 500 MG: 500 INJECTION, SOLUTION INTRAVENOUS at 11:07

## 2025-07-27 RX ADMIN — NIFEDIPINE 60 MG: 30 TABLET, FILM COATED, EXTENDED RELEASE ORAL at 12:07

## 2025-07-27 RX ADMIN — ATORVASTATIN CALCIUM 80 MG: 40 TABLET, FILM COATED ORAL at 08:07

## 2025-07-27 RX ADMIN — SEVELAMER CARBONATE 1600 MG: 800 TABLET, FILM COATED ORAL at 08:07

## 2025-07-27 RX ADMIN — ATORVASTATIN CALCIUM 80 MG: 40 TABLET, FILM COATED ORAL at 12:07

## 2025-07-27 RX ADMIN — INSULIN ASPART 1 UNITS: 100 INJECTION, SOLUTION INTRAVENOUS; SUBCUTANEOUS at 12:07

## 2025-07-27 RX ADMIN — METRONIDAZOLE 500 MG: 500 INJECTION, SOLUTION INTRAVENOUS at 12:07

## 2025-07-28 PROBLEM — B95.62 MRSA BACTEREMIA: Status: ACTIVE | Noted: 2025-07-28

## 2025-07-28 PROBLEM — R78.81 MRSA BACTEREMIA: Status: ACTIVE | Noted: 2025-07-28

## 2025-07-28 PROBLEM — A41.01 STAPHYLOCOCCUS AUREUS BACTEREMIA WITH SEPSIS: Status: ACTIVE | Noted: 2025-07-28

## 2025-07-28 PROBLEM — D63.8 ANEMIA OF CHRONIC DISORDER: Chronic | Status: ACTIVE | Noted: 2025-07-28

## 2025-07-28 LAB
ABSOLUTE EOSINOPHIL (OHS): 0.29 K/UL
ABSOLUTE MONOCYTE (OHS): 0.87 K/UL (ref 0.3–1)
ABSOLUTE NEUTROPHIL COUNT (OHS): 10.64 K/UL (ref 1.8–7.7)
ALBUMIN FLD-MCNC: 1.5 G/DL
ALBUMIN SERPL BCP-MCNC: 2.1 G/DL (ref 3.5–5.2)
ANION GAP (OHS): 11 MMOL/L (ref 8–16)
APPEARANCE FLD: NORMAL
BASOPHILS # BLD AUTO: 0.03 K/UL
BASOPHILS NFR BLD AUTO: 0.2 %
BSA FOR ECHO PROCEDURE: 1.96 M2
BUN SERPL-MCNC: 50 MG/DL (ref 6–20)
CALCIUM SERPL-MCNC: 7.3 MG/DL (ref 8.7–10.5)
CHLORIDE SERPL-SCNC: 93 MMOL/L (ref 95–110)
CO2 SERPL-SCNC: 25 MMOL/L (ref 23–29)
COLOR FLD: NORMAL
CREAT SERPL-MCNC: 7.2 MG/DL (ref 0.5–1.4)
ERYTHROCYTE [DISTWIDTH] IN BLOOD BY AUTOMATED COUNT: 13.3 % (ref 11.5–14.5)
FREEZE AND HOLD: NORMAL
GFR SERPLBLD CREATININE-BSD FMLA CKD-EPI: 8 ML/MIN/1.73/M2
GLUCOSE FLD-MCNC: 149 MG/DL
GLUCOSE SERPL-MCNC: 151 MG/DL (ref 70–110)
HBV SURFACE AG SERPL QL IA: NORMAL
HCT VFR BLD AUTO: 29.9 % (ref 40–54)
HGB BLD-MCNC: 9.9 GM/DL (ref 14–18)
IMM GRANULOCYTES # BLD AUTO: 0.08 K/UL (ref 0–0.04)
IMM GRANULOCYTES NFR BLD AUTO: 0.6 % (ref 0–0.5)
LDH FLD L TO P-CCNC: 119 U/L
LYMPHOCYTES # BLD AUTO: 0.95 K/UL (ref 1–4.8)
LYMPHOCYTES NFR FLD MANUAL: 94 %
MCH RBC QN AUTO: 32.4 PG (ref 27–31)
MCHC RBC AUTO-ENTMCNC: 33.1 G/DL (ref 32–36)
MCV RBC AUTO: 98 FL (ref 82–98)
MESOTHL CELL NFR FLD MANUAL: 2 %
MONOS+MACROS NFR FLD MANUAL: 4 %
NUCLEATED RBC (/100WBC) (OHS): 0 /100 WBC
OHS CV CPX PATIENT HEIGHT IN: 68
OHS QRS DURATION: 102 MS
OHS QTC CALCULATION: 457 MS
PHOSPHATE SERPL-MCNC: 5.2 MG/DL (ref 2.7–4.5)
PLATELET # BLD AUTO: 193 K/UL (ref 150–450)
PMV BLD AUTO: 9.1 FL (ref 9.2–12.9)
POCT GLUCOSE: 101 MG/DL (ref 70–110)
POCT GLUCOSE: 135 MG/DL (ref 70–110)
POCT GLUCOSE: 168 MG/DL (ref 70–110)
POTASSIUM SERPL-SCNC: 4 MMOL/L (ref 3.5–5.1)
RA PRESSURE ESTIMATED: 3 MMHG
RBC # BLD AUTO: 3.06 M/UL (ref 4.6–6.2)
RELATIVE EOSINOPHIL (OHS): 2.3 %
RELATIVE LYMPHOCYTE (OHS): 7.4 % (ref 18–48)
RELATIVE MONOCYTE (OHS): 6.8 % (ref 4–15)
RELATIVE NEUTROPHIL (OHS): 82.7 % (ref 38–73)
SODIUM SERPL-SCNC: 129 MMOL/L (ref 136–145)
VANCOMYCIN SERPL-MCNC: 17.6 UG/ML (ref ?–80)
WBC # BLD AUTO: 12.86 K/UL (ref 3.9–12.7)
WBC # FLD: 499 /CU MM

## 2025-07-28 PROCEDURE — 25000003 PHARM REV CODE 250: Performed by: HOSPITALIST

## 2025-07-28 PROCEDURE — 99223 1ST HOSP IP/OBS HIGH 75: CPT | Mod: ,,, | Performed by: INTERNAL MEDICINE

## 2025-07-28 PROCEDURE — 11000001 HC ACUTE MED/SURG PRIVATE ROOM

## 2025-07-28 PROCEDURE — 87075 CULTR BACTERIA EXCEPT BLOOD: CPT | Performed by: HOSPITALIST

## 2025-07-28 PROCEDURE — 63600175 PHARM REV CODE 636 W HCPCS: Performed by: NURSE PRACTITIONER

## 2025-07-28 PROCEDURE — 89051 BODY FLUID CELL COUNT: CPT | Performed by: HOSPITALIST

## 2025-07-28 PROCEDURE — 0W9B3ZZ DRAINAGE OF LEFT PLEURAL CAVITY, PERCUTANEOUS APPROACH: ICD-10-PCS | Performed by: HOSPITALIST

## 2025-07-28 PROCEDURE — 27000207 HC ISOLATION

## 2025-07-28 PROCEDURE — 82042 OTHER SOURCE ALBUMIN QUAN EA: CPT | Performed by: HOSPITALIST

## 2025-07-28 PROCEDURE — 80048 BASIC METABOLIC PNL TOTAL CA: CPT | Performed by: HOSPITALIST

## 2025-07-28 PROCEDURE — 63600175 PHARM REV CODE 636 W HCPCS: Performed by: HOSPITALIST

## 2025-07-28 PROCEDURE — 80100016 HC MAINTENANCE HEMODIALYSIS

## 2025-07-28 PROCEDURE — 0JPT3XZ REMOVAL OF TUNNELED VASCULAR ACCESS DEVICE FROM TRUNK SUBCUTANEOUS TISSUE AND FASCIA, PERCUTANEOUS APPROACH: ICD-10-PCS | Performed by: HOSPITALIST

## 2025-07-28 PROCEDURE — 86706 HEP B SURFACE ANTIBODY: CPT | Performed by: INTERNAL MEDICINE

## 2025-07-28 PROCEDURE — 82945 GLUCOSE OTHER FLUID: CPT | Performed by: HOSPITALIST

## 2025-07-28 PROCEDURE — 87077 CULTURE AEROBIC IDENTIFY: CPT | Performed by: INTERNAL MEDICINE

## 2025-07-28 PROCEDURE — 36415 COLL VENOUS BLD VENIPUNCTURE: CPT | Performed by: HOSPITALIST

## 2025-07-28 PROCEDURE — 63600175 PHARM REV CODE 636 W HCPCS: Performed by: INTERNAL MEDICINE

## 2025-07-28 PROCEDURE — 36415 COLL VENOUS BLD VENIPUNCTURE: CPT | Performed by: INTERNAL MEDICINE

## 2025-07-28 PROCEDURE — 99223 1ST HOSP IP/OBS HIGH 75: CPT | Mod: 25,,, | Performed by: NURSE PRACTITIONER

## 2025-07-28 PROCEDURE — 83615 LACTATE (LD) (LDH) ENZYME: CPT | Performed by: HOSPITALIST

## 2025-07-28 PROCEDURE — 02PA33Z REMOVAL OF INFUSION DEVICE FROM HEART, PERCUTANEOUS APPROACH: ICD-10-PCS | Performed by: HOSPITALIST

## 2025-07-28 PROCEDURE — 90935 HEMODIALYSIS ONE EVALUATION: CPT | Mod: ,,, | Performed by: INTERNAL MEDICINE

## 2025-07-28 PROCEDURE — 87340 HEPATITIS B SURFACE AG IA: CPT | Performed by: INTERNAL MEDICINE

## 2025-07-28 PROCEDURE — 99000 SPECIMEN HANDLING OFFICE-LAB: CPT | Performed by: HOSPITALIST

## 2025-07-28 PROCEDURE — 80202 ASSAY OF VANCOMYCIN: CPT | Performed by: HOSPITALIST

## 2025-07-28 PROCEDURE — 85025 COMPLETE CBC W/AUTO DIFF WBC: CPT | Performed by: HOSPITALIST

## 2025-07-28 PROCEDURE — 87070 CULTURE OTHR SPECIMN AEROBIC: CPT | Performed by: HOSPITALIST

## 2025-07-28 PROCEDURE — 25000003 PHARM REV CODE 250: Performed by: INTERNAL MEDICINE

## 2025-07-28 RX ORDER — LIDOCAINE HYDROCHLORIDE 10 MG/ML
INJECTION, SOLUTION INFILTRATION; PERINEURAL
Status: COMPLETED | OUTPATIENT
Start: 2025-07-28 | End: 2025-07-28

## 2025-07-28 RX ORDER — CARVEDILOL 3.12 MG/1
3.12 TABLET ORAL 2 TIMES DAILY
Status: DISCONTINUED | OUTPATIENT
Start: 2025-07-28 | End: 2025-08-08 | Stop reason: HOSPADM

## 2025-07-28 RX ORDER — NIFEDIPINE 30 MG/1
30 TABLET, EXTENDED RELEASE ORAL DAILY
Status: DISCONTINUED | OUTPATIENT
Start: 2025-07-29 | End: 2025-08-08 | Stop reason: HOSPADM

## 2025-07-28 RX ADMIN — INSULIN ASPART 2 UNITS: 100 INJECTION, SOLUTION INTRAVENOUS; SUBCUTANEOUS at 09:07

## 2025-07-28 RX ADMIN — EZETIMIBE 10 MG: 10 TABLET ORAL at 08:07

## 2025-07-28 RX ADMIN — LIDOCAINE HYDROCHLORIDE 9 ML: 10 INJECTION, SOLUTION INFILTRATION; PERINEURAL at 08:07

## 2025-07-28 RX ADMIN — OXYCODONE HYDROCHLORIDE 5 MG: 5 TABLET ORAL at 04:07

## 2025-07-28 RX ADMIN — TAMSULOSIN HYDROCHLORIDE 0.4 MG: 0.4 CAPSULE ORAL at 09:07

## 2025-07-28 RX ADMIN — INSULIN GLARGINE 10 UNITS: 100 INJECTION, SOLUTION SUBCUTANEOUS at 09:07

## 2025-07-28 RX ADMIN — LIDOCAINE HYDROCHLORIDE 7 ML: 10 INJECTION, SOLUTION INFILTRATION; PERINEURAL at 02:07

## 2025-07-28 RX ADMIN — VANCOMYCIN HYDROCHLORIDE 500 MG: 500 INJECTION, POWDER, LYOPHILIZED, FOR SOLUTION INTRAVENOUS at 07:07

## 2025-07-28 RX ADMIN — OXYCODONE HYDROCHLORIDE 5 MG: 5 TABLET ORAL at 07:07

## 2025-07-28 RX ADMIN — SEVELAMER CARBONATE 1600 MG: 800 TABLET, FILM COATED ORAL at 04:07

## 2025-07-28 RX ADMIN — ATORVASTATIN CALCIUM 80 MG: 40 TABLET, FILM COATED ORAL at 08:07

## 2025-07-28 RX ADMIN — METRONIDAZOLE 500 MG: 500 INJECTION, SOLUTION INTRAVENOUS at 09:07

## 2025-07-28 RX ADMIN — PROBIOTIC PRODUCT - TAB 1 TABLET: TAB at 09:07

## 2025-07-28 RX ADMIN — ESCITALOPRAM OXALATE 10 MG: 10 TABLET ORAL at 09:07

## 2025-07-28 RX ADMIN — MUPIROCIN: 20 OINTMENT TOPICAL at 08:07

## 2025-07-28 RX ADMIN — METRONIDAZOLE 500 MG: 500 INJECTION, SOLUTION INTRAVENOUS at 04:07

## 2025-07-28 RX ADMIN — OXYCODONE HYDROCHLORIDE 5 MG: 5 TABLET ORAL at 03:07

## 2025-07-28 RX ADMIN — PANTOPRAZOLE SODIUM 40 MG: 40 TABLET, DELAYED RELEASE ORAL at 09:07

## 2025-07-28 RX ADMIN — VELPATASVIR AND SOFOSBUVIR 1 TABLET: 100; 400 TABLET, FILM COATED ORAL at 04:07

## 2025-07-29 LAB
ABSOLUTE EOSINOPHIL (OHS): 0.48 K/UL
ABSOLUTE MONOCYTE (OHS): 1.4 K/UL (ref 0.3–1)
ABSOLUTE NEUTROPHIL COUNT (OHS): 8.2 K/UL (ref 1.8–7.7)
ALBUMIN SERPL BCP-MCNC: 2 G/DL (ref 3.5–5.2)
ANION GAP (OHS): 11 MMOL/L (ref 8–16)
BACTERIA BLD CULT: ABNORMAL
BASOPHILS # BLD AUTO: 0.05 K/UL
BASOPHILS NFR BLD AUTO: 0.4 %
BUN SERPL-MCNC: 47 MG/DL (ref 6–20)
CALCIUM SERPL-MCNC: 6.7 MG/DL (ref 8.7–10.5)
CHLORIDE SERPL-SCNC: 100 MMOL/L (ref 95–110)
CO2 SERPL-SCNC: 22 MMOL/L (ref 23–29)
CREAT SERPL-MCNC: 6.5 MG/DL (ref 0.5–1.4)
ERYTHROCYTE [DISTWIDTH] IN BLOOD BY AUTOMATED COUNT: 13.3 % (ref 11.5–14.5)
GFR SERPLBLD CREATININE-BSD FMLA CKD-EPI: 9 ML/MIN/1.73/M2
GLUCOSE SERPL-MCNC: 208 MG/DL (ref 70–110)
GRAM STN SPEC: ABNORMAL
HCT VFR BLD AUTO: 32 % (ref 40–54)
HGB BLD-MCNC: 10.6 GM/DL (ref 14–18)
HOLD SPECIMEN: NORMAL
IMM GRANULOCYTES # BLD AUTO: 0.06 K/UL (ref 0–0.04)
IMM GRANULOCYTES NFR BLD AUTO: 0.5 % (ref 0–0.5)
LYMPHOCYTES # BLD AUTO: 1.45 K/UL (ref 1–4.8)
MCH RBC QN AUTO: 32 PG (ref 27–31)
MCHC RBC AUTO-ENTMCNC: 33.1 G/DL (ref 32–36)
MCV RBC AUTO: 97 FL (ref 82–98)
NUCLEATED RBC (/100WBC) (OHS): 0 /100 WBC
PHOSPHATE SERPL-MCNC: 4.4 MG/DL (ref 2.7–4.5)
PLATELET # BLD AUTO: 206 K/UL (ref 150–450)
PMV BLD AUTO: 9.9 FL (ref 9.2–12.9)
POCT GLUCOSE: 129 MG/DL (ref 70–110)
POCT GLUCOSE: 136 MG/DL (ref 70–110)
POCT GLUCOSE: 214 MG/DL (ref 70–110)
POCT GLUCOSE: 215 MG/DL (ref 70–110)
POTASSIUM SERPL-SCNC: 4.8 MMOL/L (ref 3.5–5.1)
RBC # BLD AUTO: 3.31 M/UL (ref 4.6–6.2)
RELATIVE EOSINOPHIL (OHS): 4.1 %
RELATIVE LYMPHOCYTE (OHS): 12.5 % (ref 18–48)
RELATIVE MONOCYTE (OHS): 12 % (ref 4–15)
RELATIVE NEUTROPHIL (OHS): 70.5 % (ref 38–73)
SODIUM SERPL-SCNC: 133 MMOL/L (ref 136–145)
WBC # BLD AUTO: 11.64 K/UL (ref 3.9–12.7)

## 2025-07-29 PROCEDURE — 84100 ASSAY OF PHOSPHORUS: CPT | Performed by: INTERNAL MEDICINE

## 2025-07-29 PROCEDURE — G0545 PR VISIT INHERENT TO INPT OR OBS CARE, INFECTIOUS DISEASE: HCPCS | Mod: ,,, | Performed by: INTERNAL MEDICINE

## 2025-07-29 PROCEDURE — 85025 COMPLETE CBC W/AUTO DIFF WBC: CPT | Performed by: INTERNAL MEDICINE

## 2025-07-29 PROCEDURE — 27000207 HC ISOLATION

## 2025-07-29 PROCEDURE — 63600175 PHARM REV CODE 636 W HCPCS: Performed by: INTERNAL MEDICINE

## 2025-07-29 PROCEDURE — 99232 SBSQ HOSP IP/OBS MODERATE 35: CPT | Mod: ,,, | Performed by: INTERNAL MEDICINE

## 2025-07-29 PROCEDURE — 11000001 HC ACUTE MED/SURG PRIVATE ROOM

## 2025-07-29 PROCEDURE — 25000003 PHARM REV CODE 250: Performed by: HOSPITALIST

## 2025-07-29 PROCEDURE — 94761 N-INVAS EAR/PLS OXIMETRY MLT: CPT

## 2025-07-29 PROCEDURE — 99024 POSTOP FOLLOW-UP VISIT: CPT | Mod: ,,, | Performed by: SURGERY

## 2025-07-29 PROCEDURE — 36415 COLL VENOUS BLD VENIPUNCTURE: CPT | Performed by: INTERNAL MEDICINE

## 2025-07-29 PROCEDURE — 25000003 PHARM REV CODE 250: Performed by: INTERNAL MEDICINE

## 2025-07-29 RX ORDER — CALCIUM ACETATE 667 MG/1
1334 CAPSULE ORAL
Status: DISCONTINUED | OUTPATIENT
Start: 2025-07-30 | End: 2025-08-08 | Stop reason: HOSPADM

## 2025-07-29 RX ADMIN — VELPATASVIR AND SOFOSBUVIR 1 TABLET: 100; 400 TABLET, FILM COATED ORAL at 04:07

## 2025-07-29 RX ADMIN — TAMSULOSIN HYDROCHLORIDE 0.4 MG: 0.4 CAPSULE ORAL at 08:07

## 2025-07-29 RX ADMIN — METRONIDAZOLE 500 MG: 500 INJECTION, SOLUTION INTRAVENOUS at 04:07

## 2025-07-29 RX ADMIN — CARVEDILOL 3.12 MG: 3.12 TABLET, FILM COATED ORAL at 09:07

## 2025-07-29 RX ADMIN — OXYCODONE HYDROCHLORIDE 5 MG: 5 TABLET ORAL at 07:07

## 2025-07-29 RX ADMIN — INSULIN GLARGINE 10 UNITS: 100 INJECTION, SOLUTION SUBCUTANEOUS at 09:07

## 2025-07-29 RX ADMIN — OXYCODONE HYDROCHLORIDE 5 MG: 5 TABLET ORAL at 06:07

## 2025-07-29 RX ADMIN — OXYCODONE HYDROCHLORIDE 5 MG: 5 TABLET ORAL at 10:07

## 2025-07-29 RX ADMIN — ATORVASTATIN CALCIUM 80 MG: 40 TABLET, FILM COATED ORAL at 09:07

## 2025-07-29 RX ADMIN — OXYCODONE HYDROCHLORIDE 5 MG: 5 TABLET ORAL at 02:07

## 2025-07-29 RX ADMIN — SEVELAMER CARBONATE 1600 MG: 800 TABLET, FILM COATED ORAL at 04:07

## 2025-07-29 RX ADMIN — PANTOPRAZOLE SODIUM 40 MG: 40 TABLET, DELAYED RELEASE ORAL at 08:07

## 2025-07-29 RX ADMIN — PROBIOTIC PRODUCT - TAB 1 TABLET: TAB at 08:07

## 2025-07-29 RX ADMIN — SENNOSIDES AND DOCUSATE SODIUM 1 TABLET: 50; 8.6 TABLET ORAL at 09:07

## 2025-07-29 RX ADMIN — ESCITALOPRAM OXALATE 10 MG: 10 TABLET ORAL at 08:07

## 2025-07-29 RX ADMIN — SEVELAMER CARBONATE 1600 MG: 800 TABLET, FILM COATED ORAL at 08:07

## 2025-07-29 RX ADMIN — NIFEDIPINE 30 MG: 30 TABLET, FILM COATED, EXTENDED RELEASE ORAL at 08:07

## 2025-07-29 RX ADMIN — SEVELAMER CARBONATE 1600 MG: 800 TABLET, FILM COATED ORAL at 11:07

## 2025-07-29 RX ADMIN — OXYCODONE HYDROCHLORIDE 5 MG: 5 TABLET ORAL at 12:07

## 2025-07-29 RX ADMIN — METRONIDAZOLE 500 MG: 500 INJECTION, SOLUTION INTRAVENOUS at 01:07

## 2025-07-29 RX ADMIN — MUPIROCIN: 20 OINTMENT TOPICAL at 08:07

## 2025-07-29 RX ADMIN — METRONIDAZOLE 500 MG: 500 INJECTION, SOLUTION INTRAVENOUS at 08:07

## 2025-07-29 RX ADMIN — CARVEDILOL 3.12 MG: 3.12 TABLET, FILM COATED ORAL at 08:07

## 2025-07-29 RX ADMIN — INSULIN ASPART 4 UNITS: 100 INJECTION, SOLUTION INTRAVENOUS; SUBCUTANEOUS at 11:07

## 2025-07-29 RX ADMIN — EZETIMIBE 10 MG: 10 TABLET ORAL at 09:07

## 2025-07-30 ENCOUNTER — PATIENT OUTREACH (OUTPATIENT)
Facility: OTHER | Age: 55
End: 2025-07-30
Payer: MEDICAID

## 2025-07-30 LAB
ABSOLUTE EOSINOPHIL (OHS): 0.44 K/UL
ABSOLUTE MONOCYTE (OHS): 1.17 K/UL (ref 0.3–1)
ABSOLUTE NEUTROPHIL COUNT (OHS): 7.19 K/UL (ref 1.8–7.7)
ALBUMIN SERPL BCP-MCNC: 2.3 G/DL (ref 3.5–5.2)
ALP SERPL-CCNC: 54 UNIT/L (ref 40–150)
ALT SERPL W/O P-5'-P-CCNC: <8 UNIT/L (ref 10–44)
ANION GAP (OHS): 11 MMOL/L (ref 8–16)
AST SERPL-CCNC: 21 UNIT/L (ref 11–45)
BASOPHILS # BLD AUTO: 0.03 K/UL
BASOPHILS NFR BLD AUTO: 0.3 %
BILIRUB SERPL-MCNC: 0.2 MG/DL (ref 0.1–1)
BUN SERPL-MCNC: 55 MG/DL (ref 6–20)
CALCIUM SERPL-MCNC: 7.3 MG/DL (ref 8.7–10.5)
CHLORIDE SERPL-SCNC: 96 MMOL/L (ref 95–110)
CO2 SERPL-SCNC: 25 MMOL/L (ref 23–29)
CREAT SERPL-MCNC: 7.2 MG/DL (ref 0.5–1.4)
ERYTHROCYTE [DISTWIDTH] IN BLOOD BY AUTOMATED COUNT: 13.4 % (ref 11.5–14.5)
GFR SERPLBLD CREATININE-BSD FMLA CKD-EPI: 8 ML/MIN/1.73/M2
GLUCOSE SERPL-MCNC: 199 MG/DL (ref 70–110)
HCT VFR BLD AUTO: 30.1 % (ref 40–54)
HGB BLD-MCNC: 10 GM/DL (ref 14–18)
IMM GRANULOCYTES # BLD AUTO: 0.04 K/UL (ref 0–0.04)
IMM GRANULOCYTES NFR BLD AUTO: 0.4 % (ref 0–0.5)
LYMPHOCYTES # BLD AUTO: 1.3 K/UL (ref 1–4.8)
MCH RBC QN AUTO: 32.4 PG (ref 27–31)
MCHC RBC AUTO-ENTMCNC: 33.2 G/DL (ref 32–36)
MCV RBC AUTO: 97 FL (ref 82–98)
NUCLEATED RBC (/100WBC) (OHS): 0 /100 WBC
PHOSPHATE SERPL-MCNC: 3.9 MG/DL (ref 2.7–4.5)
PLATELET # BLD AUTO: 217 K/UL (ref 150–450)
PMV BLD AUTO: 10.2 FL (ref 9.2–12.9)
POCT GLUCOSE: 106 MG/DL (ref 70–110)
POCT GLUCOSE: 155 MG/DL (ref 70–110)
POCT GLUCOSE: 165 MG/DL (ref 70–110)
POCT GLUCOSE: 167 MG/DL (ref 70–110)
POCT GLUCOSE: 220 MG/DL (ref 70–110)
POCT GLUCOSE: 240 MG/DL (ref 70–110)
POCT GLUCOSE: 92 MG/DL (ref 70–110)
POTASSIUM SERPL-SCNC: 3.7 MMOL/L (ref 3.5–5.1)
PROT SERPL-MCNC: 6.2 GM/DL (ref 6–8.4)
PTH-INTACT SERPL-MCNC: 281.5 PG/ML (ref 9–77)
RBC # BLD AUTO: 3.09 M/UL (ref 4.6–6.2)
RELATIVE EOSINOPHIL (OHS): 4.3 %
RELATIVE LYMPHOCYTE (OHS): 12.8 % (ref 18–48)
RELATIVE MONOCYTE (OHS): 11.5 % (ref 4–15)
RELATIVE NEUTROPHIL (OHS): 70.7 % (ref 38–73)
SODIUM SERPL-SCNC: 132 MMOL/L (ref 136–145)
VANCOMYCIN SERPL-MCNC: 17.5 UG/ML (ref ?–80)
W HEPATITIS B SURFACE ANTIBODY, QUALITATIVE: POSITIVE
W HEPATITIS B SURFACE ANTIBODY, QUANTITATIVE: 372 MIU/ML
WBC # BLD AUTO: 10.17 K/UL (ref 3.9–12.7)

## 2025-07-30 PROCEDURE — 25000003 PHARM REV CODE 250: Performed by: HOSPITALIST

## 2025-07-30 PROCEDURE — 99232 SBSQ HOSP IP/OBS MODERATE 35: CPT | Mod: ,,, | Performed by: INTERNAL MEDICINE

## 2025-07-30 PROCEDURE — 25000003 PHARM REV CODE 250: Performed by: STUDENT IN AN ORGANIZED HEALTH CARE EDUCATION/TRAINING PROGRAM

## 2025-07-30 PROCEDURE — 80202 ASSAY OF VANCOMYCIN: CPT | Performed by: HOSPITALIST

## 2025-07-30 PROCEDURE — 63600175 PHARM REV CODE 636 W HCPCS: Performed by: STUDENT IN AN ORGANIZED HEALTH CARE EDUCATION/TRAINING PROGRAM

## 2025-07-30 PROCEDURE — 63600175 PHARM REV CODE 636 W HCPCS: Performed by: INTERNAL MEDICINE

## 2025-07-30 PROCEDURE — 94761 N-INVAS EAR/PLS OXIMETRY MLT: CPT

## 2025-07-30 PROCEDURE — 27000207 HC ISOLATION

## 2025-07-30 PROCEDURE — 25000003 PHARM REV CODE 250: Performed by: INTERNAL MEDICINE

## 2025-07-30 PROCEDURE — 84100 ASSAY OF PHOSPHORUS: CPT | Performed by: INTERNAL MEDICINE

## 2025-07-30 PROCEDURE — 85025 COMPLETE CBC W/AUTO DIFF WBC: CPT | Performed by: STUDENT IN AN ORGANIZED HEALTH CARE EDUCATION/TRAINING PROGRAM

## 2025-07-30 PROCEDURE — 36415 COLL VENOUS BLD VENIPUNCTURE: CPT | Performed by: STUDENT IN AN ORGANIZED HEALTH CARE EDUCATION/TRAINING PROGRAM

## 2025-07-30 PROCEDURE — 11000001 HC ACUTE MED/SURG PRIVATE ROOM

## 2025-07-30 PROCEDURE — 83970 ASSAY OF PARATHORMONE: CPT | Performed by: INTERNAL MEDICINE

## 2025-07-30 PROCEDURE — 80053 COMPREHEN METABOLIC PANEL: CPT | Performed by: STUDENT IN AN ORGANIZED HEALTH CARE EDUCATION/TRAINING PROGRAM

## 2025-07-30 PROCEDURE — 87040 BLOOD CULTURE FOR BACTERIA: CPT | Performed by: NURSE PRACTITIONER

## 2025-07-30 RX ADMIN — METRONIDAZOLE 500 MG: 500 INJECTION, SOLUTION INTRAVENOUS at 08:07

## 2025-07-30 RX ADMIN — CARVEDILOL 3.12 MG: 3.12 TABLET, FILM COATED ORAL at 08:07

## 2025-07-30 RX ADMIN — INSULIN ASPART 2 UNITS: 100 INJECTION, SOLUTION INTRAVENOUS; SUBCUTANEOUS at 11:07

## 2025-07-30 RX ADMIN — METRONIDAZOLE 500 MG: 500 INJECTION, SOLUTION INTRAVENOUS at 04:07

## 2025-07-30 RX ADMIN — VELPATASVIR AND SOFOSBUVIR 1 TABLET: 100; 400 TABLET, FILM COATED ORAL at 04:07

## 2025-07-30 RX ADMIN — PROBIOTIC PRODUCT - TAB 1 TABLET: TAB at 08:07

## 2025-07-30 RX ADMIN — EZETIMIBE 10 MG: 10 TABLET ORAL at 09:07

## 2025-07-30 RX ADMIN — OXYCODONE HYDROCHLORIDE 5 MG: 5 TABLET ORAL at 12:07

## 2025-07-30 RX ADMIN — CALCIUM ACETATE 1334 MG: 667 CAPSULE ORAL at 04:07

## 2025-07-30 RX ADMIN — METRONIDAZOLE 500 MG: 500 INJECTION, SOLUTION INTRAVENOUS at 01:07

## 2025-07-30 RX ADMIN — VANCOMYCIN HYDROCHLORIDE 500 MG: 500 INJECTION, POWDER, LYOPHILIZED, FOR SOLUTION INTRAVENOUS at 06:07

## 2025-07-30 RX ADMIN — INSULIN ASPART 2 UNITS: 100 INJECTION, SOLUTION INTRAVENOUS; SUBCUTANEOUS at 04:07

## 2025-07-30 RX ADMIN — CARVEDILOL 3.12 MG: 3.12 TABLET, FILM COATED ORAL at 09:07

## 2025-07-30 RX ADMIN — OXYCODONE HYDROCHLORIDE 5 MG: 5 TABLET ORAL at 10:07

## 2025-07-30 RX ADMIN — CALCIUM ACETATE 1334 MG: 667 CAPSULE ORAL at 11:07

## 2025-07-30 RX ADMIN — OXYCODONE HYDROCHLORIDE 5 MG: 5 TABLET ORAL at 11:07

## 2025-07-30 RX ADMIN — ONDANSETRON 4 MG: 2 INJECTION INTRAMUSCULAR; INTRAVENOUS at 02:07

## 2025-07-30 RX ADMIN — INSULIN GLARGINE 10 UNITS: 100 INJECTION, SOLUTION SUBCUTANEOUS at 09:07

## 2025-07-30 RX ADMIN — NIFEDIPINE 30 MG: 30 TABLET, FILM COATED, EXTENDED RELEASE ORAL at 08:07

## 2025-07-30 RX ADMIN — INSULIN ASPART 2 UNITS: 100 INJECTION, SOLUTION INTRAVENOUS; SUBCUTANEOUS at 01:07

## 2025-07-30 RX ADMIN — OXYCODONE HYDROCHLORIDE 5 MG: 5 TABLET ORAL at 06:07

## 2025-07-30 RX ADMIN — TAMSULOSIN HYDROCHLORIDE 0.4 MG: 0.4 CAPSULE ORAL at 08:07

## 2025-07-30 RX ADMIN — INSULIN ASPART 2 UNITS: 100 INJECTION, SOLUTION INTRAVENOUS; SUBCUTANEOUS at 09:07

## 2025-07-30 RX ADMIN — ATORVASTATIN CALCIUM 80 MG: 40 TABLET, FILM COATED ORAL at 09:07

## 2025-07-30 RX ADMIN — CALCIUM ACETATE 1334 MG: 667 CAPSULE ORAL at 08:07

## 2025-07-30 RX ADMIN — PANTOPRAZOLE SODIUM 40 MG: 40 TABLET, DELAYED RELEASE ORAL at 08:07

## 2025-07-30 RX ADMIN — ESCITALOPRAM OXALATE 10 MG: 10 TABLET ORAL at 08:07

## 2025-07-30 NOTE — PROGRESS NOTES
ED Navigator called to remind Pt of appt with, [facility], Vascular Surgery on 7-30-25 at 2 and 2:30. Detailed email sent. No answer. No v/m.

## 2025-07-31 PROBLEM — B95.61 STAPHYLOCOCCUS AUREUS BACTEREMIA: Status: ACTIVE | Noted: 2025-07-28

## 2025-07-31 LAB
ABSOLUTE EOSINOPHIL (OHS): 0.43 K/UL
ABSOLUTE MONOCYTE (OHS): 1.27 K/UL (ref 0.3–1)
ABSOLUTE NEUTROPHIL COUNT (OHS): 7.97 K/UL (ref 1.8–7.7)
ALBUMIN SERPL BCP-MCNC: 2.2 G/DL (ref 3.5–5.2)
ALP SERPL-CCNC: 42 UNIT/L (ref 40–150)
ALT SERPL W/O P-5'-P-CCNC: 9 UNIT/L (ref 10–44)
ANION GAP (OHS): 11 MMOL/L (ref 8–16)
AST SERPL-CCNC: 23 UNIT/L (ref 11–45)
BACTERIA BLD CULT: ABNORMAL
BACTERIA BLD CULT: ABNORMAL
BASOPHILS # BLD AUTO: 0.06 K/UL
BASOPHILS NFR BLD AUTO: 0.5 %
BILIRUB SERPL-MCNC: 0.3 MG/DL (ref 0.1–1)
BUN SERPL-MCNC: 67 MG/DL (ref 6–20)
CALCIUM SERPL-MCNC: 7 MG/DL (ref 8.7–10.5)
CHLORIDE SERPL-SCNC: 100 MMOL/L (ref 95–110)
CO2 SERPL-SCNC: 23 MMOL/L (ref 23–29)
CREAT SERPL-MCNC: 8.2 MG/DL (ref 0.5–1.4)
ERYTHROCYTE [DISTWIDTH] IN BLOOD BY AUTOMATED COUNT: 13.3 % (ref 11.5–14.5)
GFR SERPLBLD CREATININE-BSD FMLA CKD-EPI: 7 ML/MIN/1.73/M2
GLUCOSE SERPL-MCNC: 83 MG/DL (ref 70–110)
GRAM STN SPEC: ABNORMAL
HCT VFR BLD AUTO: 29.3 % (ref 40–54)
HGB BLD-MCNC: 9.6 GM/DL (ref 14–18)
IMM GRANULOCYTES # BLD AUTO: 0.09 K/UL (ref 0–0.04)
IMM GRANULOCYTES NFR BLD AUTO: 0.8 % (ref 0–0.5)
LYMPHOCYTES # BLD AUTO: 1.47 K/UL (ref 1–4.8)
MCH RBC QN AUTO: 31.8 PG (ref 27–31)
MCHC RBC AUTO-ENTMCNC: 32.8 G/DL (ref 32–36)
MCV RBC AUTO: 97 FL (ref 82–98)
NUCLEATED RBC (/100WBC) (OHS): 0 /100 WBC
PLATELET # BLD AUTO: 216 K/UL (ref 150–450)
PMV BLD AUTO: 10 FL (ref 9.2–12.9)
POCT GLUCOSE: 127 MG/DL (ref 70–110)
POCT GLUCOSE: 170 MG/DL (ref 70–110)
POCT GLUCOSE: 323 MG/DL (ref 70–110)
POCT GLUCOSE: 81 MG/DL (ref 70–110)
POTASSIUM SERPL-SCNC: 4.2 MMOL/L (ref 3.5–5.1)
PROT SERPL-MCNC: 5.9 GM/DL (ref 6–8.4)
RBC # BLD AUTO: 3.02 M/UL (ref 4.6–6.2)
RELATIVE EOSINOPHIL (OHS): 3.8 %
RELATIVE LYMPHOCYTE (OHS): 13 % (ref 18–48)
RELATIVE MONOCYTE (OHS): 11.2 % (ref 4–15)
RELATIVE NEUTROPHIL (OHS): 70.7 % (ref 38–73)
SODIUM SERPL-SCNC: 134 MMOL/L (ref 136–145)
WBC # BLD AUTO: 11.29 K/UL (ref 3.9–12.7)

## 2025-07-31 PROCEDURE — 99233 SBSQ HOSP IP/OBS HIGH 50: CPT | Mod: FS,,, | Performed by: INTERNAL MEDICINE

## 2025-07-31 PROCEDURE — 63600175 PHARM REV CODE 636 W HCPCS: Performed by: INTERNAL MEDICINE

## 2025-07-31 PROCEDURE — 27000923 HC TRIALYSIS CATHETER, ANY SIZE

## 2025-07-31 PROCEDURE — 94761 N-INVAS EAR/PLS OXIMETRY MLT: CPT

## 2025-07-31 PROCEDURE — 25000003 PHARM REV CODE 250: Performed by: INTERNAL MEDICINE

## 2025-07-31 PROCEDURE — 11000001 HC ACUTE MED/SURG PRIVATE ROOM

## 2025-07-31 PROCEDURE — 25000003 PHARM REV CODE 250: Performed by: HOSPITALIST

## 2025-07-31 PROCEDURE — 02HV33Z INSERTION OF INFUSION DEVICE INTO SUPERIOR VENA CAVA, PERCUTANEOUS APPROACH: ICD-10-PCS | Performed by: INTERNAL MEDICINE

## 2025-07-31 PROCEDURE — 99233 SBSQ HOSP IP/OBS HIGH 50: CPT | Mod: 25,,, | Performed by: NURSE PRACTITIONER

## 2025-07-31 PROCEDURE — 36415 COLL VENOUS BLD VENIPUNCTURE: CPT | Performed by: STUDENT IN AN ORGANIZED HEALTH CARE EDUCATION/TRAINING PROGRAM

## 2025-07-31 PROCEDURE — 82040 ASSAY OF SERUM ALBUMIN: CPT | Performed by: STUDENT IN AN ORGANIZED HEALTH CARE EDUCATION/TRAINING PROGRAM

## 2025-07-31 PROCEDURE — 36556 INSERT NON-TUNNEL CV CATH: CPT

## 2025-07-31 PROCEDURE — 27000207 HC ISOLATION

## 2025-07-31 PROCEDURE — 0W9B30Z DRAINAGE OF LEFT PLEURAL CAVITY WITH DRAINAGE DEVICE, PERCUTANEOUS APPROACH: ICD-10-PCS | Performed by: INTERNAL MEDICINE

## 2025-07-31 PROCEDURE — 85025 COMPLETE CBC W/AUTO DIFF WBC: CPT | Performed by: STUDENT IN AN ORGANIZED HEALTH CARE EDUCATION/TRAINING PROGRAM

## 2025-07-31 RX ORDER — HEPARIN SODIUM 1000 [USP'U]/ML
1000 INJECTION, SOLUTION INTRAVENOUS; SUBCUTANEOUS
Status: DISCONTINUED | OUTPATIENT
Start: 2025-07-31 | End: 2025-08-08 | Stop reason: HOSPADM

## 2025-07-31 RX ORDER — MIDAZOLAM HYDROCHLORIDE 1 MG/ML
INJECTION, SOLUTION INTRAMUSCULAR; INTRAVENOUS
Status: COMPLETED | OUTPATIENT
Start: 2025-07-31 | End: 2025-07-31

## 2025-07-31 RX ORDER — FENTANYL CITRATE 50 UG/ML
INJECTION, SOLUTION INTRAMUSCULAR; INTRAVENOUS
Status: COMPLETED | OUTPATIENT
Start: 2025-07-31 | End: 2025-07-31

## 2025-07-31 RX ORDER — LIDOCAINE HYDROCHLORIDE 10 MG/ML
INJECTION, SOLUTION INFILTRATION; PERINEURAL
Status: COMPLETED | OUTPATIENT
Start: 2025-07-31 | End: 2025-07-31

## 2025-07-31 RX ADMIN — HEPARIN SODIUM 1000 UNITS: 1000 INJECTION INTRAVENOUS; SUBCUTANEOUS at 06:07

## 2025-07-31 RX ADMIN — TAMSULOSIN HYDROCHLORIDE 0.4 MG: 0.4 CAPSULE ORAL at 08:07

## 2025-07-31 RX ADMIN — ESCITALOPRAM OXALATE 10 MG: 10 TABLET ORAL at 08:07

## 2025-07-31 RX ADMIN — METRONIDAZOLE 500 MG: 500 INJECTION, SOLUTION INTRAVENOUS at 06:07

## 2025-07-31 RX ADMIN — OXYCODONE HYDROCHLORIDE 5 MG: 5 TABLET ORAL at 06:07

## 2025-07-31 RX ADMIN — FENTANYL CITRATE 50 MCG: 50 INJECTION INTRAMUSCULAR; INTRAVENOUS at 02:07

## 2025-07-31 RX ADMIN — VELPATASVIR AND SOFOSBUVIR 1 TABLET: 100; 400 TABLET, FILM COATED ORAL at 06:07

## 2025-07-31 RX ADMIN — CARVEDILOL 3.12 MG: 3.12 TABLET, FILM COATED ORAL at 08:07

## 2025-07-31 RX ADMIN — METRONIDAZOLE 500 MG: 500 INJECTION, SOLUTION INTRAVENOUS at 01:07

## 2025-07-31 RX ADMIN — PANTOPRAZOLE SODIUM 40 MG: 40 TABLET, DELAYED RELEASE ORAL at 08:07

## 2025-07-31 RX ADMIN — OXYCODONE HYDROCHLORIDE 5 MG: 5 TABLET ORAL at 02:07

## 2025-07-31 RX ADMIN — PROBIOTIC PRODUCT - TAB 1 TABLET: TAB at 08:07

## 2025-07-31 RX ADMIN — INSULIN ASPART 4 UNITS: 100 INJECTION, SOLUTION INTRAVENOUS; SUBCUTANEOUS at 09:07

## 2025-07-31 RX ADMIN — CARVEDILOL 3.12 MG: 3.12 TABLET, FILM COATED ORAL at 09:07

## 2025-07-31 RX ADMIN — CALCIUM ACETATE 1334 MG: 667 CAPSULE ORAL at 06:07

## 2025-07-31 RX ADMIN — SENNOSIDES AND DOCUSATE SODIUM 1 TABLET: 50; 8.6 TABLET ORAL at 08:07

## 2025-07-31 RX ADMIN — LIDOCAINE HYDROCHLORIDE 10 ML: 10 INJECTION, SOLUTION INFILTRATION; PERINEURAL at 02:07

## 2025-07-31 RX ADMIN — MIDAZOLAM 1 MG: 1 INJECTION INTRAMUSCULAR; INTRAVENOUS at 02:07

## 2025-07-31 RX ADMIN — EZETIMIBE 10 MG: 10 TABLET ORAL at 09:07

## 2025-07-31 RX ADMIN — METRONIDAZOLE 500 MG: 500 INJECTION, SOLUTION INTRAVENOUS at 08:07

## 2025-07-31 RX ADMIN — NIFEDIPINE 30 MG: 30 TABLET, FILM COATED, EXTENDED RELEASE ORAL at 08:07

## 2025-07-31 RX ADMIN — ATORVASTATIN CALCIUM 80 MG: 40 TABLET, FILM COATED ORAL at 09:07

## 2025-07-31 RX ADMIN — INSULIN GLARGINE 10 UNITS: 100 INJECTION, SOLUTION SUBCUTANEOUS at 09:07

## 2025-08-01 LAB
ABSOLUTE EOSINOPHIL (OHS): 0.35 K/UL
ABSOLUTE MONOCYTE (OHS): 1.16 K/UL (ref 0.3–1)
ABSOLUTE NEUTROPHIL COUNT (OHS): 6.62 K/UL (ref 1.8–7.7)
ALBUMIN SERPL BCP-MCNC: 2.1 G/DL (ref 3.5–5.2)
ALP SERPL-CCNC: 42 UNIT/L (ref 40–150)
ALT SERPL W/O P-5'-P-CCNC: 9 UNIT/L (ref 10–44)
ANION GAP (OHS): 10 MMOL/L (ref 8–16)
AST SERPL-CCNC: 21 UNIT/L (ref 11–45)
BACTERIA FLD AEROBE CULT: NO GROWTH
BACTERIA SPEC ANAEROBE CULT: NO GROWTH
BASOPHILS # BLD AUTO: 0.06 K/UL
BASOPHILS NFR BLD AUTO: 0.6 %
BILIRUB SERPL-MCNC: 0.2 MG/DL (ref 0.1–1)
BUN SERPL-MCNC: 71 MG/DL (ref 6–20)
CALCIUM SERPL-MCNC: 6.7 MG/DL (ref 8.7–10.5)
CHLORIDE SERPL-SCNC: 102 MMOL/L (ref 95–110)
CO2 SERPL-SCNC: 22 MMOL/L (ref 23–29)
CREAT SERPL-MCNC: 8.6 MG/DL (ref 0.5–1.4)
ERYTHROCYTE [DISTWIDTH] IN BLOOD BY AUTOMATED COUNT: 13.5 % (ref 11.5–14.5)
GFR SERPLBLD CREATININE-BSD FMLA CKD-EPI: 7 ML/MIN/1.73/M2
GLUCOSE SERPL-MCNC: 90 MG/DL (ref 70–110)
GRAM STN SPEC: NORMAL
GRAM STN SPEC: NORMAL
HCT VFR BLD AUTO: 28 % (ref 40–54)
HGB BLD-MCNC: 9.2 GM/DL (ref 14–18)
IMM GRANULOCYTES # BLD AUTO: 0.08 K/UL (ref 0–0.04)
IMM GRANULOCYTES NFR BLD AUTO: 0.8 % (ref 0–0.5)
LYMPHOCYTES # BLD AUTO: 1.72 K/UL (ref 1–4.8)
MCH RBC QN AUTO: 32.3 PG (ref 27–31)
MCHC RBC AUTO-ENTMCNC: 32.9 G/DL (ref 32–36)
MCV RBC AUTO: 98 FL (ref 82–98)
NUCLEATED RBC (/100WBC) (OHS): 0 /100 WBC
PHOSPHATE SERPL-MCNC: 5.4 MG/DL (ref 2.7–4.5)
PLATELET # BLD AUTO: 202 K/UL (ref 150–450)
PMV BLD AUTO: 10.2 FL (ref 9.2–12.9)
POCT GLUCOSE: 210 MG/DL (ref 70–110)
POCT GLUCOSE: 378 MG/DL (ref 70–110)
POCT GLUCOSE: 72 MG/DL (ref 70–110)
POTASSIUM SERPL-SCNC: 4.4 MMOL/L (ref 3.5–5.1)
PROT SERPL-MCNC: 5.5 GM/DL (ref 6–8.4)
RBC # BLD AUTO: 2.85 M/UL (ref 4.6–6.2)
RELATIVE EOSINOPHIL (OHS): 3.5 %
RELATIVE LYMPHOCYTE (OHS): 17.2 % (ref 18–48)
RELATIVE MONOCYTE (OHS): 11.6 % (ref 4–15)
RELATIVE NEUTROPHIL (OHS): 66.3 % (ref 38–73)
SODIUM SERPL-SCNC: 134 MMOL/L (ref 136–145)
VANCOMYCIN SERPL-MCNC: 17.9 UG/ML (ref ?–80)
WBC # BLD AUTO: 9.99 K/UL (ref 3.9–12.7)

## 2025-08-01 PROCEDURE — 63600175 PHARM REV CODE 636 W HCPCS: Performed by: STUDENT IN AN ORGANIZED HEALTH CARE EDUCATION/TRAINING PROGRAM

## 2025-08-01 PROCEDURE — 25000003 PHARM REV CODE 250: Performed by: HOSPITALIST

## 2025-08-01 PROCEDURE — 25000003 PHARM REV CODE 250: Performed by: INTERNAL MEDICINE

## 2025-08-01 PROCEDURE — 99024 POSTOP FOLLOW-UP VISIT: CPT | Mod: ,,,

## 2025-08-01 PROCEDURE — 25000003 PHARM REV CODE 250: Performed by: STUDENT IN AN ORGANIZED HEALTH CARE EDUCATION/TRAINING PROGRAM

## 2025-08-01 PROCEDURE — 82040 ASSAY OF SERUM ALBUMIN: CPT | Performed by: STUDENT IN AN ORGANIZED HEALTH CARE EDUCATION/TRAINING PROGRAM

## 2025-08-01 PROCEDURE — 80202 ASSAY OF VANCOMYCIN: CPT | Performed by: HOSPITALIST

## 2025-08-01 PROCEDURE — 27000207 HC ISOLATION

## 2025-08-01 PROCEDURE — 63600175 PHARM REV CODE 636 W HCPCS: Performed by: INTERNAL MEDICINE

## 2025-08-01 PROCEDURE — 84100 ASSAY OF PHOSPHORUS: CPT | Performed by: INTERNAL MEDICINE

## 2025-08-01 PROCEDURE — 99232 SBSQ HOSP IP/OBS MODERATE 35: CPT | Mod: ,,, | Performed by: INTERNAL MEDICINE

## 2025-08-01 PROCEDURE — 90935 HEMODIALYSIS ONE EVALUATION: CPT | Mod: ,,, | Performed by: INTERNAL MEDICINE

## 2025-08-01 PROCEDURE — 85025 COMPLETE CBC W/AUTO DIFF WBC: CPT | Performed by: STUDENT IN AN ORGANIZED HEALTH CARE EDUCATION/TRAINING PROGRAM

## 2025-08-01 PROCEDURE — 80100016 HC MAINTENANCE HEMODIALYSIS

## 2025-08-01 PROCEDURE — 36415 COLL VENOUS BLD VENIPUNCTURE: CPT | Performed by: STUDENT IN AN ORGANIZED HEALTH CARE EDUCATION/TRAINING PROGRAM

## 2025-08-01 PROCEDURE — 94761 N-INVAS EAR/PLS OXIMETRY MLT: CPT

## 2025-08-01 PROCEDURE — 11000001 HC ACUTE MED/SURG PRIVATE ROOM

## 2025-08-01 RX ORDER — MORPHINE SULFATE 4 MG/ML
1 INJECTION, SOLUTION INTRAMUSCULAR; INTRAVENOUS EVERY 4 HOURS PRN
Status: DISCONTINUED | OUTPATIENT
Start: 2025-08-01 | End: 2025-08-08 | Stop reason: HOSPADM

## 2025-08-01 RX ORDER — OXYCODONE HYDROCHLORIDE 5 MG/1
5 TABLET ORAL ONCE
Refills: 0 | Status: COMPLETED | OUTPATIENT
Start: 2025-08-01 | End: 2025-08-01

## 2025-08-01 RX ORDER — MORPHINE SULFATE 4 MG/ML
2 INJECTION, SOLUTION INTRAMUSCULAR; INTRAVENOUS EVERY 4 HOURS PRN
Status: DISCONTINUED | OUTPATIENT
Start: 2025-08-01 | End: 2025-08-08 | Stop reason: HOSPADM

## 2025-08-01 RX ADMIN — CARVEDILOL 3.12 MG: 3.12 TABLET, FILM COATED ORAL at 10:08

## 2025-08-01 RX ADMIN — TAMSULOSIN HYDROCHLORIDE 0.4 MG: 0.4 CAPSULE ORAL at 08:08

## 2025-08-01 RX ADMIN — CALCIUM ACETATE 1334 MG: 667 CAPSULE ORAL at 08:08

## 2025-08-01 RX ADMIN — INSULIN ASPART 2 UNITS: 100 INJECTION, SOLUTION INTRAVENOUS; SUBCUTANEOUS at 11:08

## 2025-08-01 RX ADMIN — OXYCODONE HYDROCHLORIDE 5 MG: 5 TABLET ORAL at 08:08

## 2025-08-01 RX ADMIN — ESCITALOPRAM OXALATE 10 MG: 10 TABLET ORAL at 08:08

## 2025-08-01 RX ADMIN — VANCOMYCIN HYDROCHLORIDE 500 MG: 500 INJECTION, POWDER, LYOPHILIZED, FOR SOLUTION INTRAVENOUS at 06:08

## 2025-08-01 RX ADMIN — INSULIN GLARGINE 10 UNITS: 100 INJECTION, SOLUTION SUBCUTANEOUS at 10:08

## 2025-08-01 RX ADMIN — PROBIOTIC PRODUCT - TAB 1 TABLET: TAB at 08:08

## 2025-08-01 RX ADMIN — METRONIDAZOLE 500 MG: 500 INJECTION, SOLUTION INTRAVENOUS at 04:08

## 2025-08-01 RX ADMIN — METRONIDAZOLE 500 MG: 500 INJECTION, SOLUTION INTRAVENOUS at 12:08

## 2025-08-01 RX ADMIN — EZETIMIBE 10 MG: 10 TABLET ORAL at 10:08

## 2025-08-01 RX ADMIN — METRONIDAZOLE 500 MG: 500 INJECTION, SOLUTION INTRAVENOUS at 08:08

## 2025-08-01 RX ADMIN — MORPHINE SULFATE 2 MG: 4 INJECTION, SOLUTION INTRAMUSCULAR; INTRAVENOUS at 10:08

## 2025-08-01 RX ADMIN — PANTOPRAZOLE SODIUM 40 MG: 40 TABLET, DELAYED RELEASE ORAL at 08:08

## 2025-08-01 RX ADMIN — MORPHINE SULFATE 2 MG: 4 INJECTION, SOLUTION INTRAMUSCULAR; INTRAVENOUS at 02:08

## 2025-08-01 RX ADMIN — ATORVASTATIN CALCIUM 80 MG: 40 TABLET, FILM COATED ORAL at 10:08

## 2025-08-01 RX ADMIN — VELPATASVIR AND SOFOSBUVIR 1 TABLET: 100; 400 TABLET, FILM COATED ORAL at 04:08

## 2025-08-01 RX ADMIN — CALCIUM ACETATE 1334 MG: 667 CAPSULE ORAL at 04:08

## 2025-08-02 LAB
ABSOLUTE EOSINOPHIL (OHS): 0.4 K/UL
ABSOLUTE MONOCYTE (OHS): 1.18 K/UL (ref 0.3–1)
ABSOLUTE NEUTROPHIL COUNT (OHS): 7.07 K/UL (ref 1.8–7.7)
ALBUMIN SERPL BCP-MCNC: 2 G/DL (ref 3.5–5.2)
ALP SERPL-CCNC: 41 UNIT/L (ref 40–150)
ALT SERPL W/O P-5'-P-CCNC: 12 UNIT/L (ref 10–44)
ANION GAP (OHS): 8 MMOL/L (ref 8–16)
AST SERPL-CCNC: 22 UNIT/L (ref 11–45)
BACTERIA BLD CULT: NORMAL
BASOPHILS # BLD AUTO: 0.05 K/UL
BASOPHILS NFR BLD AUTO: 0.5 %
BILIRUB SERPL-MCNC: 0.3 MG/DL (ref 0.1–1)
BUN SERPL-MCNC: 37 MG/DL (ref 6–20)
CALCIUM SERPL-MCNC: 6.6 MG/DL (ref 8.7–10.5)
CHLORIDE SERPL-SCNC: 102 MMOL/L (ref 95–110)
CO2 SERPL-SCNC: 25 MMOL/L (ref 23–29)
CREAT SERPL-MCNC: 5.2 MG/DL (ref 0.5–1.4)
ERYTHROCYTE [DISTWIDTH] IN BLOOD BY AUTOMATED COUNT: 13.6 % (ref 11.5–14.5)
GFR SERPLBLD CREATININE-BSD FMLA CKD-EPI: 12 ML/MIN/1.73/M2
GLUCOSE SERPL-MCNC: 71 MG/DL (ref 70–110)
HCT VFR BLD AUTO: 28 % (ref 40–54)
HGB BLD-MCNC: 9.3 GM/DL (ref 14–18)
IMM GRANULOCYTES # BLD AUTO: 0.17 K/UL (ref 0–0.04)
IMM GRANULOCYTES NFR BLD AUTO: 1.6 % (ref 0–0.5)
LYMPHOCYTES # BLD AUTO: 2.03 K/UL (ref 1–4.8)
MCH RBC QN AUTO: 32.6 PG (ref 27–31)
MCHC RBC AUTO-ENTMCNC: 33.2 G/DL (ref 32–36)
MCV RBC AUTO: 98 FL (ref 82–98)
NUCLEATED RBC (/100WBC) (OHS): 0 /100 WBC
PHOSPHATE SERPL-MCNC: 3.4 MG/DL (ref 2.7–4.5)
PLATELET # BLD AUTO: 214 K/UL (ref 150–450)
PMV BLD AUTO: 10 FL (ref 9.2–12.9)
POCT GLUCOSE: 111 MG/DL (ref 70–110)
POCT GLUCOSE: 183 MG/DL (ref 70–110)
POCT GLUCOSE: 216 MG/DL (ref 70–110)
POCT GLUCOSE: 66 MG/DL (ref 70–110)
POTASSIUM SERPL-SCNC: 4.2 MMOL/L (ref 3.5–5.1)
PROT SERPL-MCNC: 5.5 GM/DL (ref 6–8.4)
PTH-INTACT SERPL-MCNC: 354.2 PG/ML (ref 9–77)
RBC # BLD AUTO: 2.85 M/UL (ref 4.6–6.2)
RELATIVE EOSINOPHIL (OHS): 3.7 %
RELATIVE LYMPHOCYTE (OHS): 18.6 % (ref 18–48)
RELATIVE MONOCYTE (OHS): 10.8 % (ref 4–15)
RELATIVE NEUTROPHIL (OHS): 64.8 % (ref 38–73)
SODIUM SERPL-SCNC: 135 MMOL/L (ref 136–145)
WBC # BLD AUTO: 10.9 K/UL (ref 3.9–12.7)

## 2025-08-02 PROCEDURE — 25000003 PHARM REV CODE 250: Performed by: INTERNAL MEDICINE

## 2025-08-02 PROCEDURE — 11000001 HC ACUTE MED/SURG PRIVATE ROOM

## 2025-08-02 PROCEDURE — 63600175 PHARM REV CODE 636 W HCPCS: Performed by: INTERNAL MEDICINE

## 2025-08-02 PROCEDURE — 25000003 PHARM REV CODE 250: Performed by: HOSPITALIST

## 2025-08-02 PROCEDURE — 36415 COLL VENOUS BLD VENIPUNCTURE: CPT | Performed by: STUDENT IN AN ORGANIZED HEALTH CARE EDUCATION/TRAINING PROGRAM

## 2025-08-02 PROCEDURE — 85025 COMPLETE CBC W/AUTO DIFF WBC: CPT | Performed by: STUDENT IN AN ORGANIZED HEALTH CARE EDUCATION/TRAINING PROGRAM

## 2025-08-02 PROCEDURE — 25000242 PHARM REV CODE 250 ALT 637 W/ HCPCS: Performed by: INTERNAL MEDICINE

## 2025-08-02 PROCEDURE — 84100 ASSAY OF PHOSPHORUS: CPT | Performed by: INTERNAL MEDICINE

## 2025-08-02 PROCEDURE — 83970 ASSAY OF PARATHORMONE: CPT | Performed by: INTERNAL MEDICINE

## 2025-08-02 PROCEDURE — 27000207 HC ISOLATION

## 2025-08-02 PROCEDURE — 80053 COMPREHEN METABOLIC PANEL: CPT | Performed by: STUDENT IN AN ORGANIZED HEALTH CARE EDUCATION/TRAINING PROGRAM

## 2025-08-02 PROCEDURE — A4217 STERILE WATER/SALINE, 500 ML: HCPCS | Performed by: INTERNAL MEDICINE

## 2025-08-02 RX ORDER — INSULIN ASPART 100 [IU]/ML
0-10 INJECTION, SOLUTION INTRAVENOUS; SUBCUTANEOUS
Status: DISCONTINUED | OUTPATIENT
Start: 2025-08-02 | End: 2025-08-02

## 2025-08-02 RX ORDER — MORPHINE SULFATE 4 MG/ML
2 INJECTION, SOLUTION INTRAMUSCULAR; INTRAVENOUS ONCE
Status: COMPLETED | OUTPATIENT
Start: 2025-08-02 | End: 2025-08-02

## 2025-08-02 RX ORDER — INSULIN ASPART 100 [IU]/ML
0-10 INJECTION, SOLUTION INTRAVENOUS; SUBCUTANEOUS
Status: DISCONTINUED | OUTPATIENT
Start: 2025-08-02 | End: 2025-08-08 | Stop reason: HOSPADM

## 2025-08-02 RX ORDER — GUAIFENESIN AND DEXTROMETHORPHAN HYDROBROMIDE 10; 100 MG/5ML; MG/5ML
5 SYRUP ORAL EVERY 4 HOURS PRN
Status: DISCONTINUED | OUTPATIENT
Start: 2025-08-02 | End: 2025-08-08 | Stop reason: HOSPADM

## 2025-08-02 RX ADMIN — CARVEDILOL 3.12 MG: 3.12 TABLET, FILM COATED ORAL at 10:08

## 2025-08-02 RX ADMIN — MORPHINE SULFATE 2 MG: 4 INJECTION, SOLUTION INTRAMUSCULAR; INTRAVENOUS at 11:08

## 2025-08-02 RX ADMIN — METRONIDAZOLE 500 MG: 500 INJECTION, SOLUTION INTRAVENOUS at 01:08

## 2025-08-02 RX ADMIN — INSULIN ASPART 2 UNITS: 100 INJECTION, SOLUTION INTRAVENOUS; SUBCUTANEOUS at 11:08

## 2025-08-02 RX ADMIN — PROBIOTIC PRODUCT - TAB 1 TABLET: TAB at 08:08

## 2025-08-02 RX ADMIN — METRONIDAZOLE 500 MG: 500 INJECTION, SOLUTION INTRAVENOUS at 05:08

## 2025-08-02 RX ADMIN — ESCITALOPRAM OXALATE 10 MG: 10 TABLET ORAL at 08:08

## 2025-08-02 RX ADMIN — INSULIN GLARGINE 10 UNITS: 100 INJECTION, SOLUTION SUBCUTANEOUS at 10:08

## 2025-08-02 RX ADMIN — WATER 5 MG: 1000 INJECTION, SOLUTION INTRAVENOUS at 11:08

## 2025-08-02 RX ADMIN — VELPATASVIR AND SOFOSBUVIR 1 TABLET: 100; 400 TABLET, FILM COATED ORAL at 04:08

## 2025-08-02 RX ADMIN — CALCIUM ACETATE 1334 MG: 667 CAPSULE ORAL at 04:08

## 2025-08-02 RX ADMIN — TAMSULOSIN HYDROCHLORIDE 0.4 MG: 0.4 CAPSULE ORAL at 08:08

## 2025-08-02 RX ADMIN — CARVEDILOL 3.12 MG: 3.12 TABLET, FILM COATED ORAL at 08:08

## 2025-08-02 RX ADMIN — CALCIUM ACETATE 1334 MG: 667 CAPSULE ORAL at 08:08

## 2025-08-02 RX ADMIN — ALTEPLASE 10 MG: 2.2 INJECTION, POWDER, LYOPHILIZED, FOR SOLUTION INTRAVENOUS at 11:08

## 2025-08-02 RX ADMIN — EZETIMIBE 10 MG: 10 TABLET ORAL at 10:08

## 2025-08-02 RX ADMIN — PANTOPRAZOLE SODIUM 40 MG: 40 TABLET, DELAYED RELEASE ORAL at 08:08

## 2025-08-02 RX ADMIN — ATORVASTATIN CALCIUM 80 MG: 40 TABLET, FILM COATED ORAL at 10:08

## 2025-08-02 RX ADMIN — INSULIN ASPART 4 UNITS: 100 INJECTION, SOLUTION INTRAVENOUS; SUBCUTANEOUS at 05:08

## 2025-08-02 RX ADMIN — GUAIFENESIN AND DEXTROMETHORPHAN 5 ML: 100; 10 SYRUP ORAL at 10:08

## 2025-08-02 RX ADMIN — CALCIUM ACETATE 1334 MG: 667 CAPSULE ORAL at 11:08

## 2025-08-02 RX ADMIN — METRONIDAZOLE 500 MG: 500 INJECTION, SOLUTION INTRAVENOUS at 08:08

## 2025-08-02 RX ADMIN — NIFEDIPINE 30 MG: 30 TABLET, FILM COATED, EXTENDED RELEASE ORAL at 08:08

## 2025-08-03 LAB
ABSOLUTE EOSINOPHIL (OHS): 0.3 K/UL
ABSOLUTE MONOCYTE (OHS): 1.39 K/UL (ref 0.3–1)
ABSOLUTE NEUTROPHIL COUNT (OHS): 15 K/UL (ref 1.8–7.7)
ALBUMIN SERPL BCP-MCNC: 2 G/DL (ref 3.5–5.2)
ALP SERPL-CCNC: 43 UNIT/L (ref 40–150)
ALT SERPL W/O P-5'-P-CCNC: 13 UNIT/L (ref 10–44)
ANION GAP (OHS): 11 MMOL/L (ref 8–16)
AST SERPL-CCNC: 26 UNIT/L (ref 11–45)
BASOPHILS # BLD AUTO: 0.06 K/UL
BASOPHILS NFR BLD AUTO: 0.3 %
BILIRUB SERPL-MCNC: 0.5 MG/DL (ref 0.1–1)
BUN SERPL-MCNC: 50 MG/DL (ref 6–20)
CALCIUM SERPL-MCNC: 6.7 MG/DL (ref 8.7–10.5)
CHLORIDE SERPL-SCNC: 101 MMOL/L (ref 95–110)
CO2 SERPL-SCNC: 23 MMOL/L (ref 23–29)
CREAT SERPL-MCNC: 6 MG/DL (ref 0.5–1.4)
ERYTHROCYTE [DISTWIDTH] IN BLOOD BY AUTOMATED COUNT: 13.7 % (ref 11.5–14.5)
GFR SERPLBLD CREATININE-BSD FMLA CKD-EPI: 10 ML/MIN/1.73/M2
GLUCOSE SERPL-MCNC: 100 MG/DL (ref 70–110)
HCT VFR BLD AUTO: 31.6 % (ref 40–54)
HGB BLD-MCNC: 10.4 GM/DL (ref 14–18)
IMM GRANULOCYTES # BLD AUTO: 0.2 K/UL (ref 0–0.04)
IMM GRANULOCYTES NFR BLD AUTO: 1.1 % (ref 0–0.5)
LYMPHOCYTES # BLD AUTO: 1.8 K/UL (ref 1–4.8)
MCH RBC QN AUTO: 32.2 PG (ref 27–31)
MCHC RBC AUTO-ENTMCNC: 32.9 G/DL (ref 32–36)
MCV RBC AUTO: 98 FL (ref 82–98)
NUCLEATED RBC (/100WBC) (OHS): 0 /100 WBC
PLATELET # BLD AUTO: 254 K/UL (ref 150–450)
PMV BLD AUTO: 9.8 FL (ref 9.2–12.9)
POCT GLUCOSE: 110 MG/DL (ref 70–110)
POCT GLUCOSE: 157 MG/DL (ref 70–110)
POCT GLUCOSE: 192 MG/DL (ref 70–110)
POCT GLUCOSE: 206 MG/DL (ref 70–110)
POCT GLUCOSE: 245 MG/DL (ref 70–110)
POTASSIUM SERPL-SCNC: 4.5 MMOL/L (ref 3.5–5.1)
PROT SERPL-MCNC: 5.4 GM/DL (ref 6–8.4)
RBC # BLD AUTO: 3.23 M/UL (ref 4.6–6.2)
RELATIVE EOSINOPHIL (OHS): 1.6 %
RELATIVE LYMPHOCYTE (OHS): 9.6 % (ref 18–48)
RELATIVE MONOCYTE (OHS): 7.4 % (ref 4–15)
RELATIVE NEUTROPHIL (OHS): 80 % (ref 38–73)
SODIUM SERPL-SCNC: 135 MMOL/L (ref 136–145)
WBC # BLD AUTO: 18.75 K/UL (ref 3.9–12.7)

## 2025-08-03 PROCEDURE — 25000003 PHARM REV CODE 250: Performed by: INTERNAL MEDICINE

## 2025-08-03 PROCEDURE — A4217 STERILE WATER/SALINE, 500 ML: HCPCS | Performed by: INTERNAL MEDICINE

## 2025-08-03 PROCEDURE — 84075 ASSAY ALKALINE PHOSPHATASE: CPT | Performed by: STUDENT IN AN ORGANIZED HEALTH CARE EDUCATION/TRAINING PROGRAM

## 2025-08-03 PROCEDURE — 85025 COMPLETE CBC W/AUTO DIFF WBC: CPT | Performed by: STUDENT IN AN ORGANIZED HEALTH CARE EDUCATION/TRAINING PROGRAM

## 2025-08-03 PROCEDURE — 25000003 PHARM REV CODE 250: Performed by: HOSPITALIST

## 2025-08-03 PROCEDURE — 63600175 PHARM REV CODE 636 W HCPCS: Performed by: STUDENT IN AN ORGANIZED HEALTH CARE EDUCATION/TRAINING PROGRAM

## 2025-08-03 PROCEDURE — 3E0L317 INTRODUCTION OF OTHER THROMBOLYTIC INTO PLEURAL CAVITY, PERCUTANEOUS APPROACH: ICD-10-PCS | Performed by: HOSPITALIST

## 2025-08-03 PROCEDURE — 27000207 HC ISOLATION

## 2025-08-03 PROCEDURE — 63600175 PHARM REV CODE 636 W HCPCS: Performed by: INTERNAL MEDICINE

## 2025-08-03 PROCEDURE — 36415 COLL VENOUS BLD VENIPUNCTURE: CPT | Performed by: STUDENT IN AN ORGANIZED HEALTH CARE EDUCATION/TRAINING PROGRAM

## 2025-08-03 PROCEDURE — 87040 BLOOD CULTURE FOR BACTERIA: CPT | Performed by: STUDENT IN AN ORGANIZED HEALTH CARE EDUCATION/TRAINING PROGRAM

## 2025-08-03 PROCEDURE — 25000242 PHARM REV CODE 250 ALT 637 W/ HCPCS: Performed by: INTERNAL MEDICINE

## 2025-08-03 PROCEDURE — 63600175 PHARM REV CODE 636 W HCPCS: Mod: JZ,TB | Performed by: INTERNAL MEDICINE

## 2025-08-03 PROCEDURE — 11000001 HC ACUTE MED/SURG PRIVATE ROOM

## 2025-08-03 RX ADMIN — INSULIN GLARGINE 10 UNITS: 100 INJECTION, SOLUTION SUBCUTANEOUS at 09:08

## 2025-08-03 RX ADMIN — MORPHINE SULFATE 2 MG: 4 INJECTION, SOLUTION INTRAMUSCULAR; INTRAVENOUS at 06:08

## 2025-08-03 RX ADMIN — METRONIDAZOLE 500 MG: 500 INJECTION, SOLUTION INTRAVENOUS at 09:08

## 2025-08-03 RX ADMIN — TAMSULOSIN HYDROCHLORIDE 0.4 MG: 0.4 CAPSULE ORAL at 09:08

## 2025-08-03 RX ADMIN — METRONIDAZOLE 500 MG: 500 INJECTION, SOLUTION INTRAVENOUS at 12:08

## 2025-08-03 RX ADMIN — INSULIN ASPART 2 UNITS: 100 INJECTION, SOLUTION INTRAVENOUS; SUBCUTANEOUS at 09:08

## 2025-08-03 RX ADMIN — VELPATASVIR AND SOFOSBUVIR 1 TABLET: 100; 400 TABLET, FILM COATED ORAL at 04:08

## 2025-08-03 RX ADMIN — INSULIN ASPART 2 UNITS: 100 INJECTION, SOLUTION INTRAVENOUS; SUBCUTANEOUS at 04:08

## 2025-08-03 RX ADMIN — CALCIUM ACETATE 1334 MG: 667 CAPSULE ORAL at 12:08

## 2025-08-03 RX ADMIN — CARVEDILOL 3.12 MG: 3.12 TABLET, FILM COATED ORAL at 09:08

## 2025-08-03 RX ADMIN — METRONIDAZOLE 500 MG: 500 INJECTION, SOLUTION INTRAVENOUS at 04:08

## 2025-08-03 RX ADMIN — MORPHINE SULFATE 2 MG: 4 INJECTION, SOLUTION INTRAMUSCULAR; INTRAVENOUS at 10:08

## 2025-08-03 RX ADMIN — ALTEPLASE 10 MG: 2.2 INJECTION, POWDER, LYOPHILIZED, FOR SOLUTION INTRAVENOUS at 10:08

## 2025-08-03 RX ADMIN — PANTOPRAZOLE SODIUM 40 MG: 40 TABLET, DELAYED RELEASE ORAL at 09:08

## 2025-08-03 RX ADMIN — DORNASE ALFA 5 MG: 1 SOLUTION RESPIRATORY (INHALATION) at 10:08

## 2025-08-03 RX ADMIN — ESCITALOPRAM OXALATE 10 MG: 10 TABLET ORAL at 09:08

## 2025-08-03 RX ADMIN — CALCIUM ACETATE 1334 MG: 667 CAPSULE ORAL at 08:08

## 2025-08-03 RX ADMIN — PROBIOTIC PRODUCT - TAB 1 TABLET: TAB at 09:08

## 2025-08-03 RX ADMIN — ATORVASTATIN CALCIUM 80 MG: 40 TABLET, FILM COATED ORAL at 09:08

## 2025-08-03 RX ADMIN — CALCIUM ACETATE 1334 MG: 667 CAPSULE ORAL at 04:08

## 2025-08-03 RX ADMIN — EZETIMIBE 10 MG: 10 TABLET ORAL at 09:08

## 2025-08-03 RX ADMIN — NIFEDIPINE 30 MG: 30 TABLET, FILM COATED, EXTENDED RELEASE ORAL at 09:08

## 2025-08-04 LAB
ABSOLUTE EOSINOPHIL (OHS): 0.7 K/UL
ABSOLUTE MONOCYTE (OHS): 1.15 K/UL (ref 0.3–1)
ABSOLUTE NEUTROPHIL COUNT (OHS): 8.69 K/UL (ref 1.8–7.7)
ALBUMIN SERPL BCP-MCNC: 1.8 G/DL (ref 3.5–5.2)
ALP SERPL-CCNC: 42 UNIT/L (ref 40–150)
ALT SERPL W/O P-5'-P-CCNC: 12 UNIT/L (ref 10–44)
ANION GAP (OHS): 7 MMOL/L (ref 8–16)
AST SERPL-CCNC: 45 UNIT/L (ref 11–45)
BACTERIA BLD CULT: NORMAL
BACTERIA BLD CULT: NORMAL
BASOPHILS # BLD AUTO: 0.06 K/UL
BASOPHILS NFR BLD AUTO: 0.5 %
BILIRUB SERPL-MCNC: 0.3 MG/DL (ref 0.1–1)
BUN SERPL-MCNC: 57 MG/DL (ref 6–20)
CALCIUM SERPL-MCNC: 7 MG/DL (ref 8.7–10.5)
CHLORIDE SERPL-SCNC: 102 MMOL/L (ref 95–110)
CO2 SERPL-SCNC: 24 MMOL/L (ref 23–29)
CREAT SERPL-MCNC: 6.3 MG/DL (ref 0.5–1.4)
ERYTHROCYTE [DISTWIDTH] IN BLOOD BY AUTOMATED COUNT: 13.7 % (ref 11.5–14.5)
GFR SERPLBLD CREATININE-BSD FMLA CKD-EPI: 10 ML/MIN/1.73/M2
GLUCOSE SERPL-MCNC: 123 MG/DL (ref 70–110)
HCT VFR BLD AUTO: 29.3 % (ref 40–54)
HGB BLD-MCNC: 9.7 GM/DL (ref 14–18)
IMM GRANULOCYTES # BLD AUTO: 0.31 K/UL (ref 0–0.04)
IMM GRANULOCYTES NFR BLD AUTO: 2.3 % (ref 0–0.5)
LYMPHOCYTES # BLD AUTO: 2.32 K/UL (ref 1–4.8)
MCH RBC QN AUTO: 32.7 PG (ref 27–31)
MCHC RBC AUTO-ENTMCNC: 33.1 G/DL (ref 32–36)
MCV RBC AUTO: 99 FL (ref 82–98)
NUCLEATED RBC (/100WBC) (OHS): 0 /100 WBC
PLATELET # BLD AUTO: 240 K/UL (ref 150–450)
PMV BLD AUTO: 9.6 FL (ref 9.2–12.9)
POCT GLUCOSE: 132 MG/DL (ref 70–110)
POCT GLUCOSE: 206 MG/DL (ref 70–110)
POCT GLUCOSE: 270 MG/DL (ref 70–110)
POTASSIUM SERPL-SCNC: 4.7 MMOL/L (ref 3.5–5.1)
PROT SERPL-MCNC: 5.1 GM/DL (ref 6–8.4)
RBC # BLD AUTO: 2.97 M/UL (ref 4.6–6.2)
RELATIVE EOSINOPHIL (OHS): 5.3 %
RELATIVE LYMPHOCYTE (OHS): 17.5 % (ref 18–48)
RELATIVE MONOCYTE (OHS): 8.7 % (ref 4–15)
RELATIVE NEUTROPHIL (OHS): 65.7 % (ref 38–73)
SODIUM SERPL-SCNC: 133 MMOL/L (ref 136–145)
VANCOMYCIN SERPL-MCNC: 13.4 UG/ML (ref ?–80)
WBC # BLD AUTO: 13.23 K/UL (ref 3.9–12.7)

## 2025-08-04 PROCEDURE — 80202 ASSAY OF VANCOMYCIN: CPT | Performed by: HOSPITALIST

## 2025-08-04 PROCEDURE — 25000003 PHARM REV CODE 250: Performed by: INTERNAL MEDICINE

## 2025-08-04 PROCEDURE — 63600175 PHARM REV CODE 636 W HCPCS: Mod: JZ,TB | Performed by: INTERNAL MEDICINE

## 2025-08-04 PROCEDURE — 99232 SBSQ HOSP IP/OBS MODERATE 35: CPT | Mod: ,,, | Performed by: INTERNAL MEDICINE

## 2025-08-04 PROCEDURE — 63600175 PHARM REV CODE 636 W HCPCS: Performed by: STUDENT IN AN ORGANIZED HEALTH CARE EDUCATION/TRAINING PROGRAM

## 2025-08-04 PROCEDURE — 80100014 HC HEMODIALYSIS 1:1

## 2025-08-04 PROCEDURE — 11000001 HC ACUTE MED/SURG PRIVATE ROOM

## 2025-08-04 PROCEDURE — 99233 SBSQ HOSP IP/OBS HIGH 50: CPT | Mod: ,,, | Performed by: INTERNAL MEDICINE

## 2025-08-04 PROCEDURE — 36415 COLL VENOUS BLD VENIPUNCTURE: CPT | Performed by: STUDENT IN AN ORGANIZED HEALTH CARE EDUCATION/TRAINING PROGRAM

## 2025-08-04 PROCEDURE — 94761 N-INVAS EAR/PLS OXIMETRY MLT: CPT

## 2025-08-04 PROCEDURE — 80053 COMPREHEN METABOLIC PANEL: CPT | Performed by: STUDENT IN AN ORGANIZED HEALTH CARE EDUCATION/TRAINING PROGRAM

## 2025-08-04 PROCEDURE — 25000003 PHARM REV CODE 250: Performed by: STUDENT IN AN ORGANIZED HEALTH CARE EDUCATION/TRAINING PROGRAM

## 2025-08-04 PROCEDURE — 85025 COMPLETE CBC W/AUTO DIFF WBC: CPT | Performed by: STUDENT IN AN ORGANIZED HEALTH CARE EDUCATION/TRAINING PROGRAM

## 2025-08-04 PROCEDURE — 25000003 PHARM REV CODE 250: Performed by: HOSPITALIST

## 2025-08-04 PROCEDURE — 27000207 HC ISOLATION

## 2025-08-04 PROCEDURE — 63600175 PHARM REV CODE 636 W HCPCS: Performed by: INTERNAL MEDICINE

## 2025-08-04 RX ADMIN — CALCIUM ACETATE 1334 MG: 667 CAPSULE ORAL at 08:08

## 2025-08-04 RX ADMIN — ESCITALOPRAM OXALATE 10 MG: 10 TABLET ORAL at 08:08

## 2025-08-04 RX ADMIN — VELPATASVIR AND SOFOSBUVIR 1 TABLET: 100; 400 TABLET, FILM COATED ORAL at 04:08

## 2025-08-04 RX ADMIN — EPOETIN ALFA-EPBX 10000 UNITS: 10000 INJECTION, SOLUTION INTRAVENOUS; SUBCUTANEOUS at 08:08

## 2025-08-04 RX ADMIN — CARVEDILOL 3.12 MG: 3.12 TABLET, FILM COATED ORAL at 08:08

## 2025-08-04 RX ADMIN — TAMSULOSIN HYDROCHLORIDE 0.4 MG: 0.4 CAPSULE ORAL at 08:08

## 2025-08-04 RX ADMIN — VANCOMYCIN HYDROCHLORIDE 750 MG: 750 INJECTION, POWDER, LYOPHILIZED, FOR SOLUTION INTRAVENOUS at 04:08

## 2025-08-04 RX ADMIN — NIFEDIPINE 30 MG: 30 TABLET, FILM COATED, EXTENDED RELEASE ORAL at 08:08

## 2025-08-04 RX ADMIN — PANTOPRAZOLE SODIUM 40 MG: 40 TABLET, DELAYED RELEASE ORAL at 08:08

## 2025-08-04 RX ADMIN — METRONIDAZOLE 500 MG: 500 INJECTION, SOLUTION INTRAVENOUS at 12:08

## 2025-08-04 RX ADMIN — PROBIOTIC PRODUCT - TAB 1 TABLET: TAB at 08:08

## 2025-08-04 RX ADMIN — ATORVASTATIN CALCIUM 80 MG: 40 TABLET, FILM COATED ORAL at 09:08

## 2025-08-04 RX ADMIN — METRONIDAZOLE 500 MG: 500 INJECTION, SOLUTION INTRAVENOUS at 04:08

## 2025-08-04 RX ADMIN — INSULIN GLARGINE 10 UNITS: 100 INJECTION, SOLUTION SUBCUTANEOUS at 09:08

## 2025-08-04 RX ADMIN — METRONIDAZOLE 500 MG: 500 INJECTION, SOLUTION INTRAVENOUS at 08:08

## 2025-08-04 RX ADMIN — CALCIUM ACETATE 1334 MG: 667 CAPSULE ORAL at 04:08

## 2025-08-04 RX ADMIN — MORPHINE SULFATE 2 MG: 4 INJECTION, SOLUTION INTRAMUSCULAR; INTRAVENOUS at 09:08

## 2025-08-04 RX ADMIN — CARVEDILOL 3.12 MG: 3.12 TABLET, FILM COATED ORAL at 09:08

## 2025-08-04 RX ADMIN — EZETIMIBE 10 MG: 10 TABLET ORAL at 09:08

## 2025-08-04 RX ADMIN — SENNOSIDES AND DOCUSATE SODIUM 1 TABLET: 50; 8.6 TABLET ORAL at 09:08

## 2025-08-05 DIAGNOSIS — J90 LOCULATED PLEURAL EFFUSION: Primary | ICD-10-CM

## 2025-08-05 LAB
ABSOLUTE NEUTROPHIL MANUAL (OHS): 7.6 K/UL (ref 1.8–7.7)
ALBUMIN SERPL BCP-MCNC: 1.8 G/DL (ref 3.5–5.2)
ALP SERPL-CCNC: 40 UNIT/L (ref 40–150)
ALT SERPL W/O P-5'-P-CCNC: 12 UNIT/L (ref 10–44)
ANION GAP (OHS): 8 MMOL/L (ref 8–16)
AST SERPL-CCNC: 19 UNIT/L (ref 11–45)
BILIRUB SERPL-MCNC: 0.2 MG/DL (ref 0.1–1)
BUN SERPL-MCNC: 46 MG/DL (ref 6–20)
CALCIUM SERPL-MCNC: 7.5 MG/DL (ref 8.7–10.5)
CHLORIDE SERPL-SCNC: 104 MMOL/L (ref 95–110)
CO2 SERPL-SCNC: 22 MMOL/L (ref 23–29)
CREAT SERPL-MCNC: 5.4 MG/DL (ref 0.5–1.4)
EOSINOPHIL NFR BLD MANUAL: 6 % (ref 0–8)
ERYTHROCYTE [DISTWIDTH] IN BLOOD BY AUTOMATED COUNT: 13.9 % (ref 11.5–14.5)
GFR SERPLBLD CREATININE-BSD FMLA CKD-EPI: 12 ML/MIN/1.73/M2
GLUCOSE SERPL-MCNC: 149 MG/DL (ref 70–110)
HCT VFR BLD AUTO: 31.1 % (ref 40–54)
HGB BLD-MCNC: 10.4 GM/DL (ref 14–18)
LYMPHOCYTES NFR BLD MANUAL: 25 % (ref 18–48)
MCH RBC QN AUTO: 33.1 PG (ref 27–31)
MCHC RBC AUTO-ENTMCNC: 33.4 G/DL (ref 32–36)
MCV RBC AUTO: 99 FL (ref 82–98)
METAMYELOCYTES NFR BLD MANUAL: 1 %
MONOCYTES NFR BLD MANUAL: 9 % (ref 4–15)
NEUTROPHILS NFR BLD MANUAL: 53 % (ref 38–73)
NEUTS BAND NFR BLD MANUAL: 6 %
NUCLEATED RBC (/100WBC) (OHS): 0 /100 WBC
PHOSPHATE SERPL-MCNC: 3.7 MG/DL (ref 2.7–4.5)
PLATELET # BLD AUTO: 272 K/UL (ref 150–450)
PMV BLD AUTO: 9.7 FL (ref 9.2–12.9)
POCT GLUCOSE: 147 MG/DL (ref 70–110)
POCT GLUCOSE: 234 MG/DL (ref 70–110)
POCT GLUCOSE: 263 MG/DL (ref 70–110)
POCT GLUCOSE: 283 MG/DL (ref 70–110)
POTASSIUM SERPL-SCNC: 4.8 MMOL/L (ref 3.5–5.1)
PROT SERPL-MCNC: 5.2 GM/DL (ref 6–8.4)
RBC # BLD AUTO: 3.14 M/UL (ref 4.6–6.2)
SODIUM SERPL-SCNC: 134 MMOL/L (ref 136–145)
WBC # BLD AUTO: 12.95 K/UL (ref 3.9–12.7)

## 2025-08-05 PROCEDURE — 25000003 PHARM REV CODE 250: Performed by: HOSPITALIST

## 2025-08-05 PROCEDURE — 27000207 HC ISOLATION

## 2025-08-05 PROCEDURE — 84100 ASSAY OF PHOSPHORUS: CPT | Performed by: PHYSICIAN ASSISTANT

## 2025-08-05 PROCEDURE — 63600175 PHARM REV CODE 636 W HCPCS: Performed by: INTERNAL MEDICINE

## 2025-08-05 PROCEDURE — 36415 COLL VENOUS BLD VENIPUNCTURE: CPT | Performed by: STUDENT IN AN ORGANIZED HEALTH CARE EDUCATION/TRAINING PROGRAM

## 2025-08-05 PROCEDURE — 11000001 HC ACUTE MED/SURG PRIVATE ROOM

## 2025-08-05 PROCEDURE — 25000003 PHARM REV CODE 250: Performed by: INTERNAL MEDICINE

## 2025-08-05 PROCEDURE — 80053 COMPREHEN METABOLIC PANEL: CPT | Performed by: STUDENT IN AN ORGANIZED HEALTH CARE EDUCATION/TRAINING PROGRAM

## 2025-08-05 PROCEDURE — 85025 COMPLETE CBC W/AUTO DIFF WBC: CPT | Performed by: STUDENT IN AN ORGANIZED HEALTH CARE EDUCATION/TRAINING PROGRAM

## 2025-08-05 PROCEDURE — 99233 SBSQ HOSP IP/OBS HIGH 50: CPT | Mod: ,,, | Performed by: NURSE PRACTITIONER

## 2025-08-05 PROCEDURE — 99232 SBSQ HOSP IP/OBS MODERATE 35: CPT | Mod: ,,, | Performed by: INTERNAL MEDICINE

## 2025-08-05 PROCEDURE — 63600175 PHARM REV CODE 636 W HCPCS: Performed by: STUDENT IN AN ORGANIZED HEALTH CARE EDUCATION/TRAINING PROGRAM

## 2025-08-05 PROCEDURE — 99233 SBSQ HOSP IP/OBS HIGH 50: CPT | Mod: ,,, | Performed by: INTERNAL MEDICINE

## 2025-08-05 RX ADMIN — ATORVASTATIN CALCIUM 80 MG: 40 TABLET, FILM COATED ORAL at 09:08

## 2025-08-05 RX ADMIN — NIFEDIPINE 30 MG: 30 TABLET, FILM COATED, EXTENDED RELEASE ORAL at 08:08

## 2025-08-05 RX ADMIN — TAMSULOSIN HYDROCHLORIDE 0.4 MG: 0.4 CAPSULE ORAL at 08:08

## 2025-08-05 RX ADMIN — MORPHINE SULFATE 2 MG: 4 INJECTION, SOLUTION INTRAMUSCULAR; INTRAVENOUS at 04:08

## 2025-08-05 RX ADMIN — METRONIDAZOLE 500 MG: 500 INJECTION, SOLUTION INTRAVENOUS at 02:08

## 2025-08-05 RX ADMIN — CALCIUM ACETATE 1334 MG: 667 CAPSULE ORAL at 11:08

## 2025-08-05 RX ADMIN — PROBIOTIC PRODUCT - TAB 1 TABLET: TAB at 08:08

## 2025-08-05 RX ADMIN — MORPHINE SULFATE 2 MG: 4 INJECTION, SOLUTION INTRAMUSCULAR; INTRAVENOUS at 08:08

## 2025-08-05 RX ADMIN — CARVEDILOL 3.12 MG: 3.12 TABLET, FILM COATED ORAL at 09:08

## 2025-08-05 RX ADMIN — VELPATASVIR AND SOFOSBUVIR 1 TABLET: 100; 400 TABLET, FILM COATED ORAL at 04:08

## 2025-08-05 RX ADMIN — CALCIUM ACETATE 1334 MG: 667 CAPSULE ORAL at 04:08

## 2025-08-05 RX ADMIN — INSULIN ASPART 6 UNITS: 100 INJECTION, SOLUTION INTRAVENOUS; SUBCUTANEOUS at 05:08

## 2025-08-05 RX ADMIN — PANTOPRAZOLE SODIUM 40 MG: 40 TABLET, DELAYED RELEASE ORAL at 08:08

## 2025-08-05 RX ADMIN — CARVEDILOL 3.12 MG: 3.12 TABLET, FILM COATED ORAL at 08:08

## 2025-08-05 RX ADMIN — CALCIUM ACETATE 1334 MG: 667 CAPSULE ORAL at 08:08

## 2025-08-05 RX ADMIN — ESCITALOPRAM OXALATE 10 MG: 10 TABLET ORAL at 09:08

## 2025-08-05 RX ADMIN — METRONIDAZOLE 500 MG: 500 INJECTION, SOLUTION INTRAVENOUS at 08:08

## 2025-08-05 RX ADMIN — EZETIMIBE 10 MG: 10 TABLET ORAL at 09:08

## 2025-08-05 RX ADMIN — INSULIN ASPART 2 UNITS: 100 INJECTION, SOLUTION INTRAVENOUS; SUBCUTANEOUS at 09:08

## 2025-08-05 RX ADMIN — SENNOSIDES AND DOCUSATE SODIUM 1 TABLET: 50; 8.6 TABLET ORAL at 09:08

## 2025-08-05 RX ADMIN — INSULIN GLARGINE 10 UNITS: 100 INJECTION, SOLUTION SUBCUTANEOUS at 09:08

## 2025-08-05 RX ADMIN — MORPHINE SULFATE 2 MG: 4 INJECTION, SOLUTION INTRAMUSCULAR; INTRAVENOUS at 11:08

## 2025-08-06 LAB
ABSOLUTE EOSINOPHIL (OHS): 0.64 K/UL
ABSOLUTE MONOCYTE (OHS): 1.1 K/UL (ref 0.3–1)
ABSOLUTE NEUTROPHIL COUNT (OHS): 6.72 K/UL (ref 1.8–7.7)
ALBUMIN SERPL BCP-MCNC: 1.9 G/DL (ref 3.5–5.2)
ANION GAP (OHS): 8 MMOL/L (ref 8–16)
BASOPHILS # BLD AUTO: 0.11 K/UL
BASOPHILS NFR BLD AUTO: 0.9 %
BUN SERPL-MCNC: 56 MG/DL (ref 6–20)
CALCIUM SERPL-MCNC: 7.5 MG/DL (ref 8.7–10.5)
CHLORIDE SERPL-SCNC: 101 MMOL/L (ref 95–110)
CO2 SERPL-SCNC: 23 MMOL/L (ref 23–29)
CREAT SERPL-MCNC: 6 MG/DL (ref 0.5–1.4)
ERYTHROCYTE [DISTWIDTH] IN BLOOD BY AUTOMATED COUNT: 13.7 % (ref 11.5–14.5)
GFR SERPLBLD CREATININE-BSD FMLA CKD-EPI: 10 ML/MIN/1.73/M2
GLUCOSE SERPL-MCNC: 154 MG/DL (ref 70–110)
HCT VFR BLD AUTO: 32.8 % (ref 40–54)
HGB BLD-MCNC: 10.6 GM/DL (ref 14–18)
IMM GRANULOCYTES # BLD AUTO: 0.51 K/UL (ref 0–0.04)
IMM GRANULOCYTES NFR BLD AUTO: 4.2 % (ref 0–0.5)
LYMPHOCYTES # BLD AUTO: 3.15 K/UL (ref 1–4.8)
MCH RBC QN AUTO: 31.9 PG (ref 27–31)
MCHC RBC AUTO-ENTMCNC: 32.3 G/DL (ref 32–36)
MCV RBC AUTO: 99 FL (ref 82–98)
NUCLEATED RBC (/100WBC) (OHS): 0 /100 WBC
PHOSPHATE SERPL-MCNC: 3.9 MG/DL (ref 2.7–4.5)
PLATELET # BLD AUTO: 293 K/UL (ref 150–450)
PMV BLD AUTO: 9.5 FL (ref 9.2–12.9)
POCT GLUCOSE: 144 MG/DL (ref 70–110)
POCT GLUCOSE: 173 MG/DL (ref 70–110)
POCT GLUCOSE: 289 MG/DL (ref 70–110)
POTASSIUM SERPL-SCNC: 4.6 MMOL/L (ref 3.5–5.1)
RBC # BLD AUTO: 3.32 M/UL (ref 4.6–6.2)
RELATIVE EOSINOPHIL (OHS): 5.2 %
RELATIVE LYMPHOCYTE (OHS): 25.8 % (ref 18–48)
RELATIVE MONOCYTE (OHS): 9 % (ref 4–15)
RELATIVE NEUTROPHIL (OHS): 54.9 % (ref 38–73)
SODIUM SERPL-SCNC: 132 MMOL/L (ref 136–145)
VANCOMYCIN SERPL-MCNC: 17.3 UG/ML (ref ?–80)
WBC # BLD AUTO: 12.23 K/UL (ref 3.9–12.7)

## 2025-08-06 PROCEDURE — 99233 SBSQ HOSP IP/OBS HIGH 50: CPT | Mod: FS,,, | Performed by: INTERNAL MEDICINE

## 2025-08-06 PROCEDURE — 36415 COLL VENOUS BLD VENIPUNCTURE: CPT | Performed by: INTERNAL MEDICINE

## 2025-08-06 PROCEDURE — 63600175 PHARM REV CODE 636 W HCPCS: Performed by: STUDENT IN AN ORGANIZED HEALTH CARE EDUCATION/TRAINING PROGRAM

## 2025-08-06 PROCEDURE — 25000003 PHARM REV CODE 250: Performed by: HOSPITALIST

## 2025-08-06 PROCEDURE — 80202 ASSAY OF VANCOMYCIN: CPT | Performed by: HOSPITALIST

## 2025-08-06 PROCEDURE — 25000003 PHARM REV CODE 250: Performed by: INTERNAL MEDICINE

## 2025-08-06 PROCEDURE — 80100016 HC MAINTENANCE HEMODIALYSIS

## 2025-08-06 PROCEDURE — 99232 SBSQ HOSP IP/OBS MODERATE 35: CPT | Mod: ,,, | Performed by: NURSE PRACTITIONER

## 2025-08-06 PROCEDURE — 02H633Z INSERTION OF INFUSION DEVICE INTO RIGHT ATRIUM, PERCUTANEOUS APPROACH: ICD-10-PCS | Performed by: STUDENT IN AN ORGANIZED HEALTH CARE EDUCATION/TRAINING PROGRAM

## 2025-08-06 PROCEDURE — 27000207 HC ISOLATION

## 2025-08-06 PROCEDURE — 80069 RENAL FUNCTION PANEL: CPT | Performed by: INTERNAL MEDICINE

## 2025-08-06 PROCEDURE — 99233 SBSQ HOSP IP/OBS HIGH 50: CPT | Mod: ,,, | Performed by: INTERNAL MEDICINE

## 2025-08-06 PROCEDURE — 11000001 HC ACUTE MED/SURG PRIVATE ROOM

## 2025-08-06 PROCEDURE — 5A1D70Z PERFORMANCE OF URINARY FILTRATION, INTERMITTENT, LESS THAN 6 HOURS PER DAY: ICD-10-PCS | Performed by: STUDENT IN AN ORGANIZED HEALTH CARE EDUCATION/TRAINING PROGRAM

## 2025-08-06 PROCEDURE — 0JH63XZ INSERTION OF TUNNELED VASCULAR ACCESS DEVICE INTO CHEST SUBCUTANEOUS TISSUE AND FASCIA, PERCUTANEOUS APPROACH: ICD-10-PCS | Performed by: STUDENT IN AN ORGANIZED HEALTH CARE EDUCATION/TRAINING PROGRAM

## 2025-08-06 PROCEDURE — 63600175 PHARM REV CODE 636 W HCPCS: Mod: JZ,TB | Performed by: INTERNAL MEDICINE

## 2025-08-06 PROCEDURE — 85025 COMPLETE CBC W/AUTO DIFF WBC: CPT | Performed by: STUDENT IN AN ORGANIZED HEALTH CARE EDUCATION/TRAINING PROGRAM

## 2025-08-06 PROCEDURE — 25000003 PHARM REV CODE 250: Performed by: STUDENT IN AN ORGANIZED HEALTH CARE EDUCATION/TRAINING PROGRAM

## 2025-08-06 PROCEDURE — 94761 N-INVAS EAR/PLS OXIMETRY MLT: CPT

## 2025-08-06 RX ORDER — FENTANYL CITRATE 50 UG/ML
INJECTION, SOLUTION INTRAMUSCULAR; INTRAVENOUS
Status: COMPLETED | OUTPATIENT
Start: 2025-08-06 | End: 2025-08-06

## 2025-08-06 RX ORDER — LIDOCAINE HYDROCHLORIDE AND EPINEPHRINE 10; 10 UG/ML; MG/ML
INJECTION, SOLUTION INFILTRATION; PERINEURAL
Status: COMPLETED | OUTPATIENT
Start: 2025-08-06 | End: 2025-08-06

## 2025-08-06 RX ORDER — LIDOCAINE HYDROCHLORIDE 10 MG/ML
INJECTION, SOLUTION INFILTRATION; PERINEURAL
Status: COMPLETED | OUTPATIENT
Start: 2025-08-06 | End: 2025-08-06

## 2025-08-06 RX ORDER — CEFAZOLIN SODIUM 1 G/3ML
INJECTION, POWDER, FOR SOLUTION INTRAMUSCULAR; INTRAVENOUS
Status: COMPLETED | OUTPATIENT
Start: 2025-08-06 | End: 2025-08-06

## 2025-08-06 RX ORDER — HEPARIN SODIUM 1000 [USP'U]/ML
INJECTION, SOLUTION INTRAVENOUS; SUBCUTANEOUS
Status: COMPLETED | OUTPATIENT
Start: 2025-08-06 | End: 2025-08-06

## 2025-08-06 RX ADMIN — MORPHINE SULFATE 2 MG: 4 INJECTION, SOLUTION INTRAMUSCULAR; INTRAVENOUS at 03:08

## 2025-08-06 RX ADMIN — LIDOCAINE HYDROCHLORIDE 1 ML: 10 INJECTION, SOLUTION INFILTRATION; PERINEURAL at 10:08

## 2025-08-06 RX ADMIN — LIDOCAINE HYDROCHLORIDE,EPINEPHRINE BITARTRATE 8 ML: 10; .01 INJECTION, SOLUTION INFILTRATION; PERINEURAL at 10:08

## 2025-08-06 RX ADMIN — SENNOSIDES AND DOCUSATE SODIUM 1 TABLET: 50; 8.6 TABLET ORAL at 08:08

## 2025-08-06 RX ADMIN — ESCITALOPRAM OXALATE 10 MG: 10 TABLET ORAL at 08:08

## 2025-08-06 RX ADMIN — VANCOMYCIN HYDROCHLORIDE 500 MG: 500 INJECTION, POWDER, LYOPHILIZED, FOR SOLUTION INTRAVENOUS at 04:08

## 2025-08-06 RX ADMIN — CEFAZOLIN SODIUM 2 G: 1 POWDER, FOR SOLUTION INTRAMUSCULAR; INTRAVENOUS at 10:08

## 2025-08-06 RX ADMIN — EZETIMIBE 10 MG: 10 TABLET ORAL at 08:08

## 2025-08-06 RX ADMIN — INSULIN GLARGINE 10 UNITS: 100 INJECTION, SOLUTION SUBCUTANEOUS at 08:08

## 2025-08-06 RX ADMIN — LIDOCAINE HYDROCHLORIDE 6 ML: 10 INJECTION, SOLUTION INFILTRATION; PERINEURAL at 10:08

## 2025-08-06 RX ADMIN — PROBIOTIC PRODUCT - TAB 1 TABLET: TAB at 08:08

## 2025-08-06 RX ADMIN — EPOETIN ALFA-EPBX 10000 UNITS: 10000 INJECTION, SOLUTION INTRAVENOUS; SUBCUTANEOUS at 12:08

## 2025-08-06 RX ADMIN — VELPATASVIR AND SOFOSBUVIR 1 TABLET: 100; 400 TABLET, FILM COATED ORAL at 04:08

## 2025-08-06 RX ADMIN — HEPARIN SODIUM 3200 UNITS: 1000 INJECTION, SOLUTION INTRAVENOUS; SUBCUTANEOUS at 10:08

## 2025-08-06 RX ADMIN — TAMSULOSIN HYDROCHLORIDE 0.4 MG: 0.4 CAPSULE ORAL at 08:08

## 2025-08-06 RX ADMIN — ATORVASTATIN CALCIUM 80 MG: 40 TABLET, FILM COATED ORAL at 08:08

## 2025-08-06 RX ADMIN — INSULIN ASPART 2 UNITS: 100 INJECTION, SOLUTION INTRAVENOUS; SUBCUTANEOUS at 08:08

## 2025-08-06 RX ADMIN — MORPHINE SULFATE 2 MG: 4 INJECTION, SOLUTION INTRAMUSCULAR; INTRAVENOUS at 09:08

## 2025-08-06 RX ADMIN — CARVEDILOL 3.12 MG: 3.12 TABLET, FILM COATED ORAL at 08:08

## 2025-08-06 RX ADMIN — LIDOCAINE HYDROCHLORIDE 3 ML: 10 INJECTION, SOLUTION INFILTRATION; PERINEURAL at 10:08

## 2025-08-06 RX ADMIN — PANTOPRAZOLE SODIUM 40 MG: 40 TABLET, DELAYED RELEASE ORAL at 08:08

## 2025-08-06 RX ADMIN — FENTANYL CITRATE 50 MCG: 50 INJECTION INTRAMUSCULAR; INTRAVENOUS at 10:08

## 2025-08-06 RX ADMIN — MORPHINE SULFATE 2 MG: 4 INJECTION, SOLUTION INTRAMUSCULAR; INTRAVENOUS at 08:08

## 2025-08-06 RX ADMIN — CALCIUM ACETATE 1334 MG: 667 CAPSULE ORAL at 04:08

## 2025-08-07 LAB
POCT GLUCOSE: 153 MG/DL (ref 70–110)
POCT GLUCOSE: 235 MG/DL (ref 70–110)
POCT GLUCOSE: 297 MG/DL (ref 70–110)
POCT GLUCOSE: 98 MG/DL (ref 70–110)

## 2025-08-07 PROCEDURE — 11000001 HC ACUTE MED/SURG PRIVATE ROOM

## 2025-08-07 PROCEDURE — 25000003 PHARM REV CODE 250: Performed by: INTERNAL MEDICINE

## 2025-08-07 PROCEDURE — 90935 HEMODIALYSIS ONE EVALUATION: CPT | Mod: ,,, | Performed by: INTERNAL MEDICINE

## 2025-08-07 PROCEDURE — 25000003 PHARM REV CODE 250: Performed by: HOSPITALIST

## 2025-08-07 PROCEDURE — 63600175 PHARM REV CODE 636 W HCPCS: Performed by: INTERNAL MEDICINE

## 2025-08-07 PROCEDURE — 63600175 PHARM REV CODE 636 W HCPCS: Performed by: STUDENT IN AN ORGANIZED HEALTH CARE EDUCATION/TRAINING PROGRAM

## 2025-08-07 PROCEDURE — 80100016 HC MAINTENANCE HEMODIALYSIS

## 2025-08-07 PROCEDURE — 27000207 HC ISOLATION

## 2025-08-07 PROCEDURE — 99233 SBSQ HOSP IP/OBS HIGH 50: CPT | Mod: ,,, | Performed by: INTERNAL MEDICINE

## 2025-08-07 RX ADMIN — CARVEDILOL 3.12 MG: 3.12 TABLET, FILM COATED ORAL at 09:08

## 2025-08-07 RX ADMIN — TAMSULOSIN HYDROCHLORIDE 0.4 MG: 0.4 CAPSULE ORAL at 08:08

## 2025-08-07 RX ADMIN — INSULIN ASPART 3 UNITS: 100 INJECTION, SOLUTION INTRAVENOUS; SUBCUTANEOUS at 09:08

## 2025-08-07 RX ADMIN — VELPATASVIR AND SOFOSBUVIR 1 TABLET: 100; 400 TABLET, FILM COATED ORAL at 04:08

## 2025-08-07 RX ADMIN — INSULIN ASPART 4 UNITS: 100 INJECTION, SOLUTION INTRAVENOUS; SUBCUTANEOUS at 04:08

## 2025-08-07 RX ADMIN — SENNOSIDES AND DOCUSATE SODIUM 1 TABLET: 50; 8.6 TABLET ORAL at 08:08

## 2025-08-07 RX ADMIN — CARVEDILOL 3.12 MG: 3.12 TABLET, FILM COATED ORAL at 08:08

## 2025-08-07 RX ADMIN — INSULIN GLARGINE 10 UNITS: 100 INJECTION, SOLUTION SUBCUTANEOUS at 09:08

## 2025-08-07 RX ADMIN — MORPHINE SULFATE 2 MG: 4 INJECTION, SOLUTION INTRAMUSCULAR; INTRAVENOUS at 01:08

## 2025-08-07 RX ADMIN — ATORVASTATIN CALCIUM 80 MG: 40 TABLET, FILM COATED ORAL at 09:08

## 2025-08-07 RX ADMIN — PROBIOTIC PRODUCT - TAB 1 TABLET: TAB at 08:08

## 2025-08-07 RX ADMIN — NIFEDIPINE 30 MG: 30 TABLET, FILM COATED, EXTENDED RELEASE ORAL at 08:08

## 2025-08-07 RX ADMIN — ESCITALOPRAM OXALATE 10 MG: 10 TABLET ORAL at 08:08

## 2025-08-07 RX ADMIN — CALCIUM ACETATE 1334 MG: 667 CAPSULE ORAL at 08:08

## 2025-08-07 RX ADMIN — EZETIMIBE 10 MG: 10 TABLET ORAL at 09:08

## 2025-08-07 RX ADMIN — MORPHINE SULFATE 2 MG: 4 INJECTION, SOLUTION INTRAMUSCULAR; INTRAVENOUS at 12:08

## 2025-08-07 RX ADMIN — CALCIUM ACETATE 1334 MG: 667 CAPSULE ORAL at 04:08

## 2025-08-07 RX ADMIN — HEPARIN SODIUM 1000 UNITS: 1000 INJECTION INTRAVENOUS; SUBCUTANEOUS at 12:08

## 2025-08-07 RX ADMIN — PANTOPRAZOLE SODIUM 40 MG: 40 TABLET, DELAYED RELEASE ORAL at 08:08

## 2025-08-08 VITALS
TEMPERATURE: 99 F | BODY MASS INDEX: 26.76 KG/M2 | WEIGHT: 176.56 LBS | HEIGHT: 68 IN | SYSTOLIC BLOOD PRESSURE: 145 MMHG | RESPIRATION RATE: 18 BRPM | HEART RATE: 68 BPM | DIASTOLIC BLOOD PRESSURE: 71 MMHG | OXYGEN SATURATION: 96 %

## 2025-08-08 LAB
ABSOLUTE EOSINOPHIL (OHS): 0.38 K/UL
ABSOLUTE MONOCYTE (OHS): 1.11 K/UL (ref 0.3–1)
ABSOLUTE NEUTROPHIL COUNT (OHS): 8.5 K/UL (ref 1.8–7.7)
ALBUMIN SERPL BCP-MCNC: 2.1 G/DL (ref 3.5–5.2)
ANION GAP (OHS): 9 MMOL/L (ref 8–16)
BACTERIA BLD CULT: NORMAL
BASOPHILS # BLD AUTO: 0.08 K/UL
BASOPHILS NFR BLD AUTO: 0.6 %
BUN SERPL-MCNC: 40 MG/DL (ref 6–20)
CALCIUM SERPL-MCNC: 7.6 MG/DL (ref 8.7–10.5)
CHLORIDE SERPL-SCNC: 102 MMOL/L (ref 95–110)
CO2 SERPL-SCNC: 20 MMOL/L (ref 23–29)
CREAT SERPL-MCNC: 5.3 MG/DL (ref 0.5–1.4)
ERYTHROCYTE [DISTWIDTH] IN BLOOD BY AUTOMATED COUNT: 14.4 % (ref 11.5–14.5)
GFR SERPLBLD CREATININE-BSD FMLA CKD-EPI: 12 ML/MIN/1.73/M2
GLUCOSE SERPL-MCNC: 154 MG/DL (ref 70–110)
HCT VFR BLD AUTO: 31.3 % (ref 40–54)
HGB BLD-MCNC: 10.2 GM/DL (ref 14–18)
IMM GRANULOCYTES # BLD AUTO: 0.2 K/UL (ref 0–0.04)
IMM GRANULOCYTES NFR BLD AUTO: 1.5 % (ref 0–0.5)
LYMPHOCYTES # BLD AUTO: 2.74 K/UL (ref 1–4.8)
MCH RBC QN AUTO: 32.7 PG (ref 27–31)
MCHC RBC AUTO-ENTMCNC: 32.6 G/DL (ref 32–36)
MCV RBC AUTO: 100 FL (ref 82–98)
NUCLEATED RBC (/100WBC) (OHS): 0 /100 WBC
PHOSPHATE SERPL-MCNC: 4.8 MG/DL (ref 2.7–4.5)
PLATELET # BLD AUTO: 257 K/UL (ref 150–450)
PMV BLD AUTO: 10.3 FL (ref 9.2–12.9)
POCT GLUCOSE: 173 MG/DL (ref 70–110)
POCT GLUCOSE: 174 MG/DL (ref 70–110)
POCT GLUCOSE: 315 MG/DL (ref 70–110)
POTASSIUM SERPL-SCNC: 5.1 MMOL/L (ref 3.5–5.1)
RBC # BLD AUTO: 3.12 M/UL (ref 4.6–6.2)
RELATIVE EOSINOPHIL (OHS): 2.9 %
RELATIVE LYMPHOCYTE (OHS): 21.1 % (ref 18–48)
RELATIVE MONOCYTE (OHS): 8.5 % (ref 4–15)
RELATIVE NEUTROPHIL (OHS): 65.4 % (ref 38–73)
SODIUM SERPL-SCNC: 131 MMOL/L (ref 136–145)
VANCOMYCIN SERPL-MCNC: 14.5 UG/ML (ref ?–80)
WBC # BLD AUTO: 13.01 K/UL (ref 3.9–12.7)

## 2025-08-08 PROCEDURE — 85025 COMPLETE CBC W/AUTO DIFF WBC: CPT | Performed by: INTERNAL MEDICINE

## 2025-08-08 PROCEDURE — 25000003 PHARM REV CODE 250: Performed by: HOSPITALIST

## 2025-08-08 PROCEDURE — 90935 HEMODIALYSIS ONE EVALUATION: CPT | Mod: ,,, | Performed by: INTERNAL MEDICINE

## 2025-08-08 PROCEDURE — 80202 ASSAY OF VANCOMYCIN: CPT | Performed by: HOSPITALIST

## 2025-08-08 PROCEDURE — 99233 SBSQ HOSP IP/OBS HIGH 50: CPT | Mod: ,,, | Performed by: INTERNAL MEDICINE

## 2025-08-08 PROCEDURE — 94761 N-INVAS EAR/PLS OXIMETRY MLT: CPT

## 2025-08-08 PROCEDURE — 80100016 HC MAINTENANCE HEMODIALYSIS

## 2025-08-08 PROCEDURE — 25000003 PHARM REV CODE 250: Performed by: INTERNAL MEDICINE

## 2025-08-08 PROCEDURE — 80069 RENAL FUNCTION PANEL: CPT | Performed by: INTERNAL MEDICINE

## 2025-08-08 PROCEDURE — 63600175 PHARM REV CODE 636 W HCPCS: Performed by: STUDENT IN AN ORGANIZED HEALTH CARE EDUCATION/TRAINING PROGRAM

## 2025-08-08 PROCEDURE — 36415 COLL VENOUS BLD VENIPUNCTURE: CPT | Performed by: INTERNAL MEDICINE

## 2025-08-08 RX ORDER — CARVEDILOL 3.12 MG/1
3.12 TABLET ORAL 2 TIMES DAILY
Qty: 180 TABLET | Refills: 3 | Status: SHIPPED | OUTPATIENT
Start: 2025-08-08 | End: 2026-08-08

## 2025-08-08 RX ORDER — GUAIFENESIN AND DEXTROMETHORPHAN HYDROBROMIDE 10; 100 MG/5ML; MG/5ML
5 SYRUP ORAL EVERY 4 HOURS PRN
Qty: 236 ML | Refills: 0 | Status: SHIPPED | OUTPATIENT
Start: 2025-08-08 | End: 2025-08-18

## 2025-08-08 RX ORDER — OXYCODONE HYDROCHLORIDE 5 MG/1
5 TABLET ORAL EVERY 4 HOURS PRN
Qty: 12 TABLET | Refills: 0 | Status: SHIPPED | OUTPATIENT
Start: 2025-08-08

## 2025-08-08 RX ADMIN — PANTOPRAZOLE SODIUM 40 MG: 40 TABLET, DELAYED RELEASE ORAL at 08:08

## 2025-08-08 RX ADMIN — ESCITALOPRAM OXALATE 10 MG: 10 TABLET ORAL at 08:08

## 2025-08-08 RX ADMIN — INSULIN ASPART 8 UNITS: 100 INJECTION, SOLUTION INTRAVENOUS; SUBCUTANEOUS at 04:08

## 2025-08-08 RX ADMIN — VELPATASVIR AND SOFOSBUVIR 1 TABLET: 100; 400 TABLET, FILM COATED ORAL at 05:08

## 2025-08-08 RX ADMIN — CALCIUM ACETATE 1334 MG: 667 CAPSULE ORAL at 08:08

## 2025-08-08 RX ADMIN — PROBIOTIC PRODUCT - TAB 1 TABLET: TAB at 08:08

## 2025-08-08 RX ADMIN — MORPHINE SULFATE 2 MG: 4 INJECTION, SOLUTION INTRAMUSCULAR; INTRAVENOUS at 04:08

## 2025-08-08 RX ADMIN — TAMSULOSIN HYDROCHLORIDE 0.4 MG: 0.4 CAPSULE ORAL at 08:08

## 2025-08-08 RX ADMIN — CALCIUM ACETATE 1334 MG: 667 CAPSULE ORAL at 05:08

## 2025-08-15 DIAGNOSIS — N18.6 ESRF (END STAGE RENAL FAILURE): Primary | ICD-10-CM

## 2025-08-25 ENCOUNTER — TELEPHONE (OUTPATIENT)
Dept: INFECTIOUS DISEASES | Facility: CLINIC | Age: 55
End: 2025-08-25
Payer: MEDICAID

## 2025-08-26 ENCOUNTER — HOSPITAL ENCOUNTER (OUTPATIENT)
Dept: RADIOLOGY | Facility: HOSPITAL | Age: 55
Discharge: HOME | End: 2025-08-26
Attending: THORACIC SURGERY (CARDIOTHORACIC VASCULAR SURGERY)
Payer: MEDICAID

## 2025-08-26 DIAGNOSIS — J90 RECURRENT PLEURAL EFFUSION ON LEFT: ICD-10-CM

## 2025-08-26 PROCEDURE — 71250 CT THORAX DX C-: CPT | Mod: 26,,, | Performed by: RADIOLOGY

## 2025-08-26 PROCEDURE — 71250 CT THORAX DX C-: CPT | Mod: TC

## 2025-09-04 ENCOUNTER — OFFICE VISIT (OUTPATIENT)
Dept: VASCULAR SURGERY | Facility: CLINIC | Age: 55
End: 2025-09-04
Attending: STUDENT IN AN ORGANIZED HEALTH CARE EDUCATION/TRAINING PROGRAM
Payer: MEDICAID

## 2025-09-04 ENCOUNTER — HOSPITAL ENCOUNTER (OUTPATIENT)
Dept: VASCULAR SURGERY | Facility: CLINIC | Age: 55
Discharge: HOME | End: 2025-09-04
Attending: STUDENT IN AN ORGANIZED HEALTH CARE EDUCATION/TRAINING PROGRAM
Payer: MEDICAID

## 2025-09-04 VITALS
BODY MASS INDEX: 26.06 KG/M2 | TEMPERATURE: 98 F | HEIGHT: 68 IN | WEIGHT: 171.94 LBS | DIASTOLIC BLOOD PRESSURE: 77 MMHG | SYSTOLIC BLOOD PRESSURE: 174 MMHG | HEART RATE: 89 BPM

## 2025-09-04 DIAGNOSIS — N18.6 ESRF (END STAGE RENAL FAILURE): ICD-10-CM

## 2025-09-04 DIAGNOSIS — N18.6 ESRF (END STAGE RENAL FAILURE): Primary | ICD-10-CM

## 2025-09-04 DIAGNOSIS — Z98.890 S/P ARTERIOVENOUS (AV) FISTULA CREATION: ICD-10-CM

## 2025-09-04 PROCEDURE — 99999 PR PBB SHADOW E&M-EST. PATIENT-LVL IV: CPT | Mod: PBBFAC,,, | Performed by: STUDENT IN AN ORGANIZED HEALTH CARE EDUCATION/TRAINING PROGRAM

## 2025-09-04 PROCEDURE — 93990 DOPPLER FLOW TESTING: CPT | Mod: PBBFAC | Performed by: SURGERY

## 2025-09-04 PROCEDURE — 99214 OFFICE O/P EST MOD 30 MIN: CPT | Mod: PBBFAC | Performed by: STUDENT IN AN ORGANIZED HEALTH CARE EDUCATION/TRAINING PROGRAM

## (undated) DEVICE — VISE RADIFOCUS MULTI TORQUE

## (undated) DEVICE — GLIDESHEATH SLENDER SS 5FR10CM

## (undated) DEVICE — CATH GLIDE ANGLED 5FR 65CM

## (undated) DEVICE — APPLICATOR CHLORAPREP ORN 26ML

## (undated) DEVICE — INFLATOR ENCORE

## (undated) DEVICE — INSERTS STEALTH FIBRA SIZE 1.

## (undated) DEVICE — GAUZE WOVEN STRL 12-PLY 4X4IN

## (undated) DEVICE — SUT VICRYL 3-0 27 SH

## (undated) DEVICE — SUT 2-0 VICRYL / SH (J417)

## (undated) DEVICE — DRESSING TRANS 4X4 TEGADERM

## (undated) DEVICE — Device

## (undated) DEVICE — BAND TR WITH INFLATOR

## (undated) DEVICE — LOOP VESSEL YELLOW MAXI

## (undated) DEVICE — SUT MCRYL PLUS 4-0 PS2 27IN

## (undated) DEVICE — SUT PROLENE 6-0 C-1 30IN BL

## (undated) DEVICE — DECANTER FLUID TRNSF WHITE 9IN

## (undated) DEVICE — SUT MONOCRYL 3-0 SH U/D

## (undated) DEVICE — DRESSING TRANS 2X2 TEGADERM

## (undated) DEVICE — SOL NACL 0.9% IV INJ 1000ML

## (undated) DEVICE — DRAPE PED LAP SURG 108X77IN

## (undated) DEVICE — GUIDEWIRE STF .035X180CM ANG

## (undated) DEVICE — COVER INSTR ELASTIC BAND 40X20

## (undated) DEVICE — ELECTRODE MEGADYNE RETURN DUAL

## (undated) DEVICE — SOL 9P NACL IRR PIC IL

## (undated) DEVICE — COVER LIGHT HANDLE 80/CA

## (undated) DEVICE — SUT SILK 2-0 SH 18IN BLACK

## (undated) DEVICE — COVERS PROBE NR-48 STERILE

## (undated) DEVICE — SPONGE GAUZE 16PLY 4X4

## (undated) DEVICE — GOWN SURGICAL X-LARGE

## (undated) DEVICE — STOPCOCK MED PRSS 3-WAY

## (undated) DEVICE — CLIP MED TICALL

## (undated) DEVICE — PENCIL ROCKER SWITCH 10FT CORD

## (undated) DEVICE — SUT 3-0 12-18IN SILK

## (undated) DEVICE — PAD CURAD NONADH 3X4IN

## (undated) DEVICE — HEMOSTAT SURGICEL 4X8IN

## (undated) DEVICE — SUT ETHILON 3-0 PS2 18 BLK

## (undated) DEVICE — SUT PROLENE 5-0 36IN C-1

## (undated) DEVICE — PACK AV VASCULAR ACCESS OMC

## (undated) DEVICE — SUT LIGACLIP SMALL XTRA

## (undated) DEVICE — PENCIL SMK EVAC CONNECTOR 10FT

## (undated) DEVICE — SYR IRRIGATION BULB STER 60ML

## (undated) DEVICE — KIT INTRO MICRO NIT VSI 4FR

## (undated) DEVICE — GUIDEWIRE ADVAN 25CM.014 180CM